# Patient Record
Sex: MALE | Race: WHITE | NOT HISPANIC OR LATINO | Employment: OTHER | URBAN - METROPOLITAN AREA
[De-identification: names, ages, dates, MRNs, and addresses within clinical notes are randomized per-mention and may not be internally consistent; named-entity substitution may affect disease eponyms.]

---

## 2017-02-16 ENCOUNTER — ALLSCRIPTS OFFICE VISIT (OUTPATIENT)
Dept: OTHER | Facility: OTHER | Age: 52
End: 2017-02-16

## 2017-02-16 DIAGNOSIS — M54.6 PAIN IN THORACIC SPINE: ICD-10-CM

## 2017-02-28 ENCOUNTER — APPOINTMENT (OUTPATIENT)
Dept: PHYSICAL THERAPY | Facility: CLINIC | Age: 52
End: 2017-02-28
Payer: OTHER MISCELLANEOUS

## 2017-02-28 DIAGNOSIS — M54.6 PAIN IN THORACIC SPINE: ICD-10-CM

## 2017-02-28 PROCEDURE — 97010 HOT OR COLD PACKS THERAPY: CPT

## 2017-02-28 PROCEDURE — 97163 PT EVAL HIGH COMPLEX 45 MIN: CPT

## 2017-03-01 ENCOUNTER — GENERIC CONVERSION - ENCOUNTER (OUTPATIENT)
Dept: OTHER | Facility: OTHER | Age: 52
End: 2017-03-01

## 2017-03-02 ENCOUNTER — APPOINTMENT (OUTPATIENT)
Dept: PHYSICAL THERAPY | Facility: CLINIC | Age: 52
End: 2017-03-02
Payer: OTHER MISCELLANEOUS

## 2017-03-02 PROCEDURE — 97110 THERAPEUTIC EXERCISES: CPT

## 2017-03-02 PROCEDURE — 97140 MANUAL THERAPY 1/> REGIONS: CPT

## 2017-03-07 ENCOUNTER — APPOINTMENT (OUTPATIENT)
Dept: PHYSICAL THERAPY | Facility: CLINIC | Age: 52
End: 2017-03-07
Payer: OTHER MISCELLANEOUS

## 2017-03-07 PROCEDURE — 97110 THERAPEUTIC EXERCISES: CPT

## 2017-03-07 PROCEDURE — 97140 MANUAL THERAPY 1/> REGIONS: CPT

## 2017-03-09 ENCOUNTER — APPOINTMENT (OUTPATIENT)
Dept: PHYSICAL THERAPY | Facility: CLINIC | Age: 52
End: 2017-03-09
Payer: OTHER MISCELLANEOUS

## 2017-03-14 ENCOUNTER — APPOINTMENT (OUTPATIENT)
Dept: PHYSICAL THERAPY | Facility: CLINIC | Age: 52
End: 2017-03-14
Payer: OTHER MISCELLANEOUS

## 2017-03-16 ENCOUNTER — APPOINTMENT (OUTPATIENT)
Dept: PHYSICAL THERAPY | Facility: CLINIC | Age: 52
End: 2017-03-16
Payer: OTHER MISCELLANEOUS

## 2017-04-07 ENCOUNTER — GENERIC CONVERSION - ENCOUNTER (OUTPATIENT)
Dept: OTHER | Facility: OTHER | Age: 52
End: 2017-04-07

## 2017-05-11 ENCOUNTER — ALLSCRIPTS OFFICE VISIT (OUTPATIENT)
Dept: OTHER | Facility: OTHER | Age: 52
End: 2017-05-11

## 2017-09-13 ENCOUNTER — GENERIC CONVERSION - ENCOUNTER (OUTPATIENT)
Dept: OTHER | Facility: OTHER | Age: 52
End: 2017-09-13

## 2017-10-03 ENCOUNTER — APPOINTMENT (EMERGENCY)
Dept: CT IMAGING | Facility: HOSPITAL | Age: 52
End: 2017-10-03
Payer: OTHER MISCELLANEOUS

## 2017-10-03 ENCOUNTER — HOSPITAL ENCOUNTER (EMERGENCY)
Facility: HOSPITAL | Age: 52
Discharge: HOME | End: 2017-10-03
Attending: EMERGENCY MEDICINE
Payer: OTHER MISCELLANEOUS

## 2017-10-03 VITALS
OXYGEN SATURATION: 95 % | HEIGHT: 68 IN | TEMPERATURE: 98.4 F | WEIGHT: 253 LBS | HEART RATE: 67 BPM | RESPIRATION RATE: 18 BRPM | BODY MASS INDEX: 38.34 KG/M2 | SYSTOLIC BLOOD PRESSURE: 127 MMHG | DIASTOLIC BLOOD PRESSURE: 85 MMHG

## 2017-10-03 DIAGNOSIS — R10.9 FLANK PAIN: Primary | ICD-10-CM

## 2017-10-03 DIAGNOSIS — R19.7 DIARRHEA: ICD-10-CM

## 2017-10-03 LAB
ALBUMIN SERPL BCP-MCNC: 3.4 G/DL (ref 3.5–5)
ALP SERPL-CCNC: 136 U/L (ref 46–116)
ALT SERPL W P-5'-P-CCNC: 55 U/L (ref 12–78)
ANION GAP SERPL CALCULATED.3IONS-SCNC: 6 MMOL/L (ref 4–13)
AST SERPL W P-5'-P-CCNC: 41 U/L (ref 5–45)
BASOPHILS # BLD AUTO: 0.03 THOUSANDS/ΜL (ref 0–0.1)
BASOPHILS NFR BLD AUTO: 1 % (ref 0–1)
BILIRUB SERPL-MCNC: 0.4 MG/DL (ref 0.2–1)
BUN SERPL-MCNC: 10 MG/DL (ref 5–25)
CALCIUM SERPL-MCNC: 8.6 MG/DL (ref 8.3–10.1)
CHLORIDE SERPL-SCNC: 104 MMOL/L (ref 100–108)
CO2 SERPL-SCNC: 29 MMOL/L (ref 21–32)
CREAT SERPL-MCNC: 0.9 MG/DL (ref 0.6–1.3)
EOSINOPHIL # BLD AUTO: 0.13 THOUSAND/ΜL (ref 0–0.61)
EOSINOPHIL NFR BLD AUTO: 2 % (ref 0–6)
ERYTHROCYTE [DISTWIDTH] IN BLOOD BY AUTOMATED COUNT: 14.8 % (ref 11.6–15.1)
GFR SERPL CREATININE-BSD FRML MDRD: 99 ML/MIN/1.73SQ M
GLUCOSE SERPL-MCNC: 138 MG/DL (ref 65–140)
HCT VFR BLD AUTO: 39.2 % (ref 36.5–49.3)
HGB BLD-MCNC: 13 G/DL (ref 12–17)
LIPASE SERPL-CCNC: 120 U/L (ref 73–393)
LYMPHOCYTES # BLD AUTO: 2.01 THOUSANDS/ΜL (ref 0.6–4.47)
LYMPHOCYTES NFR BLD AUTO: 31 % (ref 14–44)
MCH RBC QN AUTO: 27.1 PG (ref 26.8–34.3)
MCHC RBC AUTO-ENTMCNC: 33.2 G/DL (ref 31.4–37.4)
MCV RBC AUTO: 82 FL (ref 82–98)
MONOCYTES # BLD AUTO: 0.48 THOUSAND/ΜL (ref 0.17–1.22)
MONOCYTES NFR BLD AUTO: 7 % (ref 4–12)
NEUTROPHILS # BLD AUTO: 3.8 THOUSANDS/ΜL (ref 1.85–7.62)
NEUTS SEG NFR BLD AUTO: 59 % (ref 43–75)
NRBC BLD AUTO-RTO: 0 /100 WBCS
PLATELET # BLD AUTO: 243 THOUSANDS/UL (ref 149–390)
PMV BLD AUTO: 9.1 FL (ref 8.9–12.7)
POTASSIUM SERPL-SCNC: 4 MMOL/L (ref 3.5–5.3)
PROT SERPL-MCNC: 7.1 G/DL (ref 6.4–8.2)
RBC # BLD AUTO: 4.79 MILLION/UL (ref 3.88–5.62)
SODIUM SERPL-SCNC: 139 MMOL/L (ref 136–145)
WBC # BLD AUTO: 6.47 THOUSAND/UL (ref 4.31–10.16)

## 2017-10-03 PROCEDURE — 74176 CT ABD & PELVIS W/O CONTRAST: CPT

## 2017-10-03 PROCEDURE — 85025 COMPLETE CBC W/AUTO DIFF WBC: CPT | Performed by: EMERGENCY MEDICINE

## 2017-10-03 PROCEDURE — 80053 COMPREHEN METABOLIC PANEL: CPT | Performed by: EMERGENCY MEDICINE

## 2017-10-03 PROCEDURE — 36415 COLL VENOUS BLD VENIPUNCTURE: CPT | Performed by: EMERGENCY MEDICINE

## 2017-10-03 PROCEDURE — 96375 TX/PRO/DX INJ NEW DRUG ADDON: CPT

## 2017-10-03 PROCEDURE — 83690 ASSAY OF LIPASE: CPT | Performed by: EMERGENCY MEDICINE

## 2017-10-03 PROCEDURE — 96374 THER/PROPH/DIAG INJ IV PUSH: CPT

## 2017-10-03 PROCEDURE — 99285 EMERGENCY DEPT VISIT HI MDM: CPT

## 2017-10-03 PROCEDURE — 96361 HYDRATE IV INFUSION ADD-ON: CPT

## 2017-10-03 RX ORDER — SODIUM CHLORIDE 9 MG/ML
125 INJECTION, SOLUTION INTRAVENOUS CONTINUOUS
Status: DISCONTINUED | OUTPATIENT
Start: 2017-10-03 | End: 2017-10-03 | Stop reason: HOSPADM

## 2017-10-03 RX ORDER — TRAMADOL HYDROCHLORIDE 100 MG/1
100 CAPSULE ORAL 2 TIMES DAILY PRN
COMMUNITY
End: 2018-08-06

## 2017-10-03 RX ORDER — METHOCARBAMOL 750 MG/1
750 TABLET, FILM COATED ORAL 3 TIMES DAILY
COMMUNITY
End: 2018-08-06

## 2017-10-03 RX ORDER — PANTOPRAZOLE SODIUM 40 MG/1
20 TABLET, DELAYED RELEASE ORAL 2 TIMES DAILY
COMMUNITY

## 2017-10-03 RX ORDER — KETOROLAC TROMETHAMINE 30 MG/ML
30 INJECTION, SOLUTION INTRAMUSCULAR; INTRAVENOUS ONCE
Status: COMPLETED | OUTPATIENT
Start: 2017-10-03 | End: 2017-10-03

## 2017-10-03 RX ORDER — MORPHINE SULFATE 10 MG/ML
6 INJECTION, SOLUTION INTRAMUSCULAR; INTRAVENOUS ONCE
Status: COMPLETED | OUTPATIENT
Start: 2017-10-03 | End: 2017-10-03

## 2017-10-03 RX ORDER — SERTRALINE HYDROCHLORIDE 100 MG/1
150 TABLET, FILM COATED ORAL EVERY MORNING
COMMUNITY

## 2017-10-03 RX ORDER — TRAZODONE HYDROCHLORIDE 50 MG/1
75 TABLET ORAL
COMMUNITY

## 2017-10-03 RX ADMIN — KETOROLAC TROMETHAMINE 30 MG: 30 INJECTION, SOLUTION INTRAMUSCULAR at 18:54

## 2017-10-03 RX ADMIN — MORPHINE SULFATE 6 MG: 10 INJECTION, SOLUTION INTRAMUSCULAR; INTRAVENOUS at 18:53

## 2017-10-03 RX ADMIN — SODIUM CHLORIDE 125 ML/HR: 0.9 INJECTION, SOLUTION INTRAVENOUS at 19:00

## 2017-10-03 NOTE — ED PROVIDER NOTES
History  Chief Complaint   Patient presents with    Flank Pain     diarrhea 3 days ago accompanied with right sided flank pain  HPI    Prior to Admission Medications   Prescriptions Last Dose Informant Patient Reported? Taking? Multiple Vitamins-Minerals (CENTRUM SILVER 50+MEN PO)   Yes No   Sig: Centrum Silver TABS  TAKE 1 TABLET DAILY  Refills: 0      , M D ; Active   TraMADol HCl  MG CP24   Yes No   Sig: Take 100 mg by mouth 2 (two) times a day as needed   methocarbamol (ROBAXIN) 750 mg tablet   Yes No   Sig: Take 750 mg by mouth 3 (three) times a day   metoprolol tartrate (LOPRESSOR) 25 mg tablet   Yes No   Sig: Take 12 5 mg by mouth 2 (two) times a day   pantoprazole (PROTONIX) 40 mg tablet   Yes No   Sig: Take 40 mg by mouth daily at bedtime   sertraline (ZOLOFT) 100 mg tablet   Yes No   Sig: Take 50 mg by mouth daily   traZODone (DESYREL) 100 mg tablet   Yes No   Sig: Take 100 mg by mouth daily at bedtime      Facility-Administered Medications: None       Past Medical History:   Diagnosis Date    Hypertension        Past Surgical History:   Procedure Laterality Date    CERVICAL SPINE SURGERY      NECK SURGERY      SHOULDER SURGERY Right        History reviewed  No pertinent family history  I have reviewed and agree with the history as documented      Social History   Substance Use Topics    Smoking status: Never Smoker    Smokeless tobacco: Never Used    Alcohol use No        Review of Systems    Physical Exam  ED Triage Vitals [10/03/17 1817]   Temperature Pulse Respirations Blood Pressure SpO2   98 4 °F (36 9 °C) 76 18 127/85 95 %      Temp Source Heart Rate Source Patient Position - Orthostatic VS BP Location FiO2 (%)   Oral Monitor Sitting Left arm --      Pain Score       7           Physical Exam    ED Medications  Medications    EMS REPLENISHMENT MED (not administered)   sodium chloride 0 9 % infusion (125 mL/hr Intravenous New Bag 10/3/17 1900)   morphine (PF) 10 mg/mL injection 6 mg (6 mg Intravenous Given 10/3/17 1853)   ketorolac (TORADOL) 30 mg/mL injection 30 mg (30 mg Intravenous Given 10/3/17 1854)       Diagnostic Studies  Labs Reviewed   COMPREHENSIVE METABOLIC PANEL - Abnormal        Result Value Ref Range Status    Alkaline Phosphatase 136 (*) 46 - 116 U/L Final    Albumin 3 4 (*) 3 5 - 5 0 g/dL Final    Sodium 139  136 - 145 mmol/L Final    Potassium 4 0  3 5 - 5 3 mmol/L Final    Chloride 104  100 - 108 mmol/L Final    CO2 29  21 - 32 mmol/L Final    Anion Gap 6  4 - 13 mmol/L Final    BUN 10  5 - 25 mg/dL Final    Creatinine 0 90  0 60 - 1 30 mg/dL Final    Comment: Standardized to IDMS reference method    Glucose 138  65 - 140 mg/dL Final    Comment:   If the patient is fasting, the ADA then defines impaired fasting glucose as > 100 mg/dL and diabetes as > or equal to 123 mg/dL  Specimen collection should occur prior to Sulfasalazine administration due to the potential for falsely depressed results  Specimen collection should occur prior to Sulfapyridine administration due to the potential for falsely elevated results  Calcium 8 6  8 3 - 10 1 mg/dL Final    AST 41  5 - 45 U/L Final    Comment:   Specimen collection should occur prior to Sulfasalazine administration due to the potential for falsely depressed results  ALT 55  12 - 78 U/L Final    Comment:   Specimen collection should occur prior to Sulfasalazine administration due to the potential for falsely depressed results  Total Protein 7 1  6 4 - 8 2 g/dL Final    Total Bilirubin 0 40  0 20 - 1 00 mg/dL Final    eGFR 99  ml/min/1 73sq m Final    Narrative:     National Kidney Disease Education Program recommendations are as follows:  GFR calculation is accurate only with a steady state creatinine  Chronic Kidney disease less than 60 ml/min/1 73 sq  meters  Kidney failure less than 15 ml/min/1 73 sq  meters     CBC AND DIFFERENTIAL - Normal    WBC 6 47  4 31 - 10 16 Thousand/uL Final    RBC 4 79  3 88 - 5 62 Million/uL Final    Hemoglobin 13 0  12 0 - 17 0 g/dL Final    Hematocrit 39 2  36 5 - 49 3 % Final    MCV 82  82 - 98 fL Final    MCH 27 1  26 8 - 34 3 pg Final    MCHC 33 2  31 4 - 37 4 g/dL Final    RDW 14 8  11 6 - 15 1 % Final    MPV 9 1  8 9 - 12 7 fL Final    Platelets 967  016 - 390 Thousands/uL Final    nRBC 0  /100 WBCs Final    Neutrophils Relative 59  43 - 75 % Final    Lymphocytes Relative 31  14 - 44 % Final    Monocytes Relative 7  4 - 12 % Final    Eosinophils Relative 2  0 - 6 % Final    Basophils Relative 1  0 - 1 % Final    Neutrophils Absolute 3 80  1 85 - 7 62 Thousands/µL Final    Lymphocytes Absolute 2 01  0 60 - 4 47 Thousands/µL Final    Monocytes Absolute 0 48  0 17 - 1 22 Thousand/µL Final    Eosinophils Absolute 0 13  0 00 - 0 61 Thousand/µL Final    Basophils Absolute 0 03  0 00 - 0 10 Thousands/µL Final   LIPASE - Normal    Lipase 120  73 - 393 u/L Final   URINALYSIS WITH REFLEX TO MICROSCOPIC       CT renal stone study abdomen pelvis without contrast   Final Result      No obstructive uropathy or acute appendicitis in this patient with a history of right flank pain  No acute findings in the abdomen or the pelvis  Fatty infiltration of the partially imaged liver  Fat-containing bilateral inguinal hernias  ##[email or web address]##imslh                Workstation performed: [id number]             Procedures  Procedures      Phone Contacts  ED Phone Contact    ED Course  ED Course as of Oct 03 2117   Tue Oct 03, 2017   2114 Patient is stable for discharge  His lab work and CT scan are unremarkable  There is no evidence of kidney stone  Patient is comfortable at discharge  MDM  CritCare Time    Disposition  Final diagnoses:   Flank pain   Diarrhea     ED Disposition     ED Disposition Condition Comment    Discharge  Clydell Givens  discharge to home/self care      Condition at discharge: Stable        Follow-up Information     Follow up With Specialties Details Why Contact Info    Private physician  Schedule an appointment as soon as possible for a visit          Patient's Medications   Discharge Prescriptions    No medications on file     No discharge procedures on file      ED Provider  Electronically Signed by       Mike Biggs MD  10/03/17 9509

## 2017-10-03 NOTE — ED PROVIDER NOTES
History  Chief Complaint   Patient presents with    Flank Pain     diarrhea 3 days ago accompanied with right sided flank pain  Patient is a 79-year-old male  He presents to the emergency room with a 3 day history of right flank pain  It does not radiate  He denies injury  It is a sharp pain like being stabbed with a knife  It is worse with movement  He does have a history of lumbar disc disease  He has a history of spinal stenosis  He denies fever or chills  No history of IV drug use  He has no incontinence  There is no dysuria, frequency or hematuria  No difficulty urinating  No rash  No abdominal pain  He has had diarrhea for several days  There is no melena or hematochezia  There is no associated nausea and vomiting  The pain is severe  He was in a chair at home and could not get up  He needed to call an ambulance  He received fentanyl EN route  There is no associated motor or sensory complaints in the lower extremities  He has not had pain like this before  Prior to Admission Medications   Prescriptions Last Dose Informant Patient Reported? Taking? Multiple Vitamins-Minerals (CENTRUM SILVER 50+MEN PO)   Yes No   Sig: Centrum Silver TABS  TAKE 1 TABLET DAILY     Refills: 0      , M D ; Active   TraMADol HCl  MG CP24   Yes No   Sig: Take 100 mg by mouth 2 (two) times a day as needed   methocarbamol (ROBAXIN) 750 mg tablet   Yes No   Sig: Take 750 mg by mouth 3 (three) times a day   metoprolol tartrate (LOPRESSOR) 25 mg tablet   Yes No   Sig: Take 12 5 mg by mouth 2 (two) times a day   pantoprazole (PROTONIX) 40 mg tablet   Yes No   Sig: Take 40 mg by mouth daily at bedtime   sertraline (ZOLOFT) 100 mg tablet   Yes No   Sig: Take 50 mg by mouth daily   traZODone (DESYREL) 100 mg tablet   Yes No   Sig: Take 100 mg by mouth daily at bedtime      Facility-Administered Medications: None       Past Medical History:   Diagnosis Date    Hypertension        Past Surgical History:   Procedure Laterality Date    CERVICAL SPINE SURGERY      NECK SURGERY      SHOULDER SURGERY Right        History reviewed  No pertinent family history  I have reviewed and agree with the history as documented  Social History   Substance Use Topics    Smoking status: Never Smoker    Smokeless tobacco: Never Used    Alcohol use No        Review of Systems   Constitutional: Negative for chills and fever  HENT: Negative for rhinorrhea and sore throat  Eyes: Negative for pain, redness and visual disturbance  Respiratory: Negative for cough and shortness of breath  Cardiovascular: Negative for chest pain and leg swelling  Gastrointestinal: Negative for abdominal pain, diarrhea and vomiting  Endocrine: Negative for polydipsia and polyuria  Genitourinary: Positive for flank pain  Negative for dysuria, frequency and hematuria  Musculoskeletal: Positive for back pain and neck pain  Skin: Negative for rash and wound  Allergic/Immunologic: Negative for immunocompromised state  Neurological: Negative for weakness, numbness and headaches  Psychiatric/Behavioral: Negative for hallucinations and suicidal ideas  All other systems reviewed and are negative  Physical Exam  ED Triage Vitals [10/03/17 1817]   Temperature Pulse Respirations Blood Pressure SpO2   98 4 °F (36 9 °C) 76 18 127/85 95 %      Temp Source Heart Rate Source Patient Position - Orthostatic VS BP Location FiO2 (%)   Oral Monitor Sitting Left arm --      Pain Score       7           Physical Exam   Constitutional: He is oriented to person, place, and time  He appears well-developed and well-nourished  He appears distressed  HENT:   Head: Normocephalic and atraumatic  Eyes: Conjunctivae are normal  Right eye exhibits no discharge  Left eye exhibits no discharge  No scleral icterus  Neck: Normal range of motion  Neck supple  No tracheal deviation present     Cardiovascular: Normal rate, regular rhythm, normal heart sounds and intact distal pulses  Exam reveals no gallop and no friction rub  No murmur heard  Pulmonary/Chest: Effort normal and breath sounds normal  No respiratory distress  He has no wheezes  He has no rales  He exhibits no tenderness  Abdominal: Soft  Bowel sounds are normal  He exhibits no distension  There is no tenderness  There is no rebound and no guarding  Negative Peres sign  No pulsatile mass  Musculoskeletal: He exhibits tenderness  He exhibits no edema or deformity  Tenderness is localized to the right CVA area  There is pain with range of motion  There is no calf pain or unilateral leg swelling  Neurological: He is alert and oriented to person, place, and time  He has normal strength  No sensory deficit  GCS eye subscore is 4  GCS verbal subscore is 5  GCS motor subscore is 6  No saddle anesthesia  Skin: Skin is warm and dry  No rash noted  He is not diaphoretic  Psychiatric: He has a normal mood and affect  His behavior is normal    Vitals reviewed  ED Medications  Medications   morphine (PF) 10 mg/mL injection 6 mg (6 mg Intravenous Given 10/3/17 1853)   ketorolac (TORADOL) 30 mg/mL injection 30 mg (30 mg Intravenous Given 10/3/17 1854)       Diagnostic Studies  Labs Reviewed   COMPREHENSIVE METABOLIC PANEL - Abnormal        Result Value Ref Range Status    Alkaline Phosphatase 136 (*) 46 - 116 U/L Final    Albumin 3 4 (*) 3 5 - 5 0 g/dL Final    Sodium 139  136 - 145 mmol/L Final    Potassium 4 0  3 5 - 5 3 mmol/L Final    Chloride 104  100 - 108 mmol/L Final    CO2 29  21 - 32 mmol/L Final    Anion Gap 6  4 - 13 mmol/L Final    BUN 10  5 - 25 mg/dL Final    Creatinine 0 90  0 60 - 1 30 mg/dL Final    Comment: Standardized to IDMS reference method    Glucose 138  65 - 140 mg/dL Final    Comment:   If the patient is fasting, the ADA then defines impaired fasting glucose as > 100 mg/dL and diabetes as > or equal to 123 mg/dL    Specimen collection should occur prior to Sulfasalazine administration due to the potential for falsely depressed results  Specimen collection should occur prior to Sulfapyridine administration due to the potential for falsely elevated results  Calcium 8 6  8 3 - 10 1 mg/dL Final    AST 41  5 - 45 U/L Final    Comment:   Specimen collection should occur prior to Sulfasalazine administration due to the potential for falsely depressed results  ALT 55  12 - 78 U/L Final    Comment:   Specimen collection should occur prior to Sulfasalazine administration due to the potential for falsely depressed results  Total Protein 7 1  6 4 - 8 2 g/dL Final    Total Bilirubin 0 40  0 20 - 1 00 mg/dL Final    eGFR 99  ml/min/1 73sq m Final    Narrative:     National Kidney Disease Education Program recommendations are as follows:  GFR calculation is accurate only with a steady state creatinine  Chronic Kidney disease less than 60 ml/min/1 73 sq  meters  Kidney failure less than 15 ml/min/1 73 sq  meters     CBC AND DIFFERENTIAL - Normal    WBC 6 47  4 31 - 10 16 Thousand/uL Final    RBC 4 79  3 88 - 5 62 Million/uL Final    Hemoglobin 13 0  12 0 - 17 0 g/dL Final    Hematocrit 39 2  36 5 - 49 3 % Final    MCV 82  82 - 98 fL Final    MCH 27 1  26 8 - 34 3 pg Final    MCHC 33 2  31 4 - 37 4 g/dL Final    RDW 14 8  11 6 - 15 1 % Final    MPV 9 1  8 9 - 12 7 fL Final    Platelets 569  079 - 390 Thousands/uL Final    nRBC 0  /100 WBCs Final    Neutrophils Relative 59  43 - 75 % Final    Lymphocytes Relative 31  14 - 44 % Final    Monocytes Relative 7  4 - 12 % Final    Eosinophils Relative 2  0 - 6 % Final    Basophils Relative 1  0 - 1 % Final    Neutrophils Absolute 3 80  1 85 - 7 62 Thousands/µL Final    Lymphocytes Absolute 2 01  0 60 - 4 47 Thousands/µL Final    Monocytes Absolute 0 48  0 17 - 1 22 Thousand/µL Final    Eosinophils Absolute 0 13  0 00 - 0 61 Thousand/µL Final    Basophils Absolute 0 03  0 00 - 0 10 Thousands/µL Final   LIPASE - Normal    Lipase 120  73 - 393 u/L Final       CT renal stone study abdomen pelvis without contrast   Final Result      No obstructive uropathy or acute appendicitis in this patient with a history of right flank pain  No acute findings in the abdomen or the pelvis  Fatty infiltration of the partially imaged liver  Fat-containing bilateral inguinal hernias  ##[email or web address]##imslh                Workstation performed: [id number]             Procedures  Procedures      Phone Contacts  ED Phone Contact    ED Course  ED Course                                MDM  CritCare Time    Disposition  Final diagnoses:   Flank pain   Diarrhea     ED Disposition     ED Disposition Condition Comment    Discharge  Lissa Corona  discharge to home/self care  Condition at discharge: Stable        Follow-up Information     Follow up With Specialties Details Why [facility] physician  Schedule an appointment as soon as possible for a visit          Discharge Medication List as of 10/3/2017  9:17 PM      CONTINUE these medications which have NOT CHANGED    Details   methocarbamol (ROBAXIN) 750 mg tablet Take 750 mg by mouth 3 (three) times a day, Historical Med      metoprolol tartrate (LOPRESSOR) 25 mg tablet Take 12 5 mg by mouth 2 (two) times a day, Historical Med      Multiple Vitamins-Minerals (CENTRUM SILVER 50+MEN PO) Centrum Silver TABS  TAKE 1 TABLET DAILY  Refills: 0      , M D ; Active, Historical Med      pantoprazole (PROTONIX) 40 mg tablet Take 40 mg by mouth daily at bedtime, Historical Med      sertraline (ZOLOFT) 100 mg tablet Take 50 mg by mouth daily, Historical Med      TraMADol HCl  MG CP24 Take 100 mg by mouth 2 (two) times a day as needed, Historical Med      traZODone (DESYREL) 100 mg tablet Take 100 mg by mouth daily at bedtime, Historical Med           No discharge procedures on file      ED Provider  Electronically Signed by       Georges Rose MD  10/06/17 1340

## 2017-10-03 NOTE — ED NOTES
Patient transported to [address] [place] [address] [address] [place] [address], [address] [place] [address]  10/03/17 9632

## 2017-10-04 NOTE — DISCHARGE INSTRUCTIONS
Flank Pain   WHAT YOU NEED TO KNOW:   Flank pain is felt in the area below your ribcage and above your hip bones, often in the lower back  Your pain may be dull or so severe that you cannot get comfortable  The pain may stay in one area or radiate to another area  It may worsen and lighten in waves  Flank pain is often a sign of problems with your urinary tract, such as a kidney stone or infection  DISCHARGE INSTRUCTIONS:   Return to the emergency department if:   · You have a fever  · Your heart is fluttering or jumping  · You see blood in your urine  · Your pain radiates into your lower abdomen and genital area  · You have intense pain in your low back next to your spine  · You are much more tired than usual and have no desire to eat  · You have a headache and your muscles jerk  Contact your healthcare provider if:   · You have an upset stomach and are vomiting  · You have to urinate more often, and with urgency  · Your pain worsens or does not improve, and you cannot get comfortable  · You pass a stone when you urinate  · You have questions or concerns about your condition or care  Medicines: The following medicines may be ordered for you:  · Pain medicine  may help decrease or relieve your pain  Do not wait until the pain is severe before you take your medicine  · Antibiotics  may help treat a urinary tract infection caused by bacteria  · Take your medicine as directed  Contact your healthcare provider if you think your medicine is not helping or if you have side effects  Tell him of her if you are allergic to any medicine  Keep a list of the medicines, vitamins, and herbs you take  Include the amounts, and when and why you take them  Bring the list or the pill bottles to follow-up visits  Carry your medicine list with you in case of an emergency    Follow up with your healthcare provider in 1 to 2 days or as directed:  Write down your questions so you remember to ask them during your visits  © 2017 [address] [employer] Information is for End User's use only and may not be sold, redistributed or otherwise used for commercial purposes  All illustrations and images included in CareNotes® are the copyrighted property of [employer]  or Danilo Mathew  The above information is an  only  It is not intended as medical advice for individual conditions or treatments  Talk to your doctor, nurse or pharmacist before following any medical regimen to see if it is safe and effective for you  Flank Pain   AMBULATORY CARE:   Flank pain  is felt in the area below your ribcage and above your hip bones, often in the lower back  Your pain may be dull or so severe that you cannot get comfortable  The pain may stay in one area or radiate to another area  It may worsen and lighten in waves  Flank pain is often a sign of problems with your urinary tract, such as a kidney stone or infection  Seek care immediately if:   · You have a fever  · Your heart is fluttering or jumping  · You see blood in your urine  · Your pain radiates into your lower abdomen and genital area  · You have intense pain in your low back next to your spine  · You are much more tired than usual and have no desire to eat  · You have a headache and your muscles jerk  Contact your healthcare provider if:   · You have an upset stomach and are vomiting  · You have to urinate more often, and with urgency  · Your pain worsens or does not improve, and you cannot get comfortable  · You pass a stone when you urinate  · You have questions or concerns about your condition or care  Treatment for flank pain  may include medicine to decrease pain or treat a bacterial infection  Follow up with your healthcare provider as directed:  Write down your questions so you remember to ask them during your visits     © 2017 [employer] [employer] Information is for End User's use only and may not be sold, redistributed or otherwise used for commercial purposes  All illustrations and images included in CareNotes® are the copyrighted property of A [employer] M [employer]  or Danilo Mathew  The above information is an  only  It is not intended as medical advice for individual conditions or treatments  Talk to your doctor, nurse or pharmacist before following any medical regimen to see if it is safe and effective for you  Gastroenteritis   WHAT YOU NEED TO KNOW:   What is gastroenteritis? Gastroenteritis, or stomach flu, is an infection of the stomach and intestines  It is caused by bacteria, parasites, or viruses  What increases my risk for gastroenteritis? · Close contact with an infected person or animal    · Food poisoning, such as from eggs, raw vegetables, shellfish, or meat that is not fully cooked    · Drinking water that is not clean, such as when you camp or travel  What are the signs and symptoms of gastroenteritis? · Diarrhea or gas    · Nausea, vomiting, or poor appetite    · Abdominal cramps, pain, or gurgling    · Fever    · Tiredness or weakness    · Headaches or muscle aches with any of the above symptoms  How is gastroenteritis diagnosed and treated? Your healthcare provider will examine you  He will check for signs of dehydration  He will ask you how often you are vomiting or have diarrhea  You may need a blood or bowel movement sample tested for the germ causing your gastroenteritis  Gastroenteritis often clears up on its own  Medicines may be given to slow or stop your diarrhea or vomiting  You may also need medicines to treat an infection caused by bacteria or a parasite  How can I manage my gastroenteritis? · Drink liquids as directed  Ask your healthcare provider how much liquid to drink each day, and which liquids are best for you  You may also need to drink an oral rehydration solution (ORS)   An ORS has the right amounts of sugar, salt, and minerals in water to replace body fluids  · Eat bland foods  When you feel hungry, begin eating soft, bland foods  Examples are bananas, clear soup, potatoes, and applesauce  Do not have dairy products, alcohol, sugary drinks, or drinks with caffeine until you feel better  · Rest as much as possible  Slowly start to do more each day when you begin to feel better  How can I prevent gastroenteritis? Gastroenteritis can spread easily  Keep yourself, your family, and your surroundings clean to help prevent the spread of gastroenteritis:  · Wash your hands often  Use soap and water  Wash your hands after you use the bathroom, change a child's diapers, or sneeze  Wash your hands before you prepare or eat food  · Clean surfaces and do laundry often  Wash your clothes and towels separately from the rest of the laundry  Clean surfaces in your home with antibacterial  or bleach  · Clean food thoroughly and cook safely  Wash raw vegetables before you cook  Cook meat, fish, and eggs fully  Do not use the same dishes for raw meat as you do for other foods  Refrigerate any leftover food immediately  · Be aware when you camp or travel  Drink only clean water  Do not drink from rivers or lakes unless you purify or boil the water first  When you travel, drink bottled water and do not add ice  Do not eat fruit that has not been peeled  Do not eat raw fish or meat that is not fully cooked  Call 911 for any of the following:   · You have trouble breathing or a very fast pulse  When should I seek immediate care? · You see blood in your diarrhea  · You cannot stop vomiting  · You have not urinated for 12 hours  · You feel like you are going to faint  When should I contact my healthcare provider? · You have a fever  · You continue to vomit or have diarrhea, even after treatment  · You see worms in your diarrhea  · Your mouth or eyes are dry   You are not urinating as much or as often  · You have questions or concerns about your condition or care  CARE AGREEMENT:   You have the right to help plan your care  Learn about your health condition and how it may be treated  Discuss treatment options with your caregivers to decide what care you want to receive  You always have the right to refuse treatment  The above information is an  only  It is not intended as medical advice for individual conditions or treatments  Talk to your doctor, nurse or pharmacist before following any medical regimen to see if it is safe and effective for you  © 2017 [address] [facility] Information is for End User's use only and may not be sold, redistributed or otherwise used for commercial purposes  All illustrations and images included in CareNotes® are the copyrighted property of A D A M , Inc  or Danilo Mathew

## 2017-10-05 DIAGNOSIS — M54.50 LOW BACK PAIN: ICD-10-CM

## 2018-01-03 ENCOUNTER — LAB REQUISITION (OUTPATIENT)
Dept: LAB | Facility: HOSPITAL | Age: 53
End: 2018-01-03
Payer: OTHER GOVERNMENT

## 2018-01-03 DIAGNOSIS — L03.113 CELLULITIS OF RIGHT UPPER EXTREMITY: ICD-10-CM

## 2018-01-03 LAB
ALBUMIN SERPL BCP-MCNC: 4.2 G/DL (ref 3.5–5)
ALP SERPL-CCNC: 151 U/L (ref 46–116)
ALT SERPL W P-5'-P-CCNC: 75 U/L (ref 12–78)
ANION GAP SERPL CALCULATED.3IONS-SCNC: 7 MMOL/L (ref 4–13)
AST SERPL W P-5'-P-CCNC: 54 U/L (ref 5–45)
BASOPHILS # BLD AUTO: 0.03 THOUSANDS/ΜL (ref 0–0.1)
BASOPHILS NFR BLD AUTO: 1 % (ref 0–1)
BILIRUB SERPL-MCNC: 0.3 MG/DL (ref 0.2–1)
BUN SERPL-MCNC: 12 MG/DL (ref 5–25)
CALCIUM SERPL-MCNC: 8.8 MG/DL (ref 8.3–10.1)
CHLORIDE SERPL-SCNC: 102 MMOL/L (ref 100–108)
CO2 SERPL-SCNC: 31 MMOL/L (ref 21–32)
CREAT SERPL-MCNC: 1.07 MG/DL (ref 0.6–1.3)
EOSINOPHIL # BLD AUTO: 0.2 THOUSAND/ΜL (ref 0–0.61)
EOSINOPHIL NFR BLD AUTO: 5 % (ref 0–6)
ERYTHROCYTE [DISTWIDTH] IN BLOOD BY AUTOMATED COUNT: 13.3 % (ref 11.6–15.1)
ERYTHROCYTE [SEDIMENTATION RATE] IN BLOOD: 13 MM/HOUR (ref 0–10)
GFR SERPL CREATININE-BSD FRML MDRD: 81 ML/MIN/1.73SQ M
GLUCOSE SERPL-MCNC: 122 MG/DL (ref 65–140)
HCT VFR BLD AUTO: 41 % (ref 36.5–49.3)
HGB BLD-MCNC: 13.7 G/DL (ref 12–17)
LYMPHOCYTES # BLD AUTO: 1.23 THOUSANDS/ΜL (ref 0.6–4.47)
LYMPHOCYTES NFR BLD AUTO: 28 % (ref 14–44)
MCH RBC QN AUTO: 28.7 PG (ref 26.8–34.3)
MCHC RBC AUTO-ENTMCNC: 33.4 G/DL (ref 31.4–37.4)
MCV RBC AUTO: 86 FL (ref 82–98)
MONOCYTES # BLD AUTO: 0.45 THOUSAND/ΜL (ref 0.17–1.22)
MONOCYTES NFR BLD AUTO: 10 % (ref 4–12)
NEUTROPHILS # BLD AUTO: 2.47 THOUSANDS/ΜL (ref 1.85–7.62)
NEUTS SEG NFR BLD AUTO: 56 % (ref 43–75)
PLATELET # BLD AUTO: 264 THOUSANDS/UL (ref 149–390)
PMV BLD AUTO: 10.1 FL (ref 8.9–12.7)
POTASSIUM SERPL-SCNC: 4.7 MMOL/L (ref 3.5–5.3)
PROT SERPL-MCNC: 7.8 G/DL (ref 6.4–8.2)
RBC # BLD AUTO: 4.78 MILLION/UL (ref 3.88–5.62)
SODIUM SERPL-SCNC: 140 MMOL/L (ref 136–145)
WBC # BLD AUTO: 4.38 THOUSAND/UL (ref 4.31–10.16)

## 2018-01-03 PROCEDURE — 85652 RBC SED RATE AUTOMATED: CPT | Performed by: INTERNAL MEDICINE

## 2018-01-03 PROCEDURE — 86140 C-REACTIVE PROTEIN: CPT | Performed by: INTERNAL MEDICINE

## 2018-01-03 PROCEDURE — 85025 COMPLETE CBC W/AUTO DIFF WBC: CPT | Performed by: INTERNAL MEDICINE

## 2018-01-03 PROCEDURE — 80053 COMPREHEN METABOLIC PANEL: CPT | Performed by: INTERNAL MEDICINE

## 2018-01-04 LAB — CRP SERPL QL: 7.7 MG/L

## 2018-01-12 VITALS
WEIGHT: 255 LBS | HEART RATE: 62 BPM | DIASTOLIC BLOOD PRESSURE: 82 MMHG | SYSTOLIC BLOOD PRESSURE: 120 MMHG | HEIGHT: 67 IN | BODY MASS INDEX: 40.02 KG/M2

## 2018-01-12 NOTE — RESULT NOTES
Message   Recorded as Task   Date: 04/07/2016 02:11 PM, Created By: Gisselle Mcclendon   Task Name: Follow Up   Assigned To: ANTONIETA Paul   Regarding Patient: Tila Garcia, Status: Active   Comment:    Karina Rogers - 07 Apr 2016 2:11 PM     TASK CREATED  Other  ASA Hold form faxed to Dr Dieter Suarez of [address] [place] [address] on 4/6/16  ASA hold approval received dated 4/6/16 from Dr Dieter Suarez of the [place]  Anticoag form needs to be scanned  PORTILLO [facility] w/ Dr Ethan McNehemiah - 07 Apr 2016 2:48 PM     TASK REPLIED TO: Previously Assigned To [employer] with pt and scheduled for PORTILLO 4-22-16  Preprocedure instructions reviewed with pt-NPO 1hr prior,needs ,aware to call with illness/antibiotics and aware to start holding aspirin on 4-16-16 and hold x 6 days  Verbalizes understanding  Signatures   Electronically signed by :  Jethro Tolbert, ; Apr 7 2016  2:48PM EST                       (Author)

## 2018-01-14 VITALS — HEART RATE: 58 BPM | DIASTOLIC BLOOD PRESSURE: 76 MMHG | SYSTOLIC BLOOD PRESSURE: 115 MMHG

## 2018-01-15 NOTE — MISCELLANEOUS
Message   Recorded as Task   Date: 06/07/2016 04:28 PM, Created By: Shanda Kevin   Task Name: Follow Up   Assigned To: [facility],TEAM   Regarding Patient: Blue Casey, Status: Active   CommentAlysha Acuña - 07 Jun 2016 4:28 PM     TASK CREATED  Received call from patient requesting to reschedule his procedure that is scheduled for this Thursday, June 9 to next Thursday, June 16 - patient on Aspirin - please call patient at [phone] - thank you   Karina Rogers - 08 Jun 2016 9:26 AM     TASK EDITED  S/W pt who needs to R/S his PORTILLO #2 from 6/9 to 6/16 b/c of his son's HS graduation  Pt had stopped his ASA 81mg on 6/2-6/6 and restarted it on 6/7  Told pt I would need permission from his [facility] who manages his ASA if ok to restart for 3 days and then stop again for 6 days if we R/S him to 6/16/16  [facility] Dr Alexa Beckman, pt advised to s/w his Dr Pilar Jade nurse Magaly Lopez  S/W Magaly Lopez RN and explained situation will fax a new ASA hold form with note explaining which days pt already held the ASA, which days he resumed and which days he needs to hold again if we R/S inj to 6/16/16  Delia Serrano will have covering doctor today review and make a decision, she will try and get form faxed back to us today or tomorrow  [place] phone # [phone], press "0" and then ask to s/w Magaly Lopez RN  [place] fax # [phone]    Pt tent sched for 6/16/16 at 3:30, pt understands we need to await reply from the [place] doctor and if not approved would need to cx the proced we tent sched for 6/16/16  Karina Rogers - 09 Jun 2016 9:05 AM     TASK EDITED  Received updated ASA hold approval from Dr Pilar Jade office at the [facility] dated 6/8/16  **form needs to be scanned into chart  **    Left vm for pt on his home & cell to c/b     Karina Rogers - 09 Jun 2016 9:18 AM     TASK EDITED  S/W pt and informed that we can move forward with his R/S inj for 6/16/16 and that the [place]  approved the holding of the ASA for the orig 5 days and then restarting for 3 days and then stopping again for the R/S inj on 6/16/16  Pt aware of other pre-procedure instr as before  Active Problems    1  Bilateral occipital neuralgia (723 8) (M54 81)   2  Cervical post-laminectomy syndrome (722 81) (M96 1)   3  Cervical radiculopathy (723 4) (M54 12)   4  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) (M47 812)   5  Chronic lumbar pain (724 2,338 29) (M54 5,G89 29)   6  Chronic pain syndrome (338 4) (G89 4)   7  Chronic thoracic spine pain (724 1,338 29) (M54 6,G89 29)   8  Degenerative cervical spinal stenosis (723 0) (M48 02)   9  Diabetes (250 00) (E11 9)   10  Displacement of cervical intervertebral disc without myelopathy (722 0) (M50 20)   11  Failed neck syndrome (722 81) (M96 1)   12  Myofascial muscle pain (729 1) (M79 1)   13  Neck sprain and strain (847 0) (S13  9XXA,S16  1XXA)   14  Polyarthralgia (719 49) (M25 50)   15  Strain of thoracic region, sequela (905 7) (S29 019S)    Current Meds   1  Aspirin 81 MG TABS; Take 1 tablet daily; Therapy: (Recorded:15Mar2016) to Recorded   2  Centrum Silver TABS; TAKE 1 TABLET DAILY; Therapy: (Recorded:[id number]) to Recorded   3  MetFORMIN HCl - 500 MG Oral Tablet; Take 1 tablet twice daily; Therapy: (Recorded:15Mar2016) to Recorded   4  Methocarbamol 750 MG Oral Tablet (Robaxin-750); TAKE 1 TABLET 3 times daily PRN    Requested for: [id number]; Last Rx:[id number] Ordered   5  Metoprolol Tartrate 25 MG Oral Tablet; TAKE 0 5 TABLET Twice daily; Therapy: (Recorded:15Mar2016) to Recorded   6  Pantoprazole Sodium 40 MG Oral Tablet Delayed Release; TAKE 1 TABLET Bedtime; Therapy: (Recorded:[id number]) to Recorded   7  Sertraline HCl - 100 MG Oral Tablet; TAKE 1 AND 1/2 TABLETS DAILY; Therapy: (Recorded:13Odl8080) to Recorded   8  TraMADol HCl  MG Oral Tablet Extended Release 24 Hour (Ultram ER); TAKE 1   TABLET Twice daily PRN; Last Rx:15Mar2016 Ordered   9   TraZODone HCl - 50 MG Oral Tablet; TAKE 1 5 TABLET Bedtime; Therapy: (Recorded:15Mar2016) to Recorded    Allergies    1   Penicillin V Potassium TABS    Signatures   Electronically signed by : Lizz Naqvi, ; Jun 9 2016  9:19AM EST                       (Author)

## 2018-01-15 NOTE — MISCELLANEOUS
Message   Recorded as Task   Date: 06/23/2016 11:02 AM, Created By: Gilmer Grant   Task Name: Follow Up   Assigned To: [address] ,Team   Regarding Patient: Ema Lee, Status: Active   Comment:    Skyla West - 23 Jun 2016 11:02 AM     TASK CREATED  Pt is S/P PORTILLO #2 on 6/16/16 by Dr Graciela Stratton  No f/u scheduled   Skyla West - 23 Jun 2016 1:17 PM     TASK EDITED  1st attempt to reach pt  LM for return call  Skyla West - 29 Jun 2016 10:29 AM     TASK EDITED  2nd attempt to reach pt  LM for return call  Skyla West - 05 Jul 2016 8:06 AM     TASK EDITED  Please send a cannot reach you letter  letter sent      Active Problems    1  Bilateral occipital neuralgia (723 8) (M54 81)   2  Cervical post-laminectomy syndrome (722 81) (M96 1)   3  Cervical radiculopathy (723 4) (M54 12)   4  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) (M47 812)   5  Chronic lumbar pain (724 2,338 29) (M54 5,G89 29)   6  Chronic pain syndrome (338 4) (G89 4)   7  Chronic thoracic spine pain (724 1,338 29) (M54 6,G89 29)   8  Degenerative cervical spinal stenosis (723 0) (M48 02)   9  Diabetes (250 00) (E11 9)   10  Displacement of cervical intervertebral disc without myelopathy (722 0) (M50 20)   11  Failed neck syndrome (722 81) (M96 1)   12  Myofascial muscle pain (729 1) (M79 1)   13  Neck sprain and strain (847 0) (S13  9XXA,S16  1XXA)   14  Polyarthralgia (719 49) (M25 50)   15  Strain of thoracic region, sequela (905 7) (S29 019S)    Current Meds   1  Aspirin 81 MG TABS; Take 1 tablet daily; Therapy: (Recorded:15Mar2016) to Recorded   2  Centrum Silver TABS; TAKE 1 TABLET DAILY; Therapy: (Recorded:[id number]) to Recorded   3  MetFORMIN HCl - 500 MG Oral Tablet; Take 1 tablet twice daily; Therapy: (Recorded:15Mar2016) to Recorded   4  Methocarbamol 750 MG Oral Tablet (Robaxin-750); TAKE 1 TABLET 3 times daily PRN    Requested for: [id number]; Last Rx:16Jun2016 Ordered   5   Metoprolol Tartrate 25 MG Oral Tablet; TAKE 0  5 TABLET Twice daily; Therapy: (Recorded:15Mar2016) to Recorded   6  Pantoprazole Sodium 40 MG Oral Tablet Delayed Release; TAKE 1 TABLET Bedtime; Therapy: (Recorded:12Dec2015) to Recorded   7  Sertraline HCl - 100 MG Oral Tablet; TAKE 1 AND 1/2 TABLETS DAILY; Therapy: (Recorded:12Dec2015) to Recorded   8  TraMADol HCl  MG Oral Tablet Extended Release 24 Hour (Ultram ER); TAKE 1   TABLET Twice daily PRN; Last Rx:16Jun2016 Ordered   9  TraZODone HCl - 50 MG Oral Tablet; TAKE 1 5 TABLET Bedtime; Therapy: (Recorded:15Mar2016) to Recorded    Allergies    1   Penicillin V Potassium TABS    Signatures   Electronically signed by : Justin Carroll, ; Jul 5 2016 10:08AM EST                       (Author)

## 2018-01-16 NOTE — MISCELLANEOUS
Message   Recorded as Task   Date: 04/27/2016 02:01 PM, Created By: Bessie Almonte   Task Name: Follow Up   Assigned To: [facility],[employer]   Regarding Patient: Demarcus Castro, Status: In Progress   Comment:    Skyla West - 27 Apr 2016 2:01 PM     TASK CREATED  Pt is S/P PORTILLO on 4/22/16 by Dr Johnie Adams  No f/u scheduled   Ernestina Crain - 28 Apr 2016 10:41 AM     TASK EDITED  1st attempt to contact pt, no answer  LM for pt to CB  Skyla West - 15 May 2016 11:17 AM     TASK EDITED  Spoke with pt who reports 25% relief from inj  Current level of pain 5/10, about the same as prior  Pain located in neck radiating into B/L UE  Pt mentioned something about Dr Johnie Adams telling him he will most likely need surgery but he has not heard from anyone  Pt saw Dr Devin Moreno in the past, advised pt to give his office a call for an appt  Pt interested in a repeat inj to see if there would be any additional relief  Bobbi López - 32 May 2016 4:08 PM     TASK REPLIED TO: Previously Assigned To Bobbi López md aware   ok to schedule repeat injection   Skyla West - 04 May 2016 7:56 AM     TASK REASSIGNED: Previously Assigned To ANTONIETA Workman - 04 May 2016 12:35 PM     TASK REASSIGNED: Previously Assigned To [address] surgery sched,Team   Shira Enciso - 04 May 2016 1:53 PM     TASK EDITED  Left message for patient to call back   [facility] 04 May 2016 1:53 PM     TASK IN [facility] - 09 May 2016 10:07 AM     TASK EDITED  Left message for patient to call back   Faisal Galdamez - 10 May 2016 11:40 AM     TASK EDITED  While speaking with patient to schedule repeat PORTILLO - patient on Aspirin - advised patient need to obtain hold and we will call to schedule - patient aware and agreed    Faxed Aspirin hold to Dr Tae Mosley at the [facility] in [place] (fax [phone]) (ph # [phone] ext [phone])    Forwarding task to nurse   [address] [place] [address] - 10 May 2016 12:52 PM     TASK REASSIGNED: Previously Assigned To ANTONIETA Jonas - 12 May 2016 9:29 AM     TASK EDITED  Received consent to hold ASA but no consent checked off by the physician  Form faxed again to physician for consent  Karina Rogers - 23 May 2016 3:07 PM     TASK EDITED  ASA hold approval received and is signed & dated by Dr Ellyn Nelson  *Form needs to be scanned into chart  *    Left vm on pt's home & cell to c/b to schedule inj  Karina Rogers - 25 May 2016 10:02 AM     TASK EDITED  Pt called to schedule inj  Pt said he currently has a head cold so we sched PORTILLO #2 for 6/9/16 at 3:30  Instr reviewed: light lunch allowed then NPO 1 Hr prior to opro,  needed, c/b needed if sick/abx started prior to opr  ASA to be held for 6 days prior to inj, no NSAIDS for 4 days prior to inj  Pt verbalized understanding of all instr  Active Problems    1  Bilateral occipital neuralgia (723 8) (M54 81)   2  Cervical post-laminectomy syndrome (722 81) (M96 1)   3  Cervical radiculopathy (723 4) (M54 12)   4  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) (M47 812)   5  Chronic lumbar pain (724 2,338 29) (M54 5,G89 29)   6  Chronic pain syndrome (338 4) (G89 4)   7  Chronic thoracic spine pain (724 1,338 29) (M54 6,G89 29)   8  Degenerative cervical spinal stenosis (723 0) (M48 02)   9  Diabetes (250 00) (E11 9)   10  Displacement of cervical intervertebral disc without myelopathy (722 0) (M50 20)   11  Failed neck syndrome (722 81) (M96 1)   12  Myofascial muscle pain (729 1) (M79 1)   13  Neck sprain and strain (847 0) (S13  9XXA,S16  1XXA)   14  Polyarthralgia (719 49) (M25 50)   15  Strain of thoracic region, sequela (905 7) (S29 019S)    Current Meds   1  Aspirin 81 MG TABS; Take 1 tablet daily; Therapy: (Recorded:97Cts9117) to Recorded   2  Centrum Silver TABS; TAKE 1 TABLET DAILY; Therapy: (Recorded:11Wmd3361) to Recorded   3  MetFORMIN HCl - 500 MG Oral Tablet;  Take 1 tablet twice daily; Therapy: (Recorded:15Mar2016) to Recorded   4  Methocarbamol 750 MG Oral Tablet (Robaxin-750); TAKE 1 TABLET 3 times daily PRN    Requested for: [id number]; Last Rx:[id number] Ordered   5  Metoprolol Tartrate 25 MG Oral Tablet; TAKE 0 5 TABLET Twice daily; Therapy: (Recorded:15Mar2016) to Recorded   6  Pantoprazole Sodium 40 MG Oral Tablet Delayed Release; TAKE 1 TABLET Bedtime; Therapy: (Recorded:12Dec2015) to Recorded   7  Sertraline HCl - 100 MG Oral Tablet; TAKE 1 AND 1/2 TABLETS DAILY; Therapy: (Recorded:12Dec2015) to Recorded   8  TraMADol HCl  MG Oral Tablet Extended Release 24 Hour (Ultram ER); TAKE 1   TABLET Twice daily PRN; Last Rx:15Mar2016 Ordered   9  TraZODone HCl - 50 MG Oral Tablet; TAKE 1 5 TABLET Bedtime; Therapy: (Recorded:15Mar2016) to Recorded    Allergies    1   Penicillin V Potassium TABS    Signatures   Electronically signed by : Juan Daniel Kuhn, ; May 25 2016 10:02AM EST                       (Author)

## 2018-01-17 NOTE — PROGRESS NOTES
Preliminary Nursing Report                Patient Information    Initial Encounter Entry Date:   10/7/2016 10:42 AM EST (Automated Transmission Automated Transmission)       Last Modified:   {Maty Bravo}              Legal Name: [address] Number:        YOB: 1965        Age (years): 54        Gender: M        Body Mass Index (BMI): 2 kg/m2        Height: 68 in  Weight: 15 88 lbs (7 kgs)           Address:   [address] [place] [address]              Phone: [phone]   (consent to leave messages)        Email:        Ethnicity: Decline to State        Congregational:        Marital Status:        Preferred Language: English        Race: Other Race                    Patient Insurance Information        Primary Insurance Information Carrier Name: {Primary  CarrierName}           Carrier Address:   {Primary  CarrierAddress}              Carrier Phone: {Primary  CarrierPhone}          Group Number: {Primary  GroupNumber}          Policy Number: {Primary  PolicyNumber}          Insured Name: {Primary  InsuredName}          Insured : {Primary  InsuredDOB}          Relationship to Insured: {Primary  RelationshiptoInsured}           Secondary Insurance Information Carrier Name: {Secondary  CarrierName}           Carrier Address:   {Secondary  CarrierAddress}              Carrier Phone: {Secondary  CarrierPhone}          Group Number: {Secondary  GroupNumber}          Policy Number: {Secondary  PolicyNumber}          Insured Name: {Secondary  InsuredName}          Insured : {Secondary  InsuredDOB}          Relationship to Insured: {Secondary  RelationshiptoInsured}                       Health Profile   Booking #:   Apollo Rodrigues #: [id number]               DOS: 2016    Surgery : NECK SPINE FUSION       Add'l Procedures/Notes:     Surgery Risk: Intermediate          Precautions     BMI                   Allergies    Penicillin V Potassium TABS       Clinical Comments: Reaction Type: , Reaction: , Severity:              Medications    Aspirin 81 MG TABS       Centrum Silver TABS       Methocarbamol 750 MG Oral Tablet       Metoprolol Tartrate 25 MG Oral Tablet       Pantoprazole Sodium 40 MG Oral Tablet Delayed Release       Sertraline HCl - 100 MG Oral Tablet       TraMADol HCl  MG Oral Tablet Extended Release 24 Hour       TraZODone HCl - 50 MG Oral Tablet               Conditions    Bilateral occipital neuralgia       Cervical post-laminectomy syndrome       Cervical radiculopathy       Cervical spine degeneration       Cervical spondylosis with myelopathy       Chronic lumbar pain       Chronic pain syndrome       Chronic thoracic spine pain       Degenerative cervical spinal stenosis       Diabetes       Failed neck syndrome       Myofascial muscle pain       Neck sprain and strain       Other displacement of cervical disc       Polyarthralgia       Status post cervical spinal fusion       Strain of thoracic region, sequela               Family History    None             Surgical History    None             Social History    Disabled              Never a smoker       No alcohol use       Retired                               Patient Instructions       ? NPO Instructions   The day before surgery it is recommended to have a light dinner at your usual time and you are allowed a light snack early in the evening  Do not eat anything heavy or eat a big meal after 7pm  Do not eat or drink anything after midnight prior to your surgery  If you are supposed to take any of your medications, do so with a sip of water  Failure to follow these instructions can lead to an increased risk of lung complications and may result in a delay or cancellation of your procedure  If you have any questions, contact your institution for further instructions  No candy, no gum, no mints, no chewing tobacco   Triggered by: Medical Procedure Risk         ?  Aspirin (Blood Thinners) 112, 104, 105, 114, 113, 103, 110, 107, 108, 109, 111, 106  Please continue to take this medication on your normal schedule  If this is an oral medication and you take in the morning, you may do so with a sip of water  However, your surgeon may have you stop aspirin up to a week early if you are having intracranial, middle ear, posterior eye, spine surgery or prostate surgery  Triggered by: Aspirin 81 MG TABS         ? Beta Blocker 3, 2, c, 4, 6, 5  Please continue to take this medication on your normal schedule  If this is an oral medication and you take in the morning, you may do so with a sip of water  Triggered by: Metoprolol Tartrate 25 MG Oral Tablet         ? Diabetic Medication   Please decrease your morning insulin dose to one-half of normal dose  If you are taking oral diabetes medications, the morning dose should be omitted  If taking metformin (Glucophage), discontinue the medication for 24 hours prior to surgery  If you have an insulin pump, continue at a basal rate only  Triggered by: Diabetes         ? Opioids (Pain Medication) 62  Please continue the following medications, if needed, up to and including the day of surgery (with a sip of water)  Triggered by: TraMADol HCl  MG Oral Tablet Extended Release 24 Hour         ? SSRI (Antidepressants) 80  Please continue to take this medication on your normal schedule  If this is an oral medication and you take in the morning, you may do so with a sip of water  Triggered by: Sertraline HCl - 100 MG Oral Tablet, , TraZODone HCl - 50 MG Oral Tablet               Testing Considerations       ? Basic Metabolic Panel (BMP) t  If test was completed and normal within last six months, repeat test is not necessary  Triggered by: Diabetes         ? Blood Glucose on Day of Surgery t  Please check the blood sugar on the morning of surgery  Triggered by: Diabetes         ?  Complete Blood Count (CBC) t, client, client  If test was completed and normal within last six months, repeat test is not necessary  Triggered by: Age or Facility Rec         ? Electrocardiogram (ECG) t  Patient does not need new test if normal ECG is present within the last six months and no change in clinical condition  Triggered by: Diabetes         ? Hemoglobin A1c (HbA1c) client  If test was completed and normal within the last three months, repeat test is not necessary  Triggered by: Diabetes, Age or Facility Rec         ? Type and Screen client  Type and Screen - Blood: If there is anticipated or possible large blood loss with this procedure, then a Type and Screen for Blood should be ordered  Triggered by: Age or Facility Rec               Consultations       No recommendations for this classification  Miscellaneous Questions         Question: Are you able to walk up a flight of stairs, walk up a hill or do heavy housework WITHOUT having chest pain or shortness of breath? Answer: YES                   Allergies/Conditions/Medications Not Found        The following were not recognized by our system when generating the recommendations  Please consider if this would impact any preoperative protocols  ? Disabled       ?        ? Never a smoker       ? No alcohol use       ? Retired       ? Centrum Silver TABS       ? Pantoprazole Sodium 40 MG Oral Tablet Delayed Release                  Appointment Information         Date:    11/07/2016        Location:    [place]        Address:           Directions:                      Footnotes revision 14      ?? Denotes a free-text entry  Legal Disclaimer: Any and all recommendations and services provided herein are designed to assist in the preoperative care of the patient  Nothing contained herein is designed to replace, eliminate or alleviate the responsibility of the attending physician to supervise and determine the patient?s preoperative care and course of treatment   Failure to provide complete, accurate information may negatively impact the system?s ability to recommend the proper preoperative protocol  THE ATTENDING PHYSICIAN IS RESPONSIBLE TO REVIEW THE SUGGESTED PREOPERATIVE PROTOCOLS/COURSE OF TREATMENT AND PRESCRIBE THE FINAL COURSE OF PREOPERATIVE TREATMENT IN CONSULTATION WITH THE PATIENT  THE ePREOP SYSTEM AND ITS MATERIALS ARE PROVIDED ? AS IS? WITHOUT WARRANTY OF ANY KIND, EXPRESS OR IMPLIED, INCLUDING, BUT NOT LIMITED TO, WARRANTIES OF PERFORMANCE OR MERCHANTABILITY OR FITNESS FOR A PARTICULAR PURPOSE  PATIENT AND PHYSICIANS HEREBY AGREE THAT THEIR USE OF THE MATERIALS AND RESOURCES ACT AS A CONSENT TO RELEASE AND WAIVE ePREOP FROM ANY AND ALL CLAIMS OF WARRANTY, TORT OR CONTRACT LAW OF ANY KIND  Electronically signed by:Kelton ROBERTSON    Oct  7 2016  1:03PM EST

## 2018-01-22 VITALS
HEART RATE: 72 BPM | DIASTOLIC BLOOD PRESSURE: 68 MMHG | BODY MASS INDEX: 39.39 KG/M2 | SYSTOLIC BLOOD PRESSURE: 118 MMHG | HEIGHT: 67 IN | WEIGHT: 251 LBS

## 2018-08-01 RX ORDER — CYCLOBENZAPRINE HCL 10 MG
1 TABLET ORAL 3 TIMES DAILY PRN
COMMUNITY
Start: 2017-10-11 | End: 2018-12-10 | Stop reason: SDUPTHER

## 2018-08-06 ENCOUNTER — OFFICE VISIT (OUTPATIENT)
Dept: NEUROLOGY | Facility: CLINIC | Age: 53
End: 2018-08-06
Payer: OTHER MISCELLANEOUS

## 2018-08-06 VITALS
HEART RATE: 60 BPM | SYSTOLIC BLOOD PRESSURE: 108 MMHG | DIASTOLIC BLOOD PRESSURE: 72 MMHG | WEIGHT: 250 LBS | BODY MASS INDEX: 37.89 KG/M2 | HEIGHT: 68 IN

## 2018-08-06 DIAGNOSIS — G89.29 CHRONIC THORACIC SPINE PAIN: ICD-10-CM

## 2018-08-06 DIAGNOSIS — M96.1 CERVICAL POST-LAMINECTOMY SYNDROME: ICD-10-CM

## 2018-08-06 DIAGNOSIS — M77.12 BILATERAL TENNIS ELBOW: Primary | ICD-10-CM

## 2018-08-06 DIAGNOSIS — M54.6 CHRONIC THORACIC SPINE PAIN: ICD-10-CM

## 2018-08-06 DIAGNOSIS — M77.11 BILATERAL TENNIS ELBOW: Primary | ICD-10-CM

## 2018-08-06 PROBLEM — M54.12 CERVICAL RADICULOPATHY: Status: ACTIVE | Noted: 2018-08-06

## 2018-08-06 PROCEDURE — 99214 OFFICE O/P EST MOD 30 MIN: CPT | Performed by: PSYCHIATRY & NEUROLOGY

## 2018-08-06 NOTE — PROGRESS NOTES
Progress Note - Neurology   Margret Omalley  48 y o  male MRN: [id number]  Unit/Bed#:  Encounter: [id number]      Subjective:   Patient is here for a follow-up visit with a history of chronic neck and low back pain, failed neck syndrome, cervical radiculopathy, and was last seen by me in September of last year subsequent to which she underwent a right shoulder replacement, at the [facility] from which she had significant complications including sepsis, requiring revision, and is now followed up at the [facility] after 6 weeks of IV antibiotics and is to go for shoulder replacement next week  Patient continues to have neck and midback pain, and recently also has been experiencing paresthesias along the lateral aspect of the left elbow has had limited movement of the right hand, and denies any weakness  He also complains of pain in the lateral aspect of the elbows bilaterally  ROS:   Review of Systems   HENT: Negative  Eyes: Negative  Respiratory: Negative  Cardiovascular: Negative  Gastrointestinal: Negative  Endocrine: Negative  Genitourinary: Negative  Musculoskeletal: Positive for back pain and neck pain  Skin: Negative  Allergic/Immunologic: Negative  Neurological: Positive for weakness, light-headedness and headaches  Hematological: Negative  Psychiatric/Behavioral: Positive for sleep disturbance  Vitals:   Vitals:    08/06/18 0852   BP: 108/72   Pulse: 60   ,Body mass index is 38 01 kg/m²      MEDS:      Current Outpatient Prescriptions:     cyclobenzaprine (FLEXERIL) 10 mg tablet, Take 1 tablet by mouth 3 (three) times a day as needed, Disp: , Rfl:     metoprolol tartrate (LOPRESSOR) 25 mg tablet, Take 25 mg by mouth 2 (two) times a day  , Disp: , Rfl:     pantoprazole (PROTONIX) 40 mg tablet, Take 40 mg by mouth daily at bedtime, Disp: , Rfl:     sertraline (ZOLOFT) 100 mg tablet, Take 50 mg by mouth daily, Disp: , Rfl:     traZODone (DESYREL) 100 mg tablet, Take 100 mg by mouth daily at bedtime, Disp: , Rfl:   :    Physical Exam:  General appearance: alert, appears stated age and cooperative  Head: Normocephalic, without obvious abnormality, atraumatic    Neurologic:  On examination patient has evidence of significant cervical and thoracic tenderness, tenderness along the trapezius muscles, range of motion in the right shoulder is limited, strength testing could not be performed in the right upper extremity proximally but distally is strength is adequate bilaterally and has diminished sensation bilaterally to pinprick and light touch more prominently on the left than the right along the median nerve distribution  Deep tendon reflexes are hypoactive, and patient has significant tenderness more prominently on the left elbow along the lateral epicondyle in the pronator Terres muscles bilaterally, no new deficits were noted on exam   His gait is normal based  Lab Results: I have personally reviewed pertinent reports  Imaging Studies: I have personally reviewed pertinent reports  Assessment:  1  Bilateral tennis elbow  2  Post-laminectomy syndrome status post cervical disc surgery  3  Chronic cervical and thoracic strain  Plan:  The patient is to go for shoulder surgery next week, is advised to use a tennis elbow brace at the left elbow, continue present medications, and will need further evaluation once he recovers from his surgery  In the meanwhile may continue with Flexeril on a p r n  basis and home exercise program is encouraged  He will return back to see me in 3-4 months  8/6/2018,9:04 AM    Dictation voice to text software has been used in the creation of this document  Please consider this in light of any contextual or grammatical errors

## 2018-10-19 ENCOUNTER — EVALUATION (OUTPATIENT)
Dept: PHYSICAL THERAPY | Facility: CLINIC | Age: 53
End: 2018-10-19
Payer: OTHER GOVERNMENT

## 2018-10-19 DIAGNOSIS — Z96.611 S/P REVERSE TOTAL SHOULDER ARTHROPLASTY, RIGHT: Primary | ICD-10-CM

## 2018-10-19 PROCEDURE — G8985 CARRY GOAL STATUS: HCPCS | Performed by: PHYSICAL THERAPIST

## 2018-10-19 PROCEDURE — 97535 SELF CARE MNGMENT TRAINING: CPT | Performed by: PHYSICAL THERAPIST

## 2018-10-19 PROCEDURE — 97162 PT EVAL MOD COMPLEX 30 MIN: CPT | Performed by: PHYSICAL THERAPIST

## 2018-10-19 PROCEDURE — G8984 CARRY CURRENT STATUS: HCPCS | Performed by: PHYSICAL THERAPIST

## 2018-10-19 NOTE — LETTER
2018    Deny Garcia MD  [address] [facility] , [address] [place] [address]    Patient: Jose David Ashford  YOB: 1965   Date of Visit: 10/19/2018     Encounter Diagnosis     ICD-10-CM    1  S/P reverse total shoulder arthroplasty, right Z96 611        Dear Dr Mary Ann Montes Recipients:    Please review the attached Plan of Care from [facility] recent visit  Please verify that you agree therapy should continue by signing the attached document and sending it back to our office  If you have any questions or concerns, please don't hesitate to call  Sincerely,    Danny Bates, PT      Referring Provider:      I certify that I have read the below Plan of Care and certify the need for these services furnished under this plan of treatment while under my care  Deny Garcia MD  [address] [facility] , [address] [facility] [address]: [phone]          PT Evaluation     Today's date: 10/19/2018  Patient name: Jose David Ashford  : 1965  MRN: [id number]  Referring provider: No ref  provider found  Dx:   Encounter Diagnosis     ICD-10-CM    1  S/P reverse total shoulder arthroplasty, right Z96 611        Start Time: 1000  Stop Time: 1100  Total time in clinic (min): 60 minutes    Assessment  Impairments: abnormal or restricted ROM, activity intolerance, impaired physical strength, lacks appropriate home exercise program, scapular dyskinesis and poor posture     Assessment details: Pt presents s/p right reverse total shoulder revision performed 18 following two additional surgeries on right shoulder since 2017  PT notes the patient with decreased active and passive ROM of the right shoulder, TTP t/o the right shoulder into the right upper arm, decreased mm strength of the right shoulder and scapular muscles, forward head rounded shoulders posture, decreased activity tolerance, and decreased functional mobility   Pt would benefit from skilled PT to improve mm strength, ROM, flexibility, posture, scapular stability, activity tolerance, and functional mobility, as well as ensure safe RTW and PLOF  Prognosis is good with adherence to skilled PT 2-3x/wk and compliance to HEP  Based upon examination, no other referral appears necessary at this time  Understanding of Dx/Px/POC: good   Prognosis: good    Goals  Short Term Goals  1  Pt will increase right shoulder active ROM by 25% in 4 wks  2  Pt will improve right shoulder/scapular mm strength by 1/2-1 grade in 4 wks  3  Pt will be independent with HEP in 4 wks    Long Term Goals  1  Pt will demonstrate improved postural awareness and control in 8 wks  2  Pt will improve right shoulder/scapular mm strength to WNL in 8 wks  3  Pt will demonstrate WFL ROM t/o the right shoulder in 12 wks  4  Pt will report no limitations with ADLs in 12 wks  5  Pt will report no limitations with functional mobility in 12 wks      Plan  Patient would benefit from: PT eval and skilled physical therapy  Planned modality interventions: cryotherapy and thermotherapy: hydrocollator packs  Planned therapy interventions: abdominal trunk stabilization, flexibility, functional ROM exercises, graded exercise, home exercise program, joint mobilization, manual therapy, massage, neuromuscular re-education, patient education, postural training, strengthening, stretching and therapeutic exercise  Frequency: 3x week  Duration in weeks: 12  Plan of Care beginning date: 10/19/2018  Plan of Care expiration date: 11/18/2018  Treatment plan discussed with: patient  Plan details: Discussed findings of POC with pt, pt agreeable to skilled PT 2-3x/wk for 4 wks        Subjective Evaluation    History of Present Illness  Date of surgery: 8/13/2018  Mechanism of injury: surgery  Mechanism of injury: Pt presents s/p right reverse total shoulder performed 8/13/18 following failure of initial total shoulder arthroplasty performed June 5, 2017   Pt reports initial surgery was performed 17, but continued to have pain and decreased mobility t/o the shoulder  Pt went for second opinion at [facility] in November  Pt was told he had an infection in the replacement and it would have to be removed  Pt had arthroplasty removed and was without a shoulder joint for a few months  Pt had 3rd surgery for the right shoulder on 18, with reverse total shoulder being performed  Presents today with orders for evaluation and treatment s/p right reverse total shoulder arthroplasty  Pt reports he felt good after surgery with very minimal pain and swelling, however, continues to have deficits with mobility  Currently not working but is hoping to return to work following PT  Quality of life: good    Pain  No pain reported  Current pain ratin  At best pain ratin  At worst pain ratin  Location: Right shoulder   Progression: improved    Social Support    Employment status: not working  Treatments  Previous treatment: physical therapy  Current treatment: physical therapy  Patient Goals  Patient goals for therapy: increased motion, increased strength, independence with ADLs/IADLs, return to work and return to sport/leisure activities          Objective     Observations     Right Shoulder  Positive for incision  Additional Observation Details  Well healed surgical incisions present on right shoulder  No s/s of infection     Palpation     Right Tenderness of the anterior deltoid, middle deltoid, posterior deltoid and supraspinatus  Active Range of Motion   Left Shoulder   Normal active range of motion    Right Shoulder   Flexion: 105 degrees   Extension: 55 degrees   Abduction: 75 degrees   External rotation 0°:  18 degrees   Internal rotation 0°:  20 degrees     Additional Active Range of Motion Details  No pain reported with AROM of the right shoulder   PT notes the patient with decreased AROM of the right shoulder compared to left    Passive Range of Motion   Left Shoulder   Normal passive range of motion    Right Shoulder   Flexion: 110 degrees   Extension: 55 degrees   Abduction: 80 degrees   External rotation 0°:  20 degrees   Internal rotation 0°:  25 degrees     Additional Passive Range of Motion Details  PT notes the patient with decreased PROM of the right shoulder    Strength/Myotome Testing     Left Shoulder   Normal muscle strength    Right Shoulder     Planes of Motion   Flexion: 3+   Extension: 4   Abduction: 3+   Adduction: 3+   External rotation at 0°:  4-   Internal rotation at 0°:  4-       Flowsheet Rows      Most Recent Value   PT/OT G-Codes   FOTO information reviewed  N/A   Assessment Type  Evaluation   G code set  Carrying, Moving & Handling Objects   Carrying, Moving and Handling Objects Current Status ()  CK   Carrying, Moving and Handling Objects Goal Status ()  CI          Precautions s/p right reverse total shoulder 8/13/18    Specialty Daily Treatment Diary     Manual         Right shoulder stretching and ROM        Scapular mobs                                    Exercise Diary         UBE        Innorange Oy        Finger Ladder        Scapular Wall Slides        Table Slides        Shoulder Isometrics        MTP/LTP        Tband Flex, Abd, Add        Tband ER/IR        Curl, Shrug, Retraction, Row        Sidelying Scapular 4-way        Prone Flex, Ext, Abd, Row        Ball on Ricarda Counter        Doorway InPulse Medical AAROM        Towel IR Stretch                                                    Modalities        MHP/CP prn

## 2018-10-19 NOTE — PROGRESS NOTES
PT Evaluation     Today's date: 10/19/2018  Patient name: Sandip Burciaga  : 1965  MRN: [id number]  Referring provider: No ref  provider found  Dx:   Encounter Diagnosis     ICD-10-CM    1  S/P reverse total shoulder arthroplasty, right Z96 611        Start Time: 1000  Stop Time: 1100  Total time in clinic (min): 60 minutes    Assessment  Impairments: abnormal or restricted ROM, activity intolerance, impaired physical strength, lacks appropriate home exercise program, scapular dyskinesis and poor posture     Assessment details: Pt presents s/p right reverse total shoulder revision performed 18 following two additional surgeries on right shoulder since 2017  PT notes the patient with decreased active and passive ROM of the right shoulder, TTP t/o the right shoulder into the right upper arm, decreased mm strength of the right shoulder and scapular muscles, forward head rounded shoulders posture, decreased activity tolerance, and decreased functional mobility  Pt would benefit from skilled PT to improve mm strength, ROM, flexibility, posture, scapular stability, activity tolerance, and functional mobility, as well as ensure safe RTW and PLOF  Prognosis is good with adherence to skilled PT 2-3x/wk and compliance to HEP  Based upon examination, no other referral appears necessary at this time  Understanding of Dx/Px/POC: good   Prognosis: good    Goals  Short Term Goals  1  Pt will increase right shoulder active ROM by 25% in 4 wks  2  Pt will improve right shoulder/scapular mm strength by 1/2-1 grade in 4 wks  3  Pt will be independent with HEP in 4 wks    Long Term Goals  1  Pt will demonstrate improved postural awareness and control in 8 wks  2  Pt will improve right shoulder/scapular mm strength to WNL in 8 wks  3  Pt will demonstrate WFL ROM t/o the right shoulder in 12 wks  4  Pt will report no limitations with ADLs in 12 wks  5   Pt will report no limitations with functional mobility in 12 wks      Plan  Patient would benefit from: PT eval and skilled physical therapy  Planned modality interventions: cryotherapy and thermotherapy: hydrocollator packs  Planned therapy interventions: abdominal trunk stabilization, flexibility, functional ROM exercises, graded exercise, home exercise program, joint mobilization, manual therapy, massage, neuromuscular re-education, patient education, postural training, strengthening, stretching and therapeutic exercise  Frequency: 3x week  Duration in weeks: 12  Plan of Care beginning date: 10/19/2018  Plan of Care expiration date: 2018  Treatment plan discussed with: patient  Plan details: Discussed findings of POC with pt, pt agreeable to skilled PT 2-3x/wk for 4 wks        Subjective Evaluation    History of Present Illness  Date of surgery: 2018  Mechanism of injury: surgery  Mechanism of injury: Pt presents s/p right reverse total shoulder performed 18 following failure of initial total shoulder arthroplasty performed 2017  Pt reports initial surgery was performed 17, but continued to have pain and decreased mobility t/o the shoulder  Pt went for second opinion at [facility] in November  Pt was told he had an infection in the replacement and it would have to be removed  Pt had arthroplasty removed and was without a shoulder joint for a few months  Pt had 3rd surgery for the right shoulder on 18, with reverse total shoulder being performed  Presents today with orders for evaluation and treatment s/p right reverse total shoulder arthroplasty  Pt reports he felt good after surgery with very minimal pain and swelling, however, continues to have deficits with mobility  Currently not working but is hoping to return to work following PT     Quality of life: good    Pain  No pain reported  Current pain ratin  At best pain ratin  At worst pain ratin  Location: Right shoulder   Progression: improved    Social Support    Employment status: not working  Treatments  Previous treatment: physical therapy  Current treatment: physical therapy  Patient Goals  Patient goals for therapy: increased motion, increased strength, independence with ADLs/IADLs, return to work and return to sport/leisure activities          Objective     Observations     Right Shoulder  Positive for incision  Additional Observation Details  Well healed surgical incisions present on right shoulder  No s/s of infection     Palpation     Right Tenderness of the anterior deltoid, middle deltoid, posterior deltoid and supraspinatus  Active Range of Motion   Left Shoulder   Normal active range of motion    Right Shoulder   Flexion: 105 degrees   Extension: 55 degrees   Abduction: 75 degrees   External rotation 0°: 18 degrees   Internal rotation 0°: 20 degrees     Additional Active Range of Motion Details  No pain reported with AROM of the right shoulder   PT notes the patient with decreased AROM of the right shoulder compared to left    Passive Range of Motion   Left Shoulder   Normal passive range of motion    Right Shoulder   Flexion: 110 degrees   Extension: 55 degrees   Abduction: 80 degrees   External rotation 0°: 20 degrees   Internal rotation 0°: 25 degrees     Additional Passive Range of Motion Details  PT notes the patient with decreased PROM of the right shoulder    Strength/Myotome Testing     Left Shoulder   Normal muscle strength    Right Shoulder     Planes of Motion   Flexion: 3+   Extension: 4   Abduction: 3+   Adduction: 3+   External rotation at 0°: 4-   Internal rotation at 0°: 4-       Flowsheet Rows      Most Recent Value   PT/OT G-Codes   FOTO information reviewed  N/A   Assessment Type  Evaluation   G code set  Carrying, Moving & Handling Objects   Carrying, Moving and Handling Objects Current Status ()  CK   Carrying, Moving and Handling Objects Goal Status ()  CI          Precautions s/p right reverse total shoulder 8/13/18    Specialty Daily Treatment Diary     Manual         Right shoulder stretching and ROM        Scapular mobs                                    Exercise Diary         UBE        [place]        Finger Ladder        Scapular Wall Slides        Table Slides        Shoulder Isometrics        MTP/LTP        Tband Flex, Abd, Add        Tband ER/IR        Curl, Shrug, Retraction, Row        Sidelying Scapular 4-way        Prone Flex, Ext, Abd, Row        Ball on [facility]        Doorway Stretch        Cane AAROM        Towel IR Stretch                                                    Modalities        MHP/CP prn

## 2018-10-22 ENCOUNTER — OFFICE VISIT (OUTPATIENT)
Dept: PHYSICAL THERAPY | Facility: CLINIC | Age: 53
End: 2018-10-22
Payer: OTHER GOVERNMENT

## 2018-10-22 ENCOUNTER — TRANSCRIBE ORDERS (OUTPATIENT)
Dept: PHYSICAL THERAPY | Facility: CLINIC | Age: 53
End: 2018-10-22

## 2018-10-22 DIAGNOSIS — Z96.611 PRESENCE OF RIGHT ARTIFICIAL SHOULDER JOINT: ICD-10-CM

## 2018-10-22 DIAGNOSIS — Z96.611 S/P REVERSE TOTAL SHOULDER ARTHROPLASTY, RIGHT: Primary | ICD-10-CM

## 2018-10-22 DIAGNOSIS — M54.12 CERVICAL RADICULITIS: Primary | ICD-10-CM

## 2018-10-22 PROCEDURE — 97110 THERAPEUTIC EXERCISES: CPT

## 2018-10-22 PROCEDURE — 97140 MANUAL THERAPY 1/> REGIONS: CPT

## 2018-10-22 NOTE — PROGRESS NOTES
Daily Note     Today's date: 10/22/2018  Patient name: Meme Tapia  : 1965  MRN: [id number]  Referring provider: No ref  provider found  Dx:   Encounter Diagnosis     ICD-10-CM    1  S/P reverse total shoulder arthroplasty, right Z96 611                   Subjective: "Its a little sore because I accidentally slept on it"        Objective: See treatment diary below    Pain in 0-/10        Assessment: Tolerated treatment well  Patient exhibited good technique with therapeutic exercises and would benefit from continued PT  Patient did have good ROM in his shoulder during manual therapy  Plan: Continue per plan of care       Precautions s/p right reverse total shoulder 18    Specialty Daily Treatment Diary     Manual  10/22/18       Right shoulder stretching and ROM 15'       Scapular mobs                                    Exercise Diary  10/22/18       UBE 10'       [place] 3'       Finger Ladder 2x       Scapular Wall Slides 20x       Table Slides        Shoulder Isometrics        MTP/LTP Blue 30x       Tband Flex, Abd, Add        Tband ER/IR Blue 30x       Curl, Shrug, Retraction, Row        Sidelying Scapular 4-way        Prone Flex, Ext, Abd, [employer] on [employer] AAR        Towel IR Stretch                                                    Modalities 10/22/18       MHP/CP prn 5'

## 2018-10-25 ENCOUNTER — APPOINTMENT (OUTPATIENT)
Dept: PHYSICAL THERAPY | Facility: CLINIC | Age: 53
End: 2018-10-25
Payer: OTHER GOVERNMENT

## 2018-10-29 ENCOUNTER — OFFICE VISIT (OUTPATIENT)
Dept: PHYSICAL THERAPY | Facility: CLINIC | Age: 53
End: 2018-10-29
Payer: OTHER GOVERNMENT

## 2018-10-29 DIAGNOSIS — Z96.611 S/P REVERSE TOTAL SHOULDER ARTHROPLASTY, RIGHT: Primary | ICD-10-CM

## 2018-10-29 PROCEDURE — 97110 THERAPEUTIC EXERCISES: CPT

## 2018-10-29 PROCEDURE — 97140 MANUAL THERAPY 1/> REGIONS: CPT

## 2018-10-29 NOTE — PROGRESS NOTES
Daily Note     Today's date: 10/29/2018  Patient name: Aravind Anderson  : 1965  MRN: [id number]  Referring provider: Bryant Swain MD  Dx:   Encounter Diagnosis     ICD-10-CM    1  S/P reverse total shoulder arthroplasty, right Z96 611                   Subjective: "I think I overdid it this weekend"         Objective: See treatment diary below   Pain in 3/10      Assessment: Tolerated treatment well  Patient exhibited good technique with therapeutic exercises    Apparent increased pain with therex  Fair tolerance with additional therex today  Plan: Continue per plan of care       Precautions s/p right reverse total shoulder 18    Specialty Daily Treatment Diary     Manual  10/22/18 10/29/18      Right shoulder stretching and ROM 15' 15      Scapular mobs                                    Exercise Diary  10/22/18 10/29/18      UBE 10' 10      [place] 3' 4'      Finger Ladder 2x 3x      Scapular Wall Slides 20x 20x      Table Slides  10              MTP/LTP Blue 30x Blue 30      Tband Flex, Abd, Add        Tband ER/IR Blue 30x 30 bl      Curl, Shrug, Retraction, Row        Sidelying Scapular 4-way        Prone Flex, Ext, Abd, Row        Ball on Wall  20    2 way      Doorway Stretch  Attempted, unable 2 to pain      Luis A Aguiar        Towel IR Stretch                                                    Modalities 10/22/18 10/29/18      MHP/CP prn 5' Cp prone

## 2018-10-31 ENCOUNTER — OFFICE VISIT (OUTPATIENT)
Dept: PHYSICAL THERAPY | Facility: CLINIC | Age: 53
End: 2018-10-31
Payer: OTHER GOVERNMENT

## 2018-10-31 DIAGNOSIS — Z96.611 S/P REVERSE TOTAL SHOULDER ARTHROPLASTY, RIGHT: Primary | ICD-10-CM

## 2018-10-31 PROCEDURE — 97140 MANUAL THERAPY 1/> REGIONS: CPT

## 2018-10-31 PROCEDURE — 97110 THERAPEUTIC EXERCISES: CPT

## 2018-10-31 NOTE — PROGRESS NOTES
Daily Note     Today's date: 10/31/2018  Patient name: Mounika Ness  : 1965  MRN: [id number]  Referring provider: Usman Estes MD  Dx:   Encounter Diagnosis     ICD-10-CM    1  S/P reverse total shoulder arthroplasty, right Z96 611                 Subjective: "I feel pretty good"        Objective: See treatment diary below      Assessment: Tolerated treatment well  Patient exhibited good technique with therapeutic exercises    Patient reports 10-20 percent improvement in s/s since IE  Evident end range pain with MT  Plan: Continue per plan of care         recautions s/p right reverse total shoulder 18    Specialty Daily Treatment Diary     Manual  10/22/18 10/29/18 10/31/18     Right shoulder stretching and ROM 15' 15 15     Scapular mobs                                    Exercise Diary  10/22/18 10/29/18 10/31/18     UBE 10' 10 10     [place] 3' 4' 5'     Finger Ladder 2x 3x 3x     Scapular Wall Slides 20x 20x 20     Table Slides  10 20   2 way             MTP/LTP Blue 30x Blue 30 Blue  30     Tband Flex, Abd, Add        Tband ER/IR Blue 30x 30 bl 30 bl     Curl, Shrug, Retraction, Row        Sidelying Scapular 4-way        Prone Flex, Ext, Abd, Row        Ball on Wall  20    2 way 20     Doorway Stretch  Attempted, unable 2 to pain      Ekaterina Valentina        Towel IR Stretch                                                    Modalities 10/22/18 10/29/18 10/31/18     MHP/CP prn 5' Cp prone 5

## 2018-11-06 ENCOUNTER — APPOINTMENT (OUTPATIENT)
Dept: PHYSICAL THERAPY | Facility: CLINIC | Age: 53
End: 2018-11-06
Payer: OTHER GOVERNMENT

## 2018-11-07 ENCOUNTER — OFFICE VISIT (OUTPATIENT)
Dept: PHYSICAL THERAPY | Facility: CLINIC | Age: 53
End: 2018-11-07
Payer: OTHER GOVERNMENT

## 2018-11-07 DIAGNOSIS — Z96.611 S/P REVERSE TOTAL SHOULDER ARTHROPLASTY, RIGHT: Primary | ICD-10-CM

## 2018-11-07 PROCEDURE — 97110 THERAPEUTIC EXERCISES: CPT

## 2018-11-07 PROCEDURE — 97140 MANUAL THERAPY 1/> REGIONS: CPT

## 2018-11-07 NOTE — PROGRESS NOTES
Daily Note     Today's date: 2018  Patient name: Chante Jones  : 1965  MRN: [id number]  Referring provider: Lucina Durán MD  Dx:   Encounter Diagnosis     ICD-10-CM    1  S/P reverse total shoulder arthroplasty, right Z96 611                   Subjective: The patient states that he is alright today  No new complaints  Objective: See treatment diary below      Assessment: Tolerated treatment well  Patient exhibited good technique with therapeutic exercises and would benefit from continued PT  The patient did have good ROM in his shoulder  Plan: Continue per plan of care       recautions s/p right reverse total shoulder 18     Specialty Daily Treatment Diary      Manual  10/22/18 10/29/18 10/31/18 11/7/18     Right shoulder stretching and ROM 15' 15 15 15'     Scapular mobs                                                             Exercise Diary  10/22/18 10/29/18 10/31/18 11/7/18     UBE 10' 10 10 12' 75 RPM     Puleys 3' 4' 5'  5'     Finger Ladder 2x 3x 3x       Scapular Wall Slides 20x 20x 20  20x     Table Slides   10 20   2 way                     MTP/LTP Blue 30x Blue 30 Blue  30 Blue 30x     Tband Flex, Abd, Add       Green 30x     Tband ER/IR Blue 30x 30 bl 30 bl Blue 30x     Curl, Shrug, Retraction, Row        2# 30x     Sidelying Scapular 4-way             Prone Flex, Ext, Abd, Row             Ball on Wall   20    2 way 20       Doorway Stretch   Attempted, unable 2 to pain         [place]             Towel IR Stretch                                                                                         Modalities 10/22/18 10/29/18 10/31/18 11/7/18     MHP/CP prn 5' Cp prone 5 Declined

## 2018-11-08 ENCOUNTER — OFFICE VISIT (OUTPATIENT)
Dept: PHYSICAL THERAPY | Facility: CLINIC | Age: 53
End: 2018-11-08
Payer: OTHER GOVERNMENT

## 2018-11-08 DIAGNOSIS — Z96.611 S/P REVERSE TOTAL SHOULDER ARTHROPLASTY, RIGHT: Primary | ICD-10-CM

## 2018-11-08 PROCEDURE — 97140 MANUAL THERAPY 1/> REGIONS: CPT

## 2018-11-08 PROCEDURE — 97110 THERAPEUTIC EXERCISES: CPT

## 2018-11-08 NOTE — PROGRESS NOTES
Daily Note     Today's date: 2018  Patient name: Rossi Lopez  : 1965  MRN: [id number]  Referring provider: Nirmala Saucedo MD  Dx:   Encounter Diagnosis     ICD-10-CM    1  S/P reverse total shoulder arthroplasty, right Z96 611                   Subjective: The arm is definitely getting better  I couldn't really move it before starting PT      Objective: See treatment diary below      Assessment: Tolerated treatment well  Patient would benefit from continued PT      Plan: Continue per plan of care         recautions s/p right reverse total shoulder 18     Specialty Daily Treatment Diary      Manual  10/22/18 10/29/18 10/31/18 11/7/18  11-8-18   Right shoulder stretching and ROM 15' 15 15 15'  15'   Scapular mobs                                                             Exercise Diary  10/22/18 10/29/18 10/31/18 11/7/18 11-8-18   UBE 10' 10 10 12' 75 RPM  12'     Puleys 3' 4' 5'  5'  5'   Finger Ladder 2x 3x 3x    5x   Scapular Wall Slides 20x 20x 20  20x     Table Slides   10 20   2 way        Standing Shoulder Flex/Scapt         30x  ea   MTP/LTP Blue 30x Blue 30 Blue  30 Blue 30x  Blue 30x   Tband Flex, Abd, Add       Green 30x     Tband ER/IR Blue 30x 30 bl 30 bl Blue 30x  Blue 30x   Curl, Shrug, Retraction, Row        2# 30x  3#  30x  Curls  Shrugs, Bent over row   Sidelying Scapular 4-way             Prone Flex, Ext, Abd, Row             Ball on Wall   20    2 way 20       Doorway Stretch   Attempted, unable 2 to pain         Jacqui Watters IR Stretch                                                                                         Modalities 10/22/18 10/29/18 10/31/18 11/7/18  11-8-18   MHP/CP prn 5' Cp prone 5 Declined  declined

## 2018-11-13 ENCOUNTER — OFFICE VISIT (OUTPATIENT)
Dept: PHYSICAL THERAPY | Facility: CLINIC | Age: 53
End: 2018-11-13
Payer: OTHER GOVERNMENT

## 2018-11-13 DIAGNOSIS — Z96.611 S/P REVERSE TOTAL SHOULDER ARTHROPLASTY, RIGHT: Primary | ICD-10-CM

## 2018-11-13 PROCEDURE — 97140 MANUAL THERAPY 1/> REGIONS: CPT

## 2018-11-13 PROCEDURE — 97110 THERAPEUTIC EXERCISES: CPT

## 2018-11-13 NOTE — PROGRESS NOTES
Daily Note     Today's date: 2018  Patient name: Kalina Gale  : 1965  MRN: [id number]  Referring provider: Darling Kaye MD  Dx:   Encounter Diagnosis     ICD-10-CM    1  S/P reverse total shoulder arthroplasty, right Z96 611                   Subjective: Its doing ok  I need more strength  It has been sore down the outside part of the upper arm at night  Nothing during the day  Objective: See treatment diary below      Assessment: Tolerated treatment well  New TE added for strengthening  Reports using UE for lifting/carrying at home with weakness noted  Difficulty with active elevation of arm due to weakness  Fatigue noted following TE  Pt would benefit from continued PT      Plan: Continue per plan of care         Precautions s/p right reverse total shoulder 18     Specialty Daily Treatment Diary      Manual  11/13/18 10/29/18 10/31/18 11/7/18  11-8-18   Right shoulder stretching and ROM 15' 15 15 15'  15'   Scapular mobs                                                             Exercise Diary  11/13/18 10/29/18 10/31/18 11/7/18 11-8-18   UBE 12'  alt 10 10 12' 75 RPM  12'     Pulleys 5' 4' 5'  5'  5'   Finger Ladder  3x 3x    5x   Scapular Wall Slides  20x 20  20x     T-band Add Blue  20x 10 20   2 way        Standing Shoulder Flex/Abd  1#  10x ea       30x  ea   MTP/LTP Blue 30x Blue 30 Blue  30 Blue 30x  Blue 30x   Tband punches  Blue  30x        Green 30x     Tband ER/IR Blue 30x 30 bl 30 bl Blue 30x  Blue 30x   Curl, Shrug, Retraction, Row  3#  30x       2# 30x  3#  30x  Curls  Shrugs, Bent over row   Sidelying Scapular 4-way             Prone Flex, Ext, Abd, Row             Ball on Wall   20    2 way 20       Doorway Stretch   Attempted, unable 2 to pain         [place]             Towel IR Stretch                                                                                         Modalities 11/13/18 10/29/18 10/31/18 11/7/18  11-8-18   MHP/CP prn 10' MHP Cp prone 5 Declined  declined

## 2018-11-15 ENCOUNTER — OFFICE VISIT (OUTPATIENT)
Dept: PHYSICAL THERAPY | Facility: CLINIC | Age: 53
End: 2018-11-15
Payer: OTHER GOVERNMENT

## 2018-11-15 DIAGNOSIS — Z96.611 S/P REVERSE TOTAL SHOULDER ARTHROPLASTY, RIGHT: Primary | ICD-10-CM

## 2018-11-15 PROCEDURE — 97110 THERAPEUTIC EXERCISES: CPT

## 2018-11-15 PROCEDURE — 97140 MANUAL THERAPY 1/> REGIONS: CPT

## 2018-11-15 NOTE — PROGRESS NOTES
Daily Note     Today's date: 11/15/2018  Patient name: Kalina Gale  : 1965  MRN: [id number]  Referring provider: Darling Kaye MD  Dx:   Encounter Diagnosis     ICD-10-CM    1  S/P reverse total shoulder arthroplasty, right Z96 611                   Subjective: "Im feeling better, I just need to get stronger"      Objective:    Pain in 4/10       Assessment: Tolerated treatment well  Patient exhibited good technique with therapeutic exercises    Notable weakness   Would benefit from progressive strengthening  Plan: Continue per plan of care       Precautions s/p right reverse total shoulder 18     Specialty Daily Treatment Diary      Manual  11/13/18 11/15/18 10/31/18 11/7/18  11-8-18   Right shoulder stretching and ROM 15' 15 15 15'  15'   Scapular mobs                                                             Exercise Diary  11/13/18 11/15/18 10/31/18 11/7/18 11-8-18   UBE 12'  alt 12   Alt 10 12' 75 RPM  12'     Pulleys 5' 4' 5'  5'  5'   Finger Ladder  3x 3x    5x   Scapular Wall Slides  20x 20  20x     T-band Add Blue  20x 10 20   2 way        Standing Shoulder Flex/Abd  1#  10x ea  1#  10 each     30x  ea   MTP/LTP Blue 30x Blue 30 Blue  30 Blue 30x  Blue 30x   Tband punches  Blue  30x     Blue 30   Green 30x     Tband ER/IR Blue 30x 30 bl 30 bl Blue 30x  Blue 30x   Curl, Shrug, Retraction, Row  3#  30x  3#   30 each    2# 30x  3#  30x  Curls  Shrugs, Bent over row   Sidelying Scapular 4-way             Prone Flex, Ext, Abd, Row             Ball on Wall   20    2 way 20       Doorway Stretch            [place]             Towel IR Stretch                                                                                         Modalities 11/13/18 11/15/18 10/31/18 11/7/18  11-8-18   MHP/CP prn 10'  MHP 5  MHP sitting 5 Declined  declined

## 2018-11-20 ENCOUNTER — OFFICE VISIT (OUTPATIENT)
Dept: PHYSICAL THERAPY | Facility: CLINIC | Age: 53
End: 2018-11-20
Payer: OTHER GOVERNMENT

## 2018-11-20 DIAGNOSIS — Z96.611 S/P REVERSE TOTAL SHOULDER ARTHROPLASTY, RIGHT: Primary | ICD-10-CM

## 2018-11-20 PROCEDURE — G8985 CARRY GOAL STATUS: HCPCS

## 2018-11-20 PROCEDURE — 97110 THERAPEUTIC EXERCISES: CPT

## 2018-11-20 PROCEDURE — 97140 MANUAL THERAPY 1/> REGIONS: CPT

## 2018-11-20 PROCEDURE — G8986 CARRY D/C STATUS: HCPCS

## 2018-11-20 NOTE — PROGRESS NOTES
Daily Note     Today's date: 2018  Patient name: Ansley Liu  : 1965  MRN: [id number]  Referring provider: David Lovelace MD  Dx:   Encounter Diagnosis     ICD-10-CM    1  S/P reverse total shoulder arthroplasty, right Z96 611               Subjective: Slowly improving with strength and flexibility  Hoping to RTW in January  Objective: See treatment diary below    Assessment: Tolerated treatment well  Pt reports consistency with HEP  Weakness and muscle fatigue noted t/o ex  Pain and tightness with all planes  Patient would benefit from continued PT    Plan: Continue per plan of care     Precautions s/p right reverse total shoulder 18     Specialty Daily Treatment Diary      Manual  11/13/18 11/15/18 11-20-18 11/7/18  11-8-18   Right shoulder stretching and ROM 15' 15 15' 15'  15'   Scapular mobs                   Exercise Diary  11/13/18 11/15/18 11-20-18 11/7/18 11-8-18   UBE 12'  alt 12   Alt 12 alt 12' 75 RPM  12'     Pulleys 5' 4' 5'  5'  5'   Finger Ladder   3x 3x    5x   Scapular Wall Slides   20x Flex/scap/arc   x 20 ea   20x     T-band Add Blue  20x 10 20   2 way        Standing Shoulder Flex/Abd  1#  10x ea  1#  10 each  1# x 10 ea   30x  ea   MTP/LTP Blue 30x Blue 30 Blue  30 Blue 30x  Blue 30x   Tband punches  Blue  30x      Blue 30  Blue x 30 Green 30x     Tband ER/IR Blue 30x 30 bl Blue x 30 Blue 30x  Blue 30x   Curl, Shrug, Retraction, Row  3#  30x  3#   30 each  3# x 30 ea  2# 30x  3#  30x  Curls  Shrugs, Bent over row   Sidelying Scapular 4-way             Prone Flex, Ext, Abd, Row             Ball on Wall   20    2 way  x 20 CW/CCW       Doorway Stretch             Lorie Tenino             Towel IR Stretch                                                                                         Modalities 11/13/18 11/15/18 11-20-18 11/7/18  11-8-18   MHP/CP prn 10'  MHP 5  MHP sitting 5' MHP sitting Declined  declined

## 2018-12-10 ENCOUNTER — OFFICE VISIT (OUTPATIENT)
Dept: NEUROLOGY | Facility: CLINIC | Age: 53
End: 2018-12-10
Payer: OTHER MISCELLANEOUS

## 2018-12-10 VITALS
SYSTOLIC BLOOD PRESSURE: 118 MMHG | BODY MASS INDEX: 39.71 KG/M2 | DIASTOLIC BLOOD PRESSURE: 82 MMHG | HEIGHT: 67 IN | WEIGHT: 253 LBS | HEART RATE: 60 BPM

## 2018-12-10 DIAGNOSIS — M48.9 CERVICAL SPINE DISEASE: Primary | ICD-10-CM

## 2018-12-10 DIAGNOSIS — M96.1 CERVICAL POST-LAMINECTOMY SYNDROME: ICD-10-CM

## 2018-12-10 PROCEDURE — 99214 OFFICE O/P EST MOD 30 MIN: CPT | Performed by: PSYCHIATRY & NEUROLOGY

## 2018-12-10 RX ORDER — METFORMIN HYDROCHLORIDE 1000 MG/1
1000 TABLET, FILM COATED, EXTENDED RELEASE ORAL 2 TIMES DAILY WITH MEALS
COMMUNITY

## 2018-12-10 RX ORDER — INSULIN GLARGINE 100 [IU]/ML
14 INJECTION, SOLUTION SUBCUTANEOUS
COMMUNITY

## 2018-12-10 RX ORDER — CYCLOBENZAPRINE HCL 10 MG
10 TABLET ORAL
Qty: 30 TABLET | Refills: 5 | Status: SHIPPED | OUTPATIENT
Start: 2018-12-10 | End: 2020-02-28 | Stop reason: ALTCHOICE

## 2018-12-10 NOTE — PROGRESS NOTES
Progress Note - Neurology   Regine Chan  48 y o  male MRN: [id number]  Unit/Bed#:  Encounter: [id number]      Subjective:   Patient is here for a follow-up visit with significant improvement of pain, ever since he underwent right shoulder replacement  Patient complains of mild spasms in the night affecting the lower back and the neck for which she uses Flexeril on a p r n  Basis  Overall he has been doing well and does not use any pain medications  Patient also has diabetes and claims his sugars under good control  ROS:   Review of Systems   Constitutional: Negative for appetite change, fatigue and fever  HENT: Negative  Negative for hearing loss, tinnitus and voice change  Eyes: Positive for visual disturbance  Negative for photophobia and pain  Respiratory: Negative  Negative for shortness of breath  Cardiovascular: Negative  Negative for palpitations  Gastrointestinal: Negative  Negative for nausea and vomiting  Endocrine: Negative  Negative for cold intolerance and heat intolerance  Genitourinary: Positive for frequency  Negative for dysuria and urgency  Musculoskeletal: Negative  Negative for back pain and myalgias  Muscle spasms in low back   Skin: Negative  Negative for rash  Neurological: Positive for dizziness, weakness, numbness and headaches  Negative for tremors, seizures, syncope, facial asymmetry, speech difficulty and light-headedness  Tingling of both ring and pinky fingers   Hematological: Negative  Does not bruise/bleed easily  Psychiatric/Behavioral: Negative  Negative for confusion, hallucinations and sleep disturbance  Vitals:   Vitals:    12/10/18 1014   BP: 118/82   Pulse: 60   ,Body mass index is 39 63 kg/m²      MEDS:      Current Outpatient Prescriptions:     cyclobenzaprine (FLEXERIL) 10 mg tablet, Take 1 tablet by mouth 3 (three) times a day as needed, Disp: , Rfl:     metoprolol tartrate (LOPRESSOR) 25 mg tablet, Take 25 mg by mouth 2 (two) times a day  , Disp: , Rfl:     pantoprazole (PROTONIX) 40 mg tablet, Take 40 mg by mouth 2 (two) times a day  , Disp: , Rfl:     sertraline (ZOLOFT) 100 mg tablet, Take 150 mg by mouth every morning  , Disp: , Rfl:     traZODone (DESYREL) 50 mg tablet, Take 75 mg by mouth daily at bedtime  , Disp: , Rfl:   :    Physical Exam:  General appearance: alert, appears stated age and cooperative  Head: Normocephalic, without obvious abnormality, atraumatic    Neurologic:  On examination patient has minimal cervical and lumbosacral as well as thoracic tenderness with no new deficits noted on motor and sensory exam   Range of motion in the right shoulder has improved, and there is no evidence of any gait dysfunction  Lab Results: I have personally reviewed pertinent reports  Imaging Studies: I have personally reviewed pertinent reports  Assessment:  1  Status post Right shoulder replacement  2  Cervical postlaminectomy syndrome  3  Chronic thoracic and lumbosacral strain secondary to disc disease  Plan:  Patient is advised to continue home exercise program, has been doing well at this time, he is familiar with his restrictions, weight loss was also discussed at length and patient is looking to go back to some form of employment in the near future  He will return back to see me in 6 months  He may continue with Flexeril 10 mg at bedtime on a p r n  Basis  12/10/2018,10:19 AM    Dictation voice to text software has been used in the creation of this document  Please consider this in light of any contextual or grammatical errors

## 2019-01-02 NOTE — PROGRESS NOTES
PT Evaluation     Today's date: 2019  Patient name: Gela Aranda  : 1965  MRN: [id number]  Referring provider: Joycelyn Voss MD  Dx:   Encounter Diagnosis     ICD-10-CM    1  S/P reverse total shoulder arthroplasty, right Z96 611        Start Time: 1000  Stop Time: 1110  Total time in clinic (min): 70 minutes    Assessment  Assessment details: Pt presents s/p right reverse total shoulder revision performed 18 following two additional surgeries on right shoulder since 2017  Pt was progressing well with PT tx  Improving ROM and strength noted  Pt continued to fatigue with activity  He reported continued low level pain  Pt did need continued improvement in both strength and ROM  Last attended session was on 18  He did not return after that time  D/C PT services  Impairments: abnormal or restricted ROM, activity intolerance, impaired physical strength, lacks appropriate home exercise program, scapular dyskinesis and poor posture   Understanding of Dx/Px/POC: good   Prognosis: good    Goals  Short Term Goals  1  Pt will increase right shoulder active ROM by 25% in 4 wks  2  Pt will improve right shoulder/scapular mm strength by 1/2-1 grade in 4 wks  3  Pt will be independent with HEP in 4 wks    Long Term Goals  1  Pt will demonstrate improved postural awareness and control in 8 wks  2  Pt will improve right shoulder/scapular mm strength to WNL in 8 wks  3  Pt will demonstrate WFL ROM t/o the right shoulder in 12 wks  4  Pt will report no limitations with ADLs in 12 wks  5   Pt will report no limitations with functional mobility in 12 wks      Plan  Plan details: D/C PT services   Findings per initial evaluation   Patient would benefit from: PT eval and skilled physical therapy  Planned modality interventions: cryotherapy and thermotherapy: hydrocollator packs  Planned therapy interventions: abdominal trunk stabilization, flexibility, functional ROM exercises, graded exercise, home exercise program, joint mobilization, manual therapy, massage, neuromuscular re-education, patient education, postural training, strengthening, stretching and therapeutic exercise        Subjective Evaluation    History of Present Illness  Date of surgery: 2018  Mechanism of injury: surgery  Mechanism of injury: Pt presents s/p right reverse total shoulder performed 18 following failure of initial total shoulder arthroplasty performed 2017  Pt reports initial surgery was performed 17, but continued to have pain and decreased mobility t/o the shoulder  Pt went for second opinion at [facility] in November  Pt was told he had an infection in the replacement and it would have to be removed  Pt had arthroplasty removed and was without a shoulder joint for a few months  Pt had 3rd surgery for the right shoulder on 18, with reverse total shoulder being performed  Presents today with orders for evaluation and treatment s/p right reverse total shoulder arthroplasty  Pt reports he felt good after surgery with very minimal pain and swelling, however, continues to have deficits with mobility  Currently not working but is hoping to return to work following PT  Quality of life: good    Pain  No pain reported  Current pain ratin  At best pain ratin  At worst pain ratin  Location: Right shoulder   Progression: improved    Social Support    Employment status: not working  Treatments  Previous treatment: physical therapy  Current treatment: physical therapy  Patient Goals  Patient goals for therapy: increased motion, increased strength, independence with ADLs/IADLs, return to work and return to sport/leisure activities          Objective     Observations     Right Shoulder  Positive for incision  Additional Observation Details  Well healed surgical incisions present on right shoulder   No s/s of infection     Palpation     Right Tenderness of the anterior deltoid, middle deltoid, posterior deltoid and supraspinatus  Active Range of Motion   Left Shoulder   Normal active range of motion    Right Shoulder   Flexion: 105 degrees   Extension: 55 degrees   Abduction: 75 degrees   External rotation 0°: 18 degrees   Internal rotation 0°: 20 degrees     Additional Active Range of Motion Details  No pain reported with AROM of the right shoulder   PT notes the patient with decreased AROM of the right shoulder compared to left    Passive Range of Motion   Left Shoulder   Normal passive range of motion    Right Shoulder   Flexion: 110 degrees   Extension: 55 degrees   Abduction: 80 degrees   External rotation 0°: 20 degrees   Internal rotation 0°: 25 degrees     Additional Passive Range of Motion Details  PT notes the patient with decreased PROM of the right shoulder    Strength/Myotome Testing     Left Shoulder   Normal muscle strength    Right Shoulder     Planes of Motion   Flexion: 3+   Extension: 4   Abduction: 3+   Adduction: 3+   External rotation at 0°: 4-   Internal rotation at 0°: 4-       Flowsheet Rows      Most Recent Value   PT/OT G-Codes   Assessment Type  Discharge   G code set  Carrying, Moving & Handling Objects   Carrying, Moving and Handling Objects Goal Status ()  CI   Carrying, Moving and Handling Objects Discharge Status ()  CK

## 2019-07-17 ENCOUNTER — TELEPHONE (OUTPATIENT)
Dept: NEUROLOGY | Facility: CLINIC | Age: 54
End: 2019-07-17

## 2019-07-17 NOTE — TELEPHONE ENCOUNTER
Received patient request from [facility] [employer] [facility] [place]  Please fax to [phone]  Faxed to [phone] on 7/17/2019

## 2020-01-11 ENCOUNTER — OFFICE VISIT (OUTPATIENT)
Dept: URGENT CARE | Facility: CLINIC | Age: 55
End: 2020-01-11
Payer: COMMERCIAL

## 2020-01-11 VITALS
HEIGHT: 68 IN | DIASTOLIC BLOOD PRESSURE: 76 MMHG | RESPIRATION RATE: 16 BRPM | SYSTOLIC BLOOD PRESSURE: 126 MMHG | HEART RATE: 83 BPM | WEIGHT: 248 LBS | OXYGEN SATURATION: 96 % | BODY MASS INDEX: 37.59 KG/M2 | TEMPERATURE: 98.6 F

## 2020-01-11 DIAGNOSIS — J06.9 ACUTE URI: Primary | ICD-10-CM

## 2020-01-11 PROCEDURE — G0382 LEV 3 HOSP TYPE B ED VISIT: HCPCS

## 2020-01-11 RX ORDER — LISINOPRIL 10 MG/1
10 TABLET ORAL DAILY
COMMUNITY

## 2020-01-11 NOTE — PATIENT INSTRUCTIONS

## 2020-01-15 NOTE — PROGRESS NOTES
[facility]        NAME: Andriy Cain  is a 48 y o  male  : 1965    MRN: [id number]  DATE: January 15, 2020  TIME: 10:03 AM    Assessment and Plan   Acute URI [J06 9]  1  Acute URI           Patient Instructions       Follow up with PCP in 3-5 days  Proceed to  ER if symptoms worsen  Chief Complaint     Chief Complaint   Patient presents with    Cold Like Symptoms     x 4 days, c/o post nasal drainage, cough with green/gray mucus, sinus and chest congestion         History of Present Illness       URI    This is a new problem  Episode onset: 3-4 days  The problem has been unchanged  There has been no fever  Associated symptoms include congestion, coughing (non productive dry), a plugged ear sensation and rhinorrhea  Pertinent negatives include no abdominal pain, chest pain, ear pain, headaches, nausea, rash, sinus pain, sneezing, sore throat, swollen glands or vomiting  He has tried nothing for the symptoms  The treatment provided mild relief  Review of Systems   Review of Systems   Constitutional: Negative for chills, fatigue and fever  HENT: Positive for congestion and rhinorrhea  Negative for ear pain, hearing loss, postnasal drip, sinus pressure, sinus pain, sneezing and sore throat  Eyes: Negative for pain and discharge  Respiratory: Positive for cough (non productive dry)  Negative for chest tightness and shortness of breath  Cardiovascular: Negative for chest pain  Gastrointestinal: Negative for abdominal pain, constipation, nausea and vomiting  Genitourinary: Negative for difficulty urinating  Musculoskeletal: Negative for arthralgias and myalgias  Skin: Negative for rash  Neurological: Negative for dizziness and headaches  Psychiatric/Behavioral: Negative for behavioral problems           Current Medications       Current Outpatient Medications:     lisinopril (ZESTRIL) 10 mg tablet, Take 10 mg by mouth daily, Disp: , Rfl:     metFORMIN (GLUMETZA) 1000 MG (MOD) 24 hr tablet, Take 1,000 mg by mouth 2 (two) times a day with meals, Disp: , Rfl:     pantoprazole (PROTONIX) 40 mg tablet, Take 40 mg by mouth 2 (two) times a day  , Disp: , Rfl:     sertraline (ZOLOFT) 100 mg tablet, Take 150 mg by mouth every morning  , Disp: , Rfl:     traZODone (DESYREL) 50 mg tablet, Take 75 mg by mouth daily at bedtime  , Disp: , Rfl:     cyclobenzaprine (FLEXERIL) 10 mg tablet, Take 1 tablet (10 mg total) by mouth daily at bedtime (Patient not taking: Reported on 1/11/2020), Disp: 30 tablet, Rfl: 5    insulin glargine (LANTUS) 100 units/mL subcutaneous injection, Inject 22 Units under the skin daily at bedtime, Disp: , Rfl:     Current Allergies     Allergies as of 01/11/2020 - Reviewed 01/11/2020   Allergen Reaction Noted    Penicillin v      Penicillins Rash 10/03/2017            The following portions of the patient's history were reviewed and updated as appropriate: allergies, current medications, past family history, past medical history, past social history, past surgical history and problem list      Past Medical History:   Diagnosis Date    Bilateral occipital neuralgia     Cervical post-laminectomy syndrome     Cervical radiculopathy     Cervical spine disease     Cervical spondylosis with myelopathy     Chronic lumbar pain     Chronic pain syndrome     Chronic thoracic spine pain     Degenerative cervical spinal stenosis     Diabetes ([address] )     Failed neck syndrome     Hypertension     Myofascial muscle pain     Polyarthralgia        Past Surgical History:   Procedure Laterality Date    CERVICAL DISC SURGERY      CERVICAL FUSION      TOTAL SHOULDER REPLACEMENT Right     Reversal       Family History   Problem Relation Age of Onset    Stroke Mother     Lung cancer Father          Medications have been verified          Objective   /76   Pulse 83   Temp 98 6 °F (37 °C) (Oral)   Resp 16   Ht 5' 7 5" (1 715 m)   Wt 112 kg (248 lb)   SpO2 96% BMI 38 27 kg/m²        Physical Exam     Physical Exam   Constitutional: He is oriented to person, place, and time  He appears well-developed and well-nourished  HENT:   Right Ear: Tympanic membrane and external ear normal    Left Ear: Tympanic membrane and external ear normal    Nose: Rhinorrhea present  No nasal deformity  Mouth/Throat: Posterior oropharyngeal erythema present  No tonsillar exudate  Neck: Normal range of motion  No edema present  Cardiovascular: Normal rate, regular rhythm, S1 normal, S2 normal and normal heart sounds  No murmur heard  Pulmonary/Chest: Effort normal and breath sounds normal  No respiratory distress  He has no wheezes  He has no rales  He exhibits no tenderness  Lymphadenopathy:     He has no cervical adenopathy  Neurological: He is alert and oriented to person, place, and time  Skin: Skin is warm, dry and intact  No rash noted  Psychiatric: He has a normal mood and affect  His speech is normal and behavior is normal    Nursing note and vitals reviewed

## 2020-02-28 ENCOUNTER — OFFICE VISIT (OUTPATIENT)
Dept: NEUROLOGY | Facility: CLINIC | Age: 55
End: 2020-02-28
Payer: OTHER MISCELLANEOUS

## 2020-02-28 VITALS
HEIGHT: 68 IN | BODY MASS INDEX: 37.13 KG/M2 | DIASTOLIC BLOOD PRESSURE: 78 MMHG | HEART RATE: 70 BPM | SYSTOLIC BLOOD PRESSURE: 118 MMHG | WEIGHT: 245 LBS

## 2020-02-28 DIAGNOSIS — S39.012D STRAIN OF LUMBAR REGION, SUBSEQUENT ENCOUNTER: ICD-10-CM

## 2020-02-28 DIAGNOSIS — M54.6 CHRONIC THORACIC SPINE PAIN: Primary | ICD-10-CM

## 2020-02-28 DIAGNOSIS — G89.29 CHRONIC THORACIC SPINE PAIN: Primary | ICD-10-CM

## 2020-02-28 PROCEDURE — 99214 OFFICE O/P EST MOD 30 MIN: CPT | Performed by: PSYCHIATRY & NEUROLOGY

## 2020-02-28 RX ORDER — METHOCARBAMOL 750 MG/1
750 TABLET, FILM COATED ORAL EVERY 8 HOURS SCHEDULED
Qty: 90 TABLET | Refills: 1 | Status: SHIPPED | OUTPATIENT
Start: 2020-02-28 | End: 2022-07-06 | Stop reason: SDUPTHER

## 2020-02-28 NOTE — PROGRESS NOTES
Progress Note - Neurology   Bekah Lazo  48 y o  male MRN: [id number]  Unit/Bed#:  Encounter: [id number]      Subjective:   Patient is here for a follow-up visit and since his last visit was doing well has started working at the [place], until 2 weeks ago he started experiencing pain on the right lumbar region occasionally radiating down the right leg but otherwise denies any motor or sensory symptoms in the lower extremities  Patient had been off all pain medications and muscle relaxers prior to this  His blood sugars under better control he has lost about 14 lb of weight, and his blood pressure is also under good control  At baseline he has a history of cervical postlaminectomy syndrome and does experience mild neck pain constantly  Patient also has recovered from a right shoulder replacement  His back and neck pain were related to a work related injury several years ago  ROS:   Review of Systems   Constitutional: Negative  Negative for appetite change and fever  HENT: Negative  Negative for hearing loss, tinnitus, trouble swallowing and voice change  Eyes: Positive for visual disturbance  Negative for photophobia and pain  Respiratory: Negative  Negative for shortness of breath  Cardiovascular: Negative  Negative for palpitations  Gastrointestinal: Negative  Negative for nausea and vomiting  Endocrine: Negative  Negative for cold intolerance and heat intolerance  Genitourinary: Negative  Negative for dysuria, frequency and urgency  Musculoskeletal: Positive for back pain and neck pain  Negative for myalgias  Skin: Negative  Negative for rash  Neurological: Positive for numbness (right ring and middle fingers) and headaches  Negative for dizziness, tremors, seizures, syncope, facial asymmetry, speech difficulty, weakness and light-headedness  Hematological: Negative  Does not bruise/bleed easily  Psychiatric/Behavioral: Negative    Negative for confusion, hallucinations and sleep disturbance  MA review of systems was reviewed by myself  Vitals:   Vitals:    02/28/20 1405   BP: 118/78   BP Location: Left arm   Patient Position: Sitting   Cuff Size: Adult   Pulse: 70   Weight: 111 kg (245 lb)   Height: 5' 7 5" (1 715 m)   ,Body mass index is 37 81 kg/m²  MEDS:      Current Outpatient Medications:     Empagliflozin (Jardiance) 25 MG TABS, Take 25 mg by mouth every morning ---- Marylu Corona, Disp: , Rfl:     insulin glargine (LANTUS) 100 units/mL subcutaneous injection, Inject 14 Units under the skin daily at bedtime , Disp: , Rfl:     lisinopril (ZESTRIL) 10 mg tablet, Take 10 mg by mouth daily, Disp: , Rfl:     metFORMIN (GLUMETZA) 1000 MG (MOD) 24 hr tablet, Take 1,000 mg by mouth 2 (two) times a day with meals, Disp: , Rfl:     pantoprazole (PROTONIX) 40 mg tablet, Take 40 mg by mouth 2 (two) times a day  , Disp: , Rfl:     sertraline (ZOLOFT) 100 mg tablet, Take 150 mg by mouth every morning  , Disp: , Rfl:     traZODone (DESYREL) 50 mg tablet, Take 75 mg by mouth daily at bedtime  , Disp: , Rfl:   :    Physical Exam:  General appearance: alert, appears stated age and cooperative  Head: Normocephalic, without obvious abnormality, atraumatic    On examination patient is alert awake oriented, speech is fluent, cranial nerves 2-12 intact, on motor and sensory exam there is no evidence of any focal weakness noted in the lower extremities or the upper extremities, deep tendon reflexes are 1+ bilaterally, patient has diminished sensation to pinprick and light touch in the right L4 distribution, no evidence of any dysmetria was noted and his gait is normal based  Patient has evidence of right-sided lumbosacral tenderness as well as cervical paraspinal tenderness  Lab Results: I have personally reviewed pertinent reports  Imaging Studies: I have personally reviewed pertinent reports  Assessment:  1  Lumbosacral strain with recent worsening    2  Chronic cervical strain  Plan:  Patient is advised to try Robaxin 750 mg up to 3 times a day, on a p r n  basis to see if it relieves the symptoms, in the meanwhile will also try physical therapy to the lumbar region  If he sees no improvement in the next 6 weeks he is advised to call me may need a repeat MRI of the lumbar spine otherwise will return back to see me in 3 months     2/28/2020,2:10 PM    Dictation voice to text software has been used in the creation of this document  Please consider this in light of any contextual or grammatical errors

## 2020-06-04 ENCOUNTER — TELEPHONE (OUTPATIENT)
Dept: NEUROLOGY | Facility: CLINIC | Age: 55
End: 2020-06-04

## 2021-02-26 ENCOUNTER — OFFICE VISIT (OUTPATIENT)
Dept: URGENT CARE | Facility: CLINIC | Age: 56
End: 2021-02-26
Payer: COMMERCIAL

## 2021-02-26 VITALS
DIASTOLIC BLOOD PRESSURE: 80 MMHG | HEIGHT: 68 IN | HEART RATE: 71 BPM | WEIGHT: 238.8 LBS | SYSTOLIC BLOOD PRESSURE: 142 MMHG | BODY MASS INDEX: 36.19 KG/M2 | TEMPERATURE: 97.5 F | OXYGEN SATURATION: 98 % | RESPIRATION RATE: 18 BRPM

## 2021-02-26 DIAGNOSIS — J01.00 ACUTE MAXILLARY SINUSITIS, RECURRENCE NOT SPECIFIED: ICD-10-CM

## 2021-02-26 DIAGNOSIS — M26.629 TMJPDS (TEMPOROMANDIBULAR JOINT PAIN DYSFUNCTION SYNDROME): Primary | ICD-10-CM

## 2021-02-26 PROCEDURE — 99213 OFFICE O/P EST LOW 20 MIN: CPT | Performed by: PHYSICIAN ASSISTANT

## 2021-02-26 RX ORDER — AZITHROMYCIN 250 MG/1
TABLET, FILM COATED ORAL
Qty: 6 TABLET | Refills: 0 | Status: SHIPPED | OUTPATIENT
Start: 2021-02-26 | End: 2021-03-02

## 2021-02-26 RX ORDER — LOSARTAN POTASSIUM 25 MG/1
TABLET ORAL
COMMUNITY
Start: 2020-12-08

## 2021-02-26 RX ORDER — NAPROXEN 500 MG/1
500 TABLET ORAL 2 TIMES DAILY WITH MEALS
Qty: 14 TABLET | Refills: 0 | Status: SHIPPED | OUTPATIENT
Start: 2021-02-26 | End: 2021-03-05

## 2021-02-26 NOTE — PATIENT INSTRUCTIONS
Rest, ice and heat  Start azithromycin for possible sinus infection  Naproxen twice daily with food  Soft foods  Avoid wide opening of mouth, yawning  Follow up with PCP as scheduled  Go to ER with worsening symptoms, pain, unable to open mouth, eat, chew or swallow  Plain radiographs ineffective in determining causality of this condition unless concerned for fracture  No suspicion for fracture  Recommend further evaluation with CT or MRI  Temporomandibular Disorder   WHAT YOU NEED TO KNOW:   Temporomandibular disorder is a condition that causes pain in your jaw  The disorder affects the joint between your temporal bone and your mandible (jawbone)  The muscles and nerves around the joint are also affected  DISCHARGE INSTRUCTIONS:   Medicines:   · Pain medicine: You may be given a prescription medicine to decrease pain  Do not wait until the pain is severe before you take this medicine  · NSAIDs:  These medicines decrease swelling and pain  You can buy NSAIDs without a doctor's order  Ask your healthcare provider which medicine is right for you, and how much to take  Take as directed  NSAIDs can cause stomach bleeding or kidney problems if not taken correctly  · Muscle relaxers  help decrease pain and muscle spasms  · Take your medicine as directed  Contact your healthcare provider if you think your medicine is not helping or if you have side effects  Tell him of her if you are allergic to any medicine  Keep a list of the medicines, vitamins, and herbs you take  Include the amounts, and when and why you take them  Bring the list or the pill bottles to follow-up visits  Carry your medicine list with you in case of an emergency  Follow up with your healthcare provider as directed:  Write down your questions so you remember to ask them during your visits  Manage your symptoms:   · Eat soft foods: Your healthcare provider may suggest that you eat only soft foods for several days   A dietitian may work with you to find foods that are easier to bite, chew, or swallow  Examples are soup, applesauce, cottage cheese, pudding, yogurt, and soft fruits  · Use jaw supporting devices:  Splints may be used to support your jaw or keep your jaw from moving  You may need to wear a mouth guard to keep you from clenching or grinding your teeth while you are sleeping  · Use ice and heat:  Ice helps decrease swelling and pain  Ice may also help prevent tissue damage  Use an ice pack, or put crushed ice in a plastic bag  Cover it with a towel and place it on your jaw for 15 to 20 minutes every hour or as directed  After the first 24 to 48 hours, use heat to decrease pain, swelling, and muscle spasms  Apply heat on the area for 20 to 30 minutes every 2 hours for as many days as directed  Use a heating pad, moist warm compress, or a hot water bottle  · Go to physical therapy:  A physical therapist teaches you exercises to help improve movement and strength, and to decrease pain in your jaw  A speech therapist may help you with swallowing and speech exercises  Contact your healthcare provider if:   · You have a fever  · Your splint or mouth guard is loose  · You have questions or concerns about your condition or care  Return to the emergency department if:   · You have nausea, are vomiting, or cannot keep liquids down  · You have pain that does not go away even after you take your pain medicine  · You have problems breathing, talking, drinking, eating, or swallowing  · Your splint or mouth guard gets damaged or broken  © Copyright [address] Information is for End User's use only and may not be sold, redistributed or otherwise used for commercial purposes  All illustrations and images included in CareNotes® are the copyrighted property of [employer]  or [address] [place], [place] [address]osei   The above information is an  only   It is not intended as medical advice for individual conditions or treatments  Talk to your doctor, nurse or pharmacist before following any medical regimen to see if it is safe and effective for you

## 2021-02-26 NOTE — PROGRESS NOTES
[facility]        NAME: Enriqueta Bruno  is a 50 y o  male  : 1965    MRN: [id number]  DATE: 2021  TIME: 6:03 PM    Assessment and Plan   TMJPDS (temporomandibular joint pain dysfunction syndrome) [M26 629]  1  TMJPDS (temporomandibular joint pain dysfunction syndrome)  naproxen (NAPROSYN) 500 mg tablet   2  Acute maxillary sinusitis, recurrence not specified  azithromycin (ZITHROMAX) 250 mg tablet         Patient Instructions     Patient Instructions     Rest, ice and heat  Start azithromycin for possible sinus infection  Naproxen twice daily with food  Soft foods  Avoid wide opening of mouth, yawning  Follow up with PCP as scheduled  Go to ER with worsening symptoms, pain, unable to open mouth, eat, chew or swallow  Plain radiographs ineffective in determining causality of this condition unless concerned for fracture  No suspicion for fracture  Recommend further evaluation with CT or MRI  Temporomandibular Disorder   WHAT YOU NEED TO KNOW:   Temporomandibular disorder is a condition that causes pain in your jaw  The disorder affects the joint between your temporal bone and your mandible (jawbone)  The muscles and nerves around the joint are also affected  DISCHARGE INSTRUCTIONS:   Medicines:   · Pain medicine: You may be given a prescription medicine to decrease pain  Do not wait until the pain is severe before you take this medicine  · NSAIDs:  These medicines decrease swelling and pain  You can buy NSAIDs without a doctor's order  Ask your healthcare provider which medicine is right for you, and how much to take  Take as directed  NSAIDs can cause stomach bleeding or kidney problems if not taken correctly  · Muscle relaxers  help decrease pain and muscle spasms  · Take your medicine as directed  Contact your healthcare provider if you think your medicine is not helping or if you have side effects  Tell him of her if you are allergic to any medicine   Keep a list of the medicines, vitamins, and herbs you take  Include the amounts, and when and why you take them  Bring the list or the pill bottles to follow-up visits  Carry your medicine list with you in case of an emergency  Follow up with your healthcare provider as directed:  Write down your questions so you remember to ask them during your visits  Manage your symptoms:   · Eat soft foods: Your healthcare provider may suggest that you eat only soft foods for several days  A dietitian may work with you to find foods that are easier to bite, chew, or swallow  Examples are soup, applesauce, cottage cheese, pudding, yogurt, and soft fruits  · Use jaw supporting devices:  Splints may be used to support your jaw or keep your jaw from moving  You may need to wear a mouth guard to keep you from clenching or grinding your teeth while you are sleeping  · Use ice and heat:  Ice helps decrease swelling and pain  Ice may also help prevent tissue damage  Use an ice pack, or put crushed ice in a plastic bag  Cover it with a towel and place it on your jaw for 15 to 20 minutes every hour or as directed  After the first 24 to 48 hours, use heat to decrease pain, swelling, and muscle spasms  Apply heat on the area for 20 to 30 minutes every 2 hours for as many days as directed  Use a heating pad, moist warm compress, or a hot water bottle  · Go to physical therapy:  A physical therapist teaches you exercises to help improve movement and strength, and to decrease pain in your jaw  A speech therapist may help you with swallowing and speech exercises  Contact your healthcare provider if:   · You have a fever  · Your splint or mouth guard is loose  · You have questions or concerns about your condition or care  Return to the emergency department if:   · You have nausea, are vomiting, or cannot keep liquids down  · You have pain that does not go away even after you take your pain medicine      · You have problems breathing, talking, drinking, eating, or swallowing  · Your splint or mouth guard gets damaged or broken  © Copyright [address] Information is for End User's use only and may not be sold, redistributed or otherwise used for commercial purposes  All illustrations and images included in CareNotes® are the copyrighted property of A D A M , Inc  or [facility]   The above information is an  only  It is not intended as medical advice for individual conditions or treatments  Talk to your doctor, nurse or pharmacist before following any medical regimen to see if it is safe and effective for you  **Portions of the record may have been created with voice recognition software  Occasional wrong word or "sound a like" substitutions may have occurred due to the inherent limitations of voice recognition software  Read the chart carefully and recognize, using context, where substitutions have occurred  **     Chief Complaint     Chief Complaint   Patient presents with    Jaw Pain     R jaw pain/popping started Sun/Mon  History of Present Illness     72-year-old male presents to clinic with complaints of right jaw popping x5 days  States he woke up and felt clicking is dull every time he opens his mouth  He denies any pain, injury  He states over the past week he has had sinus pain and pressure on the right side, postnasal drainage and congestion  Denies any fever, difficulty breathing or swallowing, chest pain, shortness of breath  Review of Systems     Review of Systems   Constitutional: Negative for chills and fever  HENT: Positive for congestion, postnasal drip and sinus pressure  Negative for dental problem, facial swelling, sinus pain and trouble swallowing  Respiratory: Negative for shortness of breath  Cardiovascular: Negative for chest pain  Gastrointestinal: Negative for abdominal pain     Neurological: Negative for facial asymmetry, speech difficulty, numbness and headaches           Current Medications     Current Outpatient Medications:     Ascorbic Acid 500 MG CHEW, TAKE ONE TABLET BY MOUTH EVERY DAY ** VITAMIN C ** FOR NUTRITION, WOUND HEALING OR IRON ABSORPTION OVERNIGHT, PLEASE, Disp: , Rfl:     Aspirin Buf,CaCarb-MgCarb-MgO, 81 MG TABS, Take by mouth, Disp: , Rfl:     Cholecalciferol 25 MCG (1000 UT) capsule, TAKE ONE TABLET BY MOUTH EVERY DAY (FOR LOW VITAMIN D), Disp: , Rfl:     dextrose 1 g CHEW, as needed, Disp: , Rfl:     Empagliflozin (Jardiance) 25 MG TABS, Take 25 mg by mouth every morning ---- Miguelina Ashraf, Disp: , Rfl:     lisinopril (ZESTRIL) 10 mg tablet, Take 10 mg by mouth daily, Disp: , Rfl:     losartan (COZAAR) 25 mg tablet, TAKE ONE-HALF TABLET BY MOUTH EVERY DAY FOR BLOOD PRESSURE/HEART, Disp: , Rfl:     metFORMIN (GLUMETZA) 1000 MG (MOD) 24 hr tablet, Take 1,000 mg by mouth 2 (two) times a day with meals, Disp: , Rfl:     methocarbamol (ROBAXIN) 750 mg tablet, Take 1 tablet (750 mg total) by mouth every 8 (eight) hours, Disp: 90 tablet, Rfl: 1    pantoprazole (PROTONIX) 40 mg tablet, Take 40 mg by mouth 2 (two) times a day  , Disp: , Rfl:     traZODone (DESYREL) 50 mg tablet, Take 75 mg by mouth daily at bedtime  , Disp: , Rfl:     azithromycin (ZITHROMAX) 250 mg tablet, Take 2 tablets today then 1 tablet daily x 4 days, Disp: 6 tablet, Rfl: 0    benzonatate (TESSALON PERLES) 100 mg capsule, Take 100 mg by mouth Three times daily as needed, Disp: , Rfl:     insulin glargine (LANTUS) 100 units/mL subcutaneous injection, Inject 14 Units under the skin daily at bedtime , Disp: , Rfl:     naproxen (NAPROSYN) 500 mg tablet, Take 500 mg by mouth every 12 (twelve) hours as needed, Disp: , Rfl:     naproxen (NAPROSYN) 500 mg tablet, Take 1 tablet (500 mg total) by mouth 2 (two) times a day with meals for 7 days, Disp: 14 tablet, Rfl: 0    sertraline (ZOLOFT) 100 mg tablet, Take 150 mg by mouth every morning , Disp: , Rfl:     sucralfate (CARAFATE) 1 g/10 mL suspension, Take 1 g by mouth, Disp: , Rfl:     Current Allergies     Allergies as of 02/26/2021 - Reviewed 02/26/2021   Allergen Reaction Noted    Metoprolol  02/28/2020    Oxycodone-acetaminophen  01/18/2005    Penicillin v      Ranitidine  05/25/2010    Penicillins Rash 11/29/2002            The following portions of the patient's history were reviewed and updated as appropriate: allergies, current medications, past family history, past medical history, past social history, past surgical history and problem list      Past Medical History:   Diagnosis Date    Bilateral occipital neuralgia     Cervical post-laminectomy syndrome     Cervical radiculopathy     Cervical spine disease     Cervical spondylosis with myelopathy     Chronic lumbar pain     Chronic pain syndrome     Chronic thoracic spine pain     Degenerative cervical spinal stenosis     Diabetes ([facility] [address] )     Failed neck syndrome     Hypertension     Myofascial muscle pain     Polyarthralgia        Past Surgical History:   Procedure Laterality Date    CERVICAL DISC SURGERY      CERVICAL FUSION      TOTAL SHOULDER REPLACEMENT Right     Reversal       Family History   Problem Relation Age of Onset    Stroke Mother     Lung cancer Father          Medications have been verified  Objective     /80 (BP Location: Left arm, Patient Position: Sitting)   Pulse 71   Temp 97 5 °F (36 4 °C) (Temporal)   Resp 18   Ht 5' 7 5" (1 715 m)   Wt 108 kg (238 lb 12 8 oz)   SpO2 98%   BMI 36 85 kg/m²        Physical Exam     Physical Exam  Vitals signs and nursing note reviewed  Constitutional:       General: He is not in acute distress  Appearance: Normal appearance  He is not ill-appearing or diaphoretic  HENT:      Head: Normocephalic and atraumatic  Jaw: There is normal jaw occlusion  No trismus, tenderness, swelling, pain on movement or malocclusion        Comments: Clicking felt when opening and closing jaw     Right Ear: Tympanic membrane, ear canal and external ear normal       Left Ear: Tympanic membrane, ear canal and external ear normal       Mouth/Throat:      Mouth: No oral lesions  Dentition: Has dentures  No dental caries or dental abscesses  Pharynx: Oropharynx is clear  Uvula midline  No posterior oropharyngeal erythema  Tonsils: No tonsillar exudate  Cardiovascular:      Rate and Rhythm: Normal rate  Pulmonary:      Effort: Pulmonary effort is normal    Skin:     Findings: No bruising, erythema or rash  Neurological:      Mental Status: He is alert

## 2021-05-07 ENCOUNTER — OFFICE VISIT (OUTPATIENT)
Dept: URGENT CARE | Facility: CLINIC | Age: 56
End: 2021-05-07
Payer: COMMERCIAL

## 2021-05-07 ENCOUNTER — APPOINTMENT (OUTPATIENT)
Dept: RADIOLOGY | Facility: CLINIC | Age: 56
End: 2021-05-07
Payer: COMMERCIAL

## 2021-05-07 VITALS
DIASTOLIC BLOOD PRESSURE: 78 MMHG | HEIGHT: 67 IN | WEIGHT: 240 LBS | BODY MASS INDEX: 37.67 KG/M2 | TEMPERATURE: 97.6 F | RESPIRATION RATE: 18 BRPM | SYSTOLIC BLOOD PRESSURE: 137 MMHG | OXYGEN SATURATION: 98 % | HEART RATE: 59 BPM

## 2021-05-07 DIAGNOSIS — R42 DIZZINESS AND GIDDINESS: ICD-10-CM

## 2021-05-07 DIAGNOSIS — R42 DIZZINESS AND GIDDINESS: Primary | ICD-10-CM

## 2021-05-07 PROCEDURE — 99213 OFFICE O/P EST LOW 20 MIN: CPT | Performed by: PHYSICIAN ASSISTANT

## 2021-05-07 PROCEDURE — 71045 X-RAY EXAM CHEST 1 VIEW: CPT

## 2021-05-07 NOTE — PROGRESS NOTES
[facility]        NAME: Ansley Liu  is a 50 y o  male  : 1965    MRN: [id number]  DATE: May 7, 2021  TIME: 10:28 AM    Assessment and Plan   Dizziness and giddiness [R42]  1  Dizziness and giddiness  ECG 12 lead    XR chest 1 view         Patient Instructions     Patient Instructions     Hydrate with plenty of water  Change positions slowly as discussed  PCP follow up  Go to ER with worsening symptoms, chest pain, difficulty breathing, fainting, worsening dizziness  Bradycardia   WHAT YOU NEED TO KNOW:   Bradycardia is a slow heart rate, usually fewer than 60 beats per minute  A slow heart rate is normal for some people, such as athletes, and needs no treatment  Bradycardia may also be caused by health conditions that do need treatment  Your healthcare provider will tell you what heart rate is too low for you  DISCHARGE INSTRUCTIONS:   Call your local emergency number (911 in the [address] [facility]) if:   · You have new or worsening dizziness, shortness of breath, chest pain, or confusion  Call your doctor or cardiologist if:   · You feel lightheaded or faint  · Your pulse rate is lower than healthcare providers say it should be, even with treatment  · You are more tired than usual, even with treatment  · You have questions or concerns about your condition or care  Medicines: Your healthcare provider may make changes to medicine you currently take if it causes your symptoms  · Medicines  may be given to increase your heart rate and help your symptoms  You may also need medicine to treat a condition that is causing your symptoms  · Take your medicine as directed  Contact your healthcare provider if you think your medicine is not helping or if you have side effects  Tell him or her if you are allergic to any medicine  Keep a list of the medicines, vitamins, and herbs you take  Include the amounts, and when and why you take them   Bring the list or the pill bottles to follow-up visits  Carry your medicine list with you in case of an emergency  Heart monitoring at home: You may need to use a heart monitor at home to provide more information about your condition  This device is also called a Holter monitor, event monitor, or mobile telemetry  Bring your monitor with you to follow-up visits with your healthcare provider or cardiologist  Ask for more information about the Holter monitor  Manage or prevent bradycardia:   · Keep a record of your symptoms  Include when you have bradycardia, and what you were doing when it started  Also include anything that made your symptoms better or worse  Bring the record to follow-up visits with your healthcare providers  · Reach or maintain a healthy weight  Your healthcare provider can tell you what a healthy weight is for you  He or she can help you create a weight loss plan if needed  Weight loss can help strengthen your heartbeat  If you have sleep apnea, weight loss may help you breathe more regularly while you sleep  · Exercise as directed  Exercise can help strengthen your heart  It can also help you manage your weight and improve your sleep  Your healthcare provider can help you create an exercise plan  He or she may recommend 30 to 40 minutes of exercise most days of the week  · Eat a variety of healthy foods  Healthy foods include fruits, vegetables, whole-grain breads and cereals, low-fat dairy products, lean meats, fish, and cooked beans  Depending on the cause of your bradycardia, your healthcare provider may recommend a heart healthy diet  This diet is low in sodium (salt) and unhealthy fats  A dietitian or your healthcare provider can help you create a heart healthy diet  · Do not smoke  Nicotine and other chemicals in cigarettes and cigars can cause heart and lung damage  Ask your healthcare provider for information if you currently smoke and need help to quit   E-cigarettes or smokeless tobacco still contain nicotine  Talk to your healthcare provider before you use these products  For more information:   · [address]  [place] , [place]   Phone: [phone]  Web Address: [email or web address] ieCrowd/  [facility]     Follow up with your healthcare provider or cardiologist as directed:  Write down your questions so you remember to ask them during your visits  You may need to see specialists for more treatment  If you have a pacemaker, your cardiologist needs to make sure that it is working as it should  © Copyright [address] Information is for End User's use only and may not be sold, redistributed or otherwise used for commercial purposes  All illustrations and images included in CareNotes® are the copyrighted property of [employer] D A M [employer] Inc  or [employer]  The above information is an  only  It is not intended as medical advice for individual conditions or treatments  Talk to your doctor, nurse or pharmacist before following any medical regimen to see if it is safe and effective for you  **Portions of the record may have been created with voice recognition software  Occasional wrong word or "sound a like" substitutions may have occurred due to the inherent limitations of voice recognition software  Read the chart carefully and recognize, using context, where substitutions have occurred  **     Chief Complaint     Chief Complaint   Patient presents with    Nausea     1 week    Dizziness         History of Present Illness       62-year-old male presents to clinic with complaints of dizziness, lightheadedness, shortness of breath on off for the past few months  States symptoms have been ongoing since having COVID-19  He reports occasional nausea and fatigue  Denies any chest pain, difficulty breathing, fever, vomiting or diarrhea, abdominal pain  No known sick contacts  No recent travel        Review of Systems     Review of Systems Constitutional: Positive for fatigue  Negative for chills and fever  HENT: Negative  Respiratory: Positive for shortness of breath  Negative for cough, choking, chest tightness and wheezing  Cardiovascular: Negative for chest pain and palpitations  Gastrointestinal: Positive for nausea  Negative for diarrhea and vomiting  Musculoskeletal: Negative for myalgias  Skin: Negative for pallor and rash  Neurological: Positive for dizziness and light-headedness  Negative for syncope, weakness and headaches           Current Medications     Current Outpatient Medications:     Ascorbic Acid 500 MG CHEW, TAKE ONE TABLET BY MOUTH EVERY DAY ** VITAMIN C ** FOR NUTRITION, WOUND HEALING OR IRON ABSORPTION OVERNIGHT, PLEASE, Disp: , Rfl:     Aspirin Buf,CaCarb-MgCarb-MgO, 81 MG TABS, Take by mouth, Disp: , Rfl:     benzonatate (TESSALON PERLES) 100 mg capsule, Take 100 mg by mouth Three times daily as needed, Disp: , Rfl:     Cholecalciferol 25 MCG (1000 UT) capsule, TAKE ONE TABLET BY MOUTH EVERY DAY (FOR LOW VITAMIN D), Disp: , Rfl:     dextrose 1 g CHEW, as needed, Disp: , Rfl:     Empagliflozin (Jardiance) 25 MG TABS, Take 25 mg by mouth every morning ---- Marc Grace, Disp: , Rfl:     insulin glargine (LANTUS) 100 units/mL subcutaneous injection, Inject 14 Units under the skin daily at bedtime , Disp: , Rfl:     lisinopril (ZESTRIL) 10 mg tablet, Take 10 mg by mouth daily, Disp: , Rfl:     losartan (COZAAR) 25 mg tablet, TAKE ONE-HALF TABLET BY MOUTH EVERY DAY FOR BLOOD PRESSURE/HEART, Disp: , Rfl:     metFORMIN (GLUMETZA) 1000 MG (MOD) 24 hr tablet, Take 1,000 mg by mouth 2 (two) times a day with meals, Disp: , Rfl:     methocarbamol (ROBAXIN) 750 mg tablet, Take 1 tablet (750 mg total) by mouth every 8 (eight) hours, Disp: 90 tablet, Rfl: 1    naproxen (NAPROSYN) 500 mg tablet, Take 500 mg by mouth every 12 (twelve) hours as needed, Disp: , Rfl:     naproxen (NAPROSYN) 500 mg tablet, Take 1 tablet (500 mg total) by mouth 2 (two) times a day with meals for 7 days, Disp: 14 tablet, Rfl: 0    pantoprazole (PROTONIX) 40 mg tablet, Take 40 mg by mouth 2 (two) times a day  , Disp: , Rfl:     sertraline (ZOLOFT) 100 mg tablet, Take 150 mg by mouth every morning  , Disp: , Rfl:     sucralfate (CARAFATE) 1 g/10 mL suspension, Take 1 g by mouth, Disp: , Rfl:     traZODone (DESYREL) 50 mg tablet, Take 75 mg by mouth daily at bedtime  , Disp: , Rfl:     Current Allergies     Allergies as of 05/07/2021 - Reviewed 05/07/2021   Allergen Reaction Noted    Metoprolol  02/28/2020    Oxycodone-acetaminophen  01/18/2005    Penicillin v      Ranitidine  05/25/2010    Penicillins Rash 11/29/2002            The following portions of the patient's history were reviewed and updated as appropriate: allergies, current medications, past family history, past medical history, past social history, past surgical history and problem list      Past Medical History:   Diagnosis Date    Bilateral occipital neuralgia     Cervical post-laminectomy syndrome     Cervical radiculopathy     Cervical spine disease     Cervical spondylosis with myelopathy     Chronic lumbar pain     Chronic pain syndrome     Chronic thoracic spine pain     Degenerative cervical spinal stenosis     Diabetes ([address] )     Failed neck syndrome     Hypertension     Myofascial muscle pain     Polyarthralgia        Past Surgical History:   Procedure Laterality Date    CERVICAL DISC SURGERY      CERVICAL FUSION      TOTAL SHOULDER REPLACEMENT Right     Reversal       Family History   Problem Relation Age of Onset    Stroke Mother     Lung cancer Father          Medications have been verified  Objective     /78   Pulse 59   Temp 97 6 °F (36 4 °C)   Resp 18   Ht 5' 7" (1 702 m)   Wt 109 kg (240 lb)   SpO2 98%   BMI 37 59 kg/m²        Physical Exam     Physical Exam  Vitals signs and nursing note reviewed     Constitutional: General: He is not in acute distress  Appearance: Normal appearance  He is not ill-appearing or diaphoretic  HENT:      Head: Normocephalic and atraumatic  Right Ear: External ear normal       Left Ear: External ear normal    Cardiovascular:      Rate and Rhythm: Regular rhythm  Bradycardia present  Pulses: Normal pulses  Heart sounds: Normal heart sounds  Pulmonary:      Effort: Pulmonary effort is normal  No respiratory distress  Breath sounds: Normal breath sounds  No stridor  No wheezing, rhonchi or rales  Skin:     General: Skin is warm and dry  Coloration: Skin is not pale  Findings: No rash  Neurological:      Mental Status: He is alert and oriented to person, place, and time  Cranial Nerves: No cranial nerve deficit     Psychiatric:         Mood and Affect: Mood normal

## 2021-05-07 NOTE — PATIENT INSTRUCTIONS
Hydrate with plenty of water  Change positions slowly as discussed  PCP follow up  Go to ER with worsening symptoms, chest pain, difficulty breathing, fainting, worsening dizziness  Bradycardia   WHAT YOU NEED TO KNOW:   Bradycardia is a slow heart rate, usually fewer than 60 beats per minute  A slow heart rate is normal for some people, such as athletes, and needs no treatment  Bradycardia may also be caused by health conditions that do need treatment  Your healthcare provider will tell you what heart rate is too low for you  DISCHARGE INSTRUCTIONS:   Call your local emergency number (911 in the [address] [facility]) if:   · You have new or worsening dizziness, shortness of breath, chest pain, or confusion  Call your doctor or cardiologist if:   · You feel lightheaded or faint  · Your pulse rate is lower than healthcare providers say it should be, even with treatment  · You are more tired than usual, even with treatment  · You have questions or concerns about your condition or care  Medicines: Your healthcare provider may make changes to medicine you currently take if it causes your symptoms  · Medicines  may be given to increase your heart rate and help your symptoms  You may also need medicine to treat a condition that is causing your symptoms  · Take your medicine as directed  Contact your healthcare provider if you think your medicine is not helping or if you have side effects  Tell him or her if you are allergic to any medicine  Keep a list of the medicines, vitamins, and herbs you take  Include the amounts, and when and why you take them  Bring the list or the pill bottles to follow-up visits  Carry your medicine list with you in case of an emergency  Heart monitoring at home: You may need to use a heart monitor at home to provide more information about your condition  This device is also called a Holter monitor, event monitor, or mobile telemetry   Bring your monitor with you to follow-up visits with your healthcare provider or cardiologist  Ask for more information about the Holter monitor  Manage or prevent bradycardia:   · Keep a record of your symptoms  Include when you have bradycardia, and what you were doing when it started  Also include anything that made your symptoms better or worse  Bring the record to follow-up visits with your healthcare providers  · Reach or maintain a healthy weight  Your healthcare provider can tell you what a healthy weight is for you  He or she can help you create a weight loss plan if needed  Weight loss can help strengthen your heartbeat  If you have sleep apnea, weight loss may help you breathe more regularly while you sleep  · Exercise as directed  Exercise can help strengthen your heart  It can also help you manage your weight and improve your sleep  Your healthcare provider can help you create an exercise plan  He or she may recommend 30 to 40 minutes of exercise most days of the week  · Eat a variety of healthy foods  Healthy foods include fruits, vegetables, whole-grain breads and cereals, low-fat dairy products, lean meats, fish, and cooked beans  Depending on the cause of your bradycardia, your healthcare provider may recommend a heart healthy diet  This diet is low in sodium (salt) and unhealthy fats  A dietitian or your healthcare provider can help you create a heart healthy diet  · Do not smoke  Nicotine and other chemicals in cigarettes and cigars can cause heart and lung damage  Ask your healthcare provider for information if you currently smoke and need help to quit  E-cigarettes or smokeless tobacco still contain nicotine  Talk to your healthcare provider before you use these products  For more information:   · [address]  [place] , [place]   Phone: [phone]  Web Address: [email or web address]  [place]     Follow up with your healthcare provider or cardiologist as directed:  Write down your questions so you remember to ask them during your visits  You may need to see specialists for more treatment  If you have a pacemaker, your cardiologist needs to make sure that it is working as it should  © Copyright [employer] Information is for End User's use only and may not be sold, redistributed or otherwise used for commercial purposes  All illustrations and images included in CareNotes® are the copyrighted property of A D A M , Inc  or [facility]   The above information is an  only  It is not intended as medical advice for individual conditions or treatments  Talk to your doctor, nurse or pharmacist before following any medical regimen to see if it is safe and effective for you

## 2022-01-17 ENCOUNTER — TELEPHONE (OUTPATIENT)
Dept: NEUROLOGY | Facility: CLINIC | Age: 57
End: 2022-01-17

## 2022-01-17 NOTE — TELEPHONE ENCOUNTER
lmom if patient would like to move up his office visit with Mey Wallace for 1/18/2022 or later this week

## 2022-04-11 ENCOUNTER — TELEPHONE (OUTPATIENT)
Dept: NEUROLOGY | Facility: CLINIC | Age: 57
End: 2022-04-11

## 2022-04-11 NOTE — TELEPHONE ENCOUNTER
Called patient at the phone number [phone] mobile  Voicemail is full home number [phone]  and leave a message to contact office to r/s appointment for Monday April 18, 2022 with Dr Jonathan Ingram  Due to provider is out of office  Appointment for 04/18/2022 is cancel

## 2022-06-27 ENCOUNTER — TELEPHONE (OUTPATIENT)
Dept: NEUROLOGY | Facility: CLINIC | Age: 57
End: 2022-06-27

## 2022-06-27 NOTE — TELEPHONE ENCOUNTER
This is a  [place] [facility] was given as secondary ibuprofen did call to have the [address] [place] a referral as back up      Patient was out of town , could not find anything until 12/2022 will put him on waiting list

## 2022-07-05 ENCOUNTER — TELEPHONE (OUTPATIENT)
Dept: NEUROLOGY | Facility: CLINIC | Age: 57
End: 2022-07-05

## 2022-07-05 NOTE — TELEPHONE ENCOUNTER
Pt LVM on nursing line stating that he has not been seen in over 2 years but was told that he is still an active pt  Was scheduled for 12/8/22 as this was earliest they could find with Dr Juanjo Carson  Pt states he has a federal WC claim  Is now having problems that have just started up  Pt states he can't work because of it from an injury  States situation has gone downhill, injury worse  Pt hoping to get FMLA paper filled out for 12 weeks  Called pt and scheduled him for appt with Dr Juanjo Carson tomorrow 7/6/22 to discuss concerns

## 2022-07-06 ENCOUNTER — OFFICE VISIT (OUTPATIENT)
Dept: NEUROLOGY | Facility: CLINIC | Age: 57
End: 2022-07-06
Payer: OTHER MISCELLANEOUS

## 2022-07-06 VITALS
HEART RATE: 73 BPM | TEMPERATURE: 97.8 F | DIASTOLIC BLOOD PRESSURE: 80 MMHG | HEIGHT: 67 IN | BODY MASS INDEX: 37.59 KG/M2 | OXYGEN SATURATION: 97 % | SYSTOLIC BLOOD PRESSURE: 120 MMHG

## 2022-07-06 DIAGNOSIS — M54.6 CHRONIC THORACIC SPINE PAIN: ICD-10-CM

## 2022-07-06 DIAGNOSIS — M54.12 CERVICAL RADICULOPATHY: Primary | ICD-10-CM

## 2022-07-06 DIAGNOSIS — M96.1 CERVICAL POST-LAMINECTOMY SYNDROME: ICD-10-CM

## 2022-07-06 DIAGNOSIS — G89.29 CHRONIC THORACIC SPINE PAIN: ICD-10-CM

## 2022-07-06 PROCEDURE — 99215 OFFICE O/P EST HI 40 MIN: CPT | Performed by: PSYCHIATRY & NEUROLOGY

## 2022-07-06 RX ORDER — FAMOTIDINE 20 MG/1
20 TABLET, FILM COATED ORAL
COMMUNITY
Start: 2022-06-22

## 2022-07-06 RX ORDER — GABAPENTIN 100 MG/1
100 CAPSULE ORAL
Qty: 30 CAPSULE | Refills: 1 | Status: SHIPPED | OUTPATIENT
Start: 2022-07-06 | End: 2022-07-20

## 2022-07-06 RX ORDER — METHOCARBAMOL 750 MG/1
750 TABLET, FILM COATED ORAL EVERY 8 HOURS SCHEDULED
Qty: 90 TABLET | Refills: 1 | Status: SHIPPED | OUTPATIENT
Start: 2022-07-06 | End: 2022-07-20

## 2022-07-06 NOTE — PROGRESS NOTES
Ye Sheridan  is a 49 y o  male  Chief Complaint   Patient presents with    Chronic thoracic spine pain        Assessment:  1  Cervical radiculopathy    2  Chronic thoracic spine pain    3  Cervical post-laminectomy syndrome        Plan:  MRI scan of the cervical spine  MRI of the lumbar spine  EMG of bilateral upper extremity  Methocarbamol 750 mg Q 8 p r n  Neurontin 100 mg at nighttime  Referral to Pain management  Discussion:  Patient has history of cervical postlaminectomy syndrome and given his neck and mid back pain would recommend an MRI scan of the cervical and thoracic spine also would recommend an EMG of bilateral upper extremity to evaluate for his symptoms, patient to call me after the above test to discuss the results I have given him a prescription for methocarbamol 750 mg q 8 p r n  as it has helped him in the past for his muscle spasms and also Neurontin 100 mg at nighttime side effects discussed with the patient, he was advised to avoid strenuous activities, he is unable to work sitting in front of the computer for 8 hours 1 some FMLA paperwork to be filled out, also would recommend patient to be seen by pain specialist, depending on the above test will decide further management, to go to the hospital if has any worsening symptoms and call me otherwise to see me back in 3 months and follow up with his other physicians      Subjective:    HPI   Patient is here for his neck and low back pain he used to see my associate Dr Flor Lawrence in the past, he has history of cervical postlaminectomy syndrome and also has history of right shoulder replacement and midback pain, according to the patient he had been working on a computer and was able to manage but lately in the last several months he has been having worsening neck pain radiating to both arms associated with numbness and tingling sensation at least between 5-7 on 10 and also has mid back pain and is unable to sit in front of the computer and work, he also has been complaining of lot of muscle spasms, denies any bowel and bladder incontinence, no focal weakness, no other complaints      Vitals:    07/06/22 1322   BP: 120/80   BP Location: Left arm   Patient Position: Sitting   Cuff Size: Adult   Pulse: 73   Temp: 97 8 °F (36 6 °C)   TempSrc: Temporal   SpO2: 97%   Height: 5' 7" (1 702 m)       Current Medications    Current Outpatient Medications:     Ascorbic Acid 500 MG CHEW, TAKE ONE TABLET BY MOUTH EVERY DAY ** VITAMIN C ** FOR NUTRITION, WOUND HEALING OR IRON ABSORPTION OVERNIGHT, PLEASE, Disp: , Rfl:     benzonatate (TESSALON PERLES) 100 mg capsule, Take 100 mg by mouth Three times daily as needed, Disp: , Rfl:     Cholecalciferol 25 MCG (1000 UT) capsule, TAKE ONE TABLET BY MOUTH EVERY DAY (FOR LOW VITAMIN D), Disp: , Rfl:     Empagliflozin 25 MG TABS, Take 25 mg by mouth every morning ---- Summer Galindo, Disp: , Rfl:     famotidine (PEPCID) 20 mg tablet, Take 20 mg by mouth, Disp: , Rfl:     insulin glargine (LANTUS) 100 units/mL subcutaneous injection, Inject 14 Units under the skin daily at bedtime , Disp: , Rfl:     lisinopril (ZESTRIL) 10 mg tablet, Take 10 mg by mouth daily, Disp: , Rfl:     losartan (COZAAR) 25 mg tablet, TAKE ONE-HALF TABLET BY MOUTH EVERY DAY FOR BLOOD PRESSURE/HEART, Disp: , Rfl:     metFORMIN (GLUMETZA) 1000 MG (MOD) 24 hr tablet, Take 1,000 mg by mouth 2 (two) times a day with meals, Disp: , Rfl:     pantoprazole (PROTONIX) 40 mg tablet, Take 20 mg by mouth 2 (two) times a day, Disp: , Rfl:     traZODone (DESYREL) 50 mg tablet, Take 75 mg by mouth daily at bedtime  , Disp: , Rfl:     Aspirin Buf,CaCarb-MgCarb-MgO, 81 MG TABS, Take by mouth (Patient not taking: Reported on 7/6/2022), Disp: , Rfl:     dextrose 1 g CHEW, as needed (Patient not taking: Reported on 7/6/2022), Disp: , Rfl:     methocarbamol (ROBAXIN) 750 mg tablet, Take 1 tablet (750 mg total) by mouth every 8 (eight) hours (Patient not taking: Reported on 7/6/2022), Disp: 90 tablet, Rfl: 1    naproxen (NAPROSYN) 500 mg tablet, Take 500 mg by mouth every 12 (twelve) hours as needed (Patient not taking: Reported on 7/6/2022), Disp: , Rfl:     sertraline (ZOLOFT) 100 mg tablet, Take 150 mg by mouth every morning   (Patient not taking: Reported on 7/6/2022), Disp: , Rfl:     sucralfate (CARAFATE) 1 g/10 mL suspension, Take 1 g by mouth (Patient not taking: Reported on 7/6/2022), Disp: , Rfl:       Allergies  Metoprolol, Oxycodone-acetaminophen, Penicillin v, Ranitidine, and Penicillins    Past Medical History  Past Medical History:   Diagnosis Date    Bilateral occipital neuralgia     Cervical post-laminectomy syndrome     Cervical radiculopathy     Cervical spine disease     Cervical spondylosis with myelopathy     Chronic lumbar pain     Chronic pain syndrome     Chronic thoracic spine pain     Degenerative cervical spinal stenosis     Diabetes ([address] )     Failed neck syndrome     Hypertension     Myofascial muscle pain     Polyarthralgia          Past Surgical History:  Past Surgical History:   Procedure Laterality Date    CERVICAL DISC SURGERY      CERVICAL FUSION      TOTAL SHOULDER REPLACEMENT Right     Reversal         Family History:  Family History   Problem Relation Age of Onset    Stroke Mother     Lung cancer Father        Social History:   reports that he has never smoked  He has never used smokeless tobacco  He reports that he does not drink alcohol and does not use drugs  I have reviewed the past medical history, surgical history, social and family history, current medications, allergies vitals, review of systems, and updated this information as appropriate today  Objective:    Physical Exam    Neurological Exam    GENERAL:  Cooperative in no acute distress  Well-developed and well-nourished    HEAD and NECK   Head is atraumatic normocephalic with no lesions or masses   Neck is supple with full range of motion    CARDIOVASCULAR  Carotid Arteries-no carotid bruits  NEUROLOGIC:  Mental Status-the patient is awake alert and oriented without aphasia or apraxia  Cranial Nerves: Visual fields are full to confrontation  Extraocular movements are full without nystagmus  Pupils are 2-1/2 mm and reactive  Face is symmetrical to light touch  Movements of facial expression move symmetrically  Hearing is normal to finger rub bilaterally  Soft palate lifts symmetrically  Shoulder shrug is symmetrical  Tongue is midline without atrophy  Motor: No drift is noted on arm extension  Strength is full in the upper and lower extremities with normal bulk and tone  With poor effort  Sensory:  Decreased light touch pinprick temperature sensation in the right forearm compared to the left Cortical function is intact  Coordination: Finger to nose testing is performed accurately  Gait reveals a normal base with symmetrical arm swing  Reflexes:  1+ and symmetrical  Paraspinal muscle tenderness and spasm in the cervical and midthoracic area          ROS:  Review of Systems   Constitutional: Positive for appetite change  Negative for fever  HENT: Negative  Negative for hearing loss, tinnitus, trouble swallowing and voice change  Eyes: Positive for photophobia  Negative for pain  Respiratory: Negative  Negative for shortness of breath  Cardiovascular: Negative  Negative for palpitations  Gastrointestinal: Negative  Negative for nausea and vomiting  Endocrine: Negative  Negative for cold intolerance  Genitourinary: Positive for frequency and urgency  Negative for dysuria  Musculoskeletal: Negative  Negative for myalgias and neck pain  Skin: Negative  Negative for rash  Neurological: Positive for weakness, numbness and headaches  Negative for dizziness, tremors, seizures, syncope, facial asymmetry, speech difficulty and light-headedness  Patient states numbness and weakness in arms  Hematological: Negative  Does not bruise/bleed easily  Psychiatric/Behavioral: Positive for sleep disturbance  Negative for confusion and hallucinations

## 2022-07-15 NOTE — PROGRESS NOTES
Assessment:  1  Cervical paraspinal muscle spasm    2  Cervical radiculopathy    3  Cervical post-laminectomy syndrome    4  Myofascial pain syndrome    5  Chronic pain syndrome        Plan:  Orders Placed This Encounter   Procedures    Durable Medical Equipment     1 TENS UNIT       New Medications Ordered This Visit   Medications    cyclobenzaprine (FLEXERIL) 10 mg tablet     Sig: Take 1 tablet (10 mg total) by mouth 3 (three) times a day as needed for muscle spasms     Dispense:  60 tablet     Refill:  0    gabapentin (NEURONTIN) 300 mg capsule     Sig: Take 1 capsule (300 mg total) by mouth 3 (three) times a day Start by taking 1 tablet at bedtime for 3 days  Then increase to 1 tablet in the evening and 1 tablet at bedtime for 3 days  Then increase to 1 tablet in the morning, 1 tablet in the evening, and 1 tablet at bedtime thereafter  Dispense:  90 capsule     Refill:  0       My impressions and treatment recommendations were discussed in detail with the patient, who verbalized understanding and had no further questions  This is a 29-year-old male who presents to our office with chief complaint of neck tightness, burning thoracic pain and pain down the bilateral upper extremities described as numbness, tingling  No inciting event  Issue began about 6 months ago  He has history of ACDF at C5-6 and C6-7 with Dr Kathleen Victor in 2011 and was doing well up until 6 months ago  Previously seen in 2016 by Dr Vilma Venegas in deemed an inadequate SCS candidate  He is neurologically intact on exam today, however patient insists that he is having a significant issue as this is a departure from his baseline  He has MRI of the cervical and thoracic spine ordered by Neurology which she will be getting this week  He will alert us of the findings of the study  This will help to determine next step which may include epidural steroid injection if he has notable compressive pathology    He was started on gabapentin by Neurology at 100 mg q h s  I believe the medication could be escalated considerably  We will start with 300 mg t i d  on a dose titration schedule  Will also order Flexeril given his description of spasm starting at 10 mg t i d  p luca n  Shiv Perez [address] [facility] Prescription Drug Monitoring Program report was reviewed and was appropriate     Complete risks and benefits including bleeding, infection, tissue reaction, nerve injury and allergic reaction were discussed  The approach was demonstrated using models and literature was provided  Verbal and written consent was obtained  Discharge instructions were provided  I personally saw and examined the patient and I agree with the above discussed plan of care  History of Present Illness:    Ansley Liu  is a 56 y o  male who presents to [facility] for initial evaluation of the above stated pain complaints  The patient has a past medical and chronic pain history as outlined in the assessment section  He was referred by Victoria Blankenship MD  [address]   Patient was seen by spine and pain in 2016  Has history of ACDF C5-6 and C6-7 with Dr Corrine Oliveira in 2011  Surgery initially help with radicular symptoms but had recurrence of symptoms the following year  Dr John Zaman did discuss SCS trial with him but felt that he was not a good candidate given that there was not enough room for the lead    Today's reporting chronic neck and bilateral arm pain this is chronic for last 15 years  Patient attributes symptoms to an injury in 2005  Over the past month, intensity the pain has been moderate to severe  Current pain is 8/10  Constant  Worse in the morning evening  Burning, numbness, sharp, pins and needles, throbbing associated with muscle spasm  Reports weakness in the upper extremities  Reports dropping objects        Reports he doing ok until the last 6 months when he started to have increased pain in the neck and pain in the arms  Reports fine motor issues in the finger tips  Has pressure pain in the neck and burning pain in the thoracic area  He reports he was told he needed further surgery in the early 2010's after his initial surgery which was dened by the department of labor  Pain is increased with standing, bending, sitting, walking, exercising, sneezing  Decreased with relaxation, lying down  Has MRI and EMG scheduled later this week  Had MRI from 2016 showing moderate to severe left foraminal narrowing at C4-5 and broad-based disc    Past medical history includes anxiety, diabetes, GERD  Next   Reports moderate relief with nerve injection, PT, heat/ice therapy in the past   No relief with 10s unit, chiropractic manipulation  In the past has been on opioids including tramadol with relief  In the past also tried lidocaine patch  Currently using acetaminophen, naproxen  Also was recently started on gabapentin by Neurology  Review of Systems:    Review of Systems   Constitutional: Positive for unexpected weight change  Negative for fever  HENT: Positive for hearing loss  Negative for trouble swallowing  Eyes: Positive for visual disturbance  Respiratory: Negative for shortness of breath and wheezing  Cardiovascular: Negative for chest pain and palpitations  Gastrointestinal: Negative for constipation, diarrhea, nausea and vomiting  Endocrine: Positive for polyuria  Negative for cold intolerance, heat intolerance and polydipsia  Genitourinary: Negative for difficulty urinating and frequency  Musculoskeletal: Positive for arthralgias and myalgias  Negative for gait problem and joint swelling  Skin: Negative for rash  Neurological: Positive for dizziness and headaches  Negative for seizures, syncope and weakness  Hematological: Does not bruise/bleed easily  Psychiatric/Behavioral: Positive for decreased concentration  Negative for dysphoric mood          Anxiety All other systems reviewed and are negative  Patient Active Problem List   Diagnosis    Cervical radiculopathy    Cervical post-laminectomy syndrome    Chronic thoracic spine pain    Cervical spine disease       Past Medical History:   Diagnosis Date    Bilateral occipital neuralgia     Cervical post-laminectomy syndrome     Cervical radiculopathy     Cervical spine disease     Cervical spondylosis with myelopathy     Chronic lumbar pain     Chronic pain syndrome     Chronic thoracic spine pain     Degenerative cervical spinal stenosis     Diabetes ([address] )     Failed neck syndrome     Hypertension     Myofascial muscle pain     Polyarthralgia        Past Surgical History:   Procedure Laterality Date    CERVICAL DISC SURGERY      CERVICAL FUSION      TOTAL SHOULDER REPLACEMENT Right     Reversal       Family History   Problem Relation Age of Onset    Stroke Mother     Lung cancer Father        Social History     Occupational History    Not on file   Tobacco Use    Smoking status: Never Smoker    Smokeless tobacco: Never Used   Vaping Use    Vaping Use: Never used   Substance and Sexual Activity    Alcohol use: Yes     Alcohol/week: 1 0 standard drink     Types: 1 Cans of beer per week     Comment: every weekend    Drug use: No    Sexual activity: Not on file         Current Outpatient Medications:     Cholecalciferol 25 MCG (1000 UT) capsule, TAKE ONE TABLET BY MOUTH EVERY DAY (FOR LOW VITAMIN D), Disp: , Rfl:     cyclobenzaprine (FLEXERIL) 10 mg tablet, Take 1 tablet (10 mg total) by mouth 3 (three) times a day as needed for muscle spasms, Disp: 60 tablet, Rfl: 0    famotidine (PEPCID) 20 mg tablet, Take 20 mg by mouth, Disp: , Rfl:     gabapentin (NEURONTIN) 300 mg capsule, Take 1 capsule (300 mg total) by mouth 3 (three) times a day Start by taking 1 tablet at bedtime for 3 days  Then increase to 1 tablet in the evening and 1 tablet at bedtime for 3 days    Then increase to 1 tablet in the morning, 1 tablet in the evening, and 1 tablet at bedtime thereafter , Disp: 90 capsule, Rfl: 0    lisinopril (ZESTRIL) 10 mg tablet, Take 10 mg by mouth daily, Disp: , Rfl:     metFORMIN (GLUMETZA) 1000 MG (MOD) 24 hr tablet, Take 1,000 mg by mouth 2 (two) times a day with meals, Disp: , Rfl:     naproxen (NAPROSYN) 500 mg tablet, Take 500 mg by mouth every 12 (twelve) hours as needed PRN, Disp: , Rfl:     pantoprazole (PROTONIX) 40 mg tablet, Take 20 mg by mouth 2 (two) times a day, Disp: , Rfl:     traZODone (DESYREL) 50 mg tablet, Take 75 mg by mouth daily at bedtime  , Disp: , Rfl:     Ascorbic Acid 500 MG CHEW, TAKE ONE TABLET BY MOUTH EVERY DAY ** VITAMIN C ** FOR NUTRITION, WOUND HEALING OR IRON ABSORPTION OVERNIGHT, PLEASE (Patient not taking: Reported on 7/20/2022), Disp: , Rfl:     Aspirin Buf,CaCarb-MgCarb-MgO, 81 MG TABS, Take by mouth (Patient not taking: No sig reported), Disp: , Rfl:     benzonatate (TESSALON PERLES) 100 mg capsule, Take 100 mg by mouth Three times daily as needed (Patient not taking: Reported on 7/20/2022), Disp: , Rfl:     dextrose 1 g CHEW, as needed (Patient not taking: No sig reported), Disp: , Rfl:     Empagliflozin 25 MG TABS, Take 25 mg by mouth every morning ---- Thien Mcarthur, Disp: , Rfl:     insulin glargine (LANTUS) 100 units/mL subcutaneous injection, Inject 14 Units under the skin daily at bedtime  (Patient not taking: Reported on 7/20/2022), Disp: , Rfl:     losartan (COZAAR) 25 mg tablet, TAKE ONE-HALF TABLET BY MOUTH EVERY DAY FOR BLOOD PRESSURE/HEART (Patient not taking: Reported on 7/20/2022), Disp: , Rfl:     sertraline (ZOLOFT) 100 mg tablet, Take 150 mg by mouth every morning   (Patient not taking: No sig reported), Disp: , Rfl:     sucralfate (CARAFATE) 1 g/10 mL suspension, Take 1 g by mouth (Patient not taking: No sig reported), Disp: , Rfl:     Allergies   Allergen Reactions    Metoprolol      Too low of a heart rate    Oxycodone-Acetaminophen      rash    Penicillin V     Ranitidine      Other reaction(s): Xerostomia    Penicillins Rash     rash       Physical Exam:    /78 (BP Location: Left arm, Patient Position: Sitting, Cuff Size: Standard)   Pulse 74   Ht 5' 7" (1 702 m)   Wt 111 kg (244 lb 3 2 oz)   BMI 38 25 kg/m²     Constitutional: normal, well developed, well nourished, alert, in no distress and non-toxic and no overt pain behavior    Eyes: anicteric  HEENT: grossly intact  Neck: supple, symmetric, trachea midline and no masses   Pulmonary:even and unlabored  Cardiovascular:No edema or pitting edema present  Skin:Normal without rashes or lesions and well hydrated  Psychiatric:Mood and affect appropriate  Neurologic:Cranial Nerves II-XII grossly intact  Musculoskeletal:normal     Cervical Spine Exam    Appearance:  Normal lordosis  Palpation/Tenderness:  left cervical paraspinal tenderness  right cervical paraspinal tenderness  left trapezium tenderness  right trapezium tenderness  Sensory:  no sensory deficits noted  Range of Motion:  Flexion:  Minimally limited  with pain  Extension:  Severely limited  with pain  Lateral Flexion - Left:  Severely limited  with pain  Lateral Flexion - Right:  Severely limited  with pain  Rotation - Left:  Severely limited  with pain  Rotation - Right:  Severely limited  with pain  Motor Strength:  Left Arm Flexion  5/5  Left Arm Extension  5/5  Right Arm Flexion  4/5  Right Arm Extension  5/5  Left Wrist Flexion  5/5  Left Wrist Extension  5/5  Left Finger Abduction  5/5  Right Finger Abduction  5/5  Left Pincer Grasp  5/5  Right Pincer Grasp  5/5  Left    5/5  Right   5/5   Pain limited right arm flexion  Reflexes:  Left Patellar:  1+   Right Patellar:  1+   Left Achilles:  1+   Right Achilles:  1+       Imaging  No orders to display       Orders Placed This Encounter   Procedures   Jaylene Mare Durable Medical Equipment

## 2022-07-18 ENCOUNTER — TELEPHONE (OUTPATIENT)
Dept: NEUROLOGY | Facility: CLINIC | Age: 57
End: 2022-07-18

## 2022-07-18 NOTE — TELEPHONE ENCOUNTER
LVM making patient aware that his FMLA paper work has been completed  Asked that patient call back to let us know if he would like to come to the office to pick it up and if he needs the original copy  Will scan into chart

## 2022-07-20 ENCOUNTER — CONSULT (OUTPATIENT)
Dept: PAIN MEDICINE | Facility: CLINIC | Age: 57
End: 2022-07-20
Payer: OTHER MISCELLANEOUS

## 2022-07-20 VITALS
SYSTOLIC BLOOD PRESSURE: 134 MMHG | HEIGHT: 67 IN | WEIGHT: 244.2 LBS | BODY MASS INDEX: 38.33 KG/M2 | DIASTOLIC BLOOD PRESSURE: 78 MMHG | HEART RATE: 74 BPM

## 2022-07-20 DIAGNOSIS — M54.12 CERVICAL RADICULOPATHY: ICD-10-CM

## 2022-07-20 DIAGNOSIS — G89.4 CHRONIC PAIN SYNDROME: ICD-10-CM

## 2022-07-20 DIAGNOSIS — M62.838 CERVICAL PARASPINAL MUSCLE SPASM: Primary | ICD-10-CM

## 2022-07-20 DIAGNOSIS — M79.18 MYOFASCIAL PAIN SYNDROME: ICD-10-CM

## 2022-07-20 DIAGNOSIS — M96.1 CERVICAL POST-LAMINECTOMY SYNDROME: ICD-10-CM

## 2022-07-20 PROCEDURE — 99204 OFFICE O/P NEW MOD 45 MIN: CPT | Performed by: STUDENT IN AN ORGANIZED HEALTH CARE EDUCATION/TRAINING PROGRAM

## 2022-07-20 RX ORDER — GABAPENTIN 300 MG/1
300 CAPSULE ORAL 3 TIMES DAILY
Qty: 90 CAPSULE | Refills: 0 | Status: SHIPPED | OUTPATIENT
Start: 2022-07-20 | End: 2022-09-29 | Stop reason: SDUPTHER

## 2022-07-20 RX ORDER — CYCLOBENZAPRINE HCL 10 MG
10 TABLET ORAL 3 TIMES DAILY PRN
Qty: 60 TABLET | Refills: 0 | Status: SHIPPED | OUTPATIENT
Start: 2022-07-20

## 2022-07-20 NOTE — PATIENT INSTRUCTIONS
How to Use a TENS Unit   WHAT YOU NEED TO KNOW:   What do I need to know about a TENS unit? A transcutaneous electrical nerve stimulation (TENS) unit is a treatment for pain  A TENS unit is a small, portable, battery-powered device  The TENS unit uses mild, safe electrical signals to help control pain  Electrodes (sticky patches) are placed on your skin  The TENS unit sends painless electrical signals through the electrodes to the nerves under your skin  Electrode placement depends on the type and location of your pain  Your healthcare provider will show you where to place the electrodes and what settings are best for you  How do I use a TENS unit? Test the battery pack of the TENS unit to make sure it is fully charged  The TENS unit has 2 control knobs  One control knob makes the electrical signals strong or weak  The other control knob makes the electrical signals fast or slow  Turn the control knobs to the off position before you start  Use rubbing alcohol to clean the skin where the electrodes will be placed  Let your skin dry  Put a thin coat of gel on the bottom of each electrode  This gel helps the electrical signal get to the nerves under your skin  Put the electrodes on your skin and use medical tape or a sticky patch to cover the electrode  This keeps the electrode firmly stuck to the skin  Ask for help if you cannot reach the area where the electrodes should go  Hook the pin connectors on the end of the electrode wires to the electrodes  Then plug the electrode wires into the TENS unit  Turn the control knobs slowly to the correct setting  You should feel a tingling feeling  Hook the TENS unit to your belt or place it in a pocket  What should I do after the TENS treatment? Turn the control knobs to the off position  Unplug the electrode wires from the TENS unit  The electrodes may be left on your skin if you have another TENS treatment soon  If not, remove the electrodes  Wash the skin where the electrodes were placed  Clean the electrodes with soap and water to remove the gel  Do not use alcohol because this can damage the rubber on the electrode  Get new electrodes if the electrodes become damaged or will not stay stuck to the skin  Remove the battery from the TENS and replace it with a charged battery  Charge the battery so that it will be ready for another treatment  What else do I need to know about a TENS unit? Tell your healthcare provider if your muscles start to twitch during treatment  The TENS signals may be too strong or too fast  Also tell him if you cannot feel any tingling at all  The signal may be too weak or too slow  The electrodes should be removed at least once a day if the TENS treatment is used around the clock  Check your skin under the electrodes for redness or tenderness  Clean your skin when the electrodes are off and use lotion  Move the electrodes slightly for each treatment  This will help prevent the skin from becoming red or sore  Put new gel on the bottom of the electrodes each time you place them on your skin  Do not sleep or get near water with the electrodes on your skin and the TENS unit turned on  CARE AGREEMENT:   You have the right to help plan your care  Learn about your health condition and how it may be treated  Discuss treatment options with your healthcare providers to decide what care you want to receive  You always have the right to refuse treatment  The above information is an  only  It is not intended as medical advice for individual conditions or treatments  Talk to your doctor, nurse or pharmacist before following any medical regimen to see if it is safe and effective for you  © Copyright [employer] 2022 Information is for End User's use only and may not be sold, redistributed or otherwise used for commercial purposes   All illustrations and images included in CareNotes® are the copyrighted property of A D A M , Inc  or [address] [place] [address]

## 2022-07-22 ENCOUNTER — PROCEDURE VISIT (OUTPATIENT)
Dept: NEUROLOGY | Facility: CLINIC | Age: 57
End: 2022-07-22
Payer: OTHER MISCELLANEOUS

## 2022-07-22 DIAGNOSIS — M54.12 CERVICAL RADICULOPATHY: ICD-10-CM

## 2022-07-22 DIAGNOSIS — G56.23 ULNAR NEUROPATHY OF BOTH UPPER EXTREMITIES: Primary | ICD-10-CM

## 2022-07-22 PROCEDURE — 95886 MUSC TEST DONE W/N TEST COMP: CPT | Performed by: PSYCHIATRY & NEUROLOGY

## 2022-07-22 PROCEDURE — 95910 NRV CNDJ TEST 7-8 STUDIES: CPT | Performed by: PSYCHIATRY & NEUROLOGY

## 2022-07-22 NOTE — PROGRESS NOTES
EMG 2 Limb Upper Extremity     Date/Time 7/22/2022 11:18 AM     Performed by  Bryan Quinonez MD     Authorized by Riky Whitfield MD              EMG BILATERAL UPPER EXTREMITIES    Patient reports worsening neck pain radiating to his arms right greater than left as well as numbness and tingling of the medial 2 fingers bilaterally    Motor and sensory conduction studies were performed on the median and ulnar nerves and radial sensory nerves bilaterally  The right median distal motor latency is prolonged at 4 4 milliseconds in the left ulnar distal motor latency is prolonged at 3 4 milliseconds while the other distal motor latencies were normal  The median motor action potential amplitudes are normal with distal stimulation however with proximal stimulation there is a significant reduction in amplitudes  The ulnar motor potentials were mildly reduced with distal stimulation and were significantly reduced with proximal stimulation    Motor conduction velocities of the right median nerve is mildly slow at 48 m/sec while the left is normal   The ulnar conduction velocity on the right below the elbow was slow at 40 m/sec with more significant slowing at 23 m/sec across the elbow  Conduction of the left ulnar nerve below the elbow is normal with significant reduction across the elbow at 18 meters/second  The left median F-wave was normal while the other F-waves were prolonged  The ulnar distal sensory latencies were prolonged bilaterally at 3 8 and 4 3 milliseconds of the right left respectively while the median and radial latencies were normal   The right ulnar and bilateral radial potential amplitudes were normal while the other amplitudes were reduced  Concentric needle EMG was performed in various distal and proximal muscles of the upper extremities bilaterally including APB, FDI, EDC, FDS index, brachioradialis, biceps, triceps in the low cervical paraspinal region   There 2+ positive waves 1+ fibrillation potentials noted in the left FDI region  No other spontaneous activities were seen  Rapid firing large amplitude poly phasic potentials with reduced interference patterns were noted the triceps and EDC regions bilaterally  The other compound motor unit potentials were of normal configuration and interference patterns were full or full for effort    IMPRESSION: This is an abnormal EMG of the bilateral upper extremities due to evidence of a localized neuropathic process involving the ulnar nerve at the elbow bilaterally consistent with bilateral cubital tunnel syndrome as well as concomitant entrapment of the ulnar at the wrist bilaterally left greater than right  A more diffuse peripheral neuropathy is suspected  In addition chronic denervation changes in the C 6 7 distribution bilaterally were also noted  SHERRON Bower  [facility] [address]  [phone]    Electromyography & Nerve Conduction Studies Report          Full Name: Adriana Marquez Gender: Male  MRN: [id number] YOB: 1965      Visit Date: 7/22/2022 11:19 AM  Age: 56 Years  Examining Physician: Angeles Kimball MD   Referring Physician: DR Savi Tejada  Medical History: TEMP 32 8      Sensory Nerve Conduction Study       Nerve / Sites Rec  Site Onset Lat Peak Lat  Amp Segments Distance Peak Diff Velocity     ms ms µV  cm ms m/s   R Median - Dig II (Antidromic)  Wrist Index 3 0 3 9 15 9 Wrist - Index 14  47      Ref  ?3 4  ? 20 0 Ref  ?50   L Median - Dig II (Antidromic)  Wrist Index 3 1 3 8 14 7 Wrist - Index 14  45      Ref  ?3 4  ? 20 0 Ref  ?50   R Ulnar - Dig V (Antidromic)  Wrist Dig V 3 8 5 9 18 8 Wrist - Dig V 12  32      Ref  ?2 9  ? 17 0 Ref  ?50   L Ulnar - Dig V (Antidromic)  Wrist Dig V 4 3 5 4 7 8 Wrist - Dig V 12  28      Ref  ?2 9  ? 17 0 Ref     ?50   R Median, Ulnar - Palmar      Median Palm Wrist 1 7 2 3 58 8 Median Palm - Wrist 8  47      Ref  ?2 2 ? 50 0 Ref  ?50      Ulnar Palm Wrist 2 3 3 2 24 1 Ulnar Palm - Wrist 8  35      Ref  ?2 2 ? 12 0 Ref  ?50        Median Palm - Ulnar Palm  -0 9         Ref  ?0 4    L Median, Ulnar - Palmar      Median Palm Wrist 1 7 2 2 37 3 Median Palm - Wrist 8  48      Ref  ?2 2 ? 50 0 Ref  ?50      Ulnar Palm Wrist 1 4 1 9 1 9 Ulnar Palm - Wrist 8  57      Ref  ?2 2 ? 12 0 Ref  ?50        Median Palm - Ulnar Palm  0 3         Ref  ?0 4    R Radial - Superficial (Antidromic)      Forearm Wrist 1 5 2 1 16 2 Forearm - Wrist 10  66      Ref  ?2 9 ? 15 0 Ref  ?50   L Radial - Superficial (Antidromic)      Forearm Wrist 1 7 2 3 18 8 Forearm - Wrist 10  58      Ref  ?2 9 ? 15 0 Ref  ?50       Motor Nerve Conduction Study       Nerve / Sites Muscle Latency Ref  Amplitude Ref  Segments Distance Lat Diff Velocity Ref  ms ms mV mV  cm ms m/s m/s   R Median - APB      Wrist APB 4 4 ?4 4 7 0 ?4 0 Wrist - APB 7         Elbow APB 9 3  1 3  Elbow - Wrist 23 4 83 48 ?49   L Median - APB      Wrist APB 3 5 ?4 4 8 9 ?4 0 Wrist - APB 7         Elbow APB 8 0  1 8  Elbow - Wrist 23 4 54 51 ?49   R Ulnar - ADM      Wrist ADM 3 3 ?3 3 5 2 ?6 0 Wrist - ADM 7         B  Elbow ADM 9 1  2 9  B  Elbow - Wrist 23 5 81 40 ?49      A  Elbow ADM 13 3  3 6  A  Elbow - B  Elbow 10 4 27 23 ?49   L Ulnar - ADM      Wrist ADM 3 4 ?3 3 5 8 ?6 0 Wrist - ADM 7         B  Elbow ADM 7 5  2 6  B  Elbow - Wrist 22 4 04 54 ?49      A  Elbow ADM 13 0  2 6  A  Elbow - B  Elbow 10 5 50 18 ?52       F Waves       Nerve F Latency Ref  ms ms   R Median - APB 34 5 ?31 0   R Ulnar - ADM 34 4 ?32 0   L Median - APB 26 5 ?31 0   L Ulnar - ADM 37 0 ?32 0       EMG Summary Table Please see report for accurate description of EMG findings rather than the table below    Spontaneous MUAP Recruitment   Muscle Nerve Roots IA Fib PSW Fasc H F  Dur  Amp PPP Config Pattern   L   First dorsal interosseous Ulnar C8-T1 NL None None None None NL NL None NL NL   R  First dorsal interosseous Ulnar C8-T1 NL None None None None NL NL None NL NL   R  Abductor pollicis brevis Median J0-G8 NL None None None None NL NL None NL NL   L  Abductor pollicis brevis Median U4-V3 NL None None None None NL NL None NL NL   R  Extensor digitorum communis Radial C7-C8 NL None None None None NL NL None NL NL   L  Extensor digitorum communis Radial C7-C8 NL None None None None NL NL None NL NL   R  Brachioradialis Radial C5-C6 NL None None None None NL NL None NL NL   L  Brachioradialis Radial C5-C6 NL None None None None NL NL None NL NL   R  Flexor digitorum superficialis Median C7-T1 NL None None None None NL NL None NL NL   L  Flexor digitorum superficialis Median C7-T1 NL None None None None NL NL None NL NL   R  Biceps brachii Musculocut  C5-C6 NL None None None None NL NL None NL NL   L  Biceps brachii Musculocut  C5-C6 NL None None None None NL NL None NL NL   R  Triceps brachii Radial C6-C8 NL None None None None NL NL None NL NL   L  Triceps brachii Radial C6-C8 NL None None None None NL NL None NL NL   R  Cervical paraspinals Spinal C4-C8 NL None None None None NL NL None NL NL   L   Cervical paraspinals Spinal C4-C8 NL None None None None NL NL None NL NL

## 2022-07-23 ENCOUNTER — HOSPITAL ENCOUNTER (OUTPATIENT)
Dept: MRI IMAGING | Facility: HOSPITAL | Age: 57
Discharge: HOME | End: 2022-07-23
Attending: PSYCHIATRY & NEUROLOGY
Payer: OTHER MISCELLANEOUS

## 2022-07-23 DIAGNOSIS — G89.29 CHRONIC THORACIC SPINE PAIN: ICD-10-CM

## 2022-07-23 DIAGNOSIS — M54.12 CERVICAL RADICULOPATHY: ICD-10-CM

## 2022-07-23 DIAGNOSIS — M54.6 CHRONIC THORACIC SPINE PAIN: ICD-10-CM

## 2022-07-23 PROCEDURE — 72146 MRI CHEST SPINE W/O DYE: CPT

## 2022-07-23 PROCEDURE — G1004 CDSM NDSC: HCPCS

## 2022-07-23 PROCEDURE — 72141 MRI NECK SPINE W/O DYE: CPT

## 2022-07-25 ENCOUNTER — TELEPHONE (OUTPATIENT)
Dept: PAIN MEDICINE | Facility: CLINIC | Age: 57
End: 2022-07-25

## 2022-07-25 NOTE — TELEPHONE ENCOUNTER
Pt called in to let the MRI has been completed   Please be advised thank you    Pt can be reached @ [phone] once reviewed

## 2022-07-25 NOTE — TELEPHONE ENCOUNTER
MRIs ordered by neurology  Will review results whe available for any recs from [facility]

## 2022-07-26 ENCOUNTER — TELEPHONE (OUTPATIENT)
Dept: NEUROLOGY | Facility: CLINIC | Age: 57
End: 2022-07-26

## 2022-07-26 DIAGNOSIS — M48.9 CERVICAL SPINE DISEASE: Primary | ICD-10-CM

## 2022-07-27 NOTE — TELEPHONE ENCOUNTER
MRI shows some advancement of his severe spinal stenosis at C3-4    He should probably see Neurosurgery 1st   I believe Neurology placed the referral

## 2022-07-28 ENCOUNTER — TELEPHONE (OUTPATIENT)
Dept: NEUROLOGY | Facility: CLINIC | Age: 57
End: 2022-07-28

## 2022-07-28 NOTE — TELEPHONE ENCOUNTER
Pt requesting updated report regarding his condition  Please see previous  Form is located in media tab  Dr Marina Spangler - Are you agreeable to complete forms?

## 2022-08-01 NOTE — TELEPHONE ENCOUNTER
S/w pt and advised of same  Pt verbalized understanding  Per pt his records are being reviewed and he will receive a call back from Neurosurgery

## 2022-08-03 ENCOUNTER — PATIENT MESSAGE (OUTPATIENT)
Dept: NEUROLOGY | Facility: CLINIC | Age: 57
End: 2022-08-03

## 2022-08-03 NOTE — TELEPHONE ENCOUNTER
Called pt and lvm that that Dr Lomax Certain out of the office and forms will not be ready until he returns

## 2022-08-10 ENCOUNTER — PATIENT MESSAGE (OUTPATIENT)
Dept: NEUROLOGY | Facility: CLINIC | Age: 57
End: 2022-08-10

## 2022-08-15 ENCOUNTER — TELEPHONE (OUTPATIENT)
Dept: NEUROSURGERY | Facility: CLINIC | Age: 57
End: 2022-08-15

## 2022-08-15 NOTE — PATIENT COMMUNICATION
Patient called wants to know if Dr Marina Spangler filled out his forms for Workers Comp and a letter explaining patient present condition  It is due on 8/18/22  Patient stated if Dr Marina Spangler cannot fill it out to refer him to another doctor who can  Please advise and contact patient when filled out at [phone]

## 2022-08-15 NOTE — TELEPHONE ENCOUNTER
8/15/22 2:38pm - Patient called and lm stating he has an appointment on 9/9/22 with Dr Servando Ramos  He states that the consult stated it would be billed to the [place] [facility] but this is incorrect  It's supposed to be billed under [address] Dept  Of Labor; needs pre-approval and the examiner has not seen it  Patient wants to make sure this is fixed and make sure it's under [address] Dept  Of Labor OWCP W/C  Gave msg to [facility] to review/verify

## 2022-08-24 NOTE — PATIENT COMMUNICATION
Form has been completed by Dr Patel Fink  Patient informed Beba Rodriguez, he would be coming to pick-up forms  1 page ($15) form charge has been dropped  Collect patient when patient comes to pick-up forms  Completed Form has been scanned into this encounter

## 2022-09-09 ENCOUNTER — HOSPITAL ENCOUNTER (OUTPATIENT)
Dept: RADIOLOGY | Facility: HOSPITAL | Age: 57
Discharge: HOME | End: 2022-09-09
Attending: NEUROLOGICAL SURGERY
Payer: OTHER MISCELLANEOUS

## 2022-09-09 ENCOUNTER — OFFICE VISIT (OUTPATIENT)
Dept: NEUROSURGERY | Facility: CLINIC | Age: 57
End: 2022-09-09
Payer: OTHER MISCELLANEOUS

## 2022-09-09 VITALS
SYSTOLIC BLOOD PRESSURE: 130 MMHG | TEMPERATURE: 98.2 F | HEIGHT: 67 IN | RESPIRATION RATE: 16 BRPM | DIASTOLIC BLOOD PRESSURE: 80 MMHG | WEIGHT: 237 LBS | BODY MASS INDEX: 37.2 KG/M2 | HEART RATE: 66 BPM

## 2022-09-09 DIAGNOSIS — Z98.1 STATUS POST CERVICAL SPINAL FUSION: ICD-10-CM

## 2022-09-09 DIAGNOSIS — M48.9 CERVICAL SPINE DISEASE: ICD-10-CM

## 2022-09-09 DIAGNOSIS — Z98.1 ARTHRODESIS STATUS: ICD-10-CM

## 2022-09-09 DIAGNOSIS — G89.4 CHRONIC PAIN SYNDROME: ICD-10-CM

## 2022-09-09 DIAGNOSIS — M54.12 CERVICAL RADICULOPATHY: ICD-10-CM

## 2022-09-09 DIAGNOSIS — M47.12 OTHER SPONDYLOSIS WITH MYELOPATHY, CERVICAL REGION: Primary | ICD-10-CM

## 2022-09-09 DIAGNOSIS — G56.23 CUBITAL TUNNEL SYNDROME OF BOTH UPPER EXTREMITIES: ICD-10-CM

## 2022-09-09 PROCEDURE — 72052 X-RAY EXAM NECK SPINE 6/>VWS: CPT

## 2022-09-09 PROCEDURE — 99204 OFFICE O/P NEW MOD 45 MIN: CPT | Performed by: NEUROLOGICAL SURGERY

## 2022-09-09 RX ORDER — METHOCARBAMOL 500 MG/1
500 TABLET, FILM COATED ORAL AS NEEDED
COMMUNITY

## 2022-09-09 NOTE — Clinical Note
F/U appointment after xray 6 X should be scheduled as snpx w/ Gurwinder and I not solo, is scheduled with me rosalva Torres previously did surgery  There may be a surgical plan after flexion and extension completed

## 2022-09-09 NOTE — PROGRESS NOTES
Assessment/Plan:    Cubital tunnel syndrome of both upper extremities  · As addressed in HPI   · EMG 7/22/22 abnormal EMG of the bilateral upper extremities due to evidence of a localized neuropathic process involving the ulnar nerve at the elbow bilaterally consistent with bilateral cubital tunnel syndrome as well as concomitant entrapment of the ulnar at the wrist bilaterally left greater than right  A more diffuse peripheral neuropathy is suspected  Other spondylosis with myelopathy, cervical region  · As addressed in HPI  · mJOA 13 diminishes finger movement and dexterity  Is painful, able to walk up and down stairs w/ hand rail, no loss in hand sensation , no urinary sphincter dysfunction  · Reports  He sometimes has loss of bowel control in the setting of HO IBS ,   · He describes in the past year legs do not want to work, concerning is losing use of legs mainly the left, legs do not have energy, intermittently legs feel like this,Few seconds of episodes legs feel like they are dead  Imagining  · MRI cervical Progressive spondylotic narrowing at C3-C4 with progressive mild compression of the spinal cord  No cord signal abnormality  Further clinical assessment advised  2   Spondylotic changes elsewhere anterior fusion C5-C7 stable      Plan  · Dr Rivera President met with patient solo, cervical spine 6 V ordered and RTO as snpx w/ Nicole President and I     My visit   · Reviewed MRI imagining and EMG  · Explained if symptoms worsen loss of upper or lower extremity motor function, difficultly ambulating , Bowel or bladder dysfunction contact neurosurgery or go to closest emergency room  · Continue care with pain management Dr Ambrosio Bond  · Consider revisiting cervical spinal cord simulator recommended by Dr Joanna Carballo and he referred to neurosurgery Dr [facility] in 2016 for Cervical SCS and ONS          Cervical radiculopathy  · As addressed in HPI  · EMG BUE 7/22/22 chronic denervation changes in the C 6 7 distribution bilaterally were also noted  Arthrodesis status  · As addressed in HPI  · MRI cervical 7/23/22Anterior fusion hardware C5-C7 again noted  Artifact from spinal hardware limits evaluation  C5-C6:  Surgically capacious    Chronic pain syndrome  · As addressed in hPI  · As addressed in cervical spondylosis with myelopathy        Diagnoses and all orders for this visit:    Cervical spine disease  -     Ambulatory referral to Neurosurgery    Other orders  -     methocarbamol (ROBAXIN) 500 mg tablet; Take 500 mg by mouth if needed for muscle spasms        Subjective:     Patient ID: Marylin Hernandez  is a 56 y o  male     HPI   He reports a lonstanding history of chronic cervicalgia, cervical radiculopathy, weakness in arms and hands associated with numbness and tingling  He reports current symptoms as posterior cervical pain (at cervico-occipital  Junction) pain distribution at base of skull into base of neck , with distribution into  Left and right shoulders, down into arms, lateral left arm , down into fingers 3, 4,5 associated numbness and tingling and decreased sensation in finger tips  Right sided pain from neck into right shoulder, into medial posterior right arm down into fingers 4 and 5  The pain is associated with numbness and tingling in hands only not arms  Is Right hand dominate  At work started having difficultly, could not feel keys while typing at work, was having difficultly moving fingers to strike keys, started with diminished strength in both arms that seems to be progressively worsening  Symptom onset about 6 months ago with no identifiable incting factor, started noticing trouble on key board , progressively worsened with onset of numbness and tingling in hand/fingers and progressed to pain in hand  Then started to get pain in neck that was not present when he started the job 2 years ago  Pain in neck started first then progressed to shoulders , arms and hands     He reports pain is located at the base of skull, is constant aching shooting down into base of neck then shooting  over to left shoulder and arms associated with intermittent like electric shock depending on how he turns head or moves neck to left or right  Reports limitation in cervical side to side rotation and flexion/extension  Reports this limitation in cervical movement started since surgery and worsening over the past year, much worse over past 6 months  Reports arms fall asleep at night wakens causing an uncomfortable sensation that  awakens him from sleep  In the morning he awakens with a lot of neck pain  He reports a previous work related injury while working for [employer]  in 2005  He underwent surgery 7/26/2011 with Dr Kanwal Macias  C5- C6  And C6-C7 disectomy and fusion,  This surgery he reports helped arm weakness and radiculopathy he had at that time  He reports this baseline pain he had started after a fall on ice at work in 2005  After second surgery in 2011 ---100% efficacy --could use right arm associated with in numbneess and tingling , could use arm again and neck pain diminished by about 80 %  He was under care of Dr Estella Moscoso, neurologist, for neck and back pain  Dr Estella Moscoso retired recently then his care was transferred to Dr Mercedez Goodwin, neurology  Reports 3 right shoulder surgeries s/p right reverse total shoulder revision performed 8/13/18 following two additional surgeries on right shoulder since June 2017, since has decreased ROM in right shoulder  Characterization of neck/arm/hand symptoms constant, intermittent electric shock sensations associated with head and neck movement  Aggravating factors are described as cervical movement flexion and extension , rotation side to side , elevation of arms in front or above head, cannot lift right arm up to head level since shoulder surgeries  Putting pressure on handle bars while bike riding, had to stop riding  Carrying objects that weigh more 3-4 lbs cause pulling on neck then suffers with neck pain for hours after wards, pulling or pushing movement of arms  When bending at waist to  object off floor has neck pain  Heating pad places at cervical paraspinal area causes worse pain  Releiving --NOTHING --      Multimodal treatments   PT- non since onset 6 months ago ---  PM - Dr Zuleyma Sloan 7/20/22 started gabapentin ----300 tid (take only 300 at HS ) take edge off pain  Cyclobenzaprine 10 prn TID ---not taking ordered for back spasm  Ordered   Other --heating pad across shoulders and neck makes symptoms worse , aggravated the the pain  TENS unit for back has not used for neck symptoms  Previously treated with Dr Ayala Long and was referred to Dr Cedeño(2/11/2016 Consult) for a cervical spinal cord stimulator HO cervical post laminectomy syndrome  Cervical spine degenerative disease  Bilateral occipital neuralgia  Jose Martin Po He never underwent the cervical spinal cord stimulator trial and consideration for occipital nerve stimulator leads        Neurology ---ordered methocarbamol and neurontin 100 at HS but increased by PM     Medicinal; as above   naprosyn 500 mg q 12 hours does not use instead use OTC dosing, take a daily dose in the AM  40 -50 % relief take the edge off Methocarbamol take prn ----for back   [facility]raz[facility]  Dr Shekhar Benavides and I have spent 50 minutes with Patient and family today in which greater than 50% of this time was spent in counseling/coordination of care regarding Diagnostic results, Prognosis, Risks and benefits of tx options, Instructions for management, Patient and family education, Importance of tx compliance, Risk factor reductions and Impressions  REVIEW OF SYSTEMS  Review of Systems   Constitutional: Negative  HENT: Negative  Eyes: Negative  Respiratory: Negative  Cardiovascular: Negative  Gastrointestinal: Negative  Endocrine: Negative  Genitourinary: Positive for frequency and urgency  Musculoskeletal: Positive for back pain, gait problem (unsteady balance), myalgias, neck pain (radiates to B/L arms) and neck stiffness  1 fall down steps about 8 weeks ago    H/O ACDF   Skin: Negative  Allergic/Immunologic: Negative  Neurological: Positive for dizziness, weakness (arms), light-headedness, numbness (arms and hands/fingers) and headaches  Left leg went dead twice in the past month   Hematological: Negative  Psychiatric/Behavioral: Positive for confusion, decreased concentration and sleep disturbance  Negative for dysphoric mood  The patient is nervous/anxious  Meds/Allergies     Current Outpatient Medications   Medication Sig Dispense Refill    ASCORBIC ACID PO       Cholecalciferol 25 MCG (1000 UT) capsule TAKE ONE TABLET BY MOUTH EVERY DAY (FOR LOW VITAMIN D)      Empagliflozin 25 MG TABS Take 12 5 mg by mouth every morning ---- Saida García      famotidine (PEPCID) 20 mg tablet Take 20 mg by mouth      gabapentin (NEURONTIN) 300 mg capsule Take 1 capsule (300 mg total) by mouth 3 (three) times a day Start by taking 1 tablet at bedtime for 3 days  Then increase to 1 tablet in the evening and 1 tablet at bedtime for 3 days  Then increase to 1 tablet in the morning, 1 tablet in the evening, and 1 tablet at bedtime thereafter  (Patient taking differently: Take 300 mg by mouth daily at bedtime Start by taking 1 tablet at bedtime for 3 days  Then increase to 1 tablet in the evening and 1 tablet at bedtime for 3 days    Then increase to 1 tablet in the morning, 1 tablet in the evening, and 1 tablet at bedtime thereafter ) 90 capsule 0    lisinopril (ZESTRIL) 10 mg tablet Take 10 mg by mouth daily      metFORMIN (GLUMETZA) 1000 MG (MOD) 24 hr tablet Take 1,000 mg by mouth 2 (two) times a day with meals      methocarbamol (ROBAXIN) 500 mg tablet Take 500 mg by mouth if needed for muscle spasms      pantoprazole (PROTONIX) 20 mg tablet Take 20 mg by mouth daily      traZODone (DESYREL) 50 mg tablet Take 75 mg by mouth daily at bedtime        Aspirin Buf,CaCarb-MgCarb-MgO, 81 MG TABS Take by mouth (Patient not taking: No sig reported)      benzonatate (TESSALON PERLES) 100 mg capsule Take 100 mg by mouth Three times daily as needed (Patient not taking: No sig reported)      cyclobenzaprine (FLEXERIL) 10 mg tablet Take 1 tablet (10 mg total) by mouth 3 (three) times a day as needed for muscle spasms (Patient not taking: Reported on 9/9/2022) 60 tablet 0    dextrose 1 g CHEW as needed (Patient not taking: No sig reported)      insulin glargine (LANTUS) 100 units/mL subcutaneous injection Inject 14 Units under the skin daily at bedtime  (Patient not taking: No sig reported)      losartan (COZAAR) 25 mg tablet TAKE ONE-HALF TABLET BY MOUTH EVERY DAY FOR BLOOD PRESSURE/HEART (Patient not taking: No sig reported)      naproxen (NAPROSYN) 500 mg tablet Take 500 mg by mouth every 12 (twelve) hours as needed PRN (Patient not taking: Reported on 9/9/2022)      sertraline (ZOLOFT) 100 mg tablet Take 150 mg by mouth every morning   (Patient not taking: No sig reported)      sucralfate (CARAFATE) 1 g/10 mL suspension Take 1 g by mouth (Patient not taking: No sig reported)       No current facility-administered medications for this visit         Allergies   Allergen Reactions    Metoprolol      Too low of a heart rate    Oxycodone-Acetaminophen      rash    Penicillin V     Ranitidine      Other reaction(s): Xerostomia    Penicillins Rash     rash       PAST HISTORY    Past Medical History:   Diagnosis Date    Bilateral occipital neuralgia     Cervical post-laminectomy syndrome     Cervical radiculopathy     Cervical spine disease     Cervical spondylosis with myelopathy     Chronic lumbar pain     Chronic pain syndrome     Chronic thoracic spine pain     Cubital tunnel syndrome of both upper extremities 9/10/2022    Degenerative cervical spinal stenosis     Diabetes ([address] )     Failed neck syndrome     Hypertension     Myofascial muscle pain     Polyarthralgia        Past Surgical History:   Procedure Laterality Date    CERVICAL DISC SURGERY      CERVICAL FUSION  2011    ACDF with Dr Alberta Garcia Right     Reversal       Social History     Tobacco Use    Smoking status: Never Smoker    Smokeless tobacco: Never Used   Vaping Use    Vaping Use: Never used   Substance Use Topics    Alcohol use: Yes     Alcohol/week: 1 0 standard drink     Types: 1 Cans of beer per week     Comment: occasional, weekly    Drug use: No       Family History   Problem Relation Age of Onset    Stroke Mother     Lung cancer Father        The following portions of the patient's history were reviewed and updated as appropriate: allergies, current medications, past family history, past medical history, past social history, past surgical history and problem list       EXAM    Vitals:Blood pressure 130/80, pulse 66, temperature 98 2 °F (36 8 °C), temperature source Tympanic, resp  rate 16, height 5' 7" (1 702 m), weight 108 kg (237 lb)  ,Body mass index is 37 12 kg/m²  Physical Exam  Vitals and nursing note reviewed  Constitutional:       General: He is not in acute distress  Appearance: Normal appearance  He is obese  He is not ill-appearing, toxic-appearing or diaphoretic  HENT:      Head: Normocephalic  Eyes:      General: No scleral icterus  Right eye: No discharge  Left eye: No discharge  Neck:      Comments: Limitation cervical ROM  Pulmonary:      Effort: Pulmonary effort is normal  No respiratory distress  Musculoskeletal:      Cervical back: Rigidity present  Right lower leg: No edema  Left lower leg: No edema  Skin:     General: Skin is warm and dry  Neurological:      Mental Status: He is alert  Sensory: No sensory deficit        Motor: Weakness present  Coordination: Coordination abnormal  [facility] abnormal (poor coordination)  Gait: Gait abnormal and tandem walk abnormal (almost balance , reaching for wall)  Deep Tendon Reflexes: Reflexes abnormal       Reflex Scores:       Tricep reflexes are 2+ on the right side and 2+ on the left side  Bicep reflexes are 2+ on the right side and 2+ on the left side  Brachioradialis reflexes are 2+ on the right side and 2+ on the left side  Patellar reflexes are 2+ on the right side and 2+ on the left side  Achilles reflexes are 1+ on the right side and 1+ on the left side  Psychiatric:         Mood and Affect: Mood normal          Behavior: Behavior normal          Neurologic Exam     Motor Exam   Right arm tone: decreased  Left arm tone: decreased    Strength   Right strength: Painful cervical ROM side to side rotation  Right deltoid: 4/5  Left deltoid: 4/5  Right biceps: 4/5  Left biceps: 4/5  Right triceps: 4/5  Left triceps: 4/5  Right wrist flexion: 4/5  Left wrist flexion: 4/5  Right wrist extension: 4/5  Left wrist extension: 4/5  Right interossei: 4/5  Left interossei: 4/5  Right iliopsoas: 5/5  Left iliopsoas: 5/5  Right quadriceps: 5/5  Left quadriceps: 5/5  Right hamstrin/5  Left hamstrin/5  Right anterior tibial: 5/5  Left anterior tibial: 5/5  Right gastroc: 5/5  Left gastroc: 5/5Lacks fluid rapid finger movement both hands , slow deliberate movement  Must look and hands while performing, appears to tire and drops arms       Stifness during cervical movement , limitation in completion cervical rotation R> L , status post cervical fusion     Sensory Exam   Right arm light touch: normal  Left arm light touch: normal    Gait, Coordination, and Reflexes     Coordination   Heel to shin coordination: abnormal (poor coordination)  Tandem walking coordination: abnormal (almost balance , reaching for wall)    Reflexes   Right brachioradialis: 2+  Left brachioradialis: 2+  Right biceps: 2+  Left biceps: 2+  Right triceps: 2+  Left triceps: 2+  Right patellar: 2+  Left patellar: 2+  Right achilles: 1+  Left achilles: 1+  Right Khan: absent  Left Khan: absent  Right ankle clonus: absent  Left pendular knee jerk: absent      Imaging Studies    [facility] of the bilateral upper extremities due to evidence of a localized neuropathic process involving the ulnar nerve at the elbow bilaterally consistent with bilateral cubital tunnel syndrome as well as concomitant entrapment of the ulnar at the wrist bilaterally left greater than right  A more diffuse peripheral neuropathy is suspected  In addition chronic denervation changes in the C 6 7 distribution bilaterally were also noted        7/23/22 MRI CERVICAL SPINE WITHOUT CONTRAST   FINDINGS:     ALIGNMENT:  Normal alignment of the cervical spine  No compression fracture  No subluxation  No scoliosis      MARROW SIGNAL:  Anterior fusion hardware C5-C7 again noted  Artifact from spinal hardware limits evaluation  Scattered degenerative endplate changes  No focally suspicious marrow lesions  No bone marrow edema or compression abnormality      CERVICAL AND VISUALIZED THORACIC CORD:  Progressive flattening of the ventral aspect of the spinal cord at the C3-C4 level secondary to an adjacent disc herniation, slightly increased from the prior study  No cord signal abnormality      PREVERTEBRAL AND PARASPINAL SOFT TISSUES:  Normal      VISUALIZED POSTERIOR FOSSA:  Mucus retention cyst in the floor the right maxillary sinus      CERVICAL DISC SPACES:   C2-C3:  Small central disc herniation, protrusion type  Mild central canal narrowing  Neural foramina patent bilaterally  This level is similar to the prior study      C3-C4:  There is a central/right paracentral disc herniation, protrusion type with associated uncovertebral hypertrophy    Severe central canal narrowing is slightly increased from the prior study  Severe right neural foraminal narrowing remains   unchanged  Mild to moderate left neural foraminal narrowing again noted      C4-C5:  There is a disc osteophyte complex with a superimposed left neural foraminal disc protrusion  Severe left neural foraminal narrowing  Moderate central canal narrowing  Moderate right neural foraminal narrowing  This level is similar to the   prior study      C5-C6:  Surgically capacious     C6-C7:  Mild residual disc osteophyte complex with superimposed right paracentral disc protrusion  Mild central canal right neural foraminal narrowing  Left neural foramen patent  This level is similar to the prior study      C7-T1:  Residual left neural foraminal disc herniation, protrusion type  Mild left neural foraminal narrowing  Central canal right neural foramen patent  This level is similar to the prior study      UPPER THORACIC DISC SPACES:  Normal      IMPRESSION:     1  Progressive spondylotic narrowing at C3-C4 with progressive mild compression of the spinal cord  No cord signal abnormality  Further clinical assessment advised  2   Spondylotic changes elsewhere anterior fusion C5-C7 stable      I have personally reviewed pertinent reports     and I have personally reviewed pertinent films in PACS

## 2022-09-10 PROBLEM — G56.23 CUBITAL TUNNEL SYNDROME OF BOTH UPPER EXTREMITIES: Status: ACTIVE | Noted: 2022-09-10

## 2022-09-10 PROBLEM — M47.12 CERVICAL SPONDYLOSIS WITH MYELOPATHY: Status: ACTIVE | Noted: 2018-12-10

## 2022-09-10 NOTE — ASSESSMENT & PLAN NOTE
· As addressed in HPI   · EMG 7/22/22 abnormal EMG of the bilateral upper extremities due to evidence of a localized neuropathic process involving the ulnar nerve at the elbow bilaterally consistent with bilateral cubital tunnel syndrome as well as concomitant entrapment of the ulnar at the wrist bilaterally left greater than right  A more diffuse peripheral neuropathy is suspected

## 2022-09-10 NOTE — ASSESSMENT & PLAN NOTE
· As addressed in HPI  · MRI cervical 7/23/22Anterior fusion hardware C5-C7 again noted  Artifact from spinal hardware limits evaluation  C5-C6:  Surgically capacious

## 2022-09-10 NOTE — ASSESSMENT & PLAN NOTE
· As addressed in HPI  · mJOA 13 diminishes finger movement and dexterity  Is painful, able to walk up and down stairs w/ hand rail, no loss in hand sensation , no urinary sphincter dysfunction  · Reports  He sometimes has loss of bowel control in the setting of HO IBS ,   · He describes in the past year legs do not want to work, concerning is losing use of legs mainly the left, legs do not have energy, intermittently legs feel like this,Few seconds of episodes legs feel like they are dead  Imagining  · MRI cervical Progressive spondylotic narrowing at C3-C4 with progressive mild compression of the spinal cord  No cord signal abnormality  Further clinical assessment advised  2   Spondylotic changes elsewhere anterior fusion C5-C7 stable      Plan  · Dr Lourdes Olivares met with patient solo, cervical spine 6 V ordered and RTO as snpx w/ Gurwinder and I     My visit   · Reviewed MRI imagining and EMG  · Explained if symptoms worsen loss of upper or lower extremity motor function, difficultly ambulating , Bowel or bladder dysfunction contact neurosurgery or go to closest emergency room  · Continue care with pain management Dr Korey Warren  · Consider revisiting cervical spinal cord simulator recommended by Dr Xenia Soriano and he referred to neurosurgery Dr Lexi Rodriguez in 2016 for Cervical SCS and ONS

## 2022-09-10 NOTE — ASSESSMENT & PLAN NOTE
· As addressed in HPI  · EMG BUE 7/22/22 chronic denervation changes in the C 6 7 distribution bilaterally were also noted

## 2022-09-12 ENCOUNTER — TELEPHONE (OUTPATIENT)
Dept: NEUROSURGERY | Facility: CLINIC | Age: 57
End: 2022-09-12

## 2022-09-12 NOTE — TELEPHONE ENCOUNTER
Pt seen 9/9/ per [facility] [place] instructions to f u with ap after xrys - pt was schedueld that day -     Now received inbox she sent on 9/10/2022   NAVEED Min; Adrian Potter MA  F/U appointment after xray 6 X should be scheduled as snpx w/ Gurwinder and I not solo, is scheduled with me solo  Corrine Alcocer previously did surgery  There may be a surgical plan after flexion and extension completed          snpx was made now I called pt to advised of new apt mailbox full will try tomorrow -ba

## 2022-09-13 ENCOUNTER — TELEPHONE (OUTPATIENT)
Dept: NEUROLOGY | Facility: CLINIC | Age: 57
End: 2022-09-13

## 2022-09-13 NOTE — TELEPHONE ENCOUNTER
----- Message from Michoacano Bran RN sent at 9/13/2022  8:48 AM EDT -----  Regarding: FW: Can ewa and Dr Cresencio Denise assist   Patient was recently seen in the neurosurgery office on 9/9/22 by our [facility] and Dr Elan Walters independently  Per their recommendations:    My visit   · Reviewed MRI imagining and EMG  · Explained if symptoms worsen loss of upper or lower extremity motor function, difficultly ambulating , Bowel or bladder dysfunction contact neurosurgery or go to closest emergency room  · Continue care with pain management Dr Birt Duane  · Consider revisiting cervical spinal cord simulator recommended by Dr Angie Dumont and he referred to neurosurgery Dr Warren Velasco in 2016 for Cervical SCS and ONS    Patient feels upset that he is not getting the care that he needs  Would you be able to reach out to the patient again to offer an appointment with one of the providers with the pain management team?    Also cc'ing neuro on this since he mentioned concerns there as well  Unfortunately, our office only puts patients out of work from the date of surgery through their 6 week post-op visit appointment  I will recommend patient reaches out to his PCP for this but he had mentioned something about his other neurologist taking care of this before he retired- not sure of the accuracy of that statement? 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  Dr Meg Olmstead nurse is messaging me to message Dr Elan Walters in regards to this  I am guessing they cant read notes or alerts sent to them from Dr Elan Walters     I am up in arms here about how aloof my provider is at [facility] Maryam Lawrence was my assigned doctor for my federal injury for 15 years till he left  He would have handled this  I live in pain everyday with many issues stemming from my neck and back  For the life of me I cant understand why I need to tell one doctor what another says when they are in the same network  I hope you can help me here  Dr Nicole President said he wanted me to trying injections and physical therapy and he would be alerting my pain specialist   Still have not heard anything in regards to this  Totally loosing confidence in [facility]     Thanks Maria A Landers

## 2022-09-13 NOTE — TELEPHONE ENCOUNTER
9/13/2022 maria esther echols pt on home number earlier this am - advised change off apt from [facility] to [place] location he was grateful - she states he sent navya a message after hours yesterday - advised navya here but seeing pt and I will give her message to call you or email you back -BA

## 2022-09-13 NOTE — TELEPHONE ENCOUNTER
I called Mr Nallely Neil to speak with him about how I can help from  the [facility] office , I have talked with him many times in the past and helped with [place] paper work   At this time I was not able to leave a message because his mailbox was full and not taking messages   I will try  to call again later today    IF he calls back  You can forward to me I will be happy to help him in anyway I can

## 2022-09-14 ENCOUNTER — TELEPHONE (OUTPATIENT)
Dept: NEUROLOGY | Facility: CLINIC | Age: 57
End: 2022-09-14

## 2022-09-14 NOTE — TELEPHONE ENCOUNTER
Patient returned my call and I told him that I would  Speak to Dr Danya Apodaca' and his MA  He is asking for a letter stating that he  Is not able to work, on 10/3 he has an appointment with Neuro- Surgery

## 2022-09-16 ENCOUNTER — TELEPHONE (OUTPATIENT)
Dept: NEUROLOGY | Facility: CLINIC | Age: 57
End: 2022-09-16

## 2022-09-16 NOTE — TELEPHONE ENCOUNTER
Patient called in again to see about getting a letter  At this time I told him that Dr Tarik Lewis would not be able to write a letter saying that he is disable and needs to stay out of work  Explained to him that DR Tarik Lewis sent him to see the Neurosurgery; which patient stated that he is going to see 10/3 , He then went on to say that he has never gotten a call back from [facility] I did reach out tho them to see if they can get him an apt  AT this time there is nothing that Dr Tarik Lewis office can do for him  as for writing a letter for him to not work

## 2022-09-21 ENCOUNTER — TELEPHONE (OUTPATIENT)
Dept: RADIOLOGY | Facility: CLINIC | Age: 57
End: 2022-09-21

## 2022-09-21 DIAGNOSIS — M47.812 CERVICAL SPONDYLOSIS: Primary | ICD-10-CM

## 2022-09-21 NOTE — TELEPHONE ENCOUNTER
Please see below response from pt message  Seems it was routed incorrectly  Please advise of injection to be scheduled  Kylah Sanford Jr to Carla Lion CRNP    5:35 PM  Good afternoon Dr Johnny Rey,      Thanks for getting back I know your very busy  Whenever you want to set up the injections I will be ready  I am currently taking 300mg of Gabapentin at bed and 300 at dinner  I was taking it in the morning and it was affecting my memory        Thanks,      Sathish Gloria MD to Fabiano Cifuentes  12:26 PM  Hi Mr Carolina Bobby,     I'm sorry you feel that you are not getting care up to your expectations  I just want to let you know that Dr Jonah Lopez did reach out to me to consider possibly doing injections in some of the facet joints in your upper neck to help with some of neck pain you are having while they are completing their work-up  This is an injection with a numbing agent and a steroid solution into the small joints in your neck which is different from an epidural injection which is more for pain radiating in the arms  I also wanted to see how much gabapentin you are now taking and if you are having side effects   We discussed increasing the medication at our first office visit   Lizet Kwong,  Dr Johnny Rey

## 2022-09-22 DIAGNOSIS — M47.812 CERVICAL SPONDYLOSIS: Primary | ICD-10-CM

## 2022-09-23 NOTE — TELEPHONE ENCOUNTER
Faxed req for Left C3-4 and C4-5 facet joint injection to [phone] ([facility])  Will schedule when approved

## 2022-09-29 DIAGNOSIS — M96.1 CERVICAL POST-LAMINECTOMY SYNDROME: ICD-10-CM

## 2022-09-29 DIAGNOSIS — M54.12 CERVICAL RADICULOPATHY: ICD-10-CM

## 2022-09-29 DIAGNOSIS — M62.838 CERVICAL PARASPINAL MUSCLE SPASM: ICD-10-CM

## 2022-09-29 DIAGNOSIS — G89.4 CHRONIC PAIN SYNDROME: ICD-10-CM

## 2022-09-29 RX ORDER — GABAPENTIN 300 MG/1
300 CAPSULE ORAL 3 TIMES DAILY
Qty: 90 CAPSULE | Refills: 0 | Status: SHIPPED | OUTPATIENT
Start: 2022-09-29

## 2022-10-03 ENCOUNTER — OFFICE VISIT (OUTPATIENT)
Dept: NEUROSURGERY | Facility: CLINIC | Age: 57
End: 2022-10-03
Payer: OTHER MISCELLANEOUS

## 2022-10-03 VITALS
TEMPERATURE: 98 F | RESPIRATION RATE: 18 BRPM | HEIGHT: 67 IN | WEIGHT: 247 LBS | SYSTOLIC BLOOD PRESSURE: 126 MMHG | DIASTOLIC BLOOD PRESSURE: 66 MMHG | HEART RATE: 60 BPM | BODY MASS INDEX: 38.77 KG/M2

## 2022-10-03 DIAGNOSIS — M47.12 OTHER SPONDYLOSIS WITH MYELOPATHY, CERVICAL REGION: Primary | ICD-10-CM

## 2022-10-03 DIAGNOSIS — Z98.1 STATUS POST CERVICAL SPINAL FUSION: ICD-10-CM

## 2022-10-03 DIAGNOSIS — M54.12 CERVICAL RADICULOPATHY: ICD-10-CM

## 2022-10-03 DIAGNOSIS — M48.9 CERVICAL SPINE DISEASE: ICD-10-CM

## 2022-10-03 PROCEDURE — 99214 OFFICE O/P EST MOD 30 MIN: CPT | Performed by: NEUROLOGICAL SURGERY

## 2022-10-03 RX ORDER — ACETAMINOPHEN 325 MG/1
975 TABLET ORAL ONCE
OUTPATIENT
Start: 2022-10-03 | End: 2022-10-03

## 2022-10-03 RX ORDER — GABAPENTIN 300 MG/1
300 CAPSULE ORAL ONCE
OUTPATIENT
Start: 2022-10-03 | End: 2022-10-03

## 2022-10-03 RX ORDER — VANCOMYCIN HYDROCHLORIDE 1 G/200ML
1000 INJECTION, SOLUTION INTRAVENOUS ONCE
OUTPATIENT
Start: 2022-10-03 | End: 2022-10-03

## 2022-10-03 RX ORDER — CHLORHEXIDINE GLUCONATE 0.12 MG/ML
15 RINSE ORAL ONCE
OUTPATIENT
Start: 2022-10-03 | End: 2022-10-03

## 2022-10-03 NOTE — PROGRESS NOTES
Neurosurgery Office Note  Gladys Dobbins  56 y o  male MRN: [id number]      Assessment/Plan     Other spondylosis with myelopathy, cervical region  Patient presents for surgical discussion after review of xray  · H/o C5-7 ACDF Baljinder Hahn, 2011) with resolution of pre operative symptoms  · Now with difficulty with gait imbalance, difficulty with fine motor skills over the past year  Constant pain BUE  · mJOA 13     Imagining  · MRI cervical spine w/o, 7/23/22: Progressive spondylotic narrowing at C3-C4 with progressive mild compression of the spinal cord  No cord signal abnormality  · XR cervical spine, 9/9/22: No evidence of dynamic instability seen with flexion or extension  Plan  · Imaging reviewed in detail with the patient today, showing adjacent level disease 2 levels above and 1 level below his prior fusion likely contributing to his symptoms  · At this time surgery is recommended to prevent and worsening of his symptoms  · After discussion of the risks and benefits of both anterior and posterior fusion, we recommend C3-T1 posterior cervical discectomy and fixation fusion  The patient is in agreement with this  · Consent signed in the office today after all questions were answered  · Referral to [facility] (Dr Melissa Kam) for assistance with VA disability paperwork and return to work questions  · Our surgery scheduler will contact the patient within a few days via phone call to schedule surgery, discuss insurance authorization and pre operative instructions             Diagnoses and all orders for this visit:    Other spondylosis with myelopathy, cervical region  -     Ambulatory Referral to Physiatry; Future    Status post cervical spinal fusion  -     Ambulatory Referral to Physiatry; Future    Cervical radiculopathy  -     Ambulatory Referral to Physiatry; Future    Cervical spine disease  -     Ambulatory Referral to Physiatry;  Future          I spent 20 minutes with the patient today in which >50% of the time was spent counseling/coordination of care regarding diagnosis, imaging review, symptoms and treatment plan  CHIEF COMPLAINT    Chief Complaint   Patient presents with    Follow-up     Other spondylosis with myelopathy, cervical region       HISTORY    This is a 56-year-old male with a past medical history significant for C5-7 ACDF in 2011 by Dr Nato Kingsley, diabetes, hypertension who is here today to review cervical x-rays and discuss surgical options  He sustained a work injury in 2005 while working for [employer]  Ultimately he underwent C5-7 ACDF in 2011 for neck pain, right arm weakness and numbness  He did very well postoperatively with near complete reduction in symptoms  He was doing very well until approximately 6 months ago  He is working at a desk and started noticing worsening neck pain and difficulty typing  He describes a constant pain in his bilateral upper extremities in no specific distribution with associated numbness and tingling  He cannot feel his hands very well  He has difficulty with range of motion in his neck  When he does move his neck, he feels electric shock sensations in his arms  He is also having significant difficulty with ambulation and balance  He describes more of a leg fatigue  He denies bowel bladder incontinence at this time  He denies any falls  He has not tried PT but does follow with pain management  He is on gabapentin, Robaxin, and over-the-counter medications with limited pain reduction  He does have a history of 3 right shoulder surgeries and has limited range of motion of his right shoulder  See Discussion    REVIEW OF SYSTEMS    Review of Systems   Constitutional: Negative  HENT: Negative  Eyes: Negative  Respiratory: Negative  Cardiovascular: Negative  Gastrointestinal: Negative  Endocrine: Negative  Genitourinary: Positive for frequency and urgency     Musculoskeletal: Positive for back pain, gait problem (unsteady balance), myalgias, neck pain (radiates to B/L arms) and neck stiffness  1 fall down steps about 8 weeks ago    H/O ACDF    ov 9/9/22 Other spondylosis with myelopathy, cervical region  constant aching shooting down into base of neck then shooting  over to left shoulder and arms associated with intermittent like electric shock depending on how he turns head or moves neck to left or right  xray done 9/9/22   Skin: Negative  Allergic/Immunologic: Negative  Neurological: Positive for dizziness, weakness (arms), light-headedness, numbness (arms and hands/fingers) and headaches  Left leg went dead twice in the past month   Hematological: Negative  Psychiatric/Behavioral: Positive for confusion, decreased concentration and sleep disturbance  Negative for dysphoric mood  The patient is nervous/anxious  All other systems reviewed and are negative  ROS was personally reviewed and changes made as needed     Meds/Allergies     Current Outpatient Medications   Medication Sig Dispense Refill    ASCORBIC ACID PO       Aspirin Buf,CaCarb-MgCarb-MgO, 81 MG TABS Take by mouth      Cholecalciferol 25 MCG (1000 UT) capsule TAKE ONE TABLET BY MOUTH EVERY DAY (FOR LOW VITAMIN D)      cyclobenzaprine (FLEXERIL) 10 mg tablet Take 1 tablet (10 mg total) by mouth 3 (three) times a day as needed for muscle spasms 60 tablet 0    dextrose 1 g CHEW as needed      Empagliflozin 25 MG TABS Take 12 5 mg by mouth every morning ---- Supriya Diaz      famotidine (PEPCID) 20 mg tablet Take 20 mg by mouth      gabapentin (NEURONTIN) 300 mg capsule Take 1 capsule (300 mg total) by mouth 3 (three) times a day Start by taking 1 tablet at bedtime for 3 days  Then increase to 1 tablet in the evening and 1 tablet at bedtime for 3 days  Then increase to 1 tablet in the morning, 1 tablet in the evening, and 1 tablet at bedtime thereafter   90 capsule 0    lisinopril (ZESTRIL) 10 mg tablet Take 10 mg by mouth daily      losartan (COZAAR) 25 mg tablet TAKE ONE-HALF TABLET BY MOUTH EVERY DAY FOR BLOOD PRESSURE/HEART      metFORMIN (GLUMETZA) 1000 MG (MOD) 24 hr tablet Take 1,000 mg by mouth 2 (two) times a day with meals      methocarbamol (ROBAXIN) 500 mg tablet Take 500 mg by mouth if needed for muscle spasms      naproxen (NAPROSYN) 500 mg tablet Take 500 mg by mouth every 12 (twelve) hours as needed PRN      pantoprazole (PROTONIX) 20 mg tablet Take 20 mg by mouth daily      traZODone (DESYREL) 50 mg tablet Take 75 mg by mouth daily at bedtime        benzonatate (TESSALON PERLES) 100 mg capsule Take 100 mg by mouth Three times daily as needed (Patient not taking: No sig reported)      insulin glargine (LANTUS) 100 units/mL subcutaneous injection Inject 14 Units under the skin daily at bedtime  (Patient not taking: Reported on 10/3/2022)      sertraline (ZOLOFT) 100 mg tablet Take 150 mg by mouth every morning   (Patient not taking: No sig reported)      sucralfate (CARAFATE) 1 g/10 mL suspension Take 1 g by mouth (Patient not taking: No sig reported)       No current facility-administered medications for this visit         Allergies   Allergen Reactions    Metoprolol      Too low of a heart rate    Oxycodone-Acetaminophen      rash    Penicillin V     Ranitidine      Other reaction(s): Xerostomia    Penicillins Rash     rash       PAST HISTORY    Past Medical History:   Diagnosis Date    Bilateral occipital neuralgia     Cervical post-laminectomy syndrome     Cervical radiculopathy     Cervical spine disease     Cervical spondylosis with myelopathy     Chronic lumbar pain     Chronic pain syndrome     Chronic thoracic spine pain     Cubital tunnel syndrome of both upper extremities 9/10/2022    Degenerative cervical spinal stenosis     Diabetes ([facility] [address] )     Failed neck syndrome     Hypertension     Myofascial muscle pain     Polyarthralgia        Past Surgical History: Procedure Laterality Date    CERVICAL One Arch Cem SURGERY      CERVICAL FUSION  2011    ACDF with Dr Francisco Ross Right     Reversal       Social History     Tobacco Use    Smoking status: Never Smoker    Smokeless tobacco: Never Used   Vaping Use    Vaping Use: Never used   Substance Use Topics    Alcohol use: Yes     Alcohol/week: 1 0 standard drink     Types: 1 Cans of beer per week     Comment: occasional, weekly    Drug use: No       Family History   Problem Relation Age of Onset    Stroke Mother     Lung cancer Father          Above history personally reviewed  EXAM    Vitals:Blood pressure 126/66, pulse 60, temperature 98 °F (36 7 °C), temperature source Temporal, resp  rate 18, height 5' 7" (1 702 m), weight 112 kg (247 lb)  ,Body mass index is 38 69 kg/m²  Physical Exam  Vitals and nursing note reviewed  Constitutional:       Appearance: Normal appearance  He is well-developed and normal weight  HENT:      Head: Normocephalic and atraumatic  Eyes:      Extraocular Movements: Extraocular movements intact  Pupils: Pupils are equal, round, and reactive to light  Cardiovascular:      Rate and Rhythm: Normal rate  Pulmonary:      Effort: Pulmonary effort is normal  No respiratory distress  Abdominal:      Palpations: Abdomen is soft  Musculoskeletal:         General: Normal range of motion  Cervical back: Normal range of motion  Skin:     General: Skin is warm and dry  Neurological:      Mental Status: He is alert and oriented to person, place, and time  Gait: Gait is intact  Psychiatric:         Mood and Affect: Mood normal          Speech: Speech normal          Behavior: Behavior normal          Thought Content: Thought content normal          Judgment: Judgment normal          Neurologic Exam     Mental Status   Oriented to person, place, and time  Follows 2 step commands     Attention: normal  Concentration: normal    Speech: speech is normal   Level of consciousness: alert  Knowledge: good  Able to repeat  Normal comprehension  Cranial Nerves   Cranial nerves II through XII intact  CN III, IV, VI   Pupils are equal, round, and reactive to light  Motor Exam   Muscle bulk: normal  Overall muscle tone: normal    Strength   Strength 5/5 except as noted  4-/5 bilateral IO     Sensory Exam   Decreased to LT LUE in all distrubutions  Pian with cervical ROM  Limited cervical ROM     Gait, Coordination, and Reflexes     Gait  Gait: normal    Tremor   Resting tremor: absent    Reflexes   Right Khan: absent  Left Khan: absent  Right ankle clonus: absent  Left ankle clonus: absent           MEDICAL DECISION MAKING    Imaging Studies:     XR spine cervical complete 6+ vw flex/ext/obl    Result Date: 9/16/2022  Narrative: CERVICAL SPINE INDICATION:   M48 9: Spondylopathy, unspecified M54 12: Radiculopathy, cervical region Z98 1: Arthrodesis status  COMPARISON:  8/3/2016  VIEWS:  XR SPINE CERVICAL COMPLETE 6+ VW FLEX /EXT /OBL FINDINGS: Intact C5-C7 anterior fusion hardware  No fracture  Normal alignment without subluxation  The intervertebral disc spaces are preserved  No evidence of dynamic instability seen with flexion or extension  The prevertebral soft tissues are within normal limits  The lung apices are clear  Impression: Intact C5-C7 anterior fusion hardware  Workstation performed: [id number]       I have personally reviewed pertinent reports     and I have personally reviewed pertinent films in PACS

## 2022-10-03 NOTE — LETTER
October 3, 2022     Patient: Cori Avilez  YOB: 1965  Date of Visit: 10/3/2022      To Whom it May Concern:    Tristantrish Ramirez is under my professional care  Mary Auguste was seen in my office on 10/3/2022  Mary Auguste will be assessed by physiatry in the near future to determine return to work status  Until that appointment, the patient should remain out of work  If you have any questions or concerns, please don't hesitate to call           Sincerely,          Kaya Douglas MD        CC: No Recipients

## 2022-10-03 NOTE — TELEPHONE ENCOUNTER
S/w Yehuda @ [facility], there was missing info on request for facet joint injection  Corrected request and faxed

## 2022-10-03 NOTE — ASSESSMENT & PLAN NOTE
Patient presents for surgical discussion after review of xray  · H/o C5-7 ACDF (Gurwinder, 2011) with resolution of pre operative symptoms  · Now with difficulty with gait imbalance, difficulty with fine motor skills over the past year  Constant pain BUE  · mJOA 13     Imagining  · MRI cervical spine w/o, 7/23/22: Progressive spondylotic narrowing at C3-C4 with progressive mild compression of the spinal cord  No cord signal abnormality  · XR cervical spine, 9/9/22: No evidence of dynamic instability seen with flexion or extension  Plan  · Imaging reviewed in detail with the patient today, showing adjacent level disease 2 levels above and 1 level below his prior fusion likely contributing to his symptoms  · At this time surgery is recommended to prevent and worsening of his symptoms  · After discussion of the risks and benefits of both anterior and posterior fusion, we recommend C3-T1 posterior cervical discectomy and fixation fusion   The patient is in agreement with this  · Consent signed in the office today after all questions were answered  · Referral to [facility] (Dr Samantha Bermudez) for assistance with VA disability paperwork and return to work questions  · Our surgery scheduler will contact the patient within a few days via phone call to schedule surgery, discuss insurance authorization and pre operative instructions

## 2022-10-19 NOTE — TELEPHONE ENCOUNTER
B/l C3-4 and C4-5 facet joint injections are approved, pt is scheduled for Procedure: C3-T1 posterior decompression with instrumented fixation fusion and possible extension to additional levels (impulse monitoring) (Bilateral Spine Cervical) with Dr Katie Solis on Date/Time: 12/05/22 9648  Ok to proceed with facet injections?

## 2022-10-20 NOTE — TELEPHONE ENCOUNTER
S/w pt, he would like to hold off with facet injections since he will be having surgery  Pt would like to make you aware that he is up to 3 times a day of the Gabapentin and it is helping  When pain is really bad he will take tylenol and tries to avoid NSAIDs as much as possible

## 2022-11-07 ENCOUNTER — OFFICE VISIT (OUTPATIENT)
Dept: URGENT CARE | Facility: CLINIC | Age: 57
End: 2022-11-07

## 2022-11-07 VITALS
SYSTOLIC BLOOD PRESSURE: 131 MMHG | RESPIRATION RATE: 18 BRPM | TEMPERATURE: 98.6 F | DIASTOLIC BLOOD PRESSURE: 83 MMHG | HEART RATE: 76 BPM | OXYGEN SATURATION: 96 %

## 2022-11-07 DIAGNOSIS — H66.001 NON-RECURRENT ACUTE SUPPURATIVE OTITIS MEDIA OF RIGHT EAR WITHOUT SPONTANEOUS RUPTURE OF TYMPANIC MEMBRANE: ICD-10-CM

## 2022-11-07 DIAGNOSIS — R68.89 FLU-LIKE SYMPTOMS: Primary | ICD-10-CM

## 2022-11-07 RX ORDER — AZITHROMYCIN 250 MG/1
TABLET, FILM COATED ORAL
Qty: 6 TABLET | Refills: 0 | Status: SHIPPED | OUTPATIENT
Start: 2022-11-07 | End: 2022-11-11

## 2022-11-07 NOTE — PATIENT INSTRUCTIONS
Ear Infection   WHAT YOU NEED TO KNOW:   An ear infection is also called otitis media  Blocked or swollen eustachian tubes can cause an infection  Eustachian tubes connect the middle ear to the back of the nose and throat  They drain fluid from the middle ear  You may have a buildup of fluid in your ear  Germs build up in the fluid and infection develops  DISCHARGE INSTRUCTIONS:   Return to the emergency department if:   You have clear fluid coming from your ear  You have a stiff neck, headache, and a fever  Call your doctor if:   You see blood or pus draining from your ear  Your ear pain gets worse or does not go away, even after treatment  The outside of your ear is red or swollen  You are vomiting or have diarrhea  You have questions or concerns about your condition or care  Medicines: You may  need any of the following:  Acetaminophen  decreases pain and fever  It is available without a doctor's order  Ask how much to take and how often to take it  Follow directions  Read the labels of all other medicines you are using to see if they also contain acetaminophen, or ask your doctor or pharmacist  Acetaminophen can cause liver damage if not taken correctly  Do not use more than 4 grams (4,000 milligrams) total of acetaminophen in one day  NSAIDs , such as ibuprofen, help decrease swelling, pain, and fever  This medicine is available with or without a doctor's order  NSAIDs can cause stomach bleeding or kidney problems in certain people  If you take blood thinner medicine, always ask your healthcare provider if NSAIDs are safe for you  Always read the medicine label and follow directions  Ear drops  may contain medicine to decrease pain and inflammation  Antibiotics  help treat a bacterial infection  Take your medicine as directed  Contact your healthcare provider if you think your medicine is not helping or if you have side effects   Tell him or her if you are allergic to any medicine  Keep a list of the medicines, vitamins, and herbs you take  Include the amounts, and when and why you take them  Bring the list or the pill bottles to follow-up visits  Carry your medicine list with you in case of an emergency  Self-care:   Apply heat  on your ear for 15 to 20 minutes, 3 to 4 times a day or as directed  You can apply heat with an electric heating pad, hot water bottle, or warm compress  Always put a cloth between your skin and the heat pack to prevent burns  Heat helps decrease pain  Apply ice  on your ear for 15 to 20 minutes, 3 to 4 times a day for 2 days or as directed  Use an ice pack, or put crushed ice in a plastic bag  Cover it with a towel before you apply it to your ear  Ice decreases swelling and pain  Prevent an ear infection:   Wash your hands often  to help prevent the spread of germs  Ask everyone in your house to wash their hands with soap and water  Ask them to wash after they use the bathroom or change a diaper  Remind them to wash before they prepare or eat food  Stay away from people who are ill  Some germs spread easily and quickly through contact  Follow up with your doctor as directed:  Write down your questions so you remember to ask them during your visits  © Copyright [employer] 2022 Information is for End User's use only and may not be sold, redistributed or otherwise used for commercial purposes  All illustrations and images included in CareNotes® are the copyrighted property of [employer]  or [employer] Re[employer]  The above information is an  only  It is not intended as medical advice for individual conditions or treatments  Talk to your doctor, nurse or pharmacist before following any medical regimen to see if it is safe and effective for you

## 2022-11-07 NOTE — PROGRESS NOTES
[facility]        NAME: Avi Barahona  is a 56 y o  male  : 1965    MRN: [id number]  DATE: 2022  TIME: 1:00 PM    Assessment and Plan   Flu-like symptoms [R68 89]  1  Flu-like symptoms  Covid/Flu-Office Collect   2  Non-recurrent acute suppurative otitis media of right ear without spontaneous rupture of tympanic membrane  azithromycin (ZITHROMAX) 250 mg tablet         Patient Instructions       Follow up with PCP in 3-5 days  Proceed to  ER if symptoms worsen  Chief Complaint     Chief Complaint   Patient presents with   • Sinusitis     Patient states it been going on for 3-4 days, and he feels its going to his chest He did not test for covid but has had covid in the past plus all the shots and boasters  • Sore Throat         History of Present Illness       55 yo male with sinus pressure for 3-4 days, and he feels its going to his chest He did not test for covid but has had covid in the past plus all the shots and boasters  He is going for his second neck surgery at [place] [address] on Dec  5th  Patient has not had his influenza vaccine as of yet  Review of Systems   Review of Systems   Constitutional: Positive for activity change, appetite change, chills, fatigue and fever  HENT: Positive for congestion, postnasal drip, rhinorrhea and sore throat  Eyes: Negative for visual disturbance  Respiratory: Positive for cough  Negative for chest tightness, shortness of breath and wheezing  Cardiovascular: Negative for chest pain and palpitations  Gastrointestinal: Negative for abdominal pain, diarrhea, nausea and vomiting  Musculoskeletal: Negative for back pain, myalgias and neck stiffness  Skin: Negative for color change and rash  Neurological: Negative for light-headedness and headaches           Current Medications       Current Outpatient Medications:   •  ASCORBIC ACID PO, , Disp: , Rfl:   •  Aspirin Buf,CaCarb-MgCarb-MgO, 81 MG TABS, Take by mouth, Disp: , Rfl:   •  azithromycin (ZITHROMAX) 250 mg tablet, Take 2 tablets today then 1 tablet daily x 4 days, Disp: 6 tablet, Rfl: 0  •  Cholecalciferol 25 MCG (1000 UT) capsule, TAKE ONE TABLET BY MOUTH EVERY DAY (FOR LOW VITAMIN D), Disp: , Rfl:   •  cyclobenzaprine (FLEXERIL) 10 mg tablet, Take 1 tablet (10 mg total) by mouth 3 (three) times a day as needed for muscle spasms, Disp: 60 tablet, Rfl: 0  •  dextrose 1 g CHEW, as needed, Disp: , Rfl:   •  Empagliflozin 25 MG TABS, Take 12 5 mg by mouth every morning ---- Gisela Pearl, Disp: , Rfl:   •  famotidine (PEPCID) 20 mg tablet, Take 20 mg by mouth, Disp: , Rfl:   •  gabapentin (NEURONTIN) 300 mg capsule, Take 1 capsule (300 mg total) by mouth 3 (three) times a day Start by taking 1 tablet at bedtime for 3 days  Then increase to 1 tablet in the evening and 1 tablet at bedtime for 3 days    Then increase to 1 tablet in the morning, 1 tablet in the evening, and 1 tablet at bedtime thereafter , Disp: 90 capsule, Rfl: 0  •  insulin glargine (LANTUS) 100 units/mL subcutaneous injection, Inject 14 Units under the skin daily at bedtime, Disp: , Rfl:   •  lisinopril (ZESTRIL) 10 mg tablet, Take 10 mg by mouth daily, Disp: , Rfl:   •  losartan (COZAAR) 25 mg tablet, TAKE ONE-HALF TABLET BY MOUTH EVERY DAY FOR BLOOD PRESSURE/HEART, Disp: , Rfl:   •  metFORMIN (GLUMETZA) 1000 MG (MOD) 24 hr tablet, Take 1,000 mg by mouth 2 (two) times a day with meals, Disp: , Rfl:   •  methocarbamol (ROBAXIN) 500 mg tablet, Take 500 mg by mouth if needed for muscle spasms, Disp: , Rfl:   •  naproxen (NAPROSYN) 500 mg tablet, Take 500 mg by mouth every 12 (twelve) hours as needed PRN, Disp: , Rfl:   •  pantoprazole (PROTONIX) 20 mg tablet, Take 20 mg by mouth daily, Disp: , Rfl:   •  traZODone (DESYREL) 50 mg tablet, Take 75 mg by mouth daily at bedtime  , Disp: , Rfl:   •  benzonatate (TESSALON PERLES) 100 mg capsule, Take 100 mg by mouth Three times daily as needed (Patient not taking: No sig reported), Disp: , Rfl:   •  sertraline (ZOLOFT) 100 mg tablet, Take 150 mg by mouth every morning   (Patient not taking: No sig reported), Disp: , Rfl:   •  sucralfate (CARAFATE) 1 g/10 mL suspension, Take 1 g by mouth (Patient not taking: No sig reported), Disp: , Rfl:     Current Allergies     Allergies as of 11/07/2022 - Reviewed 11/07/2022   Allergen Reaction Noted   • Metoprolol  02/28/2020   • Oxycodone-acetaminophen  01/18/2005   • Penicillin v     • Ranitidine  05/25/2010   • Penicillins Rash 11/29/2002            The following portions of the patient's history were reviewed and updated as appropriate: allergies, current medications, past family history, past medical history, past social history, past surgical history and problem list      Past Medical History:   Diagnosis Date   • Bilateral occipital neuralgia    • Cervical post-laminectomy syndrome    • Cervical radiculopathy    • Cervical spine disease    • Cervical spondylosis with myelopathy    • Chronic lumbar pain    • Chronic pain syndrome    • Chronic thoracic spine pain    • Cubital tunnel syndrome of both upper extremities 9/10/2022   • Degenerative cervical spinal stenosis    • Diabetes (HCC)    • Failed neck syndrome    • Hypertension    • Myofascial muscle pain    • Polyarthralgia        Past Surgical History:   Procedure Laterality Date   • CERVICAL DISC SURGERY     • CERVICAL FUSION  2011    ACDF with Dr Jonathan Hardy   • TOTAL SHOULDER REPLACEMENT Right     Reversal       Family History   Problem Relation Age of Onset   • Stroke Mother    • Lung cancer Father          Medications have been verified  Objective   /83   Pulse 76   Temp 98 6 °F (37 °C)   Resp 18   SpO2 96%        Physical Exam     Physical Exam  Vitals and nursing note reviewed  Constitutional:       General: He is not in acute distress  Appearance: He is well-developed and normal weight  He is not ill-appearing  HENT:      Head: Normocephalic and atraumatic  Right Ear: Swelling present  A middle ear effusion is present  Tympanic membrane is erythematous  Left Ear: Ear canal normal  A middle ear effusion is present  Mouth/Throat:      Mouth: Mucous membranes are moist       Pharynx: Pharyngeal swelling and posterior oropharyngeal erythema present  No oropharyngeal exudate  Tonsils: No tonsillar exudate or tonsillar abscesses  0 on the right  0 on the left  Eyes:      Extraocular Movements:      Right eye: Normal extraocular motion  Left eye: Normal extraocular motion  Conjunctiva/sclera: Conjunctivae normal       Pupils: Pupils are equal, round, and reactive to light  Neck:      Thyroid: No thyromegaly  Cardiovascular:      Rate and Rhythm: Normal rate and regular rhythm  Heart sounds: Normal heart sounds  Pulmonary:      Effort: Pulmonary effort is normal  No respiratory distress  Breath sounds: Normal breath sounds  No wheezing, rhonchi or rales  Abdominal:      General: Bowel sounds are normal       Palpations: Abdomen is soft  Tenderness: There is no abdominal tenderness  There is no guarding or rebound  Musculoskeletal:      Cervical back: Normal range of motion and neck supple  Lymphadenopathy:      Cervical: Cervical adenopathy present  Skin:     General: Skin is warm and dry  Capillary Refill: Capillary refill takes less than 2 seconds  Findings: No erythema or rash  Neurological:      General: No focal deficit present  Mental Status: He is alert and oriented to person, place, and time     Psychiatric:         Mood and Affect: Mood normal          Behavior: Behavior normal

## 2022-11-08 LAB
FLUAV RNA RESP QL NAA+PROBE: NEGATIVE
FLUBV RNA RESP QL NAA+PROBE: NEGATIVE
SARS-COV-2 RNA RESP QL NAA+PROBE: NEGATIVE

## 2022-12-07 ENCOUNTER — TELEPHONE (OUTPATIENT)
Dept: NEUROLOGY | Facility: CLINIC | Age: 57
End: 2022-12-07

## 2022-12-07 NOTE — TELEPHONE ENCOUNTER
Spoke with patient informed him Dr Juanjo Carson is out of office sick and rescheduled his Dec  8th appointment as well as placed patient on wait-list

## 2022-12-14 DIAGNOSIS — M96.1 CERVICAL POST-LAMINECTOMY SYNDROME: ICD-10-CM

## 2022-12-14 DIAGNOSIS — G89.4 CHRONIC PAIN SYNDROME: ICD-10-CM

## 2022-12-14 DIAGNOSIS — M54.12 CERVICAL RADICULOPATHY: ICD-10-CM

## 2022-12-14 DIAGNOSIS — M62.838 CERVICAL PARASPINAL MUSCLE SPASM: ICD-10-CM

## 2022-12-14 NOTE — TELEPHONE ENCOUNTER
Please advise on refill request  Will have pt schedule a f/u and remind him [facility] does not take refill requests from pharmacies

## 2022-12-15 RX ORDER — GABAPENTIN 300 MG/1
300 CAPSULE ORAL 3 TIMES DAILY
Qty: 90 CAPSULE | Refills: 0 | Status: SHIPPED | OUTPATIENT
Start: 2022-12-15 | End: 2022-12-23 | Stop reason: SDUPTHER

## 2022-12-19 NOTE — PROGRESS NOTES
Pain Medicine Follow-Up Note    Assessment:  1  Chronic pain syndrome    2  Cervical radiculopathy    3  Cervical post-laminectomy syndrome    4  Cervical paraspinal muscle spasm        Plan:  Orders Placed This Encounter   Procedures   • FL spine and pain procedure     Standing Status:   Future     Standing Expiration Date:   12/23/2026     Order Specific Question:   Reason for Exam:     Answer:   C7-T1     Order Specific Question:   Anticoagulant hold needed? Answer:   No       New Medications Ordered This Visit   Medications   • gabapentin (NEURONTIN) 300 mg capsule     Sig: Take 2 capsules (600 mg total) by mouth 3 (three) times a day     Dispense:  180 capsule     Refill:  0   • cyclobenzaprine (FLEXERIL) 5 mg tablet     Sig: Take 1 tablet (5 mg total) by mouth 3 (three) times a day as needed for muscle spasms     Dispense:  90 tablet     Refill:  1       My impressions and treatment recommendations were discussed in detail with the patient who verbalized understanding and had no further questions  Patient presents the office following up on his chronic pain secondary to cervical radiculopathy  Patient reports that he is scheduled to undergo a C3-T1 posterior cervical discectomy and fixation fusion with Dr Quang Hutson in February 2023  However patient states due to it being a Workmen's Comp  issue he also has to have a second opinion from another surgeon which he is being evaluated on January 11, 2023  Patient has been currently using gabapentin 300 mg 3 times daily which he states has eased his pain symptoms  Patient is in agreement to titrate this medication up to 600 mg 3 times daily  The patient currently has symptoms that run along a C6-C7 dermatomal pattern and due to the patient having in a cervical fusion C4-C7, I find it appropriate for him to undergo a cervical epidural steroid injection at C7-T1    Dr Quang Hutson stated that the patient may have a epidural steroid injection as long as it is at least 2 weeks prior to his surgery  Patient is in agreement with this plan  Complete risks and benefits including bleeding, infection, tissue reaction, nerve injury and allergic reaction were discussed  The approach was demonstrated using models and literature was provided  Verbal and written consent was obtained  Follow-up is planned in 4 weeks after injection time or sooner as warranted  Discharge instructions were provided  I personally saw and examined the patient and I agree with the above discussed plan of care  History of Present Illness:    Ibeth Burroughs  is a 56 y o  male who presents to [facility] for interval re-evaluation of the above stated pain complaints  The patient has a past medical and chronic pain history as outlined in the assessment section  He was last seen on 7/20/2022  At today's visit patient states that his pain symptoms are the same with a pain score of 5 out of 10 on the verbal numeric pain scale  Patient states that his pain is worse in the morning, evening, and at night  The patient's pain is constant in nature  The quality of the patient's pain is described as burning, pressure-like, numbness, and pins-and-needles  Patient indicates that his pain is located in his posterior neck and his bilateral arms  Patient states the pain relief he is now obtaining from his current pain relievers is not enough to make a difference in his life due to it only reducing his pain symptoms by 60%  Other than as stated above, the patient denies any interval changes in medications, medical condition, mental condition, symptoms, or allergies since the last office visit  Review of Systems:    Review of Systems   Respiratory: Negative for shortness of breath  Cardiovascular: Negative for chest pain  Gastrointestinal: Negative for constipation, diarrhea, nausea and vomiting  Musculoskeletal: Positive for arthralgias and myalgias   Negative for gait problem and joint swelling  Pain in Arms   Skin: Negative for rash  Neurological: Negative for dizziness, seizures and weakness  Psychiatric/Behavioral:        Memory loss   All other systems reviewed and are negative  Past Medical History:   Diagnosis Date   • Bilateral occipital neuralgia    • Cervical post-laminectomy syndrome    • Cervical radiculopathy    • Cervical spine disease    • Cervical spondylosis with myelopathy    • Chronic lumbar pain    • Chronic pain syndrome    • Chronic thoracic spine pain    • Cubital tunnel syndrome of both upper extremities 9/10/2022   • Degenerative cervical spinal stenosis    • Diabetes (HCC)    • Failed neck syndrome    • Hypertension    • Myofascial muscle pain    • Polyarthralgia        Past Surgical History:   Procedure Laterality Date   • CERVICAL DISC SURGERY     • CERVICAL FUSION  2011    ACDF with Dr Kamar Pettit   • TOTAL SHOULDER REPLACEMENT Right     Reversal       Family History   Problem Relation Age of Onset   • Stroke Mother    • Lung cancer Father        Social History     Occupational History   • Not on file   Tobacco Use   • Smoking status: Never   • Smokeless tobacco: Never   Vaping Use   • Vaping Use: Never used   Substance and Sexual Activity   • Alcohol use:  Yes     Alcohol/week: 1 0 standard drink     Types: 1 Cans of beer per week     Comment: occasional, weekly   • Drug use: No   • Sexual activity: Not on file         Current Outpatient Medications:   •  ASCORBIC ACID PO, , Disp: , Rfl:   •  Aspirin Buf,CaCarb-MgCarb-MgO, 81 MG TABS, Take by mouth, Disp: , Rfl:   •  Cholecalciferol 25 MCG (1000 UT) capsule, TAKE ONE TABLET BY MOUTH EVERY DAY (FOR LOW VITAMIN D), Disp: , Rfl:   •  cyclobenzaprine (FLEXERIL) 5 mg tablet, Take 1 tablet (5 mg total) by mouth 3 (three) times a day as needed for muscle spasms, Disp: 90 tablet, Rfl: 1  •  dextrose 1 g CHEW, as needed, Disp: , Rfl:   •  Empagliflozin 25 MG TABS, Take 12 5 mg by mouth every morning ---- [place], Disp: , Rfl:   •  famotidine (PEPCID) 20 mg tablet, Take 20 mg by mouth, Disp: , Rfl:   •  gabapentin (NEURONTIN) 300 mg capsule, Take 2 capsules (600 mg total) by mouth 3 (three) times a day, Disp: 180 capsule, Rfl: 0  •  insulin glargine (LANTUS) 100 units/mL subcutaneous injection, Inject 14 Units under the skin daily at bedtime, Disp: , Rfl:   •  lisinopril (ZESTRIL) 10 mg tablet, Take 10 mg by mouth daily, Disp: , Rfl:   •  losartan (COZAAR) 25 mg tablet, TAKE ONE-HALF TABLET BY MOUTH EVERY DAY FOR BLOOD PRESSURE/HEART, Disp: , Rfl:   •  metFORMIN (GLUMETZA) 1000 MG (MOD) 24 hr tablet, Take 1,000 mg by mouth 2 (two) times a day with meals, Disp: , Rfl:   •  methocarbamol (ROBAXIN) 500 mg tablet, Take 500 mg by mouth if needed for muscle spasms, Disp: , Rfl:   •  naproxen (NAPROSYN) 500 mg tablet, Take 500 mg by mouth every 12 (twelve) hours as needed PRN, Disp: , Rfl:   •  pantoprazole (PROTONIX) 20 mg tablet, Take 20 mg by mouth daily, Disp: , Rfl:   •  sertraline (ZOLOFT) 100 mg tablet, Take 150 mg by mouth every morning, Disp: , Rfl:   •  sucralfate (CARAFATE) 1 g/10 mL suspension, Take 1 g by mouth, Disp: , Rfl:   •  traZODone (DESYREL) 50 mg tablet, Take 75 mg by mouth daily at bedtime  , Disp: , Rfl:   •  benzonatate (TESSALON PERLES) 100 mg capsule, Take 100 mg by mouth Three times daily as needed (Patient not taking: Reported on 7/20/2022), Disp: , Rfl:     Allergies   Allergen Reactions   • Metoprolol      Too low of a heart rate   • Oxycodone-Acetaminophen      rash   • Penicillin V    • Ranitidine      Other reaction(s): Xerostomia   • Penicillins Rash     rash       Physical Exam:    /84 (BP Location: Left arm, Patient Position: Sitting, Cuff Size: Standard)   Pulse 84   Ht 5' 7" (1 702 m)   Wt 110 kg (243 lb 9 6 oz)   BMI 38 15 kg/m²     Constitutional:normal, well developed, well nourished, alert, in no distress and non-toxic and no overt pain behavior   and obese  Eyes:anicteric  HEENT:grossly intact  Neck:supple, symmetric, trachea midline and no masses   Pulmonary:even and unlabored  Cardiovascular:No edema or pitting edema present  Skin:Normal without rashes or lesions and well hydrated  Psychiatric:Mood and affect appropriate  Neurologic:Cranial Nerves II-XII grossly intact  Musculoskeletal:antalgic     Cervical Spine Exam    Appearance:  Normal lordosis  Palpation/Tenderness:  left cervical paraspinal tenderness  right cervical paraspinal tenderness  Sensory:  dimished light touch sensation, location: Bilateral arms and digits 3, 4, and 5 bilaterally  Range of Motion:  Flexion:   Moderately limited  with pain  Extension:  Moderately limited  with pain  Lateral Flexion - Left:  Moderately limited  with pain  Lateral Flexion - Right:  Moderately limited  with pain  Rotation - Left:  Moderately limited  with pain  Rotation - Right:  Moderately limited  with pain  Motor Strength:  Left Arm Flexion  4/5  Left Arm Extension  4/5  Right Arm Flexion  4/5  Right Arm Extension  4/5  Left Wrist Flexion  4/5  Left Wrist Extension  4/5  Left Finger Abduction  5/5  Right Finger Abduction  5/5  Left Pincer Grasp  5/5  Right Pincer Grasp  5/5        Imaging  FL spine and pain procedure    (Results Pending)         Orders Placed This Encounter   Procedures   • FL spine and pain procedure

## 2022-12-23 ENCOUNTER — TELEPHONE (OUTPATIENT)
Dept: RADIOLOGY | Facility: CLINIC | Age: 57
End: 2022-12-23

## 2022-12-23 ENCOUNTER — OFFICE VISIT (OUTPATIENT)
Dept: PAIN MEDICINE | Facility: CLINIC | Age: 57
End: 2022-12-23

## 2022-12-23 VITALS
HEIGHT: 67 IN | WEIGHT: 243.6 LBS | BODY MASS INDEX: 38.24 KG/M2 | HEART RATE: 84 BPM | DIASTOLIC BLOOD PRESSURE: 84 MMHG | SYSTOLIC BLOOD PRESSURE: 148 MMHG

## 2022-12-23 DIAGNOSIS — G89.4 CHRONIC PAIN SYNDROME: Primary | ICD-10-CM

## 2022-12-23 DIAGNOSIS — M54.12 CERVICAL RADICULOPATHY: ICD-10-CM

## 2022-12-23 DIAGNOSIS — M96.1 CERVICAL POST-LAMINECTOMY SYNDROME: ICD-10-CM

## 2022-12-23 DIAGNOSIS — M62.838 CERVICAL PARASPINAL MUSCLE SPASM: ICD-10-CM

## 2022-12-23 RX ORDER — GABAPENTIN 300 MG/1
600 CAPSULE ORAL 3 TIMES DAILY
Qty: 180 CAPSULE | Refills: 0 | Status: SHIPPED | OUTPATIENT
Start: 2022-12-23

## 2022-12-23 RX ORDER — CYCLOBENZAPRINE HCL 5 MG
5 TABLET ORAL 3 TIMES DAILY PRN
Qty: 90 TABLET | Refills: 1 | Status: SHIPPED | OUTPATIENT
Start: 2022-12-23

## 2022-12-23 NOTE — PATIENT INSTRUCTIONS
Gabapentin (By mouth)   Gabapentin (graciela-a-PEN-tin)  Treats seizures and pain caused by shingles  Brand Name(s): FusePaq Fanatrex, Neurontin   There may be other brand names for this medicine  When This Medicine Should Not Be Used: This medicine is not right for everyone  Do not use it if you had an allergic reaction to gabapentin  How to Use This Medicine:   Capsule, Liquid, Tablet  Take your medicine as directed  Your dose may need to be changed several times to find what works best for you  If you have epilepsy, do not allow more than 12 hours to pass between doses  Capsule: Swallow the capsule whole with plenty of water  Do not open, crush, or chew it  Gralise® tablet: Swallow the tablet whole   Do not crush, break, or chew it  Neurontin® tablet: If you break a tablet into 2 pieces, use the second half as your next dose  Do not use the half-tablet if the whole tablet has been cut or broken after 28 days  Oral liquid: Measure the oral liquid medicine with a marked measuring spoon, oral syringe, or medicine cup  This medicine should come with a Medication Guide  Ask your pharmacist for a copy if you do not have one  Missed dose: Take a dose as soon as you remember  If it is almost time for your next dose, wait until then and take a regular dose  Do not take extra medicine to make up for a missed dose  Store the medicine in a closed container at room temperature, away from heat, moisture, and direct light  Store the Neurontin® oral liquid in the refrigerator  Do not freeze  Drugs and Foods to Avoid:   Ask your doctor or pharmacist before using any other medicine, including over-the-counter medicines, vitamins, and herbal products  Some medicines can affect how gabapentin works  Tell your doctor if you also using hydrocodone or morphine  If you take an antacid, wait at least 2 hours before you take gabapentin  Do not drink alcohol while you are using this medicine    Tell your doctor if you use anything else that makes you sleepy  Some examples are allergy medicine, narcotic pain medicine, and alcohol  Tell your doctor if you are also using lorazepam, oxycodone, or zolpidem  Warnings While Using This Medicine:   Tell your doctor if you are pregnant or breastfeeding, or if you have kidney problems (including patients receiving dialysis) or lung problems  Tell your doctor if you have a history of depression or mental health problems  This medicine may cause the following problems:  Drug reaction with eosinophilia and systemic symptoms (DRESS) or multiorgan hypersensitivity, which may damage the liver, kidney, blood, heart, or muscles  Changes in mood or behavior, including suicidal thoughts or behavior  Respiratory depression (serious breathing problem that can be life-threatening), when used with narcotic pain medicines  Do not stop using this medicine suddenly  Your doctor will need to slowly decrease your dose before you stop it completely  This medicine may make you dizzy or drowsy  Do not drive or do anything else that could be dangerous until you know how this medicine affects you  Tell any doctor or dentist who treats you that you are using this medicine  This medicine may affect certain medical test results  Your doctor will check your progress and the effects of this medicine at regular visits  Keep all appointments  Keep all medicine out of the reach of children  Never share your medicine with anyone  Possible Side Effects While Using This Medicine:   Call your doctor right away if you notice any of these side effects:   Allergic reaction: Itching or hives, swelling in your face or hands, swelling or tingling in your mouth or throat, chest tightness, trouble breathing  Behavior problems, aggression, restlessness, trouble concentrating, moodiness (especially in children)  Blistering, peeling, red skin rash  Blue lips, fingernails, or skin, chest pain, fast heartbeat, trouble breathing  Change in how much or how often you urinate, bloody or cloudy urine  Dark urine or pale stools, nausea, vomiting, loss of appetite, stomach pain, yellow skin or eyes  Fever, chills, cough, sore throat, body aches  Problems with coordination, shakiness, unsteadiness, unusual eye movement  Rapid weight gain, swelling in your hands, ankles, or feet  Rash, swollen or tender glands in the neck, armpit, or groin  Unusual moods or behaviors, thoughts of hurting yourself, feeling depressed  If you notice these less serious side effects, talk with your doctor:   Dizziness, drowsiness, sleepiness, tiredness  If you notice other side effects that you think are caused by this medicine, tell your doctor  Call your doctor for medical advice about side effects  You may report side effects to FDA at 7-323-FDA-8019    © Copyright [employer] [facility] 1928–1983 2022 Information is for End User's use only and may not be sold, redistributed or otherwise used for commercial purposes  The above information is an  only  It is not intended as medical advice for individual conditions or treatments  Talk to your doctor, nurse or pharmacist before following any medical regimen to see if it is safe and effective for you

## 2022-12-23 NOTE — TELEPHONE ENCOUNTER
----- Message from Jason Fields, Elana Jye Reyes sent at 12/23/2022  4:05 PM EST -----  Regarding: FW: PORTILLO prior to surgery  Please notify patient that Dr Santana Keith has stated that he can have a cervical epidural steroid injection up to 2 weeks before his surgery so he is allowed to have his cervical injection  I placed order in his chart Fadia Garcialove the  should reach out soon  Thank you       ----- Message -----  From: Pasha Taveras MD  Sent: 12/23/2022   3:54 PM EST  To: NAVEED Hare  Subject: RE: Hollie Mitchell prior to surgery                        He can have injection up to two weeks before surgery  ----- Message -----  From: NAVEED Hare  Sent: 12/23/2022   1:46 PM EST  To: Pasha Taveras MD  Subject: PORTILLO prior to surgery                            Patient was inquiring about having a cervical epidural steroid injection prior to his surgery  He stated he is likely to have surgery sometime in February with you  How long prior to surgery would a cervical epidural steroid injection be appropriate to administer and not interfere with surgery? Thank you      Jason LUCIO

## 2022-12-23 NOTE — TELEPHONE ENCOUNTER
Per [facility], surgery is scheduled 1/23  Pt would need  to have heriberto by [facility]'s 1/5 procedure day    [facility] has no availability until 1/17  Will check with [facility] if and where pt should be double booked

## 2022-12-28 NOTE — TELEPHONE ENCOUNTER
MG ordered PORTILLO for this patient when I previously ordered facet injection for him for his predominant neck pain  Dr Blaise Jarquin had also recommended this  Given how high in his neck the stenosis is, I honestly do not think PORTILLO will result in significant pain relief   Would consider facet injection, or he has the additional option to forgo any procedure at this time since surgery is imminent

## 2023-01-12 ENCOUNTER — TELEPHONE (OUTPATIENT)
Dept: NEUROSURGERY | Facility: CLINIC | Age: 58
End: 2023-01-12

## 2023-01-12 NOTE — TELEPHONE ENCOUNTER
1/12/23 PT CALLED AND ASKED THAT I GIVE MSG TO DR GOMEZ  PT IS SCHEDULED FOR CSPINE SX WITH PATRICIA 2/20/23  PT STATES HE SAW 2ND OPINION FOR DEPT OF LABOR TO APPROVE HIS SX OFFERED BY DR GOMEZ  PT SAW DR [address] AND BRAIN SURGERY ON 1/11/23  PT WANTS DR GOMEZ TO KNOW THAT THE NEUROSURGEON CONCURRED WITH DR GOMEZ ON SURGICAL PROCEDURE AND WE SHOULD BE RECEIVING UPDATE FROM DEPT OF LABOR SOON

## 2023-01-23 ENCOUNTER — APPOINTMENT (OUTPATIENT)
Dept: LAB | Facility: HOSPITAL | Age: 58
End: 2023-01-23

## 2023-01-23 DIAGNOSIS — M47.12 OTHER SPONDYLOSIS WITH MYELOPATHY, CERVICAL REGION: ICD-10-CM

## 2023-01-23 DIAGNOSIS — Z01.818 PRE-OP TESTING: ICD-10-CM

## 2023-01-23 DIAGNOSIS — Z98.1 STATUS POST CERVICAL SPINAL FUSION: ICD-10-CM

## 2023-01-23 DIAGNOSIS — M48.9 CERVICAL SPINE DISEASE: ICD-10-CM

## 2023-01-23 DIAGNOSIS — M54.12 CERVICAL RADICULOPATHY: ICD-10-CM

## 2023-01-23 LAB
ALBUMIN SERPL BCP-MCNC: 4.1 G/DL (ref 3.5–5)
ALP SERPL-CCNC: 104 U/L (ref 46–116)
ALT SERPL W P-5'-P-CCNC: 67 U/L (ref 12–78)
ANION GAP SERPL CALCULATED.3IONS-SCNC: 8 MMOL/L (ref 4–13)
APTT PPP: 27 SECONDS (ref 23–37)
AST SERPL W P-5'-P-CCNC: 42 U/L (ref 5–45)
BACTERIA UR QL AUTO: NORMAL /HPF
BASOPHILS # BLD AUTO: 0.03 THOUSANDS/ÂΜL (ref 0–0.1)
BASOPHILS NFR BLD AUTO: 1 % (ref 0–1)
BILIRUB SERPL-MCNC: 0.61 MG/DL (ref 0.2–1)
BILIRUB UR QL STRIP: NEGATIVE
BUN SERPL-MCNC: 15 MG/DL (ref 5–25)
CALCIUM SERPL-MCNC: 8.9 MG/DL (ref 8.3–10.1)
CHLORIDE SERPL-SCNC: 104 MMOL/L (ref 96–108)
CLARITY UR: CLEAR
CO2 SERPL-SCNC: 30 MMOL/L (ref 21–32)
COLOR UR: ABNORMAL
CREAT SERPL-MCNC: 1.06 MG/DL (ref 0.6–1.3)
EOSINOPHIL # BLD AUTO: 0.1 THOUSAND/ÂΜL (ref 0–0.61)
EOSINOPHIL NFR BLD AUTO: 2 % (ref 0–6)
ERYTHROCYTE [DISTWIDTH] IN BLOOD BY AUTOMATED COUNT: 13.1 % (ref 11.6–15.1)
EST. AVERAGE GLUCOSE BLD GHB EST-MCNC: 143 MG/DL
GFR SERPL CREATININE-BSD FRML MDRD: 78 ML/MIN/1.73SQ M
GLUCOSE P FAST SERPL-MCNC: 155 MG/DL (ref 65–99)
GLUCOSE UR STRIP-MCNC: ABNORMAL MG/DL
HBA1C MFR BLD: 6.6 %
HCT VFR BLD AUTO: 48.3 % (ref 36.5–49.3)
HGB BLD-MCNC: 16.6 G/DL (ref 12–17)
HGB UR QL STRIP.AUTO: NEGATIVE
IMM GRANULOCYTES # BLD AUTO: 0.02 THOUSAND/UL (ref 0–0.2)
IMM GRANULOCYTES NFR BLD AUTO: 0 % (ref 0–2)
INR PPP: 0.91 (ref 0.84–1.19)
KETONES UR STRIP-MCNC: NEGATIVE MG/DL
LEUKOCYTE ESTERASE UR QL STRIP: ABNORMAL
LYMPHOCYTES # BLD AUTO: 1.94 THOUSANDS/ÂΜL (ref 0.6–4.47)
LYMPHOCYTES NFR BLD AUTO: 36 % (ref 14–44)
MCH RBC QN AUTO: 30.9 PG (ref 26.8–34.3)
MCHC RBC AUTO-ENTMCNC: 34.4 G/DL (ref 31.4–37.4)
MCV RBC AUTO: 90 FL (ref 82–98)
MONOCYTES # BLD AUTO: 0.44 THOUSAND/ÂΜL (ref 0.17–1.22)
MONOCYTES NFR BLD AUTO: 8 % (ref 4–12)
NEUTROPHILS # BLD AUTO: 2.94 THOUSANDS/ÂΜL (ref 1.85–7.62)
NEUTS SEG NFR BLD AUTO: 53 % (ref 43–75)
NITRITE UR QL STRIP: NEGATIVE
NON-SQ EPI CELLS URNS QL MICRO: NORMAL /HPF
NRBC BLD AUTO-RTO: 0 /100 WBCS
PH UR STRIP.AUTO: 5 [PH]
PLATELET # BLD AUTO: 229 THOUSANDS/UL (ref 149–390)
PMV BLD AUTO: 9.7 FL (ref 8.9–12.7)
POTASSIUM SERPL-SCNC: 4.8 MMOL/L (ref 3.5–5.3)
PROT SERPL-MCNC: 7.7 G/DL (ref 6.4–8.4)
PROT UR STRIP-MCNC: NEGATIVE MG/DL
PROTHROMBIN TIME: 12.1 SECONDS (ref 11.6–14.5)
RBC # BLD AUTO: 5.38 MILLION/UL (ref 3.88–5.62)
RBC #/AREA URNS AUTO: NORMAL /HPF
SODIUM SERPL-SCNC: 142 MMOL/L (ref 135–147)
SP GR UR STRIP.AUTO: 1.03 (ref 1–1.03)
UROBILINOGEN UR STRIP-ACNC: <2 MG/DL
WBC # BLD AUTO: 5.47 THOUSAND/UL (ref 4.31–10.16)
WBC #/AREA URNS AUTO: NORMAL /HPF

## 2023-01-24 ENCOUNTER — LAB REQUISITION (OUTPATIENT)
Dept: LAB | Facility: HOSPITAL | Age: 58
End: 2023-01-24

## 2023-01-24 DIAGNOSIS — M54.12 RADICULOPATHY, CERVICAL REGION: ICD-10-CM

## 2023-01-24 DIAGNOSIS — M48.9 SPONDYLOPATHY, UNSPECIFIED: ICD-10-CM

## 2023-01-24 DIAGNOSIS — Z98.1 ARTHRODESIS STATUS: ICD-10-CM

## 2023-01-24 DIAGNOSIS — M47.12 OTHER SPONDYLOSIS WITH MYELOPATHY, CERVICAL REGION: ICD-10-CM

## 2023-01-24 LAB
ABO GROUP BLD: NORMAL
BLD GP AB SCN SERPL QL: NEGATIVE
MRSA NOSE QL CULT: NORMAL
RH BLD: POSITIVE
SPECIMEN EXPIRATION DATE: NORMAL

## 2023-02-06 ENCOUNTER — DOCUMENTATION (OUTPATIENT)
Dept: PAIN MEDICINE | Facility: CLINIC | Age: 58
End: 2023-02-06

## 2023-02-06 NOTE — PROGRESS NOTES
[facility]ed [facility] to request a new referral for pt  PCP is Dr Parisa Modi  Spoke to [place] and someone else and gave our fax number [phone]  Person stated they will message doctor to have new referral put in and faxed

## 2023-02-13 NOTE — PROGRESS NOTES
Pain Medicine Follow-Up Note    Assessment:  1  Chronic pain syndrome    2  Lumbar radiculopathy    3  Cervical post-laminectomy syndrome    4  Cervical paraspinal muscle spasm    5  Cervical radiculopathy        Plan:      New Medications Ordered This Visit   Medications   • gabapentin (NEURONTIN) 300 mg capsule     Sig: Take 2 capsules (600 mg total) by mouth 2 (two) times a day     Dispense:  360 capsule     Refill:  0       My impressions and treatment recommendations were discussed in detail with the patient who verbalized understanding and had no further questions  Patient follows up in the the office in regards to his worsening cervical radiculopathy  Patient was scheduled to have a cervical fusion with Dr Kami Russo however patient states that his Worker's Comp  postpone the surgery again  Patient also had a second consult with the Worker's Comp  physician located in [place] who stated that due to the delay of surgery the patient is likely to have chronic numbness in his bilateral hands  Patient is very frustrated at today's visit due to the fact that most medications have not been effective for this patient  Patient does not feel the gabapentin is helpful however he continues to use it  At last visit I did order a cervical epidural steroid injection however when the patient received a phone call to schedule he thought the phone call was to explain that he could not have the injection  In fact Dr Kami Russo informed me that he could safely have the injection up to 2 weeks prior to his surgery however his surgery is currently scheduled now on March 6 therefore there is not a 2-week window so his opportunity for this injection is no longer available  Patient also reports that opioid medications are not helpful to him either  Patient states that if they further delay his surgery he will contact our office ASAP in hopes of obtaining a cervical epidural steroid injection       Follow-up is planned in 3 months time or sooner as warranted  Discharge instructions were provided  I personally saw and examined the patient and I agree with the above discussed plan of care  History of Present Illness:    Gladys Dobbins  is a 56 y o  male who presents to [facility] for interval re-evaluation of the above stated pain complaints  The patient has a past medical and chronic pain history as outlined in the assessment section  He was last seen on 12/23/2022  At today's visit patient states that his pain symptoms have worsened and currently has a pain score of 8 out of 10 on the verbal numeric pain scale  The patient's pain is worse in the morning, evening, and at night  The patient's pain is constant in nature  The quality of the patient's pain is described as burning, sharp, pressure-like, numbness, and pins-and-needles  The patient indicates that his pain is located in his posterior neck and his bilateral arms, and bilateral hands  Patient states the amount of pain relief he is now obtaining from his current pain relievers is not enough to make a difference in his life due to it reducing 0% of his pain  Other than as stated above, the patient denies any interval changes in medications, medical condition, mental condition, symptoms, or allergies since the last office visit  Review of Systems:    Review of Systems   Respiratory: Negative for shortness of breath  Cardiovascular: Negative for chest pain  Gastrointestinal: Negative for constipation, diarrhea, nausea and vomiting  Musculoskeletal: Positive for myalgias  Negative for arthralgias, gait problem and joint swelling  Decreased ROM   Pain in arms/hands   Skin: Negative for rash  Neurological: Negative for dizziness, seizures and weakness  All other systems reviewed and are negative          Past Medical History:   Diagnosis Date   • Anxiety    • Arthritis    • Bilateral occipital neuralgia    • Cervical post-laminectomy syndrome    • Cervical radiculopathy    • Cervical spine disease    • Cervical spondylosis with myelopathy    • Chronic lumbar pain    • Chronic pain syndrome    • Chronic thoracic spine pain    • Cubital tunnel syndrome of both upper extremities 09/10/2022   • Degenerative cervical spinal stenosis    • Dental crowns present    • Depression    • Diabetes ([address] )     type 2   • Exercise involving walking     daily 5-10 minutes   • Failed neck syndrome    • Fatty liver    • Full dentures     upper   • GERD (gastroesophageal reflux disease)    • History of COVID-19 2021    per pt admitted to the [facility]--   • Hyperlipidemia    • Hypertension    • Irritable bowel syndrome    • Motion sickness    • Muscle weakness     "arms and legs"   • Myofascial muscle pain    • Neck stiffness     plate implanted-limited ROM   • Polyarthralgia    • Tinnitus    • Wears glasses        Past Surgical History:   Procedure Laterality Date   • CERVICAL DISC SURGERY     • CERVICAL FUSION  2011    ACDF with Dr Nohemi Miranda   • COLONOSCOPY     • TOTAL SHOULDER REPLACEMENT Right     Reversal       Family History   Problem Relation Age of Onset   • Stroke Mother    • Lung cancer Father        Social History     Occupational History   • Not on file   Tobacco Use   • Smoking status: Never   • Smokeless tobacco: Never   Vaping Use   • Vaping Use: Never used   Substance and Sexual Activity   • Alcohol use:  Yes     Alcohol/week: 1 0 standard drink     Types: 1 Cans of beer per week     Comment: occasional, weekly   • Drug use: No   • Sexual activity: Not on file     Comment: defer         Current Outpatient Medications:   •  ASCORBIC ACID PO, , Disp: , Rfl:   •  Cholecalciferol 25 MCG (1000 UT) capsule, TAKE ONE TABLET BY MOUTH EVERY DAY (FOR LOW VITAMIN D), Disp: , Rfl:   •  Cyanocobalamin (B-12 PO), Take by mouth, Disp: , Rfl:   •  dextrose 1 g CHEW, daily as needed for low blood sugar, Disp: , Rfl:   •  Empagliflozin 25 MG TABS, Take 25 mg by mouth every morning ---- Brien Proctor, Disp: , Rfl:   •  famotidine (PEPCID) 20 mg tablet, Take 20 mg by mouth daily at bedtime, Disp: , Rfl:   •  gabapentin (NEURONTIN) 300 mg capsule, Take 2 capsules (600 mg total) by mouth 2 (two) times a day, Disp: 360 capsule, Rfl: 0  •  Ibuprofen 200 MG CAPS, Take by mouth as needed, Disp: , Rfl:   •  lisinopril (ZESTRIL) 10 mg tablet, Take 10 mg by mouth daily, Disp: , Rfl:   •  losartan (COZAAR) 25 mg tablet, TAKE ONE-HALF TABLET BY MOUTH EVERY DAY FOR BLOOD PRESSURE/HEART, Disp: , Rfl:   •  metFORMIN (GLUMETZA) 1000 MG (MOD) 24 hr tablet, Take 1,000 mg by mouth 2 (two) times a day with meals, Disp: , Rfl:   •  methocarbamol (ROBAXIN) 500 mg tablet, Take 500 mg by mouth if needed for muscle spasms, Disp: , Rfl:   •  naproxen (NAPROSYN) 500 mg tablet, Take 500 mg by mouth every 12 (twelve) hours as needed PRN, Disp: , Rfl:   •  pantoprazole (PROTONIX) 20 mg tablet, Take 20 mg by mouth daily, Disp: , Rfl:   •  traZODone (DESYREL) 50 mg tablet, Take 75 mg by mouth daily at bedtime  , Disp: , Rfl:   •  benzonatate (TESSALON PERLES) 100 mg capsule, Take 100 mg by mouth Three times daily as needed (Patient not taking: Reported on 7/20/2022), Disp: , Rfl:   •  cyclobenzaprine (FLEXERIL) 5 mg tablet, Take 1 tablet (5 mg total) by mouth 3 (three) times a day as needed for muscle spasms (Patient not taking: Reported on 2/17/2023), Disp: 90 tablet, Rfl: 1    Allergies   Allergen Reactions   • Metoprolol Other (See Comments)     Too low of a heart rate   • Penicillin V Shortness Of Breath   • Penicillins Shortness Of Breath and Rash     rash       Physical Exam:    /83 (BP Location: Left arm, Patient Position: Sitting, Cuff Size: Standard)   Pulse 80   Ht 5' 8" (1 727 m)   Wt 112 kg (246 lb 3 2 oz)   BMI 37 43 kg/m²     Constitutional:normal, well developed, well nourished, alert, in no distress and non-toxic and no overt pain behavior   and obese  Eyes:anicteric  HEENT:grossly intact  Neck:supple, symmetric, trachea midline and no masses   Pulmonary:even and unlabored  Cardiovascular:No edema or pitting edema present  Skin:Normal without rashes or lesions and well hydrated  Psychiatric:Mood and affect appropriate  Neurologic:Cranial Nerves II-XII grossly intact  Musculoskeletal:antalgic    This document was created using speech voice recognition software  Grammatical errors, random word insertions, pronoun errors, and incomplete sentences are an occasional consequence of this system due to software limitations, ambient noise, and hardware issues  Any formal questions or concerns about content, text, or information contained within the body of this dictation should be directly addressed to the provider for clarification

## 2023-02-17 ENCOUNTER — OFFICE VISIT (OUTPATIENT)
Dept: PAIN MEDICINE | Facility: CLINIC | Age: 58
End: 2023-02-17

## 2023-02-17 VITALS
BODY MASS INDEX: 37.31 KG/M2 | WEIGHT: 246.2 LBS | SYSTOLIC BLOOD PRESSURE: 142 MMHG | DIASTOLIC BLOOD PRESSURE: 83 MMHG | HEIGHT: 68 IN | HEART RATE: 80 BPM

## 2023-02-17 DIAGNOSIS — M96.1 CERVICAL POST-LAMINECTOMY SYNDROME: ICD-10-CM

## 2023-02-17 DIAGNOSIS — M62.838 CERVICAL PARASPINAL MUSCLE SPASM: ICD-10-CM

## 2023-02-17 DIAGNOSIS — M54.16 LUMBAR RADICULOPATHY: ICD-10-CM

## 2023-02-17 DIAGNOSIS — G89.4 CHRONIC PAIN SYNDROME: Primary | ICD-10-CM

## 2023-02-17 DIAGNOSIS — M54.12 CERVICAL RADICULOPATHY: ICD-10-CM

## 2023-02-17 RX ORDER — GABAPENTIN 300 MG/1
600 CAPSULE ORAL 2 TIMES DAILY
Qty: 360 CAPSULE | Refills: 0 | Status: SHIPPED | OUTPATIENT
Start: 2023-02-17

## 2023-02-17 NOTE — PATIENT INSTRUCTIONS
Gabapentin (By mouth)   Gabapentin (graciela-a-PEN-tin)  Treats seizures and pain caused by shingles  Brand Name(s): FusePaq Fanatrex, Neurontin   There may be other brand names for this medicine  When This Medicine Should Not Be Used: This medicine is not right for everyone  Do not use it if you had an allergic reaction to gabapentin  How to Use This Medicine:   Capsule, Liquid, Tablet  Take your medicine as directed  Your dose may need to be changed several times to find what works best for you  If you have epilepsy, do not allow more than 12 hours to pass between doses  Capsule: Swallow the capsule whole with plenty of water  Do not open, crush, or chew it  Gralise® tablet: Swallow the tablet whole   Do not crush, break, or chew it  Neurontin® tablet: If you break a tablet into 2 pieces, use the second half as your next dose  Do not use the half-tablet if the whole tablet has been cut or broken after 28 days  Oral liquid: Measure the oral liquid medicine with a marked measuring spoon, oral syringe, or medicine cup  This medicine should come with a Medication Guide  Ask your pharmacist for a copy if you do not have one  Missed dose: Take a dose as soon as you remember  If it is almost time for your next dose, wait until then and take a regular dose  Do not take extra medicine to make up for a missed dose  Store the medicine in a closed container at room temperature, away from heat, moisture, and direct light  Store the Neurontin® oral liquid in the refrigerator  Do not freeze  Drugs and Foods to Avoid:   Ask your doctor or pharmacist before using any other medicine, including over-the-counter medicines, vitamins, and herbal products  Some medicines can affect how gabapentin works  Tell your doctor if you also using hydrocodone or morphine  If you take an antacid, wait at least 2 hours before you take gabapentin  Do not drink alcohol while you are using this medicine    Tell your doctor if you use anything else that makes you sleepy  Some examples are allergy medicine, narcotic pain medicine, and alcohol  Tell your doctor if you are also using lorazepam, oxycodone, or zolpidem  Warnings While Using This Medicine:   Tell your doctor if you are pregnant or breastfeeding, or if you have kidney problems (including patients receiving dialysis) or lung problems  Tell your doctor if you have a history of depression or mental health problems  This medicine may cause the following problems:  Drug reaction with eosinophilia and systemic symptoms (DRESS) or multiorgan hypersensitivity, which may damage the liver, kidney, blood, heart, or muscles  Changes in mood or behavior, including suicidal thoughts or behavior  Respiratory depression (serious breathing problem that can be life-threatening), when used with narcotic pain medicines  Do not stop using this medicine suddenly  Your doctor will need to slowly decrease your dose before you stop it completely  This medicine may make you dizzy or drowsy  Do not drive or do anything else that could be dangerous until you know how this medicine affects you  Tell any doctor or dentist who treats you that you are using this medicine  This medicine may affect certain medical test results  Your doctor will check your progress and the effects of this medicine at regular visits  Keep all appointments  Keep all medicine out of the reach of children  Never share your medicine with anyone  Possible Side Effects While Using This Medicine:   Call your doctor right away if you notice any of these side effects:   Allergic reaction: Itching or hives, swelling in your face or hands, swelling or tingling in your mouth or throat, chest tightness, trouble breathing  Behavior problems, aggression, restlessness, trouble concentrating, moodiness (especially in children)  Blistering, peeling, red skin rash  Blue lips, fingernails, or skin, chest pain, fast heartbeat, trouble breathing  Change in how much or how often you urinate, bloody or cloudy urine  Dark urine or pale stools, nausea, vomiting, loss of appetite, stomach pain, yellow skin or eyes  Fever, chills, cough, sore throat, body aches  Problems with coordination, shakiness, unsteadiness, unusual eye movement  Rapid weight gain, swelling in your hands, ankles, or feet  Rash, swollen or tender glands in the neck, armpit, or groin  Unusual moods or behaviors, thoughts of hurting yourself, feeling depressed  If you notice these less serious side effects, talk with your doctor:   Dizziness, drowsiness, sleepiness, tiredness  If you notice other side effects that you think are caused by this medicine, tell your doctor  Call your doctor for medical advice about side effects  You may report side effects to FDA at 3-908-FDA-0916    © Copyright [employer] 2022 Information is for End User's use only and may not be sold, redistributed or otherwise used for commercial purposes  The above information is an  only  It is not intended as medical advice for individual conditions or treatments  Talk to your doctor, nurse or pharmacist before following any medical regimen to see if it is safe and effective for you  Epidural Steroid Injection, Ambulatory Care   GENERAL INFORMATION:   What do I need to know about an epidural steroid injection? An epidural steroid injection (CLARA) is a procedure to inject steroid medicine into the epidural space  The epidural space is between your spinal cord and vertebrae  Steroids reduce inflammation and fluid buildup in your spine that may be causing pain  You may be given pain medicine along with the steroids  How do I prepare for an CLARA? Your healthcare provider will talk to you about how to prepare for your procedure  He will tell you what medicines to take or not take on the day of your procedure  You may need to stop taking blood thinners or other medicines several days before your procedure   You may need to adjust any diabetes medicine you take on the day of your procedure  Steroid medicine can increase your blood sugar level  What will happen during an CLARA? You will be given medicine to numb the procedure area  You will be awake for the procedure, but you will not feel pain  You may also be given medicine to help you relax during the procedure  Contrast liquid will be used to help your healthcare provider see the area better  Tell the healthcare provider if you have ever had an allergic reaction to contrast liquid  Your healthcare provider may place the needle into your neck area, middle of your back, or tailbone area  He may inject the medicine next to the nerves that are causing your pain  He may instead inject the medicine into a larger area of the epidural space  This helps the medicine spread to more nerves  Your healthcare provider will use a fluoroscope to help guide the needle to the right place  A fluoroscope is a type of x-ray  After the procedure, a bandage will be placed over the injection site to prevent infection  What are the risks of an CLARA? You may have temporary or permanent nerve damage or paralysis  You may have bleeding or develop a serious infection, such as meningitis (swelling of the brain coverings)  An abscess may also develop  You may need surgery to fix the abscess  You may have a seizure, anxiety, or trouble sleeping  If you are a man, you may have temporary erectile dysfunction (not able to have an erection)  CARE AGREEMENT:   You have the right to help plan your care  Learn about your health condition and how it may be treated  Discuss treatment options with your caregivers to decide what care you want to receive  You always have the right to refuse treatment  The above information is an  only  It is not intended as medical advice for individual conditions or treatments   Talk to your doctor, nurse or pharmacist before following any medical regimen to see if it is safe and effective for you  © 2014 [address] is for End User's use only and may not be sold, redistributed or otherwise used for commercial purposes  All illustrations and images included in CareNotes® are the copyrighted property of A D A M [employer]  or Danilo Mathew

## 2023-04-27 ENCOUNTER — HOSPITAL ENCOUNTER (EMERGENCY)
Facility: HOSPITAL | Age: 58
Discharge: HOME | End: 2023-04-27
Attending: EMERGENCY MEDICINE

## 2023-04-27 VITALS
DIASTOLIC BLOOD PRESSURE: 80 MMHG | TEMPERATURE: 97.4 F | OXYGEN SATURATION: 97 % | HEART RATE: 88 BPM | SYSTOLIC BLOOD PRESSURE: 159 MMHG | RESPIRATION RATE: 16 BRPM

## 2023-04-27 DIAGNOSIS — M54.50 ACUTE EXACERBATION OF CHRONIC LOW BACK PAIN: Primary | ICD-10-CM

## 2023-04-27 DIAGNOSIS — G89.29 ACUTE EXACERBATION OF CHRONIC LOW BACK PAIN: Primary | ICD-10-CM

## 2023-04-27 RX ORDER — KETOROLAC TROMETHAMINE 30 MG/ML
30 INJECTION, SOLUTION INTRAMUSCULAR; INTRAVENOUS ONCE
Status: COMPLETED | OUTPATIENT
Start: 2023-04-27 | End: 2023-04-27

## 2023-04-27 RX ADMIN — KETOROLAC TROMETHAMINE 30 MG: 30 INJECTION, SOLUTION INTRAMUSCULAR at 17:12

## 2023-04-27 NOTE — DISCHARGE INSTRUCTIONS
Take ibuprofen (Motrin, Advil) or acetaminophen (Tylenol) as needed for pain, as per the instructions  Use ice or heat as it helps  Use topical medications, such as Danilo-Vail or icy Hot, to the area, to help with the pain  Do stretching exercises to keep the muscles of your back loose  Take the muscle relaxer you were prescribed by your neurologist as needed for continued severe pain  Do your normal daily activities, but avoid strenuous activities or heavy lifting until you begin to feel better  Follow up your primary care doctor and the [address] [place] [address] Avenue for further evaluation and management of your back pain  Return if you develop weakness or numbness of your leg, loss of control of your bowels or bladder, or for any other concerns

## 2023-04-27 NOTE — ED PROVIDER NOTES
History  Chief Complaint   Patient presents with   • Back Pain     Pt reports L sided sciatica, flare started 3 days ago     HPI    Prior to Admission Medications   Prescriptions Last Dose Informant Patient Reported? Taking?    ASCORBIC ACID PO   Yes No   Patient not taking: Reported on 4/20/2023   Cholecalciferol 25 MCG (1000 UT) capsule   Yes No   Sig: TAKE ONE TABLET BY MOUTH EVERY DAY (FOR LOW VITAMIN D)   Cyanocobalamin (B-12 PO)   Yes No   Sig: Take by mouth   Empagliflozin 25 MG TABS   Yes No   Sig: Take 25 mg by mouth every morning ---- Len Rory   Ibuprofen 200 MG CAPS   Yes No   Sig: Take by mouth as needed   TRAZODONE HCL PO   Yes No   Sig: Take 75 mg by mouth daily at bedtime     benzonatate (TESSALON PERLES) 100 mg capsule   Yes No   Sig: Take 100 mg by mouth Three times daily as needed   Patient not taking: Reported on 7/20/2022   cyclobenzaprine (FLEXERIL) 5 mg tablet   No No   Sig: Take 1 tablet (5 mg total) by mouth 3 (three) times a day as needed for muscle spasms   dextrose 1 g CHEW   Yes No   Sig: daily as needed for low blood sugar   famotidine (PEPCID) 20 mg tablet   Yes No   Sig: Take 20 mg by mouth daily at bedtime   Patient not taking: Reported on 4/20/2023   gabapentin (NEURONTIN) 300 mg capsule   No No   Sig: Take 2 capsules (600 mg total) by mouth 2 (two) times a day   lisinopril (ZESTRIL) 10 mg tablet   Yes No   Sig: Take 10 mg by mouth daily   losartan (COZAAR) 25 mg tablet   Yes No   Sig: TAKE ONE-HALF TABLET BY MOUTH EVERY DAY FOR BLOOD PRESSURE/HEART   metFORMIN (GLUMETZA) 1000 MG (MOD) 24 hr tablet   Yes No   Sig: Take 1,000 mg by mouth 2 (two) times a day with meals   methocarbamol (ROBAXIN) 500 mg tablet   Yes No   Sig: Take 500 mg by mouth if needed for muscle spasms   Patient not taking: Reported on 4/20/2023   naproxen (NAPROSYN) 500 mg tablet   Yes No   Sig: Take 500 mg by mouth every 12 (twelve) hours as needed PRN   pantoprazole (PROTONIX) 20 mg tablet   Yes No   Sig: "Take 20 mg by mouth daily      Facility-Administered Medications: None       Past Medical History:   Diagnosis Date   • Anxiety    • Arthritis    • Bilateral occipital neuralgia    • Cervical post-laminectomy syndrome    • Cervical radiculopathy    • Cervical spine disease    • Cervical spondylosis with myelopathy    • Chronic lumbar pain    • Chronic pain syndrome    • Chronic thoracic spine pain    • Cubital tunnel syndrome of both upper extremities 09/10/2022   • Degenerative cervical spinal stenosis    • Dental crowns present    • Depression    • Diabetes ([address] )     type 2   • Exercise involving walking     daily 5-10 minutes   • Failed neck syndrome    • Fatty liver    • Full dentures     upper   • GERD (gastroesophageal reflux disease)    • History of COVID-19 2021    per pt admitted to the [facility]--   • Hyperlipidemia    • Hypertension    • Irritable bowel syndrome    • Motion sickness    • Muscle weakness     \"arms and legs\"   • Myofascial muscle pain    • Neck stiffness     plate implanted-limited ROM   • Polyarthralgia    • Tinnitus    • Wears glasses        Past Surgical History:   Procedure Laterality Date   • CERVICAL DISC SURGERY     • CERVICAL FUSION  2011    ACDF with Dr Salud Gage   • COLONOSCOPY     • TOTAL SHOULDER REPLACEMENT Right     Reversal       Family History   Problem Relation Age of Onset   • Stroke Mother    • Lung cancer Father      I have reviewed and agree with the history as documented  E-Cigarette/Vaping   • E-Cigarette Use Never User      E-Cigarette/Vaping Substances   • Nicotine No    • THC No    • CBD No    • Flavoring No    • Other No    • Unknown No      Social History     Tobacco Use   • Smoking status: Never   • Smokeless tobacco: Never   Vaping Use   • Vaping Use: Never used   Substance Use Topics   • Alcohol use:  Yes     Alcohol/week: 1 0 standard drink     Types: 1 Cans of beer per week     Comment: occasional, weekly   • Drug use: No       Review of Systems    Physical " Exam  Physical Exam  Vitals and nursing note reviewed  Constitutional:       General: He is not in acute distress  Appearance: He is well-developed  He is obese  HENT:      Head: Normocephalic and atraumatic  Eyes:      Conjunctiva/sclera: Conjunctivae normal       Pupils: Pupils are equal, round, and reactive to light  Neck:      Trachea: No tracheal deviation  Cardiovascular:      Rate and Rhythm: Normal rate and regular rhythm  Pulses:           Radial pulses are 2+ on the right side  Pulmonary:      Effort: Pulmonary effort is normal  No respiratory distress  Musculoskeletal:      Cervical back: Normal range of motion  Thoracic back: No tenderness or bony tenderness  Lumbar back: Tenderness (left low paraspinal, into buttocks; worst upper buttocks) present  No bony tenderness  Back:       Left hip: No tenderness  Normal range of motion  Skin:     General: Skin is warm and dry  Neurological:      Mental Status: He is alert and oriented to person, place, and time  GCS: GCS eye subscore is 4  GCS verbal subscore is 5  GCS motor subscore is 6  Gait: Gait normal       Comments: Normal strength and sensation bilateral legs  No saddle anesthesia      Psychiatric:         Behavior: Behavior normal          Vital Signs  ED Triage Vitals [04/27/23 1533]   Temperature Pulse Respirations Blood Pressure SpO2   (!) 97 4 °F (36 3 °C) 88 16 159/80 97 %      Temp src Heart Rate Source Patient Position - Orthostatic VS BP Location FiO2 (%)   -- Monitor -- -- --      Pain Score       --           Vitals:    04/27/23 1533   BP: 159/80   Pulse: 88         Visual Acuity      ED Medications  Medications   ketorolac (TORADOL) injection 30 mg (30 mg Intramuscular Given 4/27/23 1712)       Diagnostic Studies  Results Reviewed     None                 No orders to display              Procedures  Procedures         ED Course                               SBIRT 22yo+    Flowsheet Row "Most Recent Value   Initial Alcohol Screen: US AUDIT-C     1  How often do you have a drink containing alcohol? 0 Filed at: 04/27/2023 1534   2  How many drinks containing alcohol do you have on a typical day you are drinking? 0 Filed at: 04/27/2023 1534   3a  Male UNDER 65: How often do you have five or more drinks on one occasion? 0 Filed at: 04/27/2023 1534   3b  FEMALE Any Age, or MALE 65+: How often do you have 4 or more drinks on one occassion? 0 Filed at: 04/27/2023 1534   Audit-C Score 0 Filed at: 04/27/2023 1534   ELICEO: How many times in the past year have you    Used an illegal drug or used a prescription medication for non-medical reasons? Never Filed at: 04/27/2023 1534                    Medical Decision Making  This is a 19-year-old male who presents here today with a complaint of \"sciatica  \"  He has a history of this happening every several years  He denies known trigger for current symptoms  He says it starts in his lower back and by his butt, radiating down his leg  No associated weakness or numbness  He has no bowel or bladder incontinence  He did try two over-the-counter naproxen today without improvement of pain  He has chronic pain in his neck which is at baseline  He denies any recent injuries that would have triggered his symptoms  He says he has similar pain every several years and has gotten a shot of an \"anti-inflammatory medication\" that he does not remember the name of, which causes significant improvement of his symptoms  Since it did not improve at home today with medications, he came here today hoping for the same  Review of systems, otherwise negative unless stated as above    He is well-appearing, in no acute distress  He has no midline tenderness, but does have paraspinal tenderness to the low lumbar back  There is tenderness out through the gluteal region  He is neurovascularly intact distally  Exam is otherwise unremarkable    This is likely recurrence of prior " sciatica, with no known trigger  He has no midline tenderness or injuries to suggest fracture or bony injury  He has no current symptoms to suggest spinal compressive process such as cauda equina syndrome, epidural abscess or hematoma  We will give him a shot of IM Toradol as he says this is worked for him before  I discussed with him additional management of pain at home, follow-up with his doctors, and indications for return, and he expresses understanding with this plan  Acute exacerbation of chronic low back pain: acute illness or injury  Risk  Prescription drug management  Disposition  Final diagnoses:   Acute exacerbation of chronic low back pain     Time reflects when diagnosis was documented in both MDM as applicable and the Disposition within this note     Time User Action Codes Description Comment    4/27/2023  4:52 PM Torrie Joseph Add [M54 50,  G89 29] Acute exacerbation of chronic low back pain       ED Disposition     ED Disposition   Discharge    Condition   Good    Date/Time   Thu Apr 27, 2023 1652    Comment   Suzie Agustin  discharge to home/self care           Follow-up Information     Follow up With Specialties Details Why Contact Info Additional Information    Zarina Cohen MD Internal Medicine Schedule an appointment as soon as possible for a visit  to follow up on your symptoms [address] [facility] [phone] [id number] [facility] Pain Medicine Schedule an appointment as soon as possible for a visit  As needed, to follow up on your low back [address] [facility] [id number] [address], [address] [phone]          Patient's Medications   Discharge Prescriptions    No medications on file           PDMP Review       Value Time User    PDMP Reviewed  Yes 7/20/2022  1:22 PM Devon Atwood MD ED Provider  Electronically Signed by           Holli Agustin MD  04/27/23 1716

## 2023-04-28 ENCOUNTER — TELEPHONE (OUTPATIENT)
Dept: PHYSICAL THERAPY | Facility: OTHER | Age: 58
End: 2023-04-28

## 2023-04-28 NOTE — TELEPHONE ENCOUNTER
Call placed to the patient per [facility] referral     Patient states that he has a very extensive cervical and back history from a work injury in 2005  States he is followed by Dr Myra Olvera and is waiting for surgery approval   States he does not wish to start our program at this time but will contact us if needed in the future  Referral Closed

## 2023-05-16 NOTE — PROGRESS NOTES
Pain Medicine Follow-Up Note    Assessment:  1  Chronic pain syndrome    2  Cervical post-laminectomy syndrome    3  Cervical paraspinal muscle spasm    4  Cervical radiculopathy        Plan:      New Medications Ordered This Visit   Medications   • gabapentin (Neurontin) 600 MG tablet     Sig: Take 1 tablet (600 mg total) by mouth 3 (three) times a day     Dispense:  90 tablet     Refill:  2       My impressions and treatment recommendations were discussed in detail with the patient who verbalized understanding and had no further questions  The patient continues to report an overall reduction of his pain level and improvement with his functioning without significant side effects using gabapentin 300 mg 3 times daily  At this time I recommend the patient slowly titrate up to gabapentin 600 mg 3 times daily  Patient was instructed how to titrate the medication up as well as to take the medication consistently and not to stop the medication suddenly  Patient verbalized understanding  Follow-up is planned in 3 months time or sooner as warranted  Discharge instructions were provided  I personally saw and examined the patient and I agree with the above discussed plan of care  History of Present Illness:    Yaa Roy  is a 56 y o  male who presents to [facility] for interval re-evaluation of the above stated pain complaints  The patient has a past medical and chronic pain history as outlined in the assessment section  He was last seen on 2/17/2023  At today's visit patient states that their pain symptoms are worse with a pain score of 8/10 on the verbal numeric pain scale  The patient's pain is worse in the morning, evening, and at night  The patient's pain is constant in nature  And the quality of the patient's pain is described as burning, sharp, throbbing, pressure-like, and pins-and-needles    The patient's pain is located in the the entire length of his spine and "bilateral arms  Patient reports the amount of pain relief he is now obtaining from his current pain relievers is not enough to make a difference in his life due to it only reducing his pain symptoms by 50%  Patient denies any side effects using gabapentin  Other than as stated above, the patient denies any interval changes in medications, medical condition, mental condition, symptoms, or allergies since the last office visit  Review of Systems:    Review of Systems   Respiratory: Negative for shortness of breath  Cardiovascular: Negative for chest pain  Gastrointestinal: Negative for constipation, diarrhea, nausea and vomiting  Musculoskeletal: Positive for gait problem and myalgias  Negative for arthralgias and joint swelling  DROM  Pain in bilateral arms   Skin: Negative for rash  Neurological: Negative for dizziness, seizures and weakness  All other systems reviewed and are negative          Past Medical History:   Diagnosis Date   • Anxiety    • Arthritis    • Bilateral occipital neuralgia    • Cervical post-laminectomy syndrome    • Cervical radiculopathy    • Cervical spine disease    • Cervical spondylosis with myelopathy    • Chronic lumbar pain    • Chronic pain syndrome    • Chronic thoracic spine pain    • Cubital tunnel syndrome of both upper extremities 09/10/2022   • Degenerative cervical spinal stenosis    • Dental crowns present    • Depression    • Diabetes ([address] )     type 2   • Exercise involving walking     daily 5-10 minutes   • Failed neck syndrome    • Fatty liver    • Full dentures     upper   • GERD (gastroesophageal reflux disease)    • History of COVID-19 2021    per pt admitted to the [facility]--   • Hyperlipidemia    • Hypertension    • Irritable bowel syndrome    • Motion sickness    • Muscle weakness     \"arms and legs\"   • Myofascial muscle pain    • Neck stiffness     plate implanted-limited ROM   • Polyarthralgia    • Tinnitus    • Wears glasses  " Past Surgical History:   Procedure Laterality Date   • CERVICAL DISC SURGERY     • CERVICAL FUSION  2011    ACDF with Dr Currie Covert   • COLONOSCOPY     • TOTAL SHOULDER REPLACEMENT Right     Reversal       Family History   Problem Relation Age of Onset   • Stroke Mother    • Lung cancer Father        Social History     Occupational History   • Not on file   Tobacco Use   • Smoking status: Never   • Smokeless tobacco: Never   Vaping Use   • Vaping Use: Never used   Substance and Sexual Activity   • Alcohol use:  Yes     Alcohol/week: 1 0 standard drink     Types: 1 Cans of beer per week     Comment: occasional, weekly   • Drug use: No   • Sexual activity: Not on file     Comment: defer         Current Outpatient Medications:   •  Cholecalciferol 25 MCG (1000 UT) capsule, TAKE ONE TABLET BY MOUTH EVERY DAY (FOR LOW VITAMIN D), Disp: , Rfl:   •  Cyanocobalamin (B-12 PO), Take by mouth, Disp: , Rfl:   •  dextrose 1 g CHEW, daily as needed for low blood sugar, Disp: , Rfl:   •  Empagliflozin 25 MG TABS, Take 25 mg by mouth every morning ---- Radha Llamashers, Disp: , Rfl:   •  gabapentin (Neurontin) 600 MG tablet, Take 1 tablet (600 mg total) by mouth 3 (three) times a day, Disp: 90 tablet, Rfl: 2  •  Ibuprofen 200 MG CAPS, Take by mouth as needed, Disp: , Rfl:   •  lisinopril (ZESTRIL) 10 mg tablet, Take 10 mg by mouth daily, Disp: , Rfl:   •  losartan (COZAAR) 25 mg tablet, TAKE ONE-HALF TABLET BY MOUTH EVERY DAY FOR BLOOD PRESSURE/HEART, Disp: , Rfl:   •  metFORMIN (GLUMETZA) 1000 MG (MOD) 24 hr tablet, Take 1,000 mg by mouth 2 (two) times a day with meals, Disp: , Rfl:   •  naproxen (NAPROSYN) 500 mg tablet, Take 500 mg by mouth every 12 (twelve) hours as needed PRN, Disp: , Rfl:   •  pantoprazole (PROTONIX) 20 mg tablet, Take 20 mg by mouth daily, Disp: , Rfl:   •  TRAZODONE HCL PO, Take 75 mg by mouth daily at bedtime  , Disp: , Rfl:   •  ASCORBIC ACID PO, , Disp: , Rfl:   •  benzonatate (TESSALON PERLES) 100 mg capsule, Take 100 mg by mouth Three times daily as needed (Patient not taking: Reported on 7/20/2022), Disp: , Rfl:   •  cyclobenzaprine (FLEXERIL) 5 mg tablet, Take 1 tablet (5 mg total) by mouth 3 (three) times a day as needed for muscle spasms (Patient not taking: Reported on 5/18/2023), Disp: 90 tablet, Rfl: 1  •  famotidine (PEPCID) 20 mg tablet, Take 20 mg by mouth daily at bedtime (Patient not taking: Reported on 4/20/2023), Disp: , Rfl:   •  methocarbamol (ROBAXIN) 500 mg tablet, Take 500 mg by mouth if needed for muscle spasms (Patient not taking: Reported on 4/20/2023), Disp: , Rfl:     Allergies   Allergen Reactions   • Metoprolol Other (See Comments)     Too low of a heart rate   • Penicillin V Shortness Of Breath   • Penicillins Shortness Of Breath and Rash     rash       Physical Exam:    /89 (BP Location: Right arm, Patient Position: Sitting, Cuff Size: Standard)   Pulse 59   Wt 110 kg (242 lb)   BMI 36 80 kg/m²     Constitutional:normal, well developed, well nourished, alert, in no distress and non-toxic and no overt pain behavior  and obese  Eyes:anicteric  HEENT:grossly intact  Neck:supple, symmetric, trachea midline and no masses   Pulmonary:even and unlabored  Cardiovascular:No edema or pitting edema present  Skin:Normal without rashes or lesions and well hydrated  Psychiatric:Mood and affect appropriate  Neurologic:Cranial Nerves II-XII grossly intact  Musculoskeletal:antalgic    This document was created using speech voice recognition software  Grammatical errors, random word insertions, pronoun errors, and incomplete sentences are an occasional consequence of this system due to software limitations, ambient noise, and hardware issues  Any formal questions or concerns about content, text, or information contained within the body of this dictation should be directly addressed to the provider for clarification

## 2023-05-18 ENCOUNTER — OFFICE VISIT (OUTPATIENT)
Dept: PAIN MEDICINE | Facility: CLINIC | Age: 58
End: 2023-05-18

## 2023-05-18 VITALS
DIASTOLIC BLOOD PRESSURE: 89 MMHG | BODY MASS INDEX: 36.8 KG/M2 | SYSTOLIC BLOOD PRESSURE: 144 MMHG | WEIGHT: 242 LBS | HEART RATE: 59 BPM

## 2023-05-18 DIAGNOSIS — M96.1 CERVICAL POST-LAMINECTOMY SYNDROME: ICD-10-CM

## 2023-05-18 DIAGNOSIS — M54.12 CERVICAL RADICULOPATHY: ICD-10-CM

## 2023-05-18 DIAGNOSIS — M62.838 CERVICAL PARASPINAL MUSCLE SPASM: ICD-10-CM

## 2023-05-18 DIAGNOSIS — G89.4 CHRONIC PAIN SYNDROME: Primary | ICD-10-CM

## 2023-05-18 RX ORDER — GABAPENTIN 600 MG/1
600 TABLET ORAL 3 TIMES DAILY
Qty: 90 TABLET | Refills: 2 | Status: SHIPPED | OUTPATIENT
Start: 2023-05-18

## 2023-05-18 NOTE — PATIENT INSTRUCTIONS
Gabapentin (By mouth)   Gabapentin (graciela-a-PEN-tin)  Treats seizures and pain caused by shingles  Brand Name(s): FusePaq Fanatrex, Neurontin   There may be other brand names for this medicine  When This Medicine Should Not Be Used: This medicine is not right for everyone  Do not use it if you had an allergic reaction to gabapentin  How to Use This Medicine:   Capsule, Liquid, Tablet  Take your medicine as directed  Your dose may need to be changed several times to find what works best for you  If you have epilepsy, do not allow more than 12 hours to pass between doses  Capsule: Swallow the capsule whole with plenty of water  Do not open, crush, or chew it  Gralise® tablet: Swallow the tablet whole   Do not crush, break, or chew it  Neurontin® tablet: If you break a tablet into 2 pieces, use the second half as your next dose  Do not use the half-tablet if the whole tablet has been cut or broken after 28 days  Oral liquid: Measure the oral liquid medicine with a marked measuring spoon, oral syringe, or medicine cup  This medicine should come with a Medication Guide  Ask your pharmacist for a copy if you do not have one  Missed dose: Take a dose as soon as you remember  If it is almost time for your next dose, wait until then and take a regular dose  Do not take extra medicine to make up for a missed dose  Store the medicine in a closed container at room temperature, away from heat, moisture, and direct light  Store the Neurontin® oral liquid in the refrigerator  Do not freeze  Drugs and Foods to Avoid:   Ask your doctor or pharmacist before using any other medicine, including over-the-counter medicines, vitamins, and herbal products  Some medicines can affect how gabapentin works  Tell your doctor if you also using hydrocodone or morphine  If you take an antacid, wait at least 2 hours before you take gabapentin  Do not drink alcohol while you are using this medicine    Tell your doctor if you use anything else that makes you sleepy  Some examples are allergy medicine, narcotic pain medicine, and alcohol  Tell your doctor if you are also using lorazepam, oxycodone, or zolpidem  Warnings While Using This Medicine:   Tell your doctor if you are pregnant or breastfeeding, or if you have kidney problems (including patients receiving dialysis) or lung problems  Tell your doctor if you have a history of depression or mental health problems  This medicine may cause the following problems:  Drug reaction with eosinophilia and systemic symptoms (DRESS) or multiorgan hypersensitivity, which may damage the liver, kidney, blood, heart, or muscles  Changes in mood or behavior, including suicidal thoughts or behavior  Respiratory depression (serious breathing problem that can be life-threatening), when used with narcotic pain medicines  Do not stop using this medicine suddenly  Your doctor will need to slowly decrease your dose before you stop it completely  This medicine may make you dizzy or drowsy  Do not drive or do anything else that could be dangerous until you know how this medicine affects you  Tell any doctor or dentist who treats you that you are using this medicine  This medicine may affect certain medical test results  Your doctor will check your progress and the effects of this medicine at regular visits  Keep all appointments  Keep all medicine out of the reach of children  Never share your medicine with anyone  Possible Side Effects While Using This Medicine:   Call your doctor right away if you notice any of these side effects:   Allergic reaction: Itching or hives, swelling in your face or hands, swelling or tingling in your mouth or throat, chest tightness, trouble breathing  Behavior problems, aggression, restlessness, trouble concentrating, moodiness (especially in children)  Blistering, peeling, red skin rash  Blue lips, fingernails, or skin, chest pain, fast heartbeat, trouble breathing  Change in how much or how often you urinate, bloody or cloudy urine  Dark urine or pale stools, nausea, vomiting, loss of appetite, stomach pain, yellow skin or eyes  Fever, chills, cough, sore throat, body aches  Problems with coordination, shakiness, unsteadiness, unusual eye movement  Rapid weight gain, swelling in your hands, ankles, or feet  Rash, swollen or tender glands in the neck, armpit, or groin  Unusual moods or behaviors, thoughts of hurting yourself, feeling depressed  If you notice these less serious side effects, talk with your doctor:   Dizziness, drowsiness, sleepiness, tiredness  If you notice other side effects that you think are caused by this medicine, tell your doctor  Call your doctor for medical advice about side effects  You may report side effects to FDA at 2-180-FDA-2454    © Copyright [facility] 2022 Information is for End User's use only and may not be sold, redistributed or otherwise used for commercial purposes  The above information is an  only  It is not intended as medical advice for individual conditions or treatments  Talk to your doctor, nurse or pharmacist before following any medical regimen to see if it is safe and effective for you

## 2023-06-07 ENCOUNTER — APPOINTMENT (OUTPATIENT)
Dept: PREADMISSION TESTING | Facility: HOSPITAL | Age: 58
DRG: 215 | End: 2023-06-07
Payer: OTHER MISCELLANEOUS

## 2023-06-08 ENCOUNTER — HOSPITAL ENCOUNTER (OUTPATIENT)
Dept: MRI IMAGING | Facility: HOSPITAL | Age: 58
End: 2023-06-08
Attending: NEUROLOGICAL SURGERY
Payer: OTHER MISCELLANEOUS

## 2023-06-08 DIAGNOSIS — M54.12 CERVICAL RADICULOPATHY: ICD-10-CM

## 2023-06-08 DIAGNOSIS — M47.12 OTHER SPONDYLOSIS WITH MYELOPATHY, CERVICAL REGION: ICD-10-CM

## 2023-06-08 DIAGNOSIS — Z98.1 STATUS POST CERVICAL SPINAL FUSION: ICD-10-CM

## 2023-06-08 DIAGNOSIS — M48.9 CERVICAL SPINE DISEASE: ICD-10-CM

## 2023-06-08 PROCEDURE — 72141 MRI NECK SPINE W/O DYE: CPT

## 2023-06-08 PROCEDURE — G1004 CDSM NDSC: HCPCS

## 2023-06-09 ENCOUNTER — APPOINTMENT (OUTPATIENT)
Dept: RADIOLOGY | Facility: CLINIC | Age: 58
End: 2023-06-09
Payer: OTHER MISCELLANEOUS

## 2023-06-09 ENCOUNTER — APPOINTMENT (OUTPATIENT)
Dept: LAB | Facility: CLINIC | Age: 58
End: 2023-06-09
Payer: OTHER MISCELLANEOUS

## 2023-06-09 DIAGNOSIS — Z98.1 STATUS POST CERVICAL SPINAL FUSION: ICD-10-CM

## 2023-06-09 DIAGNOSIS — M47.12 OTHER SPONDYLOSIS WITH MYELOPATHY, CERVICAL REGION: ICD-10-CM

## 2023-06-09 DIAGNOSIS — M54.12 CERVICAL RADICULOPATHY: ICD-10-CM

## 2023-06-09 DIAGNOSIS — M48.9 CERVICAL SPINE DISEASE: ICD-10-CM

## 2023-06-09 LAB
ALBUMIN SERPL BCP-MCNC: 4.1 G/DL (ref 3.5–5)
ALP SERPL-CCNC: 96 U/L (ref 46–116)
ALT SERPL W P-5'-P-CCNC: 53 U/L (ref 12–78)
ANION GAP SERPL CALCULATED.3IONS-SCNC: 5 MMOL/L (ref 4–13)
AST SERPL W P-5'-P-CCNC: 31 U/L (ref 5–45)
BACTERIA UR QL AUTO: NORMAL /HPF
BASOPHILS # BLD AUTO: 0.04 THOUSANDS/ÂΜL (ref 0–0.1)
BASOPHILS NFR BLD AUTO: 1 % (ref 0–1)
BILIRUB SERPL-MCNC: 0.77 MG/DL (ref 0.2–1)
BILIRUB UR QL STRIP: NEGATIVE
BUN SERPL-MCNC: 12 MG/DL (ref 5–25)
CALCIUM SERPL-MCNC: 9.3 MG/DL (ref 8.3–10.1)
CHLORIDE SERPL-SCNC: 106 MMOL/L (ref 96–108)
CLARITY UR: CLEAR
CO2 SERPL-SCNC: 29 MMOL/L (ref 21–32)
COLOR UR: ABNORMAL
CREAT SERPL-MCNC: 1.13 MG/DL (ref 0.6–1.3)
EOSINOPHIL # BLD AUTO: 0.11 THOUSAND/ÂΜL (ref 0–0.61)
EOSINOPHIL NFR BLD AUTO: 2 % (ref 0–6)
ERYTHROCYTE [DISTWIDTH] IN BLOOD BY AUTOMATED COUNT: 13.2 % (ref 11.6–15.1)
EST. AVERAGE GLUCOSE BLD GHB EST-MCNC: 137 MG/DL
GFR SERPL CREATININE-BSD FRML MDRD: 72 ML/MIN/1.73SQ M
GLUCOSE P FAST SERPL-MCNC: 115 MG/DL (ref 65–99)
GLUCOSE UR STRIP-MCNC: ABNORMAL MG/DL
HBA1C MFR BLD: 6.4 %
HCT VFR BLD AUTO: 47.9 % (ref 36.5–49.3)
HGB BLD-MCNC: 15.9 G/DL (ref 12–17)
HGB UR QL STRIP.AUTO: NEGATIVE
IMM GRANULOCYTES # BLD AUTO: 0.01 THOUSAND/UL (ref 0–0.2)
IMM GRANULOCYTES NFR BLD AUTO: 0 % (ref 0–2)
KETONES UR STRIP-MCNC: NEGATIVE MG/DL
LEUKOCYTE ESTERASE UR QL STRIP: ABNORMAL
LYMPHOCYTES # BLD AUTO: 1.78 THOUSANDS/ÂΜL (ref 0.6–4.47)
LYMPHOCYTES NFR BLD AUTO: 34 % (ref 14–44)
MCH RBC QN AUTO: 30.8 PG (ref 26.8–34.3)
MCHC RBC AUTO-ENTMCNC: 33.2 G/DL (ref 31.4–37.4)
MCV RBC AUTO: 93 FL (ref 82–98)
MONOCYTES # BLD AUTO: 0.47 THOUSAND/ÂΜL (ref 0.17–1.22)
MONOCYTES NFR BLD AUTO: 9 % (ref 4–12)
NEUTROPHILS # BLD AUTO: 2.77 THOUSANDS/ÂΜL (ref 1.85–7.62)
NEUTS SEG NFR BLD AUTO: 54 % (ref 43–75)
NITRITE UR QL STRIP: NEGATIVE
NON-SQ EPI CELLS URNS QL MICRO: NORMAL /HPF
NRBC BLD AUTO-RTO: 0 /100 WBCS
PH UR STRIP.AUTO: 5.5 [PH]
PLATELET # BLD AUTO: 244 THOUSANDS/UL (ref 149–390)
PMV BLD AUTO: 10.5 FL (ref 8.9–12.7)
POTASSIUM SERPL-SCNC: 4.6 MMOL/L (ref 3.5–5.3)
PROT SERPL-MCNC: 7.5 G/DL (ref 6.4–8.4)
PROT UR STRIP-MCNC: NEGATIVE MG/DL
RBC # BLD AUTO: 5.17 MILLION/UL (ref 3.88–5.62)
RBC #/AREA URNS AUTO: NORMAL /HPF
SODIUM SERPL-SCNC: 140 MMOL/L (ref 135–147)
SP GR UR STRIP.AUTO: 1.03 (ref 1–1.03)
UROBILINOGEN UR STRIP-ACNC: <2 MG/DL
WBC # BLD AUTO: 5.18 THOUSAND/UL (ref 4.31–10.16)
WBC #/AREA URNS AUTO: NORMAL /HPF

## 2023-06-09 PROCEDURE — 86850 RBC ANTIBODY SCREEN: CPT | Performed by: NEUROLOGICAL SURGERY

## 2023-06-09 PROCEDURE — 86900 BLOOD TYPING SEROLOGIC ABO: CPT | Performed by: NEUROLOGICAL SURGERY

## 2023-06-09 PROCEDURE — 87081 CULTURE SCREEN ONLY: CPT

## 2023-06-09 PROCEDURE — 85610 PROTHROMBIN TIME: CPT

## 2023-06-09 PROCEDURE — 83036 HEMOGLOBIN GLYCOSYLATED A1C: CPT

## 2023-06-09 PROCEDURE — 85025 COMPLETE CBC W/AUTO DIFF WBC: CPT

## 2023-06-09 PROCEDURE — 85730 THROMBOPLASTIN TIME PARTIAL: CPT

## 2023-06-09 PROCEDURE — 36415 COLL VENOUS BLD VENIPUNCTURE: CPT

## 2023-06-09 PROCEDURE — 80053 COMPREHEN METABOLIC PANEL: CPT

## 2023-06-09 PROCEDURE — 86901 BLOOD TYPING SEROLOGIC RH(D): CPT | Performed by: NEUROLOGICAL SURGERY

## 2023-06-09 PROCEDURE — 72052 X-RAY EXAM NECK SPINE 6/>VWS: CPT

## 2023-06-10 ENCOUNTER — LAB REQUISITION (OUTPATIENT)
Dept: LAB | Facility: HOSPITAL | Age: 58
End: 2023-06-10
Payer: COMMERCIAL

## 2023-06-10 DIAGNOSIS — M54.12 RADICULOPATHY, CERVICAL REGION: ICD-10-CM

## 2023-06-10 DIAGNOSIS — Z98.1 ARTHRODESIS STATUS: ICD-10-CM

## 2023-06-10 DIAGNOSIS — M48.9 SPONDYLOPATHY, UNSPECIFIED: ICD-10-CM

## 2023-06-10 DIAGNOSIS — M47.12 OTHER SPONDYLOSIS WITH MYELOPATHY, CERVICAL REGION: ICD-10-CM

## 2023-06-10 LAB
ABO GROUP BLD: NORMAL
APTT PPP: 30 SECONDS (ref 23–37)
BLD GP AB SCN SERPL QL: NEGATIVE
INR PPP: 0.85 (ref 0.84–1.19)
MRSA NOSE QL CULT: NORMAL
PROTHROMBIN TIME: 11.8 SECONDS (ref 11.6–14.5)
RH BLD: POSITIVE
SPECIMEN EXPIRATION DATE: NORMAL

## 2023-06-19 ENCOUNTER — ANESTHESIA (OUTPATIENT)
Dept: PERIOP | Facility: HOSPITAL | Age: 58
DRG: 215 | End: 2023-06-19
Payer: OTHER MISCELLANEOUS

## 2023-06-19 ENCOUNTER — ANESTHESIA EVENT (OUTPATIENT)
Dept: PERIOP | Facility: HOSPITAL | Age: 58
DRG: 215 | End: 2023-06-19
Payer: OTHER MISCELLANEOUS

## 2023-06-19 ENCOUNTER — HOSPITAL ENCOUNTER (INPATIENT)
Facility: HOSPITAL | Age: 58
LOS: 3 days | Discharge: HOME | DRG: 215 | End: 2023-06-22
Attending: NEUROLOGICAL SURGERY | Admitting: NEUROLOGICAL SURGERY
Payer: OTHER MISCELLANEOUS

## 2023-06-19 ENCOUNTER — APPOINTMENT (OUTPATIENT)
Dept: RADIOLOGY | Facility: HOSPITAL | Age: 58
DRG: 215 | End: 2023-06-19
Payer: OTHER MISCELLANEOUS

## 2023-06-19 DIAGNOSIS — M62.838 CERVICAL PARASPINAL MUSCLE SPASM: ICD-10-CM

## 2023-06-19 DIAGNOSIS — M54.12 CERVICAL RADICULOPATHY: ICD-10-CM

## 2023-06-19 DIAGNOSIS — Z98.1 S/P CERVICAL SPINAL FUSION: Primary | ICD-10-CM

## 2023-06-19 DIAGNOSIS — M96.1 CERVICAL POST-LAMINECTOMY SYNDROME: ICD-10-CM

## 2023-06-19 LAB
ANION GAP SERPL CALCULATED.3IONS-SCNC: 8 MMOL/L (ref 4–13)
BUN SERPL-MCNC: 13 MG/DL (ref 5–25)
CALCIUM SERPL-MCNC: 9.1 MG/DL (ref 8.4–10.2)
CHLORIDE SERPL-SCNC: 101 MMOL/L (ref 96–108)
CO2 SERPL-SCNC: 25 MMOL/L (ref 21–32)
CREAT SERPL-MCNC: 0.97 MG/DL (ref 0.6–1.3)
ERYTHROCYTE [DISTWIDTH] IN BLOOD BY AUTOMATED COUNT: 12.9 % (ref 11.6–15.1)
GFR SERPL CREATININE-BSD FRML MDRD: 87 ML/MIN/1.73SQ M
GLUCOSE SERPL-MCNC: 156 MG/DL (ref 65–140)
GLUCOSE SERPL-MCNC: 216 MG/DL (ref 65–140)
GLUCOSE SERPL-MCNC: 217 MG/DL (ref 65–140)
GLUCOSE SERPL-MCNC: 223 MG/DL (ref 65–140)
GLUCOSE SERPL-MCNC: 251 MG/DL (ref 65–140)
GLUCOSE SERPL-MCNC: 271 MG/DL (ref 65–140)
HCT VFR BLD AUTO: 44.8 % (ref 36.5–49.3)
HGB BLD-MCNC: 15.3 G/DL (ref 12–17)
MAGNESIUM SERPL-MCNC: 1.7 MG/DL (ref 1.9–2.7)
MCH RBC QN AUTO: 31.2 PG (ref 26.8–34.3)
MCHC RBC AUTO-ENTMCNC: 34.2 G/DL (ref 31.4–37.4)
MCV RBC AUTO: 91 FL (ref 82–98)
PHOSPHATE SERPL-MCNC: 2.3 MG/DL (ref 2.7–4.5)
PLATELET # BLD AUTO: 238 THOUSANDS/UL (ref 149–390)
PMV BLD AUTO: 9.8 FL (ref 8.9–12.7)
POTASSIUM SERPL-SCNC: 4.9 MMOL/L (ref 3.5–5.3)
RBC # BLD AUTO: 4.91 MILLION/UL (ref 3.88–5.62)
SODIUM SERPL-SCNC: 134 MMOL/L (ref 135–147)
WBC # BLD AUTO: 12.71 THOUSAND/UL (ref 4.31–10.16)

## 2023-06-19 PROCEDURE — 22614 ARTHRD PST TQ 1NTRSPC EA ADD: CPT | Performed by: NEUROLOGICAL SURGERY

## 2023-06-19 PROCEDURE — 93005 ELECTROCARDIOGRAM TRACING: CPT

## 2023-06-19 PROCEDURE — C1713 ANCHOR/SCREW BN/BN,TIS/BN: HCPCS | Performed by: NEUROLOGICAL SURGERY

## 2023-06-19 PROCEDURE — 20936 SP BONE AGRFT LOCAL ADD-ON: CPT | Performed by: NEUROLOGICAL SURGERY

## 2023-06-19 PROCEDURE — 4A11X4G MONITORING OF PERIPHERAL NERVOUS ELECTRICAL ACTIVITY, INTRAOPERATIVE, EXTERNAL APPROACH: ICD-10-PCS | Performed by: NEUROLOGICAL SURGERY

## 2023-06-19 PROCEDURE — 99024 POSTOP FOLLOW-UP VISIT: CPT | Performed by: NEUROLOGICAL SURGERY

## 2023-06-19 PROCEDURE — 01N10ZZ RELEASE CERVICAL NERVE, OPEN APPROACH: ICD-10-PCS | Performed by: NEUROLOGICAL SURGERY

## 2023-06-19 PROCEDURE — 63048 LAM FACETEC &FORAMOT EA ADDL: CPT | Performed by: NEUROLOGICAL SURGERY

## 2023-06-19 PROCEDURE — 20930 SP BONE ALGRFT MORSEL ADD-ON: CPT | Performed by: NEUROLOGICAL SURGERY

## 2023-06-19 PROCEDURE — 22600 ARTHRD PST TQ 1NTRSPC CRV: CPT | Performed by: NEUROLOGICAL SURGERY

## 2023-06-19 PROCEDURE — 85027 COMPLETE CBC AUTOMATED: CPT | Performed by: PHYSICIAN ASSISTANT

## 2023-06-19 PROCEDURE — 84100 ASSAY OF PHOSPHORUS: CPT | Performed by: PHYSICIAN ASSISTANT

## 2023-06-19 PROCEDURE — 72040 X-RAY EXAM NECK SPINE 2-3 VW: CPT

## 2023-06-19 PROCEDURE — 82948 REAGENT STRIP/BLOOD GLUCOSE: CPT

## 2023-06-19 PROCEDURE — 0RG4071 FUSION OF CERVICOTHORACIC VERTEBRAL JOINT WITH AUTOLOGOUS TISSUE SUBSTITUTE, POSTERIOR APPROACH, POSTERIOR COLUMN, OPEN APPROACH: ICD-10-PCS | Performed by: NEUROLOGICAL SURGERY

## 2023-06-19 PROCEDURE — 00NW0ZZ RELEASE CERVICAL SPINAL CORD, OPEN APPROACH: ICD-10-PCS | Performed by: NEUROLOGICAL SURGERY

## 2023-06-19 PROCEDURE — 80048 BASIC METABOLIC PNL TOTAL CA: CPT | Performed by: PHYSICIAN ASSISTANT

## 2023-06-19 PROCEDURE — 22842 INSERT SPINE FIXATION DEVICE: CPT | Performed by: NEUROLOGICAL SURGERY

## 2023-06-19 PROCEDURE — 63045 LAM FACETEC & FORAMOT CRV: CPT | Performed by: NEUROLOGICAL SURGERY

## 2023-06-19 PROCEDURE — 83735 ASSAY OF MAGNESIUM: CPT | Performed by: PHYSICIAN ASSISTANT

## 2023-06-19 PROCEDURE — 2W30XYZ IMMOBILIZATION OF HEAD USING OTHER DEVICE: ICD-10-PCS | Performed by: NEUROLOGICAL SURGERY

## 2023-06-19 PROCEDURE — 0RG2071 FUSION OF 2 OR MORE CERVICAL VERTEBRAL JOINTS WITH AUTOLOGOUS TISSUE SUBSTITUTE, POSTERIOR APPROACH, POSTERIOR COLUMN, OPEN APPROACH: ICD-10-PCS | Performed by: NEUROLOGICAL SURGERY

## 2023-06-19 DEVICE — BIOLOGICS 7600706 MSTRGRFT PUTY 6 CC KIT
Type: IMPLANTABLE DEVICE | Site: SPINE CERVICAL | Status: FUNCTIONAL
Brand: MASTERGRAFT®PUTTY

## 2023-06-19 DEVICE — MULTI AXIAL SCREW 3604524 4.5 X 24MM
Type: IMPLANTABLE DEVICE | Site: SPINE CERVICAL | Status: FUNCTIONAL
Brand: INFINITY™ OCCIPITOCERVICAL UPPER THORACIC SYSTEM

## 2023-06-19 DEVICE — ROD 7753785 PRE-CUT 3.5MM X 85MM
Type: IMPLANTABLE DEVICE | Site: SPINE CERVICAL | Status: FUNCTIONAL
Brand: VERTEX® RECONSTRUCTION SYSTEM

## 2023-06-19 RX ORDER — SIMETHICONE 80 MG
80 TABLET,CHEWABLE ORAL 4 TIMES DAILY PRN
Status: DISCONTINUED | OUTPATIENT
Start: 2023-06-19 | End: 2023-06-22 | Stop reason: HOSPADM

## 2023-06-19 RX ORDER — METHOCARBAMOL 750 MG/1
750 TABLET, FILM COATED ORAL EVERY 6 HOURS SCHEDULED
Status: DISCONTINUED | OUTPATIENT
Start: 2023-06-19 | End: 2023-06-22 | Stop reason: HOSPADM

## 2023-06-19 RX ORDER — LISINOPRIL 10 MG/1
10 TABLET ORAL DAILY
Status: DISCONTINUED | OUTPATIENT
Start: 2023-06-20 | End: 2023-06-22 | Stop reason: HOSPADM

## 2023-06-19 RX ORDER — LOSARTAN POTASSIUM 25 MG/1
12.5 TABLET ORAL DAILY
Status: DISCONTINUED | OUTPATIENT
Start: 2023-06-20 | End: 2023-06-22 | Stop reason: HOSPADM

## 2023-06-19 RX ORDER — ACETAMINOPHEN 325 MG/1
975 TABLET ORAL EVERY 8 HOURS SCHEDULED
Status: DISCONTINUED | OUTPATIENT
Start: 2023-06-19 | End: 2023-06-22 | Stop reason: HOSPADM

## 2023-06-19 RX ORDER — DEXMEDETOMIDINE HYDROCHLORIDE 100 UG/ML
INJECTION, SOLUTION INTRAVENOUS AS NEEDED
Status: DISCONTINUED | OUTPATIENT
Start: 2023-06-19 | End: 2023-06-19

## 2023-06-19 RX ORDER — OXYCODONE HYDROCHLORIDE 5 MG/1
5 TABLET ORAL EVERY 4 HOURS PRN
Status: DISCONTINUED | OUTPATIENT
Start: 2023-06-19 | End: 2023-06-22 | Stop reason: HOSPADM

## 2023-06-19 RX ORDER — DOCUSATE SODIUM 100 MG/1
100 CAPSULE, LIQUID FILLED ORAL 2 TIMES DAILY
Status: DISCONTINUED | OUTPATIENT
Start: 2023-06-19 | End: 2023-06-22 | Stop reason: HOSPADM

## 2023-06-19 RX ORDER — SODIUM CHLORIDE, SODIUM LACTATE, POTASSIUM CHLORIDE, CALCIUM CHLORIDE 600; 310; 30; 20 MG/100ML; MG/100ML; MG/100ML; MG/100ML
125 INJECTION, SOLUTION INTRAVENOUS CONTINUOUS
Status: DISCONTINUED | OUTPATIENT
Start: 2023-06-19 | End: 2023-06-22 | Stop reason: HOSPADM

## 2023-06-19 RX ORDER — FENTANYL CITRATE/PF 50 MCG/ML
25 SYRINGE (ML) INJECTION
Status: COMPLETED | OUTPATIENT
Start: 2023-06-19 | End: 2023-06-19

## 2023-06-19 RX ORDER — PROMETHAZINE HYDROCHLORIDE 25 MG/ML
12.5 INJECTION, SOLUTION INTRAMUSCULAR; INTRAVENOUS ONCE AS NEEDED
Status: DISCONTINUED | OUTPATIENT
Start: 2023-06-19 | End: 2023-06-19 | Stop reason: HOSPADM

## 2023-06-19 RX ORDER — INSULIN LISPRO 100 [IU]/ML
1-6 INJECTION, SOLUTION INTRAVENOUS; SUBCUTANEOUS
Status: DISCONTINUED | OUTPATIENT
Start: 2023-06-19 | End: 2023-06-19

## 2023-06-19 RX ORDER — HYDROMORPHONE HYDROCHLORIDE 1 MG/ML
INJECTION, SOLUTION INTRAMUSCULAR; INTRAVENOUS; SUBCUTANEOUS AS NEEDED
Status: DISCONTINUED | OUTPATIENT
Start: 2023-06-19 | End: 2023-06-19

## 2023-06-19 RX ORDER — PANTOPRAZOLE SODIUM 20 MG/1
20 TABLET, DELAYED RELEASE ORAL
Status: DISCONTINUED | OUTPATIENT
Start: 2023-06-20 | End: 2023-06-22 | Stop reason: HOSPADM

## 2023-06-19 RX ORDER — HYDROMORPHONE HYDROCHLORIDE 2 MG/ML
INJECTION, SOLUTION INTRAMUSCULAR; INTRAVENOUS; SUBCUTANEOUS
Status: COMPLETED
Start: 2023-06-19 | End: 2023-06-19

## 2023-06-19 RX ORDER — LIDOCAINE HYDROCHLORIDE AND EPINEPHRINE 10; 10 MG/ML; UG/ML
INJECTION, SOLUTION INFILTRATION; PERINEURAL AS NEEDED
Status: DISCONTINUED | OUTPATIENT
Start: 2023-06-19 | End: 2023-06-19 | Stop reason: HOSPADM

## 2023-06-19 RX ORDER — PROPOFOL 10 MG/ML
INJECTION, EMULSION INTRAVENOUS CONTINUOUS PRN
Status: DISCONTINUED | OUTPATIENT
Start: 2023-06-19 | End: 2023-06-19

## 2023-06-19 RX ORDER — MEPERIDINE HYDROCHLORIDE 25 MG/ML
12.5 INJECTION INTRAMUSCULAR; INTRAVENOUS; SUBCUTANEOUS
Status: DISCONTINUED | OUTPATIENT
Start: 2023-06-19 | End: 2023-06-19 | Stop reason: HOSPADM

## 2023-06-19 RX ORDER — ONDANSETRON 2 MG/ML
4 INJECTION INTRAMUSCULAR; INTRAVENOUS EVERY 6 HOURS PRN
Status: DISCONTINUED | OUTPATIENT
Start: 2023-06-19 | End: 2023-06-22 | Stop reason: HOSPADM

## 2023-06-19 RX ORDER — SENNOSIDES 8.6 MG
1 TABLET ORAL DAILY
Status: DISCONTINUED | OUTPATIENT
Start: 2023-06-20 | End: 2023-06-22 | Stop reason: HOSPADM

## 2023-06-19 RX ORDER — PHENYLEPHRINE HYDROCHLORIDE 10 MG/ML
INJECTION INTRAVENOUS AS NEEDED
Status: DISCONTINUED | OUTPATIENT
Start: 2023-06-19 | End: 2023-06-19

## 2023-06-19 RX ORDER — PROPOFOL 10 MG/ML
INJECTION, EMULSION INTRAVENOUS AS NEEDED
Status: DISCONTINUED | OUTPATIENT
Start: 2023-06-19 | End: 2023-06-19

## 2023-06-19 RX ORDER — MIDAZOLAM HYDROCHLORIDE 2 MG/2ML
INJECTION, SOLUTION INTRAMUSCULAR; INTRAVENOUS AS NEEDED
Status: DISCONTINUED | OUTPATIENT
Start: 2023-06-19 | End: 2023-06-19

## 2023-06-19 RX ORDER — MAGNESIUM SULFATE HEPTAHYDRATE 40 MG/ML
2 INJECTION, SOLUTION INTRAVENOUS ONCE
Status: COMPLETED | OUTPATIENT
Start: 2023-06-19 | End: 2023-06-20

## 2023-06-19 RX ORDER — GABAPENTIN 100 MG/1
100 CAPSULE ORAL 3 TIMES DAILY
Status: DISCONTINUED | OUTPATIENT
Start: 2023-06-19 | End: 2023-06-22 | Stop reason: HOSPADM

## 2023-06-19 RX ORDER — ALBUTEROL SULFATE 2.5 MG/3ML
2.5 SOLUTION RESPIRATORY (INHALATION) ONCE AS NEEDED
Status: DISCONTINUED | OUTPATIENT
Start: 2023-06-19 | End: 2023-06-19 | Stop reason: HOSPADM

## 2023-06-19 RX ORDER — KETAMINE HCL IN NACL, ISO-OSM 100MG/10ML
SYRINGE (ML) INJECTION AS NEEDED
Status: DISCONTINUED | OUTPATIENT
Start: 2023-06-19 | End: 2023-06-19

## 2023-06-19 RX ORDER — OXYCODONE HYDROCHLORIDE 10 MG/1
10 TABLET ORAL EVERY 4 HOURS PRN
Status: DISCONTINUED | OUTPATIENT
Start: 2023-06-19 | End: 2023-06-22 | Stop reason: HOSPADM

## 2023-06-19 RX ORDER — SODIUM CHLORIDE 9 MG/ML
INJECTION, SOLUTION INTRAVENOUS CONTINUOUS PRN
Status: DISCONTINUED | OUTPATIENT
Start: 2023-06-19 | End: 2023-06-19

## 2023-06-19 RX ORDER — ONDANSETRON 2 MG/ML
INJECTION INTRAMUSCULAR; INTRAVENOUS AS NEEDED
Status: DISCONTINUED | OUTPATIENT
Start: 2023-06-19 | End: 2023-06-19

## 2023-06-19 RX ORDER — DEXAMETHASONE SODIUM PHOSPHATE 10 MG/ML
INJECTION, SOLUTION INTRAMUSCULAR; INTRAVENOUS AS NEEDED
Status: DISCONTINUED | OUTPATIENT
Start: 2023-06-19 | End: 2023-06-19

## 2023-06-19 RX ORDER — BUPIVACAINE HYDROCHLORIDE AND EPINEPHRINE 5; 5 MG/ML; UG/ML
INJECTION, SOLUTION EPIDURAL; INTRACAUDAL; PERINEURAL AS NEEDED
Status: DISCONTINUED | OUTPATIENT
Start: 2023-06-19 | End: 2023-06-19 | Stop reason: HOSPADM

## 2023-06-19 RX ORDER — ONDANSETRON 2 MG/ML
4 INJECTION INTRAMUSCULAR; INTRAVENOUS ONCE AS NEEDED
Status: DISCONTINUED | OUTPATIENT
Start: 2023-06-19 | End: 2023-06-19 | Stop reason: HOSPADM

## 2023-06-19 RX ORDER — EPHEDRINE SULFATE 50 MG/ML
INJECTION INTRAVENOUS AS NEEDED
Status: DISCONTINUED | OUTPATIENT
Start: 2023-06-19 | End: 2023-06-19

## 2023-06-19 RX ORDER — FENTANYL CITRATE 50 UG/ML
INJECTION, SOLUTION INTRAMUSCULAR; INTRAVENOUS AS NEEDED
Status: DISCONTINUED | OUTPATIENT
Start: 2023-06-19 | End: 2023-06-19

## 2023-06-19 RX ORDER — HYDROMORPHONE HCL/PF 1 MG/ML
0.5 SYRINGE (ML) INJECTION
Status: DISCONTINUED | OUTPATIENT
Start: 2023-06-19 | End: 2023-06-19 | Stop reason: HOSPADM

## 2023-06-19 RX ORDER — INSULIN LISPRO 100 [IU]/ML
1-6 INJECTION, SOLUTION INTRAVENOUS; SUBCUTANEOUS
Status: DISCONTINUED | OUTPATIENT
Start: 2023-06-20 | End: 2023-06-22 | Stop reason: HOSPADM

## 2023-06-19 RX ORDER — LIDOCAINE HYDROCHLORIDE 10 MG/ML
INJECTION, SOLUTION EPIDURAL; INFILTRATION; INTRACAUDAL; PERINEURAL AS NEEDED
Status: DISCONTINUED | OUTPATIENT
Start: 2023-06-19 | End: 2023-06-19

## 2023-06-19 RX ORDER — HYDROMORPHONE HCL/PF 1 MG/ML
1 SYRINGE (ML) INJECTION EVERY 4 HOURS PRN
Status: DISCONTINUED | OUTPATIENT
Start: 2023-06-19 | End: 2023-06-21

## 2023-06-19 RX ORDER — SUCCINYLCHOLINE/SOD CL,ISO/PF 100 MG/5ML
SYRINGE (ML) INTRAVENOUS AS NEEDED
Status: DISCONTINUED | OUTPATIENT
Start: 2023-06-19 | End: 2023-06-19

## 2023-06-19 RX ORDER — SODIUM CHLORIDE, SODIUM LACTATE, POTASSIUM CHLORIDE, CALCIUM CHLORIDE 600; 310; 30; 20 MG/100ML; MG/100ML; MG/100ML; MG/100ML
INJECTION, SOLUTION INTRAVENOUS CONTINUOUS PRN
Status: DISCONTINUED | OUTPATIENT
Start: 2023-06-19 | End: 2023-06-19

## 2023-06-19 RX ORDER — ROCURONIUM BROMIDE 10 MG/ML
INJECTION, SOLUTION INTRAVENOUS AS NEEDED
Status: DISCONTINUED | OUTPATIENT
Start: 2023-06-19 | End: 2023-06-19

## 2023-06-19 RX ADMIN — EPHEDRINE SULFATE 10 MG: 50 INJECTION INTRAVENOUS at 08:15

## 2023-06-19 RX ADMIN — GABAPENTIN 100 MG: 100 CAPSULE ORAL at 23:15

## 2023-06-19 RX ADMIN — ACETAMINOPHEN 975 MG: 325 TABLET, FILM COATED ORAL at 18:38

## 2023-06-19 RX ADMIN — MAGNESIUM SULFATE HEPTAHYDRATE 2 G: 40 INJECTION, SOLUTION INTRAVENOUS at 23:16

## 2023-06-19 RX ADMIN — DOCUSATE SODIUM 100 MG: 100 CAPSULE, LIQUID FILLED ORAL at 18:38

## 2023-06-19 RX ADMIN — VANCOMYCIN HYDROCHLORIDE 2000 MG: 1 INJECTION, POWDER, LYOPHILIZED, FOR SOLUTION INTRAVENOUS at 07:42

## 2023-06-19 RX ADMIN — EPHEDRINE SULFATE 10 MG: 50 INJECTION INTRAVENOUS at 08:13

## 2023-06-19 RX ADMIN — ROCURONIUM BROMIDE 5 MG: 10 INJECTION, SOLUTION INTRAVENOUS at 07:40

## 2023-06-19 RX ADMIN — PHENYLEPHRINE HYDROCHLORIDE 200 MCG: 10 INJECTION, SOLUTION INTRAVENOUS at 08:21

## 2023-06-19 RX ADMIN — PROPOFOL 200 MG: 10 INJECTION, EMULSION INTRAVENOUS at 07:40

## 2023-06-19 RX ADMIN — INSULIN LISPRO 2 UNITS: 100 INJECTION, SOLUTION INTRAVENOUS; SUBCUTANEOUS at 18:39

## 2023-06-19 RX ADMIN — DEXAMETHASONE SODIUM PHOSPHATE 10 MG: 10 INJECTION, SOLUTION INTRAMUSCULAR; INTRAVENOUS at 08:25

## 2023-06-19 RX ADMIN — SODIUM CHLORIDE, SODIUM LACTATE, POTASSIUM CHLORIDE, AND CALCIUM CHLORIDE 125 ML/HR: .6; .31; .03; .02 INJECTION, SOLUTION INTRAVENOUS at 18:56

## 2023-06-19 RX ADMIN — METHOCARBAMOL 750 MG: 750 TABLET ORAL at 13:51

## 2023-06-19 RX ADMIN — METHOCARBAMOL 750 MG: 750 TABLET ORAL at 23:15

## 2023-06-19 RX ADMIN — FENTANYL CITRATE 100 MCG: 50 INJECTION INTRAMUSCULAR; INTRAVENOUS at 08:34

## 2023-06-19 RX ADMIN — OXYCODONE HYDROCHLORIDE 10 MG: 5 TABLET ORAL at 21:19

## 2023-06-19 RX ADMIN — ONDANSETRON 4 MG: 2 INJECTION INTRAMUSCULAR; INTRAVENOUS at 10:36

## 2023-06-19 RX ADMIN — MIDAZOLAM 2 MG: 1 INJECTION INTRAMUSCULAR; INTRAVENOUS at 07:25

## 2023-06-19 RX ADMIN — DEXMEDETOMIDINE HYDROCHLORIDE 12 MCG: 100 INJECTION, SOLUTION INTRAVENOUS at 09:32

## 2023-06-19 RX ADMIN — SODIUM CHLORIDE: 0.9 INJECTION, SOLUTION INTRAVENOUS at 07:59

## 2023-06-19 RX ADMIN — LIDOCAINE HYDROCHLORIDE 50 MG: 10 INJECTION, SOLUTION EPIDURAL; INFILTRATION; INTRACAUDAL; PERINEURAL at 07:40

## 2023-06-19 RX ADMIN — HYDROMORPHONE HYDROCHLORIDE 1 MG: 1 INJECTION, SOLUTION INTRAMUSCULAR; INTRAVENOUS; SUBCUTANEOUS at 23:11

## 2023-06-19 RX ADMIN — FENTANYL CITRATE 25 MCG: 50 INJECTION INTRAMUSCULAR; INTRAVENOUS at 11:46

## 2023-06-19 RX ADMIN — SUGAMMADEX 200 MG: 100 INJECTION, SOLUTION INTRAVENOUS at 09:07

## 2023-06-19 RX ADMIN — FENTANYL CITRATE 25 MCG: 50 INJECTION INTRAMUSCULAR; INTRAVENOUS at 11:30

## 2023-06-19 RX ADMIN — ROCURONIUM BROMIDE 35 MG: 10 INJECTION, SOLUTION INTRAVENOUS at 08:32

## 2023-06-19 RX ADMIN — HYDROMORPHONE HYDROCHLORIDE 1 MG: 1 INJECTION, SOLUTION INTRAMUSCULAR; INTRAVENOUS; SUBCUTANEOUS at 10:35

## 2023-06-19 RX ADMIN — HYDROMORPHONE HYDROCHLORIDE 1 MG: 2 INJECTION, SOLUTION INTRAMUSCULAR; INTRAVENOUS; SUBCUTANEOUS at 12:44

## 2023-06-19 RX ADMIN — Medication 50 MG: at 07:40

## 2023-06-19 RX ADMIN — POTASSIUM & SODIUM PHOSPHATES POWDER PACK 280-160-250 MG 1 PACKET: 280-160-250 PACK at 23:19

## 2023-06-19 RX ADMIN — GABAPENTIN 100 MG: 100 CAPSULE ORAL at 15:26

## 2023-06-19 RX ADMIN — HYDROMORPHONE HYDROCHLORIDE 1 MG: 1 INJECTION, SOLUTION INTRAMUSCULAR; INTRAVENOUS; SUBCUTANEOUS at 18:38

## 2023-06-19 RX ADMIN — SODIUM CHLORIDE, SODIUM LACTATE, POTASSIUM CHLORIDE, AND CALCIUM CHLORIDE: .6; .31; .03; .02 INJECTION, SOLUTION INTRAVENOUS at 10:39

## 2023-06-19 RX ADMIN — REMIFENTANIL HYDROCHLORIDE 0.12 MCG/KG/MIN: 1 INJECTION, POWDER, LYOPHILIZED, FOR SOLUTION INTRAVENOUS at 08:38

## 2023-06-19 RX ADMIN — OXYCODONE HYDROCHLORIDE 10 MG: 5 TABLET ORAL at 15:26

## 2023-06-19 RX ADMIN — REMIFENTANIL HYDROCHLORIDE 0.12 MCG/KG/MIN: 1 INJECTION, POWDER, LYOPHILIZED, FOR SOLUTION INTRAVENOUS at 07:58

## 2023-06-19 RX ADMIN — METHOCARBAMOL 750 MG: 750 TABLET ORAL at 18:38

## 2023-06-19 RX ADMIN — Medication 200 MG: at 07:40

## 2023-06-19 RX ADMIN — ACETAMINOPHEN 975 MG: 325 TABLET, FILM COATED ORAL at 23:15

## 2023-06-19 RX ADMIN — SODIUM CHLORIDE, SODIUM LACTATE, POTASSIUM CHLORIDE, AND CALCIUM CHLORIDE: .6; .31; .03; .02 INJECTION, SOLUTION INTRAVENOUS at 07:03

## 2023-06-19 RX ADMIN — FENTANYL CITRATE 25 MCG: 50 INJECTION INTRAMUSCULAR; INTRAVENOUS at 11:37

## 2023-06-19 RX ADMIN — DEXMEDETOMIDINE HYDROCHLORIDE 12 MCG: 100 INJECTION, SOLUTION INTRAVENOUS at 08:46

## 2023-06-19 RX ADMIN — PROPOFOL 100 MCG/KG/MIN: 10 INJECTION, EMULSION INTRAVENOUS at 07:58

## 2023-06-19 RX ADMIN — INSULIN HUMAN 5 UNITS: 100 INJECTION, SOLUTION PARENTERAL at 12:07

## 2023-06-19 NOTE — PROGRESS NOTES
Patient personally seen and examined with family at bedside  Postop day 0 posterior cervical decompression with fusion  Resting comfortably in hospital bed  Reports improved sensation and decreased pain in both hands  Neurologic examination is grossly unremarkable without focal deficit  Results of surgery reviewed with the patient  All his questions were answered to his satisfaction  PT and OT for disposition planning  Upright plain films later today or tomorrow  Encourage patient to ambulate and mobilize  We will likely start DVT prophylaxis tomorrow, SCDs in the interim  Collar to remain in place when up and about  Monitor drain output

## 2023-06-19 NOTE — ANESTHESIA POSTPROCEDURE EVALUATION
Post-Op Assessment Note    CV Status:  Stable  Pain Score: 0    Pain management: adequate  Multimodal analgesia used between 6 hours prior to anesthesia start to PACU discharge    Mental Status:  Sleepy   Hydration Status:  Stable and euvolemic   PONV Controlled:  None   Airway Patency:  Patent   Two or more mitigation strategies used for obstructive sleep apnea   Post Op Vitals Reviewed: Yes      Staff: CRNA         No notable events documented      BP  161/96   Temp   97 0   Pulse  110   Resp   18   SpO2   98

## 2023-06-19 NOTE — OP NOTE
OPERATIVE REPORT  PATIENT NAME: Brendan Campos  :  1965  MRN: [id number]  Pt Location: AN OR ROOM 05    SURGERY DATE: 2023    Surgeon(s) and Role:     * Schuyler Crain MD - Primary     * Christiano Haley PA-C - Assisting    No qualified resident was available  JOSEPHINE was present for the procedure, and provided essential assistance with proper exposure, retraction, hemostasis, instrumentation and decompression, and wound closure, which was necessary secondary to the complex nature of this case  Preop Diagnosis:  Status post cervical spinal fusion [Z98 1]  Other spondylosis with myelopathy, cervical region [M47 12]  Cervical radiculopathy [M54 12]  Cervical spine disease [M48 9]    Post-Op Diagnosis Codes:     * Status post cervical spinal fusion [Z98 1]     * Other spondylosis with myelopathy, cervical region [M47 12]     * Cervical radiculopathy [M54 12]     * Cervical spine disease [M48 9]    Procedure(s):  1  C3-T1 posterior instrumented fixation fusion with Medtronic Infinity, locally harvested autograft and master graft  2   C3-C7 posterior decompression  Specimen(s):  * No specimens in log *    Estimated Blood Loss:   150 mL    Drains:  Urethral Catheter Latex 16 Fr  (Active)   Number of days: 0       Anesthesia Type:   General    Operative Indications:  Status post cervical spinal fusion [Z98 1]  Other spondylosis with myelopathy, cervical region [M47 12]  Cervical radiculopathy [M54 12]  Cervical spine disease [M48 9]    Operative Findings:  Stable intraoperative electrophysiological monitoring  Central stenosis at C3-4 and C4-5 secondary to degenerative changes  Complications:   None    Procedure and Technique:  Clinical Note:    The goals and alternatives to the above procedure were discussed with patient and significant other  Surgery is intended to decompress neural structures and hopefully improve radicular pain in prevent progression myelopathy   Weakness, numbness and neck pain are less likely to improve  The risks of surgery were described in detail  1  Risk of general anesthetic, with possible cardiac and respiratory complication  There is risk of infection and bleeding  2  Risk of neurological injury with new pain, weakness or numbness or difficulties with bowel and bladder function  The risk of CSF leak was described  The risk C5 palsy was described  3  Possible need for revision surgery in the future  Restriction of cervical range of motion  Once all questions were answered to their satisfaction, they asked to proceed with surgery  Operating Room Note    The patient was brought to the operating room and placed under general anesthetic  Once all appropriate lines were in position, the patients head was placed in Forman pins  The patient was carefully turned in the prone position  Care was taken to insure that all pressure points were padded and the neck was in a  tuck position  AP and lateral fluoroscopy were used to check cervical alignment and to plan an appropriate trajectory to the appropriate surgical levels  Based on lateral fluoroscopy, an incision was planned midline of the posterior cervical spine  The skin was then prepped and draped in usual sterile fashion  A full surgical timeout was undertaken identifying the site, side and levels of surgery  The patient received preoperative antibiotics  Baseline collection physiological monitoring was obtained  10 mg of Decadron were administered and I asked that MAPs> 80 mmHg  The planned incision was infiltrated 1% lidocaine with 100,000 of epinephrine  10 blade was used to incise the skin  Monopolar cautery was used to dissect down and through the muscle fascia  The spinous processes, lamina and lateral masses were exposed using monopolar cautery and Aquamantis  Lateral fluoroscopy was used to confirm exposure of the appropriate levels, and these were also confirmed with neuroradiology      Starting at C3 a ball bur was used to drill  holes 1 mm medial and inferior to the midpoint of the lateral mass under lateral fluoroscopy  A drill with guard was then used to drill bilateral holes In a superior lateral trajectory from C3 to C7  The holes were then probed to ensure there was a floor and 4 walls and then a tap was used to prepare the holes  Starting at C3 lateral mass screws were placed under lateral fluoroscopy to complete instrumentation from C3-C6  The C7 screws were temporarily left out to allow for an easier trajectory for the T1 screws  At T1, a  hole was drilled just lateral to the pars at the base of the transverse process  A pedicle finder was advanced under AP fluoroscopy  The hole was then probed to ensure there before and 4 walls  A tap was used to prepare the hole  Bilateral 4 5 x 24 mm pedicle screws were placed at T1  The C7 screws were then placed bilaterally  AP and lateral fluoroscopy demonstrated good alignment a posterior lateral instrumentation  Electrophysiological monitoring main stable  A matchstick violet was used to drill troughs along the lamina and medial lateral mass from the inferior half of C3 to the superior half of C7  Leksell rongeur was used to remove intraspinous ligament  Posterior longitudinal ligament was undermined with 2 mm Kerrison punch bilaterally  Spinous processes and lamina were then removed with Leksell rongeur  Bone wax and bipolar cautery were used for hemostasis along the edges of the decompression  Electrophysiological monitoring remained stable  Medial facetectomies perform bilateral foraminotomies were undertaken at C3-4 and C4-5  Afterwards, I nerve hook could be passed through the foramina without difficulty  85 mm were measured and bent  These were placed bilaterally and set screws were applied  There was adequate brought above and below the construct under direct visualization   Final AP and lateral fluoroscopy demonstrated good alignment of the instrumentation and of the cervical spine  The set screws were finally tightened  The surgical site was irrigated copiously with antibiotic irrigation  The lateral masses and facet joints were decorticated with a matchstick drill  Locally harvested autograft and bone substitute were prepared in a bone mill and placed along the decorticated surface to promote bony fusion  An epidural BRISA drain was tunnelled to right of the incision  Vicryl suture was used to approximate the muscle,  fascia and subcutaneous tissue  Staples were used to approximate the skin edges  0 5% Marcaine with 1/100,000 of epinephrine was used to infiltrate the soft tissue  Miplex was applied  The count was correct at the end of the case and there were no complications  Electrophysiological monitoring was stable  The patient was carefully extubated and carefully transferred onto the operating room gurney  The Forman head spencer was removed and the holes were inspected for bleeding or CSF leak  A cervical collar was applied  The patient was transferred to the recovery room where they were noted to be hemodynamically stable and neurologically unchanged  The results of surgery were discussed with patient and significant other  I was present for the entire procedure      Patient Disposition:  PACU     SIGNATURE: Genna Luis MD  DATE: June 19, 2023  TIME: 11:12 AM

## 2023-06-19 NOTE — INTERVAL H&P NOTE
H&P reviewed  After examining the patient I find no changes in the patients condition since the H&P had been written  Patient personally seen and examined  Neurological examination unchanged compared to last office/progress note, with the following exceptions:    /81   Pulse 61   Temp 97 5 °F (36 4 °C) (Temporal)   Resp 20   SpO2 97%      None  Regular cardiac rate and rhythm  No respiratory distress  Abdomen nontender  Normocephalic  Grossly full power in upper and lower extremities aside from 4/5 power on left  and finger span  Reports diminished light touch sensation in both upper extremities with the left being affected more so than the right  Dae negative bilaterally  Mild dysdiadochokinesia  Has stopped antiplatelet/anticoagulation medication as instructed  CODE STATUS personally reviewed with the patient and his partner at the bedside  He is a full code  Post operative instructions and medications have been reviewed with the patient and partner   Assessment and Plan:    All questions have been answered to the patient and partner  satisfaction  Plan to proceed with C3-T1 posterior decompression with instrumented fixation fusion and possible extension to additional levels  They are in agreement with proceeding

## 2023-06-19 NOTE — ANESTHESIA PREPROCEDURE EVALUATION
Procedure:  C3-T1 posterior decompression with instrumented fixation fusion and possible extension to additional levels (impulse monitoring) (Bilateral: Spine Cervical)    Relevant Problems   CARDIO   (+) Chronic thoracic spine pain      MUSCULOSKELETAL   (+) Chronic thoracic spine pain   (+) Other spondylosis with myelopathy, cervical region      NEURO/PSYCH   (+) Chronic pain syndrome      Nervous and Auditory   (+) Cervical radiculopathy      Other   (+) Cervical post-laminectomy syndrome        Physical Exam    Airway    Mallampati score: I  TM Distance: >3 FB  Neck ROM: full     Dental       Cardiovascular  Cardiovascular exam normal    Pulmonary  Pulmonary exam normal     Other Findings        Anesthesia Plan  ASA Score- 3     Anesthesia Type- general with ASA Monitors  Additional Monitors:   Airway Plan: ETT  Plan Factors-Exercise tolerance (METS): >4 METS  Chart reviewed  EKG reviewed  Imaging results reviewed  Existing labs reviewed  Patient summary reviewed  Patient is not a current smoker  Patient did not smoke on day of surgery  Obstructive sleep apnea risk education given perioperatively  Induction- intravenous  Postoperative Plan- Plan for postoperative opioid use  Planned trial extubation    Informed Consent- Anesthetic plan and risks discussed with patient  I personally reviewed this patient with the CRNA  Discussed and agreed on the Anesthesia Plan with the CRNA  Ceci Laughlin

## 2023-06-20 LAB
ANION GAP SERPL CALCULATED.3IONS-SCNC: 6 MMOL/L (ref 4–13)
ATRIAL RATE: 108 BPM
BUN SERPL-MCNC: 13 MG/DL (ref 5–25)
CALCIUM SERPL-MCNC: 8.5 MG/DL (ref 8.4–10.2)
CHLORIDE SERPL-SCNC: 101 MMOL/L (ref 96–108)
CO2 SERPL-SCNC: 27 MMOL/L (ref 21–32)
CREAT SERPL-MCNC: 0.86 MG/DL (ref 0.6–1.3)
ERYTHROCYTE [DISTWIDTH] IN BLOOD BY AUTOMATED COUNT: 13.2 % (ref 11.6–15.1)
GFR SERPL CREATININE-BSD FRML MDRD: 97 ML/MIN/1.73SQ M
GLUCOSE SERPL-MCNC: 159 MG/DL (ref 65–140)
GLUCOSE SERPL-MCNC: 173 MG/DL (ref 65–140)
GLUCOSE SERPL-MCNC: 199 MG/DL (ref 65–140)
GLUCOSE SERPL-MCNC: 215 MG/DL (ref 65–140)
GLUCOSE SERPL-MCNC: 248 MG/DL (ref 65–140)
HCT VFR BLD AUTO: 41.5 % (ref 36.5–49.3)
HGB BLD-MCNC: 14.2 G/DL (ref 12–17)
MAGNESIUM SERPL-MCNC: 2.1 MG/DL (ref 1.9–2.7)
MCH RBC QN AUTO: 31.5 PG (ref 26.8–34.3)
MCHC RBC AUTO-ENTMCNC: 34.2 G/DL (ref 31.4–37.4)
MCV RBC AUTO: 92 FL (ref 82–98)
P AXIS: 44 DEGREES
PHOSPHATE SERPL-MCNC: 3.8 MG/DL (ref 2.7–4.5)
PLATELET # BLD AUTO: 235 THOUSANDS/UL (ref 149–390)
PMV BLD AUTO: 9.8 FL (ref 8.9–12.7)
POTASSIUM SERPL-SCNC: 4.5 MMOL/L (ref 3.5–5.3)
PR INTERVAL: 168 MS
QRS AXIS: -6 DEGREES
QRSD INTERVAL: 70 MS
QT INTERVAL: 316 MS
QTC INTERVAL: 423 MS
RBC # BLD AUTO: 4.51 MILLION/UL (ref 3.88–5.62)
SODIUM SERPL-SCNC: 134 MMOL/L (ref 135–147)
T WAVE AXIS: 16 DEGREES
VENTRICULAR RATE: 108 BPM
WBC # BLD AUTO: 15.31 THOUSAND/UL (ref 4.31–10.16)

## 2023-06-20 PROCEDURE — 83735 ASSAY OF MAGNESIUM: CPT | Performed by: PHYSICIAN ASSISTANT

## 2023-06-20 PROCEDURE — 97535 SELF CARE MNGMENT TRAINING: CPT

## 2023-06-20 PROCEDURE — 99024 POSTOP FOLLOW-UP VISIT: CPT | Performed by: PHYSICIAN ASSISTANT

## 2023-06-20 PROCEDURE — 97163 PT EVAL HIGH COMPLEX 45 MIN: CPT

## 2023-06-20 PROCEDURE — 97167 OT EVAL HIGH COMPLEX 60 MIN: CPT

## 2023-06-20 PROCEDURE — 85027 COMPLETE CBC AUTOMATED: CPT | Performed by: PHYSICIAN ASSISTANT

## 2023-06-20 PROCEDURE — 84100 ASSAY OF PHOSPHORUS: CPT | Performed by: PHYSICIAN ASSISTANT

## 2023-06-20 PROCEDURE — 93010 ELECTROCARDIOGRAM REPORT: CPT | Performed by: INTERNAL MEDICINE

## 2023-06-20 PROCEDURE — 80048 BASIC METABOLIC PNL TOTAL CA: CPT | Performed by: PHYSICIAN ASSISTANT

## 2023-06-20 PROCEDURE — 82948 REAGENT STRIP/BLOOD GLUCOSE: CPT

## 2023-06-20 RX ORDER — HEPARIN SODIUM 5000 [USP'U]/ML
5000 INJECTION, SOLUTION INTRAVENOUS; SUBCUTANEOUS EVERY 8 HOURS SCHEDULED
Status: DISCONTINUED | OUTPATIENT
Start: 2023-06-20 | End: 2023-06-22 | Stop reason: HOSPADM

## 2023-06-20 RX ORDER — TRAZODONE HYDROCHLORIDE 50 MG/1
75 TABLET ORAL
Status: DISCONTINUED | OUTPATIENT
Start: 2023-06-20 | End: 2023-06-22 | Stop reason: HOSPADM

## 2023-06-20 RX ADMIN — INSULIN LISPRO 1 UNITS: 100 INJECTION, SOLUTION INTRAVENOUS; SUBCUTANEOUS at 08:20

## 2023-06-20 RX ADMIN — OXYCODONE HYDROCHLORIDE 10 MG: 5 TABLET ORAL at 15:42

## 2023-06-20 RX ADMIN — HYDROMORPHONE HYDROCHLORIDE 1 MG: 1 INJECTION, SOLUTION INTRAMUSCULAR; INTRAVENOUS; SUBCUTANEOUS at 22:55

## 2023-06-20 RX ADMIN — LISINOPRIL 10 MG: 10 TABLET ORAL at 08:17

## 2023-06-20 RX ADMIN — METHOCARBAMOL 750 MG: 750 TABLET ORAL at 23:00

## 2023-06-20 RX ADMIN — DOCUSATE SODIUM 100 MG: 100 CAPSULE, LIQUID FILLED ORAL at 17:34

## 2023-06-20 RX ADMIN — GABAPENTIN 100 MG: 100 CAPSULE ORAL at 20:31

## 2023-06-20 RX ADMIN — HYDROMORPHONE HYDROCHLORIDE 1 MG: 1 INJECTION, SOLUTION INTRAMUSCULAR; INTRAVENOUS; SUBCUTANEOUS at 18:45

## 2023-06-20 RX ADMIN — HEPARIN SODIUM 5000 UNITS: 5000 INJECTION INTRAVENOUS; SUBCUTANEOUS at 14:39

## 2023-06-20 RX ADMIN — INSULIN LISPRO 2 UNITS: 100 INJECTION, SOLUTION INTRAVENOUS; SUBCUTANEOUS at 17:34

## 2023-06-20 RX ADMIN — SODIUM CHLORIDE, SODIUM LACTATE, POTASSIUM CHLORIDE, AND CALCIUM CHLORIDE 125 ML/HR: .6; .31; .03; .02 INJECTION, SOLUTION INTRAVENOUS at 03:49

## 2023-06-20 RX ADMIN — SENNOSIDES 8.6 MG: 8.6 TABLET, FILM COATED ORAL at 08:17

## 2023-06-20 RX ADMIN — ACETAMINOPHEN 975 MG: 325 TABLET, FILM COATED ORAL at 21:00

## 2023-06-20 RX ADMIN — METHOCARBAMOL 750 MG: 750 TABLET ORAL at 05:04

## 2023-06-20 RX ADMIN — PANTOPRAZOLE SODIUM 20 MG: 20 TABLET, DELAYED RELEASE ORAL at 05:04

## 2023-06-20 RX ADMIN — INSULIN LISPRO 2 UNITS: 100 INJECTION, SOLUTION INTRAVENOUS; SUBCUTANEOUS at 14:02

## 2023-06-20 RX ADMIN — ACETAMINOPHEN 975 MG: 325 TABLET, FILM COATED ORAL at 14:39

## 2023-06-20 RX ADMIN — METHOCARBAMOL 750 MG: 750 TABLET ORAL at 11:54

## 2023-06-20 RX ADMIN — TRAZODONE HYDROCHLORIDE 75 MG: 50 TABLET ORAL at 21:00

## 2023-06-20 RX ADMIN — OXYCODONE HYDROCHLORIDE 10 MG: 5 TABLET ORAL at 08:44

## 2023-06-20 RX ADMIN — ACETAMINOPHEN 975 MG: 325 TABLET, FILM COATED ORAL at 05:04

## 2023-06-20 RX ADMIN — LOSARTAN POTASSIUM 12.5 MG: 25 TABLET, FILM COATED ORAL at 08:17

## 2023-06-20 RX ADMIN — HEPARIN SODIUM 5000 UNITS: 5000 INJECTION INTRAVENOUS; SUBCUTANEOUS at 21:00

## 2023-06-20 RX ADMIN — OXYCODONE HYDROCHLORIDE 10 MG: 5 TABLET ORAL at 02:36

## 2023-06-20 RX ADMIN — GABAPENTIN 100 MG: 100 CAPSULE ORAL at 15:42

## 2023-06-20 RX ADMIN — HYDROMORPHONE HYDROCHLORIDE 1 MG: 1 INJECTION, SOLUTION INTRAMUSCULAR; INTRAVENOUS; SUBCUTANEOUS at 11:54

## 2023-06-20 RX ADMIN — DOCUSATE SODIUM 100 MG: 100 CAPSULE, LIQUID FILLED ORAL at 08:16

## 2023-06-20 RX ADMIN — INSULIN LISPRO 1 UNITS: 100 INJECTION, SOLUTION INTRAVENOUS; SUBCUTANEOUS at 08:45

## 2023-06-20 RX ADMIN — OXYCODONE HYDROCHLORIDE 10 MG: 5 TABLET ORAL at 20:31

## 2023-06-20 RX ADMIN — GABAPENTIN 100 MG: 100 CAPSULE ORAL at 08:16

## 2023-06-20 RX ADMIN — HYDROMORPHONE HYDROCHLORIDE 1 MG: 1 INJECTION, SOLUTION INTRAMUSCULAR; INTRAVENOUS; SUBCUTANEOUS at 03:52

## 2023-06-20 RX ADMIN — METHOCARBAMOL 750 MG: 750 TABLET ORAL at 17:34

## 2023-06-20 NOTE — PLAN OF CARE
Problem: OCCUPATIONAL THERAPY ADULT  Goal: Performs self-care activities at highest level of function for planned discharge setting  See evaluation for individualized goals  Description: Treatment Interventions: ADL retraining, Functional transfer training, UE strengthening/ROM, Endurance training, Patient/family training, Equipment evaluation/education, Neuromuscular reeducation  Equipment Recommended: Shower/Tub chair with back ($), Other (comment) (rollator walker vs rolling walker)       See flowsheet documentation for full assessment, interventions and recommendations  6/20/2023 1557 by Aminah Duncan OT  Outcome: Progressing  Note: Limitation: Decreased ADL status, Decreased UE strength, Decreased UE ROM, Decreased endurance, Decreased self-care trans, Decreased high-level ADLs (+ pain, spinal precautions)  Prognosis: Good  Assessment: Patient is a 56 y o  male seen for OT evaluation + treatment at [facility] following admission on 6/19/2023  s/p C3 - T1 posterior laminectomy and decompression/fusion  Please see above for comprehensive list of comorbidities and significant PMHx impacting functional performance  At baseline, pt is (I) with ADLs, no AD  Upon initial evaluation, pt appears to be performing below baseline functional status  Occupational performance is affected by the following deficits:  decreased muscular strength , acute change in mobility status , decreased trunk control , decreased activity tolerance  and (+) pain , further limited by spinal precautiosn  Supporting personal factors include: accessible home environment, support system available and attitude towards recovery Patient would benefit from OT services within the acute care setting to maximize level of functional independence in the following areas self-care transfers, functional mobility and ADLs    From OT standpoint, recommendation at time of D/C would be return to previous environment with outpatient rehabilitation       OT Discharge Recommendation: Home with outpatient rehabilitation     Sharri Sher

## 2023-06-20 NOTE — UTILIZATION REVIEW
Initial Clinical Review    Elective Inpatient surgical procedure  Age/Sex: 56 y o  male with hx cervical radiculopathy  Surgery Date: 6/19/2023 11:12 AM  Procedure:   1  C3-T1 posterior instrumented fixation fusion with Medtronic Infinity, locally harvested autograft and master graft  2   C3-C7 posterior decompression  Anesthesia: general  Operative Findings: Stable intraoperative electrophysiological monitoring  Central stenosis at C3-4 and C4-5 secondary to degenerative changes    POD #0- neuro sx-Reports improved sensation and decreased pain in both hands  Neurologic examination is grossly unremarkable without focal deficit  Upright plain films later today or tomorrow  Encourage patient to ambulate and mobilize  We will likely start DVT prophylaxis tomorrow, SCDs in the interim  Collar to remain in place when up and about  Monitor drain output  POD#1 Progress Note: 6/20   Neurosx- Patient had rough night last night due to pain & difficulty sleeping, he was tachycardic  ECG unremarkable  He requests Trazodone which he used to take at night for sleep  Order placed for 75 mg oral at bed time-noticed improvement with his radicular pain and paresthesia in his hands and fingers, c/o severe incisional pain-noticed dilaudid helped him much better than other pain meds  Sensation to LT decreased in the Ulnar distribution of bilateral hand and fingers  finger to nose test slight dysmetria bilaterally,  including iOS 4+/5 bilaterally, otherwise pain inhibition shoulder abduction weakness, elbow flex/ext 5/5 bilat  Drsg- C/D/I  Drain in place, draining light red body fluid, OP overnight 210 ml  [place] collar on  Postop upright cervical spine x-rays 6/19/2023 demonstrates postop changes and stable hardware's, with good construction, all the screws in the pedicles  WBC 15 31, Hgb 14 2   Continue with pain control, OOB ambulation  PT/OT evals  Wear [employer] cervical collar all the time   Start SQ Heparin Ellis Calvillo Admission Orders: Date/Time/Statement  Admission Orders (From admission, onward)     Ordered        06/19/23 0717  Inpatient Admission  Once                      Orders Placed This Encounter   Procedures   • Inpatient Admission     Standing Status:   Standing     Number of Occurrences:   1     Order Specific Question:   Level of Care     Answer:   Med Surg [16]     Order Specific Question:   Estimated length of stay     Answer:   More than 2 Midnights     Order Specific Question:   Certification     Answer:   I certify that inpatient services are medically necessary for this patient for a duration of greater than two midnights  See H&P and MD Progress Notes for additional information about the patient's course of treatment  Vital Signs: /92   Pulse 95   Temp 98 7 °F (37 1 °C)   Resp 18   Wt 111 kg (244 lb 3 2 oz)   SpO2 94%   BMI 37 13 kg/m²     Pertinent Labs/Diagnostic Test Results:   XR spine cervical 2 or 3 vw injury  6/19/23-  (Results Pending)   Per neuro sx, await official read-  postop changes and stable hardware's, with good construction, all the screws in the pedicles              Results from last 7 days   Lab Units 06/20/23 0457 06/19/23 2054   WBC Thousand/uL 15 31* 12 71*   HEMOGLOBIN g/dL 14 2 15 3   HEMATOCRIT % 41 5 44 8   PLATELETS Thousands/uL 235 238         Results from last 7 days   Lab Units 06/20/23 0457 06/19/23 2054   SODIUM mmol/L 134* 134*   POTASSIUM mmol/L 4 5 4 9   CHLORIDE mmol/L 101 101   CO2 mmol/L 27 25   ANION GAP mmol/L 6 8   BUN mg/dL 13 13   CREATININE mg/dL 0 86 0 97   EGFR ml/min/1 73sq m 97 87   CALCIUM mg/dL 8 5 9 1   MAGNESIUM mg/dL 2 1 1 7*   PHOSPHORUS mg/dL 3 8 2 3*         Results from last 7 days   Lab Units 06/20/23  0730 06/19/23  2135 06/19/23  1704 06/19/23  1238 06/19/23  1144 06/19/23  0704   POC GLUCOSE mg/dl 159* 251* 217* 223* 216* 156*     Results from last 7 days   Lab Units 06/20/23 0457 06/19/23 2054   GLUCOSE RANDOM mg/dL 173* 271*                 Diet: level 2 carb diet  Mobility: OOB TID ,C collar   DVT Prophylaxis: ambulation , SCD, heparin         Scheduled Medications:  acetaminophen, 975 mg, Oral, Q8H YVETTE  docusate sodium, 100 mg, Oral, BID  gabapentin, 100 mg, Oral, TID  heparin (porcine), 5,000 Units, Subcutaneous, Q8H YVETTE  insulin lispro, 1-6 Units, Subcutaneous, 4 times day  lisinopril, 10 mg, Oral, Daily  losartan, 12 5 mg, Oral, Daily  methocarbamol, 750 mg, Oral, Q6H YVETTE  pantoprazole, 20 mg, Oral, Early Morning  senna, 1 tablet, Oral, Daily  traZODone, 75 mg, Oral, HS    insulin regular (HumuLIN R,NovoLIN R) injection 5 Units  Dose: 5 Units  Freq: Once as needed Route: SC  PRN Reason: high blood sugar  Start: 06/19/23 1204 End: 06/19/23 1207  potassium-sodium phosphates (PHOS-NAK) packet 1 packet  Dose: 1 packet  Freq: Once Route: PO  Start: 06/19/23 2200 End: 06/19/23 2319  magnesium sulfate 2 g/50 mL IVPB (premix) 2 g  Dose: 2 g  Freq: Once Route: IV  Last Dose: 2 g (06/19/23 2316)  Start: 06/19/23 2200 End: 06/20/23 0116        Continuous IV Infusions:  lactated ringers, 125 mL/hr, Intravenous, Continuous      PRN Meds:  HYDROmorphone, 1 mg, Intravenous, Q4H PRN x2 6/19, x1 6/20  ondansetron, 4 mg, Intravenous, Q6H PRN  oxyCODONE, 10 mg, Oral, Q4H PRN  oxyCODONE, 5 mg, Oral, Q4H PRN  simethicone, 80 mg, Oral, 4x Daily PRN  fentaNYL (SUBLIMAZE) injection 25 mcg  Dose: 25 mcg  Freq: Every 5 minutes PRN Route: IV  PRN Reason: Pain - PACU  PRN Comment: Breakthrough - first line  Start: 06/19/23 1107 End: 06/19/23 1146 x3 6/19  HYDROmorphone (DILAUDID) 2 mg/mL injection **ADS Override Pull**  Start: 06/19/23 1241 End: 06/19/23 1244 x1 6/19         Network Utilization Review Department  ATTENTION: Please call with any questions or concerns to [phone] and carefully listen to the prompts so that you are directed to the right person   All voicemails are confidential   Kevin Perrin all requests for admission clinical reviews, approved or denied determinations and any other requests to dedicated fax number below belonging to the campus where the patient is receiving treatment   List of dedicated fax numbers for the Facilities:  [address] DENIALS (Administrative/Medical Necessity) [phone]   [address] (Maternity/NICU/Pediatrics) [phone]   [address] [phone]   Geetha Puentes  [phone]   [address] [address] [address] [facility] 28 [phone]   1551 [facility] 134 815 [facility] [phone]

## 2023-06-20 NOTE — PROGRESS NOTES
Progress Note - Neurosurgery   Yasmeen Vásquez  56 y o  male MRN: [id number]  Unit/Bed#: S -01 Encounter: [id number]              Assessment:  1  S/P # 1 C3-T1 PCDF  2  Hx of cervical radiculopathy      Plan:   · Exam:  Patient obese, A&OX3, Damon, finger to nose test slight dysmetria bilaterally,  including iOS 4+/5 bilaterally, otherwise pain inhibition shoulder abduction weakness, elbow flex/ext 5/5 bilat, sensation to LT decreased in the Ulnar distribution of bilateral hand and fingers  DTR 2+  No clonus bilaterally  Negative Khan's tests bilat  Dressing clean and dry  Drain in place, draining light red body fluid, OP overnight 210 ml  [place] collar on    · Imaging is reviewed personally and findings as follows:  · Postop upright cervical spine x-rays 6/19/2023 demonstrates postop changes and stable hardware's, with good construction, all the screws in the pedicles  · Labs: WBC=15 31; Hgb=14 2%; blood glucose 173  · Pain control:   1  Tylenol 975 mg oral 3 times daily scheduled pain  2  Gabapentin 100 mg oral 3 times daily neuropathic pain  3  Dilaudid 1 mg IV every 4 hours as needed breakthrough pain  4  Methocarbamol every 6 hours scheduled-for muscle spasm  5  Oxycodone 10 mg oral every 4 hours as needed severe pain  6  Oxycodone 5 mg oral every 4 hours as needed moderate pain  · DVT ppx: SCDs bilateral legs,   · Activity: As tolerated  · PT/OT evaluation & treatment  · Brace: Wear [place] cervical collar all the time  · Medical Mx:  1  Hypertension-on lisinopril and losartan  2  Diabetes mellitus-on insulin- sliding scale  3  Nausea and vomiting on Zofran  4  Constipation on Colace  · Neurocheck: Routine  · BRISA [id number]/20H  · NSx following the patient  Patient had rough night last night due to pain & difficulty sleeping, he was tachycardic  ECG unremarkable  He requests Trazodone which he used to take at night for sleep   Order placed for 75 mg oral at bed time- will monitor for "any potential side effect with other narcotics and muscle relaxers  He noticed improvement with his radicular pain and paresthesia in his hands and fingers, c/o severe incisional pain-noticed dilaudid helped him much better than other pain meds  He was OOB to bath room, gait instability stable  XR looks good construction and stable hardware and post op changes  Okay to start heparin SQ,  Continue monitoring with Pain control, PT/OT  Call with questions or concerns  Subjective/Objective   Chief Complaint: \" Post op day 1 Cervical Decompression & fusion\"/progress note    Subjective: Patient reports severe incisional pain, difficulty sleeping last night  He noticed improvement with his paresthesia and radicular pain in his hand and pinky and ring fingers bilaterally  Reports numbness persists in these areas and also reports wobbly gait  He ate his dinner voided urine slight burning sensation, passed gases but denies any bowel movement  He is wearing [place] collar and denies any drainage or discharge from the incision site  No fever, chills, rigors, cough or chest pain  Objective: Alert and oriented x3, communicates well and in no pain distress    I/O       06/18 0701  06/19 0700 06/19 0701  06/20 0700    P  O   200    I V   1500    NG/GT  0    Total Intake  1700    Urine  2300    Drains  210    Blood  150    Total Output  2660    Net  -960                Invasive Devices     Peripheral Intravenous Line  Duration           Peripheral IV 06/19/23 Left;Ventral (anterior) Hand <1 day          Drain  Duration           Closed/Suction Drain Posterior Neck Bulb <1 day                Physical Exam:  Vitals: Blood pressure 126/80, pulse 101, temperature 98 4 °F (36 9 °C), resp  rate 18, SpO2 92 %  ,There is no height or weight on file to calculate BMI        General appearance: alert, appears stated age, cooperative and no distress  Head: Normocephalic, without obvious abnormality, atraumatic  Eyes: EOMI, PERRL, 2 " "mm conjugate bilaterally  Neck: Dressing clean and dry, BRISA drain in place draining slightly light red bloody fluid, collar on  Back: no kyphosis present, no tenderness to percussion or palpation  Lungs: non labored breathing  Heart: regular heart rate  Neurologic:   Mental status: Alert, oriented x3, thought content appropriate  Cranial nerves: grossly intact (Cranial nerves II-XII)  Sensory: See exam above  Motor: moving all extremities, see exam above  Reflexes: 2+ and symmetric  Coordination: finger to nose normal bilaterally, no drift bilaterally      Lab Results:  Results from last 7 days   Lab Units 06/20/23 0457 06/19/23 2054   WBC Thousand/uL 15 31* 12 71*   HEMOGLOBIN g/dL 14 2 15 3   HEMATOCRIT % 41 5 44 8   PLATELETS Thousands/uL 235 238     Results from last 7 days   Lab Units 06/20/23 0457 06/19/23 2054   POTASSIUM mmol/L 4 5 4 9   CHLORIDE mmol/L 101 101   CO2 mmol/L 27 25   BUN mg/dL 13 13   CREATININE mg/dL 0 86 0 97   CALCIUM mg/dL 8 5 9 1     Results from last 7 days   Lab Units 06/20/23 0457 06/19/23 2054   MAGNESIUM mg/dL 2 1 1 7*     Results from last 7 days   Lab Units 06/20/23 0457 06/19/23 2054   PHOSPHORUS mg/dL 3 8 2 3*         No results found for: \"TROPONINT\"  ABG:No results found for: \"PHART\", \"QTX0FWS\", \"PO2ART\", \"GPN3GPV\", \"N0MAEMXA\", \"BEART\", \"SOURCE\"    Imaging Studies: I have personally reviewed pertinent reports  and I have personally reviewed pertinent films in PACS    EKG, Pathology, and Other Studies: I have personally reviewed pertinent reports        VTE Pharmacologic Prophylaxis: Sequential compression device (Venodyne)     VTE Mechanical Prophylaxis: sequential compression device      "

## 2023-06-20 NOTE — PLAN OF CARE
Problem: PAIN - ADULT  Goal: Verbalizes/displays adequate comfort level or baseline comfort level  Description: Interventions:  - Encourage patient to monitor pain and request assistance  - Assess pain using appropriate pain scale  - Administer analgesics based on type and severity of pain and evaluate response  - Implement non-pharmacological measures as appropriate and evaluate response  - Consider cultural and social influences on pain and pain management  - Notify physician/advanced practitioner if interventions unsuccessful or patient reports new pain  Outcome: Progressing     Problem: INFECTION - ADULT  Goal: Absence or prevention of progression during hospitalization  Description: INTERVENTIONS:  - Assess and monitor for signs and symptoms of infection  - Monitor lab/diagnostic results  - Monitor all insertion sites, i e  indwelling lines, tubes, and drains  - Monitor endotracheal if appropriate and nasal secretions for changes in amount and color  - [place] appropriate cooling/warming therapies per order  - Administer medications as ordered  - Instruct and encourage patient and family to use good hand hygiene technique  - Identify and instruct in appropriate isolation precautions for identified infection/condition  Outcome: Progressing  Goal: Absence of fever/infection during neutropenic period  Description: INTERVENTIONS:  - Monitor WBC    Outcome: Progressing     Problem: SAFETY ADULT  Goal: Patient will remain free of falls  Description: INTERVENTIONS:  - Educate patient/family on patient safety including physical limitations  - Instruct patient to call for assistance with activity   - Consult OT/PT to assist with strengthening/mobility   - Keep Call bell within reach  - Keep bed low and locked with side rails adjusted as appropriate  - Keep care items and personal belongings within reach  - Initiate and maintain comfort rounds  - Make Fall Risk Sign visible to staff  - Offer Toileting every 2 Hours, in advance of need  - Initiate/Maintain alarm  - Obtain necessary fall risk management equipment  - Apply yellow socks and bracelet for high fall risk patients  - Consider moving patient to room near nurses station  Outcome: Progressing  Goal: Maintain or return to baseline ADL function  Description: INTERVENTIONS:  -  Assess patient's ability to carry out ADLs; assess patient's baseline for ADL function and identify physical deficits which impact ability to perform ADLs (bathing, care of mouth/teeth, toileting, grooming, dressing, etc )  - Assess/evaluate cause of self-care deficits   - Assess range of motion  - Assess patient's mobility; develop plan if impaired  - Assess patient's need for assistive devices and provide as appropriate  - Encourage maximum independence but intervene and supervise when necessary  - Involve family in performance of ADLs  - Assess for home care needs following discharge   - Consider OT consult to assist with ADL evaluation and planning for discharge  - Provide patient education as appropriate  Outcome: Progressing  Goal: Maintains/Returns to pre admission functional level  Description: INTERVENTIONS:  - Perform BMAT or MOVE assessment daily    - Set and communicate daily mobility goal to care team and patient/family/caregiver  - Collaborate with rehabilitation services on mobility goals if consulted  - Perform Range of Motion 3 times a day  - Reposition patient every 2 hours    - Dangle patient 3 times a day  - Stand patient 3 times a day  - Ambulate patient 3 times a day  - Out of bed to chair 3 times a day   - Out of bed for meals 3 times a day  - Out of bed for toileting  - Record patient progress and toleration of activity level   Outcome: Progressing     Problem: DISCHARGE PLANNING  Goal: Discharge to home or other facility with appropriate resources  Description: INTERVENTIONS:  - Identify barriers to discharge w/patient and caregiver  - Arrange for needed discharge resources and transportation as appropriate  - Identify discharge learning needs (meds, wound care, etc )  - Arrange for interpretive services to assist at discharge as needed  - Refer to Case Management Department for coordinating discharge planning if the patient needs post-hospital services based on physician/advanced practitioner order or complex needs related to functional status, cognitive ability, or social support system  Outcome: Progressing     Problem: Knowledge Deficit  Goal: Patient/family/caregiver demonstrates understanding of disease process, treatment plan, medications, and discharge instructions  Description: Complete learning assessment and assess knowledge base    Interventions:  - Provide teaching at level of understanding  - Provide teaching via preferred learning methods  Outcome: Progressing

## 2023-06-20 NOTE — PHYSICAL THERAPY NOTE
"   PHYSICAL THERAPY EVALUATION  DATE: 06/20/23  TIME: 0521-6204    NAME:  Patricia Rand  AGE:   56 y o  Mrn:   [id number]  Length Of Stay: 1    ADMIT DX:  Status post cervical spinal fusion [Z98 1]  Other spondylosis with myelopathy, cervical region [M47 12]  Cervical radiculopathy [M54 12]  Cervical spine disease [M48 9]    Past Medical History:   Diagnosis Date    Anxiety     Arthritis     Bilateral occipital neuralgia     Cervical post-laminectomy syndrome     Cervical radiculopathy     Cervical spine disease     Cervical spondylosis with myelopathy     Chronic lumbar pain     Chronic pain syndrome     Chronic thoracic spine pain     Cubital tunnel syndrome of both upper extremities 09/10/2022    Degenerative cervical spinal stenosis     Dental crowns present     Depression     Diabetes ([address] )     type 2    Exercise involving walking     daily 5-10 minutes    Failed neck syndrome     Fatty liver     Full dentures     upper    GERD (gastroesophageal reflux disease)     History of COVID-19 2021    per pt admitted to the [address] [place] [address]--    Hyperlipidemia     Hypertension     Irritable bowel syndrome     Motion sickness     Muscle weakness     \"arms and legs\"    Myofascial muscle pain     Neck stiffness     plate implanted-limited ROM    Polyarthralgia     Tinnitus     Wears glasses      Past Surgical History:   Procedure Laterality Date    CERVICAL DISC SURGERY      CERVICAL FUSION  2011    ACDF with Dr Williams BRADEN PST/PSTLAT TQ 1NTRSPC CRV BELW C2 SEGMENT Bilateral 6/19/2023    Procedure: C3-T1 posterior decompression with instrumented fixation fusion (impulse monitoring);   Surgeon: Mike Bettencourt MD;  Location: [facility];  Service: Neurosurgery    TOTAL SHOULDER REPLACEMENT Right     Reversal       Performed at least 2 patient identifiers during session: Name, ID bracelet, and Epic photo     06/20/23 1439   PT Last Visit   PT Visit Date 06/20/23   Note Type   Note type Evaluation   Pain " Assessment   Pain Assessment Tool 0-10   Pain Score 7   Pain Location/Orientation Location: Neck   Pain Radiating Towards upper back, between shoulder blades   Pain Onset/Description Onset: Ongoing; Descriptor: Pressure   Effect of Pain on Daily Activities limits comfort   Patient's Stated Pain Goal No pain   Hospital Pain Intervention(s) Repositioned; Ambulation/increased activity; Emotional support;Medication (See MAR)   Multiple Pain Sites No   Restrictions/Precautions   Weight Bearing Precautions Per Order No   Braces or Orthoses C/S Collar  ([place] collar to be worn at all times)   Other Precautions Multiple lines; Fall Risk;Pain;Spinal precautions   Home Living   Type of [address] [place] [address] Multi-level;Performs ADLs on one level; Able to live on main level with bedroom/bathroom;Stairs to enter without rails  (baseline has 2nd floor bedroom and master bathroom after 16 steps; however reports able to maintain FFSU temporarily after 1STE)   Bathroom Shower/Tub Walk-in shower   Bathroom Toilet Standard   Bathroom Equipment Grab bars in shower   [address] Other (Comment)  (no AD)   Prior Function   Level of [place] Independent with ADLs; Independent with functional mobility; Needs assistance with IADLS   Lives With Spouse   Receives Help From Family   IADLs Independent with driving; Independent with medication management; Family/Friend/Other provides meals  ( manages all lifting tasks, laundry, home managment, laundry )   Falls in the last 6 months 1 to 4  (pt reports 1 fall within last 6 months)   Vocational Full time employment  (Healthcare support, [address]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[address])   Comments Pt reports that PTA he was independent with all self care and functional mobility of household and community distances with no AD  Has required assistance for household management tasks and IADLs due to ongoing medical difficulty     General   Additional Pertinent History Pt admitted s/p "C3-T1 posterior instrumented fixation fusion & C3-C7 posterior decompression by Dr Rebecca Baca on 6/19/23, [address] collar to be worn at all times  Family/Caregiver Present Yes  (spouse present t/o session)   Cognition   Overall Cognitive Status WFL   Arousal/Participation Alert   Orientation Level Oriented X4   Memory Within functional limits   Following Commands Follows all commands and directions without difficulty   Subjective   Subjective \"I'm not going to be able to sleep flat  I need to have a recliner ordered  \"   RUE Assessment   RUE Assessment WFL   RUE Strength   RUE Overall Strength Within Functional Limits - able to perform ADL tasks with strength   LUE Assessment   LUE Assessment WFL   LUE Strength   LUE Overall Strength Within Functional Limits - able to perform ADL tasks with strength   RLE Assessment   RLE Assessment WFL   LLE Assessment   LLE Assessment WFL   Coordination   Movements are Fluid and Coordinated 1   Sensation X   Light Touch   RLE Light Touch Grossly intact   LLE Light Touch Impaired   LLE Light Touch Comments limited sensation as compared to R LE, pt reports as \"more dull\"   Proprioception   RLE Proprioception Grossly intact   LLE Proprioception Grossly Intact   Bed Mobility   Additional Comments Bed mobility NT on this date as pt presents and was left seated OOB in recliner chair at end of session  Transfers   Sit to Stand 6  Modified independent   Additional items Armrests   Stand to Sit 6  Modified independent   Additional items Armrests   Stand pivot 6  Modified independent   Additional items Increased time required  (RW)   Ambulation/Elevation   Gait pattern WNL; Decreased foot clearance; Short stride   Gait Assistance 5  Supervision   Additional items Assist x 1   Assistive Device Rolling walker  (baseline uses no AD)   Distance 100ft x1 with elevations assessment skilled nursing   Stair Management Assistance 5  Supervision   Additional items Verbal cues; Increased time required   Stair " Management Technique One rail R;Foreward   Number of Stairs 1  (1 standard height step up/down with RHR)   Ambulation/Elevation Additional Comments Therapist educated pt on management of RW up/down single step if needed upon return to home  Pt displays and verbalizes good understanding  Balance   Static Sitting Good   Dynamic Sitting Fair +   Static Standing Good  (w/ RW support)   Dynamic Standing Fair +  (w/ RW support)   Ambulatory Fair +  (w/ RW support)   Endurance Deficit   Endurance Deficit Yes   Activity Tolerance   Activity Tolerance Patient limited by fatigue;Patient limited by pain   Medical Staff Made Aware Spoke with CM, OT   Nurse Made Aware Spoke with LARON Burr pre/post session   Assessment   Prognosis Good   Problem List Decreased strength;Decreased endurance; Impaired balance;Decreased mobility; Decreased skin integrity;Orthopedic restrictions;Pain; Impaired sensation   Assessment Pt seen for PT evaluation for mobility assessment & discharge needs  Activity orders: OOB  Pt admitted s/p C3-T1 posterior instrumented fixation fusion & C3-C7 posterior decompression by Dr Angie Salmon on 6/19/23, [facility] collar to be worn at all times  Comorbidities affecting pt's fnxl performance include: R rTSA, cervical radiculopathy, chronic pain syndrome  During PT IE, pt completes transfers independently, ambulates 100ft with RW and S, and negotiates a single standard height step with S  The AM-PAC & Barthel Index outcome tools were used to assist in determining pt safety w/ mobility/self care & appropriate d/c recommendations, see above for scores  Pt is at risk of falls d/t use of ambulatory aid, varying levels of pain, acuity of medical illness, ongoing medical treatment of primary dx and polypharmacy  Pt will benefit from continued PT services in order to address impairments, decrease risk of falls, maximize independence w/ fnxl mobility, & ensure safety w/ mobility for transition to next level of care   Based on pt presentation & impairments, pt would most appropriately benefit from outpatient PT services  Barriers to Discharge None   Goals   Patient Goals to get full function back, improve mobility   STG Expiration Date 06/30/23   Short Term Goal #1 Patient PT goals established in order to return to PLOF  Pt will: complete all bed mobility in flat bed with S in order to promote increased OOB functional mobility to improve overall activity tolerance; ambulate >300ft with LRAD at [facility] in order to increase safety with household and short community functional mobility; negotiate 10-12 steps with HR assist and S in order to facilitate safe access to all areas of his harlan; demonstrate understanding and independence with LE strengthening HEP; improve dynamic standing balance to >/= good grade in order to promote safety and increased independence with mobility; tolerate >3hrs OOB in upright position, in order to improve muscular endurance and respiratory status; improve AM-PAC score to >/= 24/24 in order to increase independence with mobility and decrease burden of care; improve Barthel Index score to >/= 80/100 in order to increase independence and decrease risk of falls  PT Treatment Day 0   Plan   Treatment/Interventions Elevations;Gait training;Bed mobility;Spoke to nursing;Spoke to case management;Equipment eval/education   PT Frequency 1-2x/wk   Recommendation   PT Discharge Recommendation Home with outpatient rehabilitation   Equipment Recommended [address] [facility] Recommended Rollator   Change/add to SCP Events?  No   AM-PAC Basic Mobility Inpatient   Turning in Flat Bed Without Bedrails 3   Lying on Back to Sitting on Edge of Flat Bed Without Bedrails 3   Moving Bed to Chair 4   Standing Up From Chair Using Arms 4   Walk in Room 3   Climb 3-5 Stairs With Railing 3   Basic Mobility Inpatient Raw Score 20   Basic Mobility Standardized Score 43 99   Highest Level Of Mobility   -[facility] Goal 6: Walk 10 steps or more   JH-HLM Achieved 7: Walk 25 feet or more   Modified [place] Scale   Modified [place] Scale 3   Barthel Index   Feeding 10   Bathing 0   Grooming Score 0   Dressing Score 5   Bladder Score 10   Bowels Score 10   Toilet Use Score 5   Transfers (Bed/Chair) Score 15   Mobility (Level Surface) Score 10   Stairs Score 5   Barthel Index Score 70   End of Consult   Patient Position at End of Consult Bedside chair; All needs within reach  (spouse and RN in room)   End of Consult Comments Based on patient's Terressa Breaker Highest Level of Mobility scores today, patient currently has a goal of JH-HLM Levels: 7: WALK 25 FEET OR MORE, to be completed with RN staffing each shift, in order to improve overall activity tolerance and mobility, combat hospital related deconditioning, and maximize outcomes for d/c from the acute care setting  The patient's AM-PAC Basic Mobility Inpatient Short Form Raw Score is 20  A Raw score of greater than 16 suggests the patient may benefit from discharge to home  Please also refer to the recommendation of the Physical Therapist for safe discharge planning        Randall Landers PT, DPT   Available via Table8  I # [id number]  [place] License - [id number]  9/43/6149

## 2023-06-20 NOTE — OCCUPATIONAL THERAPY NOTE
"    Occupational Therapy Evaluation + Treatment      Patient Name: Kevin Fuller  Today's Date: 6/20/2023  Problem List  Active Problems: There are no active Hospital Problems  Past Medical History  Past Medical History:   Diagnosis Date   • Anxiety    • Arthritis    • Bilateral occipital neuralgia    • Cervical post-laminectomy syndrome    • Cervical radiculopathy    • Cervical spine disease    • Cervical spondylosis with myelopathy    • Chronic lumbar pain    • Chronic pain syndrome    • Chronic thoracic spine pain    • Cubital tunnel syndrome of both upper extremities 09/10/2022   • Degenerative cervical spinal stenosis    • Dental crowns present    • Depression    • Diabetes ([address] )     type 2   • Exercise involving walking     daily 5-10 minutes   • Failed neck syndrome    • Fatty liver    • Full dentures     upper   • GERD (gastroesophageal reflux disease)    • History of COVID-19 2021    per pt admitted to the [facility]--   • Hyperlipidemia    • Hypertension    • Irritable bowel syndrome    • Motion sickness    • Muscle weakness     \"arms and legs\"   • Myofascial muscle pain    • Neck stiffness     plate implanted-limited ROM   • Polyarthralgia    • Tinnitus    • Wears glasses      Past Surgical History  Past Surgical History:   Procedure Laterality Date   • CERVICAL DISC SURGERY     • CERVICAL FUSION  2011    ACDF with Dr Epifanio Scott   • COLONOSCOPY     • CO ARTHRD PST/PSTLAT TQ 1NTRSPC CRV BELW C2 SEGMENT Bilateral 6/19/2023    Procedure: C3-T1 posterior decompression with instrumented fixation fusion (impulse monitoring); Surgeon: Rosalie Cifuentes MD;  Location: [facility];  Service: Neurosurgery   • TOTAL SHOULDER REPLACEMENT Right     Reversal           06/20/23 1028   OT Last Visit   OT Visit Date 06/20/23   Note Type   Note type Evaluation   Pain Assessment   Pain Assessment Tool 0-10   Pain Score 4   Pain Location/Orientation Location: Neck   Pain Onset/Description Onset: Ongoing; Descriptor: Sore " "  Effect of Pain on Daily Activities limits comfort   Patient's Stated Pain Goal No pain   Hospital Pain Intervention(s) Cold applied;Repositioned; Emotional support;Medication (See MAR); Ambulation/increased activity   Multiple Pain Sites No   Restrictions/Precautions   Weight Bearing Precautions Per Order No   Braces or Orthoses C/S Collar  ([place] collar to be worn at all times)   Other Precautions Multiple lines; Fall Risk;Pain;Spinal precautions   Home Living   Type of Home House   Home Layout Multi-level; Access  (has multi level home But plans to stay in portion of home with FF and 1 YANET)   Bathroom Shower/Tub Walk-in shower   Bathroom Toilet Standard   Bathroom Equipment Grab bars in shower  (would like shower chair)   [address]   (no AD)   Prior Function   Level of [place] Independent with ADLs; Independent with functional mobility; Needs assistance with IADLS   Lives With Spouse   Receives Help From Family   IADLs Independent with driving; Independent with medication management; Family/Friend/Other provides meals  ( manages all lifting tasks, laundry, home [employer], laundry  currently not driving, would like to return to)   Falls in the last 6 months 0  (hx of falls d/t LLE giving out, however none within 6 mo)   Vocational On disability   Comments Limited activity tolerance d/t ongoing LE and BUE weakness  and instability per pt   Lifestyle   Autonomy At baseline, pt is (I) with ADLs, mod (I) c SPC  Lives c spouse in bilevel home  Reciprocal Relationships supportive    Service to Others on disability , works for [employer] , used to work for [employer] enjoys walking outside, spending time with spouse   General   Additional Pertinent History pt s/p posterior spinal decompression and fusion of C3- T1 POD #1 by Dr Ruben Barrett \"I just bought a mustang " ", I have to be able to drive it\"   ADL   Eating Assistance 6  Modified independent   Grooming Assistance 6  Modified Independent   Grooming Deficit   (was\hed hands at sink)   UB Bathing Assistance 5  Supervision/Setup   LB Bathing Assistance 5  Supervision/Setup   UB Dressing Assistance 5  Supervision/Setup   LB Dressing Assistance 5  Supervision/Setup   LB Dressing Deficit   (thread BLE through underwear, pulled over hips with supervision  no LOB)   Toileting Assistance  5  Supervision/Setup   Toileting Deficit   (supervision pericare )   Functional Assistance 5  Supervision/Setup   Functional Deficit Increased time to complete   Additional Comments Anticipate A for donning/doffing collar only  Bed Mobility   Supine to Sit 6  Modified independent   Additional items HOB elevated; Increased time required   Sit to Supine   (seated OOB in recliner at end of session)   Transfers   Sit to Stand 5  Supervision   Additional items Increased time required;Verbal cues   Stand to Sit 5  Supervision   Additional items Increased time required;Verbal cues   Toilet transfer 5  Supervision   Additional items Increased time required;Standard toilet  (use of GB)   Additional Comments Rw used during transfers, demonstrates safe body mechancis   Functional Mobility   Functional Mobility 5  Supervision   Additional Comments functional mobility short community distance within hallways, pt preferring to use Rw for comfort   no significant change in pain, no LOB   Additional items Rolling walker   Balance   Static Sitting Good   Dynamic Sitting Fair +   Static Standing Fair +   Dynamic Standing Fair +  (c R W)   Activity Tolerance   Activity Tolerance Patient limited by fatigue;Patient limited by pain   Medical Staff Made Aware CM, PT   Nurse Made Aware RN [place] pre/post session   RUE Assessment   RUE Assessment WFL   RUE Strength   RUE Overall Strength Within Functional Limits - able to perform ADL tasks with strength   LUE Assessment " LUE Assessment WFL   LUE Strength   LUE Overall Strength Within Functional Limits - able to perform ADL tasks with strength   Hand Function   Gross Motor Coordination Functional   Fine Motor Coordination Functional   Sensation   Light Touch Partial deficits in the RUE;Partial deficits in the LUE;Partial deficits in the LLE;Partial deficits in the RLE   Additional Comments Pt reports chronic ulnar nerve distribution impairements, LLE weakness  reports no change yet since sx   Cognition   Overall Cognitive Status [facility]   Arousal/Participation Alert; Cooperative   Attention Within functional limits   Orientation Level Oriented X4   Memory Within functional limits   Following Commands Follows all commands and directions without difficulty   Comments Pt agreebale to OT session, pleasant and cooperative  Demonstrates good safety awareness   Assessment   Limitation Decreased ADL status; Decreased UE strength;Decreased UE ROM; Decreased endurance;Decreased self-care trans;Decreased high-level ADLs  (+ pain, spinal precautions)   Prognosis Good   Assessment Patient is a 56 y o  male seen for OT evaluation + [facility] at [facility] following admission on 6/19/2023  s/p C3 - T1 posterior laminectomy and decompression/fusion  Please see above for comprehensive list of comorbidities and significant PMHx impacting functional performance  At baseline, pt is (I) with ADLs, no AD  Upon initial evaluation, pt appears to be performing below baseline functional status  Occupational performance is affected by the following deficits:  decreased muscular strength , acute change in mobility status , decreased trunk control , decreased activity tolerance  and (+) pain , further limited by spinal precautiosn   Supporting personal factors include: accessible home environment, support system available and attitude towards recovery Patient would benefit from OT services within the acute care setting to maximize level of functional independence in the following areas self-care transfers, functional mobility and ADLs  From OT standpoint, recommendation at time of D/C would be return to previous environment with outpatient rehabilitation  Goals   Patient Goals for sensation and strength to improve, to get back to riding his bike and driving his mustang   Plan   Treatment Interventions ADL retraining;Functional transfer training;UE strengthening/ROM; Endurance training;Patient/family training;Equipment evaluation/education; Neuromuscular reeducation   Goal Expiration Date 06/30/23   OT Treatment Day 0   OT Frequency 1-2x/wk   Recommendation   OT Discharge Recommendation Home with outpatient rehabilitation   Equipment Recommended Shower/Tub chair with back ($);Other (comment)  (rollator walker vs rolling walker)   Additional Comments  The patient's raw score on the AM-PAC Daily Activity Inpatient Short Form is 19  A raw score of greater than or equal to 19 suggests the patient may benefit from discharge to home  Please refer to the recommendation of the Occupational Therapist for safe discharge planning  AM-PAC Daily Activity Inpatient   Lower Body Dressing 3   Bathing 3   Toileting 3   Upper Body Dressing 3   Grooming 3   Eating 4   Daily Activity Raw Score 19   Daily Activity Standardized Score (Calc for Raw Score >=11) 40 22   Additional Treatment Session   Start Time 1110   End Time 1117   Treatment Assessment Pt participated in additional tx session following IE with intervention focus on increasing functional independence during ADL tasks  Pt provided c shower collar and hand out for donning/doffing and proper wear  Reviewed steps with patient and provided visual demonstration, Additional review of spinal precautions and steps for ensuring proper fit/care of [place] and shower collar  All questions answered, pt verbalized understanding   Pt with good application of c spine precautions during session   Additional Treatment Day 1 End of Consult   Education Provided Yes   Patient Position at End of Consult Bedside chair;Bed/Chair alarm activated; All needs within reach   Nurse Communication Nurse aware of consult   Goals established on initial evaluation in order to achieve pt's goal of maximizing functional independence, riding his bike, and driving his mustang  Pt will complete donning/doffing of shower collar and [place] collar with supervision  for increased ADL independence within 10 days  Pt will complete LB ADLs Mod independent   for increased ADL independence within 10 days  Pt will complete toileting Mod independent   with use of DME for increased ADL independence within 10 days  Pt will demonstrate proper body mechanics to complete self-care transfers and functional mobility with Mod independent  and use of LRAD for increased safety and functional independence within 10 days  Pt will demonstrate proper body mechanics and fall prevention strategies during 100% of tx sessions for increased safety awareness during ADL/IADLs    Pt will verbalize and demonstrate understanding of B UE HEP program increase strength and endurance during self care transfers within 10 days  Pt will verbalize and demonstrate understanding of spinal precautions in 100% of tx sessions for increased safety and functional mobility

## 2023-06-20 NOTE — PLAN OF CARE
Problem: PAIN - ADULT  Goal: Verbalizes/displays adequate comfort level or baseline comfort level  Description: Interventions:  - Encourage patient to monitor pain and request assistance  - Assess pain using appropriate pain scale  - Administer analgesics based on type and severity of pain and evaluate response  - Implement non-pharmacological measures as appropriate and evaluate response  - Consider cultural and social influences on pain and pain management  - Notify physician/advanced practitioner if interventions unsuccessful or patient reports new pain  Outcome: Progressing     Problem: INFECTION - ADULT  Goal: Absence or prevention of progression during hospitalization  Description: INTERVENTIONS:  - Assess and monitor for signs and symptoms of infection  - Monitor lab/diagnostic results  - Monitor all insertion sites, i e  indwelling lines, tubes, and drains  - Monitor endotracheal if appropriate and nasal secretions for changes in amount and color  - [place] appropriate cooling/warming therapies per order  - Administer medications as ordered  - Instruct and encourage patient and family to use good hand hygiene technique  - Identify and instruct in appropriate isolation precautions for identified infection/condition  Outcome: Progressing  Goal: Absence of fever/infection during neutropenic period  Description: INTERVENTIONS:  - Monitor WBC    Outcome: Progressing     Problem: SAFETY ADULT  Goal: Patient will remain free of falls  Description: INTERVENTIONS:  - Educate patient/family on patient safety including physical limitations  - Instruct patient to call for assistance with activity   - Consult OT/PT to assist with strengthening/mobility   - Keep Call bell within reach  - Keep bed low and locked with side rails adjusted as appropriate  - Keep care items and personal belongings within reach  - Initiate and maintain comfort rounds  - Make Fall Risk Sign visible to staff  - Offer Toileting every  Hours, in advance of need  - Initiate/Maintain alarm  - Obtain necessary fall risk management equipment:   - Apply yellow socks and bracelet for high fall risk patients  - Consider moving patient to room near nurses station  Outcome: Progressing  Goal: Maintain or return to baseline ADL function  Description: INTERVENTIONS:  -  Assess patient's ability to carry out ADLs; assess patient's baseline for ADL function and identify physical deficits which impact ability to perform ADLs (bathing, care of mouth/teeth, toileting, grooming, dressing, etc )  - Assess/evaluate cause of self-care deficits   - Assess range of motion  - Assess patient's mobility; develop plan if impaired  - Assess patient's need for assistive devices and provide as appropriate  - Encourage maximum independence but intervene and supervise when necessary  - Involve family in performance of ADLs  - Assess for home care needs following discharge   - Consider OT consult to assist with ADL evaluation and planning for discharge  - Provide patient education as appropriate  Outcome: Progressing  Goal: Maintains/Returns to pre admission functional level  Description: INTERVENTIONS:  - Perform BMAT or MOVE assessment daily    - Set and communicate daily mobility goal to care team and patient/family/caregiver  - Collaborate with rehabilitation services on mobility goals if consulted  - Perform Range of Motion  times a day  - Reposition patient every  hours    - Dangle patient  times a day  - Stand patient  times a day  - Ambulate patient  times a day  - Out of bed to chair  times a day   - Out of bed for meals times a day  - Out of bed for toileting  - Record patient progress and toleration of activity level   Outcome: Progressing     Problem: DISCHARGE PLANNING  Goal: Discharge to home or other facility with appropriate resources  Description: INTERVENTIONS:  - Identify barriers to discharge w/patient and caregiver  - Arrange for needed discharge resources and transportation as appropriate  - Identify discharge learning needs (meds, wound care, etc )  - Arrange for interpretive services to assist at discharge as needed  - Refer to Case Management Department for coordinating discharge planning if the patient needs post-hospital services based on physician/advanced practitioner order or complex needs related to functional status, cognitive ability, or social support system  Outcome: Progressing     Problem: Knowledge Deficit  Goal: Patient/family/caregiver demonstrates understanding of disease process, treatment plan, medications, and discharge instructions  Description: Complete learning assessment and assess knowledge base    Interventions:  - Provide teaching at level of understanding  - Provide teaching via preferred learning methods  Outcome: Progressing

## 2023-06-20 NOTE — TREATMENT PLAN
Notified by nurse that pt is tachycardic to the 120s-130s  Reports the pt is asx at this time  He denies any sx of chest pain or SOB  His BP is stable at 142/99 on the recheck at this time  Pt reports some neck pain at this time, rating as a 6/10 but tolerable  Per nurse, he does report some anxiety at being in the hospital  Pt remains in IVF @ 125cc/hr post-op  Plan:   - will obtain some basic labs including a CBC, BMP, Mg, and phos   - will obtain EKG   - asked nurse to treat the pt's pain with medications ordered   - will defer IVF bolus at this time as pt remains on IVF @ 125cc/hr       UPDATE:   - EKG reviewed and revealed sinus tachycardia, however, HR has slowed to 108  - BP remains stable   - labs reviewed and as follows:    - hgb stable    - mild, expected post-op leukocytosis    - mild hyponatremia, low Mg and phos     Plan:   - will replete electrolytes at this time   - continue IVF overnight @ 125cc/hr   - continue pain control with multimodal pain regimen and prn meds scheduled   - continue to monitor vitals frequently overnight   - will follow-up repeat labs in the am      Advised nurse to reach out with any further questions or concerns

## 2023-06-21 LAB
BASOPHILS # BLD AUTO: 0.03 THOUSANDS/ÂΜL (ref 0–0.1)
BASOPHILS NFR BLD AUTO: 0 % (ref 0–1)
EOSINOPHIL # BLD AUTO: 0.13 THOUSAND/ÂΜL (ref 0–0.61)
EOSINOPHIL NFR BLD AUTO: 1 % (ref 0–6)
ERYTHROCYTE [DISTWIDTH] IN BLOOD BY AUTOMATED COUNT: 13.2 % (ref 11.6–15.1)
GLUCOSE SERPL-MCNC: 126 MG/DL (ref 65–140)
GLUCOSE SERPL-MCNC: 141 MG/DL (ref 65–140)
GLUCOSE SERPL-MCNC: 141 MG/DL (ref 65–140)
GLUCOSE SERPL-MCNC: 143 MG/DL (ref 65–140)
HCT VFR BLD AUTO: 42.1 % (ref 36.5–49.3)
HGB BLD-MCNC: 14.3 G/DL (ref 12–17)
IMM GRANULOCYTES # BLD AUTO: 0.02 THOUSAND/UL (ref 0–0.2)
IMM GRANULOCYTES NFR BLD AUTO: 0 % (ref 0–2)
LYMPHOCYTES # BLD AUTO: 2.25 THOUSANDS/ÂΜL (ref 0.6–4.47)
LYMPHOCYTES NFR BLD AUTO: 24 % (ref 14–44)
MCH RBC QN AUTO: 31.2 PG (ref 26.8–34.3)
MCHC RBC AUTO-ENTMCNC: 34 G/DL (ref 31.4–37.4)
MCV RBC AUTO: 92 FL (ref 82–98)
MONOCYTES # BLD AUTO: 0.95 THOUSAND/ÂΜL (ref 0.17–1.22)
MONOCYTES NFR BLD AUTO: 10 % (ref 4–12)
NEUTROPHILS # BLD AUTO: 6.18 THOUSANDS/ÂΜL (ref 1.85–7.62)
NEUTS SEG NFR BLD AUTO: 65 % (ref 43–75)
NRBC BLD AUTO-RTO: 0 /100 WBCS
PLATELET # BLD AUTO: 196 THOUSANDS/UL (ref 149–390)
PMV BLD AUTO: 9.5 FL (ref 8.9–12.7)
RBC # BLD AUTO: 4.59 MILLION/UL (ref 3.88–5.62)
WBC # BLD AUTO: 9.56 THOUSAND/UL (ref 4.31–10.16)

## 2023-06-21 PROCEDURE — 85025 COMPLETE CBC W/AUTO DIFF WBC: CPT | Performed by: PHYSICIAN ASSISTANT

## 2023-06-21 PROCEDURE — 82948 REAGENT STRIP/BLOOD GLUCOSE: CPT

## 2023-06-21 PROCEDURE — 99024 POSTOP FOLLOW-UP VISIT: CPT | Performed by: PHYSICIAN ASSISTANT

## 2023-06-21 RX ORDER — POLYETHYLENE GLYCOL 3350 17 G/17G
17 POWDER, FOR SOLUTION ORAL DAILY PRN
Status: DISCONTINUED | OUTPATIENT
Start: 2023-06-21 | End: 2023-06-22 | Stop reason: HOSPADM

## 2023-06-21 RX ORDER — LIDOCAINE 50 MG/G
1 PATCH TOPICAL DAILY PRN
Status: DISCONTINUED | OUTPATIENT
Start: 2023-06-21 | End: 2023-06-22 | Stop reason: HOSPADM

## 2023-06-21 RX ORDER — HYDROMORPHONE HCL/PF 1 MG/ML
1 SYRINGE (ML) INJECTION EVERY 4 HOURS PRN
Status: DISCONTINUED | OUTPATIENT
Start: 2023-06-21 | End: 2023-06-22 | Stop reason: HOSPADM

## 2023-06-21 RX ADMIN — DOCUSATE SODIUM 100 MG: 100 CAPSULE, LIQUID FILLED ORAL at 08:21

## 2023-06-21 RX ADMIN — SODIUM CHLORIDE, SODIUM LACTATE, POTASSIUM CHLORIDE, AND CALCIUM CHLORIDE 125 ML/HR: .6; .31; .03; .02 INJECTION, SOLUTION INTRAVENOUS at 04:40

## 2023-06-21 RX ADMIN — ACETAMINOPHEN 975 MG: 325 TABLET, FILM COATED ORAL at 21:22

## 2023-06-21 RX ADMIN — METHOCARBAMOL 750 MG: 750 TABLET ORAL at 17:10

## 2023-06-21 RX ADMIN — DOCUSATE SODIUM 100 MG: 100 CAPSULE, LIQUID FILLED ORAL at 17:10

## 2023-06-21 RX ADMIN — METHOCARBAMOL 750 MG: 750 TABLET ORAL at 05:03

## 2023-06-21 RX ADMIN — SODIUM CHLORIDE, SODIUM LACTATE, POTASSIUM CHLORIDE, AND CALCIUM CHLORIDE 125 ML/HR: .6; .31; .03; .02 INJECTION, SOLUTION INTRAVENOUS at 19:19

## 2023-06-21 RX ADMIN — HEPARIN SODIUM 5000 UNITS: 5000 INJECTION INTRAVENOUS; SUBCUTANEOUS at 13:11

## 2023-06-21 RX ADMIN — GABAPENTIN 100 MG: 100 CAPSULE ORAL at 15:09

## 2023-06-21 RX ADMIN — GABAPENTIN 100 MG: 100 CAPSULE ORAL at 20:28

## 2023-06-21 RX ADMIN — SENNOSIDES 8.6 MG: 8.6 TABLET, FILM COATED ORAL at 08:21

## 2023-06-21 RX ADMIN — ACETAMINOPHEN 975 MG: 325 TABLET, FILM COATED ORAL at 13:11

## 2023-06-21 RX ADMIN — TRAZODONE HYDROCHLORIDE 75 MG: 50 TABLET ORAL at 22:42

## 2023-06-21 RX ADMIN — PANTOPRAZOLE SODIUM 20 MG: 20 TABLET, DELAYED RELEASE ORAL at 05:03

## 2023-06-21 RX ADMIN — GABAPENTIN 100 MG: 100 CAPSULE ORAL at 08:19

## 2023-06-21 RX ADMIN — METHOCARBAMOL 750 MG: 750 TABLET ORAL at 11:26

## 2023-06-21 RX ADMIN — LIDOCAINE 5% 1 PATCH: 700 PATCH TOPICAL at 08:18

## 2023-06-21 RX ADMIN — HYDROMORPHONE HYDROCHLORIDE 1 MG: 1 INJECTION, SOLUTION INTRAMUSCULAR; INTRAVENOUS; SUBCUTANEOUS at 20:28

## 2023-06-21 RX ADMIN — HEPARIN SODIUM 5000 UNITS: 5000 INJECTION INTRAVENOUS; SUBCUTANEOUS at 05:02

## 2023-06-21 RX ADMIN — OXYCODONE HYDROCHLORIDE 10 MG: 5 TABLET ORAL at 19:18

## 2023-06-21 RX ADMIN — OXYCODONE HYDROCHLORIDE 10 MG: 5 TABLET ORAL at 09:00

## 2023-06-21 RX ADMIN — HYDROMORPHONE HYDROCHLORIDE 1 MG: 1 INJECTION, SOLUTION INTRAMUSCULAR; INTRAVENOUS; SUBCUTANEOUS at 11:45

## 2023-06-21 RX ADMIN — HEPARIN SODIUM 5000 UNITS: 5000 INJECTION INTRAVENOUS; SUBCUTANEOUS at 21:22

## 2023-06-21 RX ADMIN — LOSARTAN POTASSIUM 12.5 MG: 25 TABLET, FILM COATED ORAL at 08:19

## 2023-06-21 RX ADMIN — LISINOPRIL 10 MG: 10 TABLET ORAL at 08:21

## 2023-06-21 RX ADMIN — OXYCODONE HYDROCHLORIDE 10 MG: 5 TABLET ORAL at 04:38

## 2023-06-21 RX ADMIN — ACETAMINOPHEN 975 MG: 325 TABLET, FILM COATED ORAL at 05:03

## 2023-06-21 RX ADMIN — SODIUM CHLORIDE, SODIUM LACTATE, POTASSIUM CHLORIDE, AND CALCIUM CHLORIDE 125 ML/HR: .6; .31; .03; .02 INJECTION, SOLUTION INTRAVENOUS at 11:49

## 2023-06-21 RX ADMIN — OXYCODONE HYDROCHLORIDE 10 MG: 5 TABLET ORAL at 15:09

## 2023-06-21 NOTE — PLAN OF CARE
Problem: PAIN - ADULT  Goal: Verbalizes/displays adequate comfort level or baseline comfort level  Description: Interventions:  - Encourage patient to monitor pain and request assistance  - Assess pain using appropriate pain scale  - Administer analgesics based on type and severity of pain and evaluate response  - Implement non-pharmacological measures as appropriate and evaluate response  - Consider cultural and social influences on pain and pain management  - Notify physician/advanced practitioner if interventions unsuccessful or patient reports new pain  Outcome: Progressing     Problem: INFECTION - ADULT  Goal: Absence or prevention of progression during hospitalization  Description: INTERVENTIONS:  - Assess and monitor for signs and symptoms of infection  - Monitor lab/diagnostic results  - Monitor all insertion sites, i e  indwelling lines, tubes, and drains  - Monitor endotracheal if appropriate and nasal secretions for changes in amount and color  - [place] appropriate cooling/warming therapies per order  - Administer medications as ordered  - Instruct and encourage patient and family to use good hand hygiene technique  - Identify and instruct in appropriate isolation precautions for identified infection/condition  Outcome: Progressing  Goal: Absence of fever/infection during neutropenic period  Description: INTERVENTIONS:  - Monitor WBC    Outcome: Progressing     Problem: SAFETY ADULT  Goal: Patient will remain free of falls  Description: INTERVENTIONS:  - Educate patient/family on patient safety including physical limitations  - Instruct patient to call for assistance with activity   - Consult OT/PT to assist with strengthening/mobility   - Keep Call bell within reach  - Keep bed low and locked with side rails adjusted as appropriate  - Keep care items and personal belongings within reach  - Initiate and maintain comfort rounds  - Make Fall Risk Sign visible to staff  - Offer Toileting every 2 Hours, in advance of need  - Initiate/Maintain bed alarm  - Obtain necessary fall risk management equipment  - Apply yellow socks and bracelet for high fall risk patients  - Consider moving patient to room near nurses station  Outcome: Progressing  Goal: Maintain or return to baseline ADL function  Description: INTERVENTIONS:  -  Assess patient's ability to carry out ADLs; assess patient's baseline for ADL function and identify physical deficits which impact ability to perform ADLs (bathing, care of mouth/teeth, toileting, grooming, dressing, etc )  - Assess/evaluate cause of self-care deficits   - Assess range of motion  - Assess patient's mobility; develop plan if impaired  - Assess patient's need for assistive devices and provide as appropriate  - Encourage maximum independence but intervene and supervise when necessary  - Involve family in performance of ADLs  - Assess for home care needs following discharge   - Consider OT consult to assist with ADL evaluation and planning for discharge  - Provide patient education as appropriate  Outcome: Progressing  Goal: Maintains/Returns to pre admission functional level  Description: INTERVENTIONS:  - Perform BMAT or MOVE assessment daily    - Set and communicate daily mobility goal to care team and patient/family/caregiver  - Collaborate with rehabilitation services on mobility goals if consulted  - Perform Range of Motion 2 times a day  - Reposition patient every 2 hours    - Dangle patient 2 times a day  - Stand patient 3 times a day  - Ambulate patient 3 times a day  - Out of bed to chair 3 times a day   - Out of bed for meals 3 times a day  - Out of bed for toileting  - Record patient progress and toleration of activity level   Outcome: Progressing     Problem: DISCHARGE PLANNING  Goal: Discharge to home or other facility with appropriate resources  Description: INTERVENTIONS:  - Identify barriers to discharge w/patient and caregiver  - Arrange for needed discharge resources and transportation as appropriate  - Identify discharge learning needs (meds, wound care, etc )  - Arrange for interpretive services to assist at discharge as needed  - Refer to Case Management Department for coordinating discharge planning if the patient needs post-hospital services based on physician/advanced practitioner order or complex needs related to functional status, cognitive ability, or social support system  Outcome: Progressing     Problem: Knowledge Deficit  Goal: Patient/family/caregiver demonstrates understanding of disease process, treatment plan, medications, and discharge instructions  Description: Complete learning assessment and assess knowledge base  Interventions:  - Provide teaching at level of understanding  - Provide teaching via preferred learning methods  Outcome: Progressing     Problem: Nutrition/Hydration-ADULT  Goal: Nutrient/Hydration intake appropriate for improving, restoring or maintaining nutritional needs  Description: Monitor and assess patient's nutrition/hydration status for malnutrition  Collaborate with interdisciplinary team and initiate plan and interventions as ordered  Monitor patient's weight and dietary intake as ordered or per policy  Utilize nutrition screening tool and intervene as necessary  Determine patient's food preferences and provide high-protein, high-caloric foods as appropriate       INTERVENTIONS:  - Monitor oral intake, urinary output, labs, and treatment plans  - Assess nutrition and hydration status and recommend course of action  - Evaluate amount of meals eaten  - Assist patient with eating if necessary   - Allow adequate time for meals  - Recommend/ encourage appropriate diets, oral nutritional supplements, and vitamin/mineral supplements  - Order, calculate, and assess calorie counts as needed  - Recommend, monitor, and adjust tube feedings and TPN/PPN based on assessed needs  - Assess need for intravenous fluids  - Provide specific nutrition/hydration education as appropriate  - Include patient/family/caregiver in decisions related to nutrition  Outcome: Progressing     Problem: MOBILITY - ADULT  Goal: Maintain or return to baseline ADL function  Description: INTERVENTIONS:  -  Assess patient's ability to carry out ADLs; assess patient's baseline for ADL function and identify physical deficits which impact ability to perform ADLs (bathing, care of mouth/teeth, toileting, grooming, dressing, etc )  - Assess/evaluate cause of self-care deficits   - Assess range of motion  - Assess patient's mobility; develop plan if impaired  - Assess patient's need for assistive devices and provide as appropriate  - Encourage maximum independence but intervene and supervise when necessary  - Involve family in performance of ADLs  - Assess for home care needs following discharge   - Consider OT consult to assist with ADL evaluation and planning for discharge  - Provide patient education as appropriate  Outcome: Progressing  Goal: Maintains/Returns to pre admission functional level  Description: INTERVENTIONS:  - Perform BMAT or MOVE assessment daily    - Set and communicate daily mobility goal to care team and patient/family/caregiver  - Collaborate with rehabilitation services on mobility goals if consulted  - Perform Range of Motion 2 times a day  - Reposition patient every 2 hours    - Dangle patient 2 times a day  - Stand patient 2 times a day  - Ambulate patient 3 times a day  - Out of bed to chair 3 times a day   - Out of bed for meals 3 times a day  - Out of bed for toileting  - Record patient progress and toleration of activity level   Outcome: Progressing

## 2023-06-21 NOTE — CASE MANAGEMENT
Case Management Assessment & Discharge Planning Note    Patient name Jose Roberto List  Location S /S -01 MRN [id number]  : 1965 Date 2023       Current Admission Date: 2023  Current Admission Diagnosis:Status post cervical spinal fusion, Other spondylosis with myelopathy, cervical region, Cervical radiculopathy, Cervical spine disease   Patient Active Problem List    Diagnosis Date Noted   • Cubital tunnel syndrome of both upper extremities 09/10/2022   • Arthrodesis status    • Chronic pain syndrome    • Other spondylosis with myelopathy, cervical region 12/10/2018   • Cervical radiculopathy 2018   • Cervical post-laminectomy syndrome 2018   • Chronic thoracic spine pain 2018      LOS (days): 2  Geometric Mean LOS (GMLOS) (days):   Days to GMLOS:     OBJECTIVE:    Risk of Unplanned Readmission Score: 7 87         Current admission status: Inpatient  Referral Reason: Other    Preferred Pharmacy:   [id number]- [place], [place] - [address]  Phone: [phone] Fax: [phone]    Primary Care Provider: Nils Bazan MD    Primary Insurance: WORKERS COMPENSATION  Secondary Insurance:     ASSESSMENT:  Active Health Care Proxies    There are no active Health Care Proxies on file  Readmission Root Cause  30 Day Readmission: No    Patient Information  Admitted from[de-identified] Home  Mental Status: Alert  During Assessment patient was accompanied by: Not accompanied during assessment  Assessment information provided by[de-identified] Patient  Primary Caregiver: Self  Support Systems: Self, Spouse/significant other, Family members  [place] of Residence: [place] [address] do you live in?: [address] entry access options  Select all that apply : Stairs  Number of steps to enter home  : 1  Do the steps have railings?: No  Type of Current Residence: 2 story home  Upon entering residence, is there a bedroom on the main floor (no further steps)?: Yes  Upon entering residence, is there a bathroom on the main floor (no further steps)?: Yes  In the last 12 months, was there a time when you were not able to pay the mortgage or rent on time?: No  In the last 12 months, how many places have you lived?: 1  In the last 12 months, was there a time when you did not have a steady place to sleep or slept in a shelter (including now)?: No  Homeless/housing insecurity resource given?: N/A  Living Arrangements: Lives w/ Spouse/significant other  Is patient a ?: Yes  Is patient active with [facility])?: Yes    Activities of Daily Living Prior to Admission  Functional Status: Independent  Completes ADLs independently?: Yes  Ambulates independently?: Yes  Does patient use assisted devices?: No  Does patient currently own DME?: No  Does patient have a history of Outpatient Therapy (PT/OT)?: Yes  Does the patient have a history of Short-Term Rehab?: No  Does patient have a history of HHC?: Yes  Does patient currently have [facility]?: No         Patient Information Continued  Income Source: SSI/SSD (Patient reports he has income from the government from his workmans comp claim)  Does patient have prescription coverage?: Yes  Within the past 12 months, you worried that your food would run out before you got the money to buy more : Never true  Within the past 12 months, the food you bought just didn't last and you didn't have money to get more : Never true  Food insecurity resource given?: N/A  Does patient receive dialysis treatments?: No  Does patient have a history of substance abuse?: No  Does patient have a history of Mental Health Diagnosis?: Yes (Anxiety)  Is patient receiving treatment for mental health?: Yes  Has patient received inpatient treatment related to mental health in the last 2 years?: No         Means of Transportation  Means of Transport to Appts[de-identified] Drives Self  In the past 12 months, has lack of transportation kept you from medical appointments or from getting medications?: No  In the past 12 months, has lack of transportation kept you from meetings, work, or from getting things needed for daily living?: No  Was application for public transport provided?: N/A        DISCHARGE DETAILS:    Discharge planning discussed with[de-identified] Patient  Freedom of Choice: Yes  Comments - Freedom of Choice: Discussed DCP recommendation for outpatient PT/OT and DME  CM contacted family/caregiver?: No- see comments (Patient is alert and oriented and declined CM outreach)  Were Treatment Team discharge recommendations reviewed with patient/caregiver?: Yes  Did patient/caregiver verbalize understanding of patient care needs?: Yes  Were patient/caregiver advised of the risks associated with not following Treatment Team discharge recommendations?: Yes    Contacts  Patient Contacts: Patient  Relationship to Patient[de-identified] Other (Comment)  Contact Method: In Person  Reason/Outcome: Discharge Planning, Continuity of Care, Referral    Requested [address]         Is the patient interested in [place] [address] at discharge?: No    DME Referral Provided  Referral made for DME?: Yes  DME referral completed for the following items[de-identified] Jerson Good  DME Supplier Name[de-identified] [employer]    Other Referral/Resources/Interventions Provided:  Interventions: DME         Treatment Team Recommendation: Home  Discharge Destination Plan[de-identified] Home  Transport at Discharge : Family                                         CM met with patient at bedside  CM name and role reviewed  CM assessment completed and charted above  CM reviewed PT/OT recommendations and DME requests/needs  Patient stated he purchased a lift recliner which is being delivered to his home tonight  CM reviewed rollator and shower chair request   A referral was made to [employer] via [place] for these items to get approved through his WC claim        Workman's Comp Claim info:  Date of Injury - 1/7/2005   Department of Labor  Case # [id number]  (P) [phone]    Patient verbalized concerns of the current pain he was still having  CM shared patient's concerns with the neurosurgery team     CM reviewed discharge planning process including the following: identifying caregivers at home, preference for d/c planning needs, [facility] Meds to Bed program, and discharge planning  CM will continue to follow for care coordination and update assessment as necessary

## 2023-06-21 NOTE — QUICK NOTE
Patient complains of severe break through pain that involves posterior neck , shoulder blade, up to occipital region and down to his shoulder and arm   Re-ordered Dilaudid PRN

## 2023-06-21 NOTE — PROGRESS NOTES
Progress Note - Neurosurgery   Navjot Moreno  56 y o  male MRN: [id number]  Unit/Bed#: S -01 Encounter: [id number]      Assessment:  1  S/P # 2 C3-T1 PCDF  2  Hx of cervical radiculopathy        Plan:   - Exam:  A&OX3, Damon,  including iOS 4/5 bilaterally, otherwise pain inhibition shoulder abduction weakness, elbow flex/ext 4+/5 bilat, sensation to LT decreased in the Ulnar distribution of bilateral hand and fingers  DTR 2+  No clonus bilaterally  Negative Khan's tests bilat  Dressing clean and dry  Drain in place, pinkish body fluid, OO=471fo/24h  [place] collar on      - Imaging is reviewed personally and findings as follows:  - Postop upright cervical spine x-rays 6/19/2023 demonstrates postop changes and stable hardware's, with good construction, all the screws in the pedicles  ? Pain control:   1  Tylenol 975 mg oral 3 times daily scheduled pain  2  Gabapentin 100 mg oral 3 times daily neuropathic pain  3  Dilaudid 1 mg IV every 4 hours as needed breakthrough pain  4  Methocarbamol every 6 hours scheduled-for muscle spasm  5  Oxycodone 10 mg oral every 4 hours as needed severe pain  6  Oxycodone 5 mg oral every 4 hours as needed moderate pain  ? DVT ppx: SCDs bilateral legs,   ? Activity: As tolerated  ? PT/OT evaluation & treatment  ? Brace: Wear [place] cervical collar all the time  ? Medical Mx:  1  Hypertension-on lisinopril and losartan  2  Diabetes mellitus-on insulin- sliding scale  3  Nausea and vomiting on Zofran  4  Constipation on Colace  ? Neurocheck: Routine  ? BRISA cvgsog=481db/20H, pinkish body fluid  ? Patient complains of severe shoulder blade pain, was OOB and walked to the recliner did PT, eats well but didn't have BM, will add Miralax to his bowel regimen  Drain 175 pinkish body fluid, consider putting on under gravity mode  DC IV fluid and  Dilaudid  PT recommends Home with home PT  Consider discharging him tomorrow   Barak with questions or "concerns        Subjective/Objective   Chief Complaint: \" Shoulder blade pain\"/2nd post op day progress note    Subjective: Patient reports shoulder blade pain, feels better with dilaudid injection, He reports resolution orf bilateral UE radicular pain, noticed improved left  quads weakness , residual paresthesia and numbness along pinky and ring fingers bilaterally,  some stiffness and tightness in both hands with  weakness and limited shoulder abduction likely related to pain inhibition  He was OOB, doing PT, appetite is good, voided urine and gases, but no BM  No fever, chills, rigors, cough or chest pain  BRISA drain in draining light red body fluid, [place] on, denies any drainage or discharge from incision  Objective: Patient supine in bed, communicates well and in no pain distress, appears slightly tired    I/O       06/19 0701  06/20 0700 06/20 0701  06/21 0700    P  O  200 480    I V  (mL/kg) 1500 (13 5) 850 (7 6)    NG/GT 0     Total Intake(mL/kg) 1700 (15 3) 1330 (11 9)    Urine (mL/kg/hr) 2300 (0 9) 1950 (0 7)    Drains 210 175    Blood 150     Total Output 2660 2125    Net -960 -795                Invasive Devices     Peripheral Intravenous Line  Duration           Peripheral IV 06/20/23 Right;Ventral (anterior) Forearm <1 day          Drain  Duration           Closed/Suction Drain Posterior Neck Bulb 1 day                Physical Exam:  Vitals: Blood pressure 107/74, pulse 73, temperature 98 6 °F (37 °C), temperature source Oral, resp  rate 21, weight 112 kg (247 lb 9 2 oz), SpO2 90 %  ,Body mass index is 37 64 kg/m²          General appearance: alert, appears stated age, cooperative and no distress  Head: Normocephalic, without obvious abnormality, atraumatic  Eyes: EOMI, PERRL 2 mm conjugate bilaterally  Neck: Posterior surgical wound dry dressing except slight staining in the inferior border, [place] collar on  Lungs: non labored breathing  Heart: regular heart rate  Neurologic:   Mental " "status: Alert, oriented x3, thought content appropriate  Cranial nerves: grossly intact (Cranial nerves II-XII)  Sensory: normal to light touch throughout except slightly decreased along the bilateral pinky and ring fingers  Motor: moving all extremities without focal weakness; see exam above  Reflexes: 2+ and symmetric  Coordination: finger to nose normal bilaterally, no drift bilaterally      Lab Results:  Results from last 7 days   Lab Units 06/20/23 0457 06/19/23 2054   WBC Thousand/uL 15 31* 12 71*   HEMOGLOBIN g/dL 14 2 15 3   HEMATOCRIT % 41 5 44 8   PLATELETS Thousands/uL 235 238     Results from last 7 days   Lab Units 06/20/23  0457 06/19/23 2054   POTASSIUM mmol/L 4 5 4 9   CHLORIDE mmol/L 101 101   CO2 mmol/L 27 25   BUN mg/dL 13 13   CREATININE mg/dL 0 86 0 97   CALCIUM mg/dL 8 5 9 1     Results from last 7 days   Lab Units 06/20/23  0457 06/19/23 2054   MAGNESIUM mg/dL 2 1 1 7*     Results from last 7 days   Lab Units 06/20/23 0457 06/19/23 2054   PHOSPHORUS mg/dL 3 8 2 3*         No results found for: \"TROPONINT\"  ABG:No results found for: \"PHART\", \"NNQ3KXF\", \"PO2ART\", \"AUN9CHT\", \"X6JGXEQZ\", \"BEART\", \"SOURCE\"    Imaging Studies: I have personally reviewed pertinent reports  and I have personally reviewed pertinent films in PACS    EKG, Pathology, and Other Studies: I have personally reviewed pertinent reports        VTE Pharmacologic Prophylaxis: Heparin SQ    VTE Mechanical Prophylaxis: sequential compression device      " Attending Attestation (For Attendings USE Only)...

## 2023-06-22 ENCOUNTER — TELEPHONE (OUTPATIENT)
Dept: NEUROSURGERY | Facility: CLINIC | Age: 58
End: 2023-06-22

## 2023-06-22 ENCOUNTER — APPOINTMENT (OUTPATIENT)
Dept: RADIOLOGY | Facility: HOSPITAL | Age: 58
DRG: 215 | End: 2023-06-22
Payer: OTHER MISCELLANEOUS

## 2023-06-22 VITALS
BODY MASS INDEX: 37.88 KG/M2 | HEART RATE: 69 BPM | DIASTOLIC BLOOD PRESSURE: 61 MMHG | SYSTOLIC BLOOD PRESSURE: 115 MMHG | RESPIRATION RATE: 16 BRPM | WEIGHT: 249.12 LBS | TEMPERATURE: 99.6 F | OXYGEN SATURATION: 92 %

## 2023-06-22 PROBLEM — Z98.1 S/P CERVICAL SPINAL FUSION: Status: ACTIVE | Noted: 2023-06-22

## 2023-06-22 LAB
BACTERIA UR QL AUTO: NORMAL /HPF
BASOPHILS # BLD AUTO: 0.04 THOUSANDS/ÂΜL (ref 0–0.1)
BASOPHILS NFR BLD AUTO: 1 % (ref 0–1)
BILIRUB UR QL STRIP: NEGATIVE
CLARITY UR: CLEAR
COLOR UR: COLORLESS
DME PARACHUTE DELIVERY DATE ACTUAL: NORMAL
DME PARACHUTE DELIVERY DATE REQUESTED: NORMAL
DME PARACHUTE ITEM DESCRIPTION: NORMAL
DME PARACHUTE ORDER STATUS: NORMAL
DME PARACHUTE SUPPLIER NAME: NORMAL
DME PARACHUTE SUPPLIER PHONE: NORMAL
EOSINOPHIL # BLD AUTO: 0.11 THOUSAND/ÂΜL (ref 0–0.61)
EOSINOPHIL NFR BLD AUTO: 1 % (ref 0–6)
ERYTHROCYTE [DISTWIDTH] IN BLOOD BY AUTOMATED COUNT: 13.2 % (ref 11.6–15.1)
GLUCOSE SERPL-MCNC: 150 MG/DL (ref 65–140)
GLUCOSE SERPL-MCNC: 165 MG/DL (ref 65–140)
GLUCOSE UR STRIP-MCNC: NEGATIVE MG/DL
HCT VFR BLD AUTO: 42.2 % (ref 36.5–49.3)
HGB BLD-MCNC: 14.1 G/DL (ref 12–17)
HGB UR QL STRIP.AUTO: NEGATIVE
IMM GRANULOCYTES # BLD AUTO: 0.03 THOUSAND/UL (ref 0–0.2)
IMM GRANULOCYTES NFR BLD AUTO: 0 % (ref 0–2)
KETONES UR STRIP-MCNC: NEGATIVE MG/DL
LEUKOCYTE ESTERASE UR QL STRIP: NEGATIVE
LYMPHOCYTES # BLD AUTO: 2.11 THOUSANDS/ÂΜL (ref 0.6–4.47)
LYMPHOCYTES NFR BLD AUTO: 24 % (ref 14–44)
MCH RBC QN AUTO: 31.2 PG (ref 26.8–34.3)
MCHC RBC AUTO-ENTMCNC: 33.4 G/DL (ref 31.4–37.4)
MCV RBC AUTO: 93 FL (ref 82–98)
MONOCYTES # BLD AUTO: 0.84 THOUSAND/ÂΜL (ref 0.17–1.22)
MONOCYTES NFR BLD AUTO: 10 % (ref 4–12)
NEUTROPHILS # BLD AUTO: 5.63 THOUSANDS/ÂΜL (ref 1.85–7.62)
NEUTS SEG NFR BLD AUTO: 64 % (ref 43–75)
NITRITE UR QL STRIP: NEGATIVE
NON-SQ EPI CELLS URNS QL MICRO: NORMAL /HPF
NRBC BLD AUTO-RTO: 0 /100 WBCS
PH UR STRIP.AUTO: 7.5 [PH]
PLATELET # BLD AUTO: 189 THOUSANDS/UL (ref 149–390)
PMV BLD AUTO: 9.8 FL (ref 8.9–12.7)
PROT UR STRIP-MCNC: NEGATIVE MG/DL
RBC # BLD AUTO: 4.52 MILLION/UL (ref 3.88–5.62)
RBC #/AREA URNS AUTO: NORMAL /HPF
SP GR UR STRIP.AUTO: 1.01 (ref 1–1.03)
UROBILINOGEN UR STRIP-ACNC: <2 MG/DL
WBC # BLD AUTO: 8.76 THOUSAND/UL (ref 4.31–10.16)
WBC #/AREA URNS AUTO: NORMAL /HPF

## 2023-06-22 PROCEDURE — 97530 THERAPEUTIC ACTIVITIES: CPT

## 2023-06-22 PROCEDURE — 82948 REAGENT STRIP/BLOOD GLUCOSE: CPT

## 2023-06-22 PROCEDURE — 71046 X-RAY EXAM CHEST 2 VIEWS: CPT

## 2023-06-22 PROCEDURE — 85025 COMPLETE CBC W/AUTO DIFF WBC: CPT | Performed by: PHYSICIAN ASSISTANT

## 2023-06-22 PROCEDURE — 99024 POSTOP FOLLOW-UP VISIT: CPT | Performed by: PHYSICIAN ASSISTANT

## 2023-06-22 PROCEDURE — 97116 GAIT TRAINING THERAPY: CPT

## 2023-06-22 PROCEDURE — 81001 URINALYSIS AUTO W/SCOPE: CPT | Performed by: PHYSICIAN ASSISTANT

## 2023-06-22 PROCEDURE — 97110 THERAPEUTIC EXERCISES: CPT

## 2023-06-22 RX ORDER — SENNOSIDES 8.6 MG
8.6 TABLET ORAL DAILY
Qty: 10 TABLET | Refills: 0 | Status: SHIPPED | OUTPATIENT
Start: 2023-06-23

## 2023-06-22 RX ORDER — POLYETHYLENE GLYCOL 3350 17 G/17G
17 POWDER, FOR SOLUTION ORAL DAILY PRN
Qty: 100 G | Refills: 0 | Status: SHIPPED | OUTPATIENT
Start: 2023-06-22

## 2023-06-22 RX ORDER — DOCUSATE SODIUM 100 MG/1
100 CAPSULE, LIQUID FILLED ORAL 2 TIMES DAILY
Qty: 20 CAPSULE | Refills: 0 | Status: SHIPPED | OUTPATIENT
Start: 2023-06-22

## 2023-06-22 RX ORDER — BISACODYL 10 MG
10 SUPPOSITORY, RECTAL RECTAL DAILY PRN
Status: DISCONTINUED | OUTPATIENT
Start: 2023-06-22 | End: 2023-06-22 | Stop reason: HOSPADM

## 2023-06-22 RX ORDER — OXYCODONE HYDROCHLORIDE 10 MG/1
10 TABLET ORAL EVERY 4 HOURS PRN
Qty: 40 TABLET | Refills: 0 | Status: SHIPPED | OUTPATIENT
Start: 2023-06-22 | End: 2023-07-02

## 2023-06-22 RX ORDER — CYCLOBENZAPRINE HCL 5 MG
5 TABLET ORAL 3 TIMES DAILY PRN
Qty: 60 TABLET | Refills: 0 | Status: SHIPPED | OUTPATIENT
Start: 2023-06-22

## 2023-06-22 RX ORDER — GABAPENTIN 600 MG/1
600 TABLET ORAL 3 TIMES DAILY
Qty: 90 TABLET | Refills: 0 | Status: SHIPPED | OUTPATIENT
Start: 2023-06-22

## 2023-06-22 RX ORDER — ACETAMINOPHEN 325 MG/1
975 TABLET ORAL EVERY 8 HOURS SCHEDULED
Qty: 40 TABLET | Refills: 0 | Status: SHIPPED | OUTPATIENT
Start: 2023-06-22

## 2023-06-22 RX ORDER — DIAPER,BRIEF,INFANT-TODD,DISP
EACH MISCELLANEOUS 4 TIMES DAILY PRN
Status: DISCONTINUED | OUTPATIENT
Start: 2023-06-22 | End: 2023-06-22 | Stop reason: HOSPADM

## 2023-06-22 RX ORDER — BISACODYL 10 MG
10 SUPPOSITORY, RECTAL RECTAL DAILY PRN
Qty: 3 SUPPOSITORY | Refills: 0 | Status: SHIPPED | OUTPATIENT
Start: 2023-06-22 | End: 2023-06-25

## 2023-06-22 RX ADMIN — GABAPENTIN 100 MG: 100 CAPSULE ORAL at 08:05

## 2023-06-22 RX ADMIN — METHOCARBAMOL 750 MG: 750 TABLET ORAL at 05:43

## 2023-06-22 RX ADMIN — SENNOSIDES 8.6 MG: 8.6 TABLET, FILM COATED ORAL at 08:02

## 2023-06-22 RX ADMIN — LISINOPRIL 10 MG: 10 TABLET ORAL at 08:06

## 2023-06-22 RX ADMIN — ACETAMINOPHEN 975 MG: 325 TABLET, FILM COATED ORAL at 15:17

## 2023-06-22 RX ADMIN — INSULIN LISPRO 1 UNITS: 100 INJECTION, SOLUTION INTRAVENOUS; SUBCUTANEOUS at 08:06

## 2023-06-22 RX ADMIN — METHOCARBAMOL 750 MG: 750 TABLET ORAL at 11:17

## 2023-06-22 RX ADMIN — GABAPENTIN 100 MG: 100 CAPSULE ORAL at 15:17

## 2023-06-22 RX ADMIN — OXYCODONE HYDROCHLORIDE 10 MG: 5 TABLET ORAL at 04:10

## 2023-06-22 RX ADMIN — OXYCODONE HYDROCHLORIDE 5 MG: 5 TABLET ORAL at 15:19

## 2023-06-22 RX ADMIN — LOSARTAN POTASSIUM 12.5 MG: 25 TABLET, FILM COATED ORAL at 08:06

## 2023-06-22 RX ADMIN — OXYCODONE HYDROCHLORIDE 5 MG: 5 TABLET ORAL at 08:06

## 2023-06-22 RX ADMIN — OXYCODONE HYDROCHLORIDE 10 MG: 5 TABLET ORAL at 11:19

## 2023-06-22 RX ADMIN — HEPARIN SODIUM 5000 UNITS: 5000 INJECTION INTRAVENOUS; SUBCUTANEOUS at 05:43

## 2023-06-22 RX ADMIN — DOCUSATE SODIUM 100 MG: 100 CAPSULE, LIQUID FILLED ORAL at 08:02

## 2023-06-22 RX ADMIN — ACETAMINOPHEN 975 MG: 325 TABLET, FILM COATED ORAL at 05:43

## 2023-06-22 RX ADMIN — PANTOPRAZOLE SODIUM 20 MG: 20 TABLET, DELAYED RELEASE ORAL at 05:43

## 2023-06-22 RX ADMIN — HYDROCORTISONE: 1 OINTMENT TOPICAL at 15:18

## 2023-06-22 RX ADMIN — METHOCARBAMOL 750 MG: 750 TABLET ORAL at 00:17

## 2023-06-22 NOTE — QUICK NOTE
Patient's CBC, CXR and U/A unremarkable for infection  BRISA drains minimal, removed and site secured with suture, no post removal oozing/bleeding  Patient c/o erythematous itchy skin rash on bilateral back which looks like along the tape that was used to pull his shoulders down  I ordered topical Hydrocortisone 1% ointment to Apply PRN

## 2023-06-22 NOTE — PLAN OF CARE
Problem: PHYSICAL THERAPY ADULT  Goal: Performs mobility at highest level of function for planned discharge setting  See evaluation for individualized goals  Description: Treatment/Interventions: Elevations, Gait training, Bed mobility, Spoke to nursing, Spoke to case management, Equipment eval/education    Equipment Recommended: Jennifer Deneny     See flowsheet documentation for full assessment, interventions and recommendations  Outcome: Progressing  Note: Prognosis: Good  Problem List: Decreased strength, Decreased endurance, Impaired balance, Decreased mobility, Decreased skin integrity, Pain, Orthopedic restrictions, Impaired sensation  Assessment: pt began tx session seated OOB in the recliner and was agreeable to participate in PT intervention  pt was able to tolerate some TE while seated in recliner in order to strengthen LE's prior to completing functional transfers  pt progressing with skilled PT intervention and can now perform multiple functional transfer w/ and w/o an AD independently  pt also was able to increase ambulation distance to 250'x1 RW /s w/o LOB whiel holding a conversation and avoiding obstacles in the hallway  pt did require seated rest breaks due to increased pain in todays tx session  pt educated on all stair trials prior to initating steps  Initially pt attempted to ascend with L LE and was unable to complete 1 step  pt then attempted to ascend with RLE and was gregory to complete 2 steps with /s and no LOB  POst tx session pt seated in recliner with call bell, all needs met and waiting on RN for pain medications  Barriers to Discharge: None     PT Discharge Recommendation: Home with outpatient rehabilitation    See flowsheet documentation for full assessment

## 2023-06-22 NOTE — DISCHARGE INSTR - AVS FIRST PAGE
Discharge instructions  Posterior cervical decompression and fixation/fusion      Surgical incisional care:  Keep incision clean and dry  Avoid applying creams, lotion or antiseptic to incision area  Allow steri-strips to fall off  If still in place at two week postoperative visit, we will remove them  Check the wound daily  If the incision becomes red, swollen, tender, warm, or has increased drainage please notify physician immediately  May shower 3 days after surgery, but do not soak in a tub and no swimming  Use mild antimicrobial soap and water with a clean washcloth  Pat incision dry after showering and a clean towel daily  Cervical VISTA collar to be worn at all times except for showering  Change from VISTA (grey) collar to the [place]pe[place] collar prior to showering  Incision may be cleaned with water and a mild antimicrobial soap using a clean washcloth  Incision is to be gently patted dry with a clean towel  Once dry, collar should be changed back to a VISTA (grey) collar with clean pads in place  Wash collar pads with mild soap and water  They are to be laid flat to dry on a clean towel  Recommend changing every 1-2 days  Please refer to VISTA collar instructions for further details  Activity Restrictions:  No heavy lifting greater than 5 - 10lbs  No strenuous activities  May walk as tolerated  Encourage at least 4 short walks per day  No driving while requiring cervical collar, anticipated six weeks  No significant neck movement  Diet: consider soft minced food with gravy  Recommend small bites with sips of water between  Postoperative medication:  Take pain medications to relieve incision pain, and muscle relaxants to prevent spasms as directed  Please see after visit summary (AVS) for details  Take over the counter stool softeners such as colace or senna-s to avoid constipation while on narcotics  Intake water and fiber intake     Do not take ibuprofen, Naproxen/Aleve or any NSAID until cleared by surgeon  May take Tylenol instead  If taking Coumadin, Aspirin, or Plavix, you may resume these medications when cleared by Neurosurgery  Follow-up as scheduled for a 2 week incision check  Follow-up 6 weeks after surgery with a repeat cervical spine upright x-rays to be completed prior to visit  **Please notify MD immediately if you experience a fever of 101F, have increased neck or arm pain, new numbness and/or weakness in your arms/hands, difficulty swallowing or breathing especially while lying down, numbness or weakness in your legs  **

## 2023-06-22 NOTE — PHYSICAL THERAPY NOTE
PHYSICAL THERAPY NOTE          Patient Name: Al Hager  Today's Date: 6/22/2023 06/22/23 1040   PT Last Visit   PT Visit Date 06/22/23   Note Type   Note Type Treatment   Pain Assessment   Pain Assessment Tool 0-10   Pain Score 6  (6/10 pre tx session, 8/10 post tx session RN made aware)   Pain Location/Orientation Location: Neck  (bilateral shoulder blades)   Pain Onset/Description Onset: Ongoing   Effect of Pain on Daily Activities limited activity tolerance   Patient's Stated Pain Goal No pain   Hospital Pain Intervention(s) Repositioned; Ambulation/increased activity; Rest   Multiple Pain Sites Yes  (neck and bilateral shoulder blades)   Restrictions/Precautions   Weight Bearing Precautions Per Order No   Braces or Orthoses C/S Collar  ([place]pen vista collar to be worn at all times)   Other Precautions Fall Risk;Spinal precautions;Pain   General   Chart Reviewed Yes   Response to Previous Treatment Patient with no complaints from previous session  Family/Caregiver Present No   Cognition   Overall Cognitive Status WFL   Arousal/Participation Alert; Responsive; Cooperative   Attention Within functional limits   Orientation Level Oriented X4   Memory Within functional limits   Following Commands Follows all commands and directions without difficulty   Comments pt was pleasant and cooperative throughout tx session   Subjective   Subjective pt was agreeable to participate in PT intervention   Bed Mobility   Rolling R Unable to assess   Rolling L Unable to assess   Supine to Sit Unable to assess   Sit to Supine Unable to assess   Additional Comments pt seated OOB in the recliner pre/post tx session   Transfers   Sit to Stand 7  Independent   Stand to Sit 7  Independent   Stand pivot 7  Independent   Additional Comments pt was able to complete multiple functional transfesr in todays tx session with and w/o an AD and independet w/o LOB   Ambulation/Elevation   Gait pattern WNL; Decreased foot clearance; Short stride   Gait Assistance 5  Supervision   Additional items Assist x 1   Assistive Device Rolling walker   Distance 250'x1 RW   Stair Management Assistance 5  Supervision   Additional items Assist x 1;Verbal cues; Tactile cues; Increased time required   Stair Management Technique Two rails; Step to pattern; Foreward;Backward  (pt was unable to ascend steps with LLE but was able to ascend step with RLE first)   Number of Stairs 2   Balance   Static Sitting Good   Dynamic Sitting Fair +   Static Standing Good   Dynamic Standing Fair +   Ambulatory Poor +  (w/ RW)   Endurance Deficit   Endurance Deficit Yes   Endurance Deficit Description limited due to pain increase   Activity Tolerance   Activity Tolerance Patient limited by fatigue;Patient limited by pain   Nurse Made Aware Spoke to RN destinee about pt increased pain   Exercises   Hip Abduction Sitting;10 reps;AROM; Bilateral   Hip Adduction Sitting;10 reps;AROM; Bilateral  (pillow squeezes)   Knee AROM Long Arc Quad Sitting;10 reps;AROM; Bilateral   Ankle Pumps Sitting;20 reps;AROM; Bilateral   Assessment   Prognosis Good   Problem List Decreased strength;Decreased endurance; Impaired balance;Decreased mobility; Decreased skin integrity;Pain;Orthopedic restrictions; Impaired sensation   Assessment pt began tx session seated OOB in the recliner and was agreeable to participate in PT intervention  pt was able to tolerate some TE while seated in recliner in order to strengthen LE's prior to completing functional transfers  pt progressing with skilled PT intervention and can now perform multiple functional transfer w/ and w/o an AD independently  pt also was able to increase ambulation distance to 250'x1 RW /s w/o LOB whiel holding a conversation and avoiding obstacles in the hallway  pt did require seated rest breaks due to increased pain in todays tx session   pt educated on all stair trials prior to initating steps  Initially pt attempted to ascend with L LE and was unable to complete 1 step  pt then attempted to ascend with RLE and was gregory to complete 2 steps with /s and no LOB  POst tx session pt seated in recliner with call bell, all needs met and waiting on RN for pain medications   Goals   Patient Goals to have less pain   STG Expiration Date 06/30/23   PT Treatment Day 1   Plan   Treatment/Interventions Bed mobility;Gait training;Elevations; Spoke to nursing;Equipment eval/education   Progress Progressing toward goals   PT Frequency 1-2x/wk   Recommendation   PT Discharge Recommendation Home with outpatient rehabilitation   Equipment Recommended [facility] Recommended Rollator   Change/add to "[employer]"? No   AM-PAC Basic Mobility Inpatient   Turning in Flat Bed Without Bedrails 3   Lying on Back to Sitting on Edge of Flat Bed Without Bedrails 3   Moving Bed to Chair 4   Standing Up From Chair Using Arms 4   Walk in Room 3   Climb 3-5 Stairs With Railing 3   Basic Mobility Inpatient Raw Score 20   Basic Mobility Standardized Score 43 99   Highest Level Of Mobility   JH-HLM Goal 6: Walk 10 steps or more   JH-HLM Achieved 8: Walk 250 feet ot more   Education   Education Provided Mobility training;Assistive device; Other  (stair trials)   Patient Demonstrates acceptance/verbal understanding   End of Consult   Patient Position at End of Consult Bedside chair;Bed/Chair alarm activated; All needs within reach   The patient's AM-PAC Basic Mobility Inpatient Short Form Raw Score is 20  A Raw score of greater than 16 suggests the patient may benefit from discharge to home  Please also refer to the recommendation of the Physical Therapist for safe discharge planning       Baljinder Mcknight

## 2023-06-22 NOTE — CASE MANAGEMENT
Case Management Discharge Planning Note    Patient name Fina Cowart  Location S /S -01 MRN [id number]  : 1965 Date 2023       Current Admission Date: 2023  Current Admission Diagnosis:S/P cervical spinal fusion   Patient Active Problem List    Diagnosis Date Noted   • S/P cervical spinal fusion 2023   • Cubital tunnel syndrome of both upper extremities 09/10/2022   • Arthrodesis status    • Chronic pain syndrome    • Other spondylosis with myelopathy, cervical region 12/10/2018   • Cervical radiculopathy 2018   • Cervical post-laminectomy syndrome 2018   • Chronic thoracic spine pain 2018      LOS (days): 3  Geometric Mean LOS (GMLOS) (days): 4 90  Days to GMLOS:1 5     OBJECTIVE:  Risk of Unplanned Readmission Score: 8 09         Current admission status: Inpatient   Preferred Pharmacy:   [facility]/pharmacy #[id number]- [place], [place] - [address]  Phone: [phone] Fax: [phone]    Primary Care Provider: Susan Brown MD    Primary Insurance: WORKERS COMPENSATION  Secondary Insurance:     DISCHARGE DETAILS:    Discharge planning discussed with[de-identified] Patient                      Contacts  Patient Contacts: Patient  Relationship to Patient[de-identified] Other (Comment)  Contact Method: In Person  Reason/Outcome: Discharge Planning, Continuity of [address]         Is the patient interested in [facility] at discharge?: No    DME Referral Provided  Referral made for DME?: Yes  DME referral completed for the following items[de-identified] Hayde Torres  DME Supplier Name[de-identified] [employer]    Other Referral/Resources/Interventions Provided:  Interventions: DME         Treatment Team Recommendation: Home, Other (Outpatient PT/OT)  Discharge Destination Plan[de-identified] Home, Other (Outpatient PT/OT)  Transport at Discharge : Family                                         CM followed up with [employer] on DME request made yesterday    CM was asked for the Al Detal which had been provided at time of DME request     After numerous conversations back and forth CM was told by Armani chapin that they were going to get a credit card on file just in case they are unable to process the DME through the [facility] claim  Armani chapin delivered the DME to patient's room and reviewed the above  Patient's  is here and will provide transportation home  CM encouraged patient to follow up with all recommended appointments after discharge  Patient advised of importance for patient and family to participate in managing patient's medical well being

## 2023-06-22 NOTE — DISCHARGE SUMMARY
Discharge Summary - neuro Surgery   Gerson Peoples  56 y o  male MRN: [id number]  Unit/Bed#: S -01 Encounter: [id number]    Admission Date: 6/19/2023  Admission Orders (From admission, onward)     Ordered        06/19/23 0717  Inpatient Admission  Once                         Discharge Date: 6/22/2023    Admitting Diagnosis: Status post cervical spinal fusion [Z98 1]  Other spondylosis with myelopathy, cervical region [M47 12]  Cervical radiculopathy [M54 12]  Cervical spine disease [M48 9]    Discharge Diagnosis: same    Medical Problems     Resolved Problems  Date Reviewed: 6/22/2023   None         Attending: Ebony Herron Physician(s): None    Procedures Performed: C3-T1 PCDF  Pathology: None    Hospital Course:   Patient is a 56 yrs old gentleman with Hx of C6-7 ACDF, developed adjacent segment degenerative disease, experiencing neck pain and bilateral ulnar dermatome radicular pain, and associated numbness and paresthesia along the medial forearm, pinky and ring fingers bilaterally, had also  and fine motor dysfunction bilaterally  MRI cervical spine moderate cervical spondylosis issues with spinal canal and neuroforaminal narrowing most notable at C2-3, C3-4, and C4-C5  Patient has some early sign of myelopathy  Management options discussed  Patient opted to have posterior cervical decompression and fixation fusion  On 6/19/2023, she underwent a C3-T1 posterior instrumented fixation fusion and C3 7 posterior decompression  Surgery went well  BRISA drain placed  Health Net collar put on  Patient was monitored in the PACU and subsequently transferred to the floor  During his hospital stays, his surgical pain was controlled with multimodal pain meds  BRISA drain was monitored, wearing brace  Patient noticed improvement with his radicular pain along the dermatome, only residual numbness persists on bilateral pinky and ring fingers  He was up and walking around doing physical therapy  Dressing was changed on the third postop day  Postop x-rays demonstrates stable hardware with good construction  PT recommends home with home PT  patient had 1 episode of 101 2 degrees Fahrenheit temperature  UA,CBC and CXR done and all unremarkable  patient discharged home in stable condition  Prescription for his pain medication was sent to his pharmacy  Condition at Discharge: stable     Discharge instructions/Information to patient and family:   See after visit summary for information provided to patient and family  Provisions for Follow-Up Care:  See after visit summary for information related to follow-up care and any pertinent home health orders  Disposition: Home          Planned Readmission: No    Discharge Statement   I spent 30 minutes discharging the patient  This time was spent on the day of discharge  I had direct contact with the patient on the day of discharge  Additional documentation is required if more than 30 minutes were spent on discharge  Discharge Medications:  See after visit summary for reconciled discharge medications provided to patient and family

## 2023-06-22 NOTE — TELEPHONE ENCOUNTER
6/23/23: DISCHARGED HOME    6/22/23: INPATIENT    6/19/23: SURGERY    2WK POV W/ NURSE  7/6/23 / 2:00 / Ihsan Rose    6WK POV SNPX W/ PATRICIA  8/2/23 / 1:00 / Rick Alexandra    IMAGING: XRAY CUATE    PER: [address]

## 2023-06-22 NOTE — PROGRESS NOTES
Progress Note - Neurosurgery   Kirsten Jackman  56 y o  male MRN: [id number]  Unit/Bed#: S -01 Encounter: [id number]      Assessment:  1  S/P # 3 C3-T1 PCDF ( 06/19/2023, Dr Aicha Sanchez)  2  Hx of cervical radiculopathy  3  Cx myelopathy        Plan:   - Exam:  A&OX3, Damon,  including iOS 4/5 stable,elbow flex/ext 4+/5 bilat, sensation to LT decreased in the Ulnar distribution of bilateral hand and fingers stable  DTR 2+  No clonus bilaterally  Negative Khan's tests bilat   Dressing stained with darkish body fluid 3rd inferior pole-change  Drain in place, pinkish body fluid, OP=35ml/24h-remove [place] collar on      - Imaging is reviewed personally and findings as follows:  - Postop upright cervical spine x-rays 6/19/2023 demonstrates postop changes and stable hardware's, with good construction, all the screws in the pedicles  ? Pain control:   1  Tylenol 975 mg oral 3 times daily scheduled pain  2  Gabapentin 100 mg oral 3 times daily neuropathic pain  3  Dilaudid 1 mg IV every 4 hours as needed breakthrough pain  4  Methocarbamol every 6 hours scheduled-for muscle spasm  5  Oxycodone 10 mg oral every 4 hours as needed severe pain  6  Oxycodone 5 mg oral every 4 hours as needed moderate pain  ? DVT ppx: SCDs bilateral legs,   ? Activity: As tolerated  ? PT/OT evaluation & treatment  ? Brace: Wear Health Net cervical collar all the time  ? Medical Mx:  1  Hypertension-on lisinopril and losartan  2  Diabetes mellitus-on insulin- sliding scale  3  Nausea and vomiting on Zofran  4  Constipation on Colace, Meralax, and Dulcolax  suppository  ? Neurocheck: Routine  ? BRISA drains=35ml/24H, serosanguinous body fluid  ? Patient reports feeling tired, unable to sleep last night due to interruption by CNAs to check his Vitals and sugars, Had one episode of 101 2 temp, no cough or chest pain, no urinary complaints  He didn't have BM yet  His WBC with in normal limit, Hgb Normal, ordered CXR and UA   Incisional and "shoulder blade pain somewhat better this morning, paresthesia and numbness along bilateral pinky and ring fingers stable, he was up to recliner  Wearing Health Net, using ISP  BRISA OP 35 mL/24 H, Will remove drain   DC IV Dilaudid  Apply Ice-pack and Lidocaine patch  intermittently on shoulder blade  Hold off Heparin SQ  PT recommends home  Consider discharging to home in the afternoon once his labs and imaging show no sign of infection  Subjective/Objective   Chief Complaint: \" 3rd day post PCDF\"/ progress note    Subjective: Patient reports is feeling tired because he did not sleep last night due to frequent awakening by nursing staff to check his blood & vitals  Reports this is similar blood and incisional pain somewhat better as of this morning  Had one-time IV Dilaudid last night, applying ice pack helped in relieving his pain  Reports this sweating at night and had 1 episode of fever, denies any cough, chest pain, dysuria, or abdominal complaints  Had gas but did not have bowel movement  Using ISP  No drainage or discharge from the incision sites  Patient out of bed to recliner  Wearing [place] collar  Objective: Patient appears slightly tired,  sitting in recliner, but communicates well and in no distress    I/O       06/20 0701  06/21 0700 06/21 0701  06/22 0700    P  O  480     I V  (mL/kg) 850 (7 6) 893 8 (7 9)    Total Intake(mL/kg) 1330 (11 9) 893 8 (7 9)    Urine (mL/kg/hr) 1950 (0 7)     Drains 175 15    Total Output 2125 15    Net -795 +878 8                Invasive Devices     Peripheral Intravenous Line  Duration           Peripheral IV 06/20/23 Right;Ventral (anterior) Forearm 1 day          Drain  Duration           Closed/Suction Drain Posterior Neck Bulb 2 days                Physical Exam:  Vitals: Blood pressure 107/59, pulse 62, temperature 99 6 °F (37 6 °C), resp  rate 16, weight 113 kg (249 lb 1 9 oz), SpO2 91 %  ,Body mass index is 37 88 kg/m²        General " "appearance: alert, appears stated age, cooperative and no distress  Head: Normocephalic, without obvious abnormality, atraumatic  Eyes: EOMI, 2 mm continues bilaterally  Neck: Dressing is stained with darkish body fluid, BRISA drain in place output 35 ml/24 h  Lungs: non labored breathing  Heart: regular heart rate  Neurologic:   Mental status: Alert, oriented x3, thought content appropriate  Cranial nerves: grossly intact (Cranial nerves II-XII)  Sensory: See exam above  Motor: moving all extremities without focal weakness; see exam above  Reflexes: 2+ and symmetric  Coordination: finger to nose normal bilaterally, no drift bilaterally      Lab Results:  Results from last 7 days   Lab Units 06/22/23  0511 06/21/23  2217 06/20/23 0457   WBC Thousand/uL 8 76 9 56 15 31*   HEMOGLOBIN g/dL 14 1 14 3 14 2   HEMATOCRIT % 42 2 42 1 41 5   PLATELETS Thousands/uL 189 196 235   NEUTROS PCT % 64 65  --    MONOS PCT % 10 10  --    EOS PCT % 1 1  --      Results from last 7 days   Lab Units 06/20/23  0457 06/19/23  2054   POTASSIUM mmol/L 4 5 4 9   CHLORIDE mmol/L 101 101   CO2 mmol/L 27 25   BUN mg/dL 13 13   CREATININE mg/dL 0 86 0 97   CALCIUM mg/dL 8 5 9 1     Results from last 7 days   Lab Units 06/20/23  0457 06/19/23  2054   MAGNESIUM mg/dL 2 1 1 7*     Results from last 7 days   Lab Units 06/20/23  0457 06/19/23  2054   PHOSPHORUS mg/dL 3 8 2 3*         No results found for: \"TROPONINT\"  ABG:No results found for: \"PHART\", \"LCV2HDN\", \"PO2ART\", \"DUW6GMP\", \"M0AKEEAX\", \"BEART\", \"SOURCE\"    Imaging Studies: I have personally reviewed pertinent reports  and I have personally reviewed pertinent films in PACS    EKG, Pathology, and Other Studies: I have personally reviewed pertinent reports        VTE Pharmacologic Prophylaxis: Heparin    VTE Mechanical Prophylaxis: sequential compression device      "

## 2023-06-23 ENCOUNTER — TELEPHONE (OUTPATIENT)
Dept: NEUROSURGERY | Facility: CLINIC | Age: 58
End: 2023-06-23

## 2023-06-23 NOTE — TELEPHONE ENCOUNTER
FMLA forms for patients spouse received and completed    Sent copy to patient via [employer] and spouse Sabino's email that was provided to me

## 2023-06-23 NOTE — TELEPHONE ENCOUNTER
Called patient to see how he is doing after surgery  Patient reports he is doing well overall and denies any incisional issues or fevers  Patient is able to ambulate around the house and complete ADLs  Educated the patient about the importance of preventing blood clots and provided measures how to prevent them  Patient hasn't moved his bowels since the surgery  Encouraged patient to take an over the counter stool softener, if he is taking narcotic pain medication  Encouraged fiber intake and fluids  Reviewed incision care with the patient  Advised that  he may take a shower and gently wash the surgical site with soap and water  Use clean wash cloth, towels, and clothing  Do not submerge in water until cleared by the surgeon  Do not apply any creams, ointments, or lotions to the site  Patient is aware to call the office if any redness, swelling, drainage, dehiscence of incision, or fever >100 F occurs  Patient is aware to call the office if any concerns or questions may arise  Reminded patient of his upcoming appointments with the date/time/location  Patient was appreciative for the call

## 2023-07-06 ENCOUNTER — CLINICAL SUPPORT (OUTPATIENT)
Dept: NEUROSURGERY | Facility: CLINIC | Age: 58
End: 2023-07-06

## 2023-07-06 VITALS
BODY MASS INDEX: 36.73 KG/M2 | SYSTOLIC BLOOD PRESSURE: 124 MMHG | OXYGEN SATURATION: 98 % | DIASTOLIC BLOOD PRESSURE: 80 MMHG | WEIGHT: 234 LBS | RESPIRATION RATE: 16 BRPM | HEART RATE: 70 BPM | TEMPERATURE: 97.8 F | HEIGHT: 67 IN

## 2023-07-06 DIAGNOSIS — Z48.89 ENCOUNTER FOR POST SURGICAL WOUND CHECK: ICD-10-CM

## 2023-07-06 DIAGNOSIS — Z98.890 STATUS POST SURGERY: Primary | ICD-10-CM

## 2023-07-06 PROCEDURE — 99024 POSTOP FOLLOW-UP VISIT: CPT

## 2023-07-06 NOTE — PATIENT INSTRUCTIONS
Please complete x-rays 2-3 days prior to your next scheduled office visit. Continue incision care as instructed. Cleans incision area(s) with soap and water and pat dry. Avoid lotions, creams or ointments for two more weeks. Avoid soaking in water (bath tubs, hot tubs) for two more weeks. Maintain activity restrictions until cleared by the surgeon. Avoid heavy lifting and frequent bending/twisting. Call the office immediately with any signs or symptoms of infection including: increasing redness, swelling, drainage or fevers (101 or greater). Please complete x-rays 2-3 days prior to your next scheduled office visit. Continue incision care as instructed. Cleans incision area(s) with soap and water and pat dry. Avoid lotions, creams or ointments for two more weeks. Avoid soaking in water (bath tubs, hot tubs) for two more weeks. Maintain activity restrictions until cleared by the surgeon. Avoid heavy lifting and frequent bending/twisting. Call the office immediately with any signs or symptoms of infection including: increasing redness, swelling, drainage or fevers (101 or greater).

## 2023-07-06 NOTE — PROGRESS NOTES
Post-Op Visit- Neurosurgery    Jannis Blizzard. 56 y.o. male MRN: [id number]    Chief Complaint:  Patient presents post: C3-T1 posterior decompression with instrumented fixation fusion. History of Present Illness:  Patient presents for 2 week POV for incision check ambulating without an assistive device. Patient reports he is doing well overall and denies any incisional issues or fevers. He denies any new weakness since the surgery but does report residual weakness in his left leg. He also has some numbness and occasional tingling in his bilateral hands but no longer has pain into his arms. He does report some numbness in his buttock region which is new since the surgery but denies any incontinence only urgency. Patient has been compliant with wearing the cervical collar. Patient admits to much improved surgical pain at this time and rates their pain as a Pain Score:   2/10 (pain and tightness between shoulder blades). Assessment:   Vitals:    07/06/23 1353   BP: 124/80   Pulse: 70   Resp: 16   Temp: 97.8 °F (36.6 °C)   SpO2: 98%       Wound Exam: Incision well approximated. No erythema, edema or drainage present. Location: posterior neck. Procedure:  Staple/suture removal.   Procedure Note: 18 staples and 1 drain stitch were removed. Cleansed area with NSS. Patient Status: the patient tolerated the procedure well. DSD & C-Collar in place. Complications: None. Discussion/Summary:  Doing well postoperatively. Reviewed incision care with patient including daily observation for s/s infection including: increased erythema, edema, drainage, dehiscence of incision or fever >101. Should these be observed, he understands that he is to call and/or return immediately for reassessment. Advised patient to continue cleansing area with mild soap and water and pat dry.  Not to apply any lotions, creams, or ointments, & not to submerge in any water for 4 more weeks. He is to maintain activity restrictions and continue wearing cervical collar until cleared by the surgeon. Activity levels were also reviewed with the patient in detail, he is to lift no greater than 10 pounds and ambulation is encouraged as tolerated. Patient is aware that he cannot drive while still in the cervical collar. Verified date/time/location of upcoming POV and reminded him to complete x-rays prior to his next appointment. He is to call the office with any further questions or concerns, or if any incisional issues or fevers would arise. Discussed buttock numbness with Dr. Anton Huddleston who recommended patient should continue to monitor these symptoms for now and reach out with any new or worsening symptoms.

## 2023-07-31 ENCOUNTER — TELEPHONE (OUTPATIENT)
Dept: NEUROSURGERY | Facility: CLINIC | Age: 58
End: 2023-07-31

## 2023-07-31 ENCOUNTER — APPOINTMENT (OUTPATIENT)
Dept: RADIOLOGY | Facility: CLINIC | Age: 58
End: 2023-07-31
Payer: OTHER MISCELLANEOUS

## 2023-07-31 DIAGNOSIS — M43.22 FUSION OF SPINE OF CERVICAL REGION: ICD-10-CM

## 2023-07-31 DIAGNOSIS — Z98.890 STATUS POST SURGERY: ICD-10-CM

## 2023-07-31 DIAGNOSIS — Z98.890 STATUS POST SURGERY: Primary | ICD-10-CM

## 2023-07-31 DIAGNOSIS — Z98.890 POST-OPERATIVE STATE: ICD-10-CM

## 2023-07-31 PROCEDURE — 72040 X-RAY EXAM NECK SPINE 2-3 VW: CPT

## 2023-08-02 ENCOUNTER — OFFICE VISIT (OUTPATIENT)
Dept: NEUROSURGERY | Facility: CLINIC | Age: 58
End: 2023-08-02

## 2023-08-02 VITALS
WEIGHT: 218 LBS | OXYGEN SATURATION: 99 % | SYSTOLIC BLOOD PRESSURE: 122 MMHG | DIASTOLIC BLOOD PRESSURE: 80 MMHG | BODY MASS INDEX: 34.21 KG/M2 | TEMPERATURE: 98.5 F | RESPIRATION RATE: 19 BRPM | HEIGHT: 67 IN | HEART RATE: 62 BPM

## 2023-08-02 DIAGNOSIS — M43.22 FUSION OF SPINE OF CERVICAL REGION: Primary | ICD-10-CM

## 2023-08-02 DIAGNOSIS — M47.12 OTHER SPONDYLOSIS WITH MYELOPATHY, CERVICAL REGION: ICD-10-CM

## 2023-08-02 PROCEDURE — 99024 POSTOP FOLLOW-UP VISIT: CPT | Performed by: NEUROLOGICAL SURGERY

## 2023-08-02 NOTE — ASSESSMENT & PLAN NOTE
Patient presents for 6 week f/u s/p C3-T1 PCDF (, 6/19/23)  · H/o C5-7 ACDF Mary Anne Roberson, 2011) with resolution of pre operative symptoms  · Re-presented in October 2022 with difficulty with gait imbalance, difficulty with fine motor skills over the past year. Constant pain BUE. · mJOA 13 preop  · Post op symptoms improving. Continued bilateral hand N/T middle finger to mid forearm, hand clumsiness, LLE weakness, balance difficulty    Imagining  · XR cervical spine, 7/31/23: Posterior jesus alberto and paired screw fusion C3-T1 with unilateral right screw at C5. ACDF C5-C7. Hardware appears intact. Cervical brace in place    Plan  · Continue to monitor neurological exam  · Imaging reviewed with the patient in the room today, showing intact fusion  · Discussed that it may take up to one year to see much improvement post surgery. Reiterated that the purpose of the surgery was to prevent worsening myelopathy and catastrophic injury to spinal cord with hyperextension, and improvement in symptoms was not guaranteed. · Ok to wean cervical collar at this time  · Pain medications PRN  · PT referral placed for cervical ROM/strenghtening (gentle) and balance training.   · Follow up in 6 months after repeat XR cervical spine

## 2023-08-02 NOTE — PROGRESS NOTES
Neurosurgery Office Note  Yousuf Nelson. 66 y.o. male MRN: [id number]      Assessment/Plan     Other spondylosis with myelopathy, cervical region  Patient presents for 6 week f/u s/p C3-T1 PCDF (, 6/19/23)  · H/o C5-7 ACDF Shelly Huber, 2011) with resolution of pre operative symptoms  · Re-presented in October 2022 with difficulty with gait imbalance, difficulty with fine motor skills over the past year. Constant pain BUE. · mJOA 13 preop  · Post op symptoms improving. Continued bilateral hand N/T middle finger to mid forearm, hand clumsiness, LLE weakness, balance difficulty    Imagining  · XR cervical spine, 7/31/23: Posterior jesus alberto and paired screw fusion C3-T1 with unilateral right screw at C5. ACDF C5-C7. Hardware appears intact. Cervical brace in place    Plan  · Continue to monitor neurological exam  · Imaging reviewed with the patient in the room today, showing intact fusion  · Discussed that it may take up to one year to see much improvement post surgery. Reiterated that the purpose of the surgery was to prevent worsening myelopathy and catastrophic injury to spinal cord with hyperextension, and improvement in symptoms was not guaranteed. · Ok to wean cervical collar at this time  · Pain medications PRN  · PT referral placed for cervical ROM/strenghtening (gentle) and balance training. · Follow up in 6 months after repeat XR cervical spine           Diagnoses and all orders for this visit:    Fusion of spine of cervical region  -     XR spine cervical 2 or 3 vw injury; Future  -     Ambulatory Referral to Physical Therapy; Future    Other spondylosis with myelopathy, cervical region  -     XR spine cervical 2 or 3 vw injury; Future  -     Ambulatory Referral to Physical Therapy;  Future          I have spent a total time of 20 minutes on 08/02/23 in caring for this patient including Diagnostic results, Prognosis, Risks and benefits of tx options, Instructions for management, Patient and family education, Importance of tx compliance, Risk factor reductions, Impressions, Counseling / Coordination of care, Documenting in the medical record, Reviewing / ordering tests, medicine, procedures  , Obtaining or reviewing history   and Communicating with other healthcare professionals . CHIEF COMPLAINT    Chief Complaint   Patient presents with   • Post-op       HISTORY    This is a 53-year-old male with a past medical history significant for C5-7 ACDF in 2011 by Dr. Ras Platt, diabetes, hypertension who is here today to review cervical x-rays 6 weeks s/p C3-T1 PCDF by Dr Ras Platt. He sustained a work injury in 2005 while working for [employer]. Ultimately he underwent C5-7 ACDF in 2011 for neck pain, right arm weakness and numbness. He did very well postoperatively with near complete reduction in symptoms. He was doing very well until approximately 1 year ago. He was working at a desk and started noticing worsening neck pain and difficulty typing. He described a constant pain in his bilateral upper extremities in no specific distribution with associated numbness and tingling. He could feel his hands very well. He had difficulty with range of motion in his neck. When he could move his neck, he felt electric shock sensations in his arms. He was also having significant difficulty with ambulation and balance. He underwent the above surgery on 6/19/23. He was maintained in a VISTA collar post operatively. His neck pain improved but he does have bilateral shoulder tightness. He feels stronger but is mainly complaining of continued hand numbness and clumsiness, LLE, and worsening balance over the past few weeks. See Discussion    REVIEW OF SYSTEMS    Review of Systems   Constitutional: Negative. HENT: Negative. Eyes: Negative. Respiratory: Negative. Cardiovascular: Negative. Gastrointestinal: Negative. Endocrine: Negative. Genitourinary: Positive for frequency and urgency. Musculoskeletal: Positive for back pain, gait problem (unsteady balance), myalgias (neck and upper back muscle tightness and spasms), neck pain (not pain achey at times) and neck stiffness. 1 fall down steps about 8 weeks ago    H/O ACDF    ov 9/9/22 Other spondylosis with myelopathy, cervical region  constant aching shooting down into base of neck then shooting  over to left shoulder and arms associated with intermittent like electric shock depending on how he turns head or moves neck to left or right. xray done 9/9/22   Skin: Negative. Allergic/Immunologic: Negative. Neurological: Positive for weakness (left leg feels weak  feels weak), light-headedness (with low sugars), numbness (arms and hands/fingers pinky and ring fingers both hand  more on right hand) and headaches. Negative for dizziness (bensing over). Left leg went dead twice in the past month   Hematological: Negative. Psychiatric/Behavioral: Positive for sleep disturbance (takes meds for sleeping). Negative for dysphoric mood. The patient is nervous/anxious. All other systems reviewed and are negative. ROS obtained by MA. Reviewed. See HPI.    ROS was personally reviewed and changes made as needed     Meds/Allergies     Current Outpatient Medications   Medication Sig Dispense Refill   • acetaminophen (TYLENOL) 325 mg tablet Take 3 tablets (975 mg total) by mouth every 8 (eight) hours 40 tablet 0   • benzonatate (TESSALON PERLES) 100 mg capsule Take 100 mg by mouth Three times daily as needed     • bisacodyl (DULCOLAX) 10 mg suppository Insert 1 suppository (10 mg total) into the rectum daily as needed for constipation for up to 3 days 3 suppository 0   • Cholecalciferol 25 MCG (1000 UT) capsule TAKE ONE TABLET BY MOUTH EVERY DAY (FOR LOW VITAMIN D)     • Cyanocobalamin (B-12 PO) Take by mouth     • cyclobenzaprine (FLEXERIL) 5 mg tablet Take 1 tablet (5 mg total) by mouth 3 (three) times a day as needed for muscle spasms 60 tablet 0   • dextrose 1 g CHEW daily as needed for low blood sugar     • Empagliflozin 25 MG TABS Take 25 mg by mouth every morning ---- Trevin Thrasher     • famotidine (PEPCID) 20 mg tablet Take 20 mg by mouth daily at bedtime     • gabapentin (Neurontin) 600 MG tablet Take 1 tablet (600 mg total) by mouth 3 (three) times a day 90 tablet 0   • lisinopril (ZESTRIL) 10 mg tablet Take 10 mg by mouth daily     • losartan (COZAAR) 25 mg tablet      • metFORMIN (GLUMETZA) 1000 MG (MOD) 24 hr tablet Take 1,000 mg by mouth 2 (two) times a day with meals     • pantoprazole (PROTONIX) 20 mg tablet Take 20 mg by mouth daily     • TRAZODONE HCL PO Take 75 mg by mouth daily at bedtime       • ASCORBIC ACID PO      • docusate sodium (COLACE) 100 mg capsule Take 1 capsule (100 mg total) by mouth 2 (two) times a day (Patient not taking: Reported on 8/2/2023) 20 capsule 0   • polyethylene glycol (MIRALAX) 17 g packet Take 17 g by mouth daily as needed (PRN per day) (Patient not taking: Reported on 7/6/2023) 100 g 0   • senna (SENOKOT) 8.6 mg Take 1 tablet (8.6 mg total) by mouth daily Do not start before June 23, 2023. (Patient not taking: Reported on 7/6/2023) 10 tablet 0     No current facility-administered medications for this visit.        Allergies   Allergen Reactions   • Metoprolol Other (See Comments)     Too low of a heart rate   • Penicillin V Shortness Of Breath   • Penicillins Shortness Of Breath and Rash     rash       PAST HISTORY    Past Medical History:   Diagnosis Date   • Anxiety    • Arthritis    • Bilateral occipital neuralgia    • Cervical post-laminectomy syndrome    • Cervical radiculopathy    • Cervical spine disease    • Cervical spondylosis with myelopathy    • Chronic lumbar pain    • Chronic pain syndrome    • Chronic thoracic spine pain    • Cubital tunnel syndrome of both upper extremities 09/10/2022   • Degenerative cervical spinal stenosis    • Dental crowns present    • Depression    • Diabetes ([address])     type 2   • Exercise involving walking     daily 5-10 minutes   • Failed neck syndrome    • Fatty liver    • Full dentures     upper   • GERD (gastroesophageal reflux disease)    • History of COVID-19 2021    per pt admitted to the [facility]--   • Hyperlipidemia    • Hypertension    • Irritable bowel syndrome    • Motion sickness    • Muscle weakness     "arms and legs"   • Myofascial muscle pain    • Neck stiffness     plate implanted-limited ROM   • Polyarthralgia    • Tinnitus    • Wears glasses        Past Surgical History:   Procedure Laterality Date   • CERVICAL DISC SURGERY     • CERVICAL FUSION  2011    ACDF with Dr. Mile Mclaughlin   • COLONOSCOPY     • IA ARTHRD PST/PSTLAT TQ 1NTRSPC CRV BELW C2 SEGMENT Bilateral 6/19/2023    Procedure: C3-T1 posterior decompression with instrumented fixation fusion (impulse monitoring); Surgeon: Miguel Escobedo MD;  Location: [facility];  Service: Neurosurgery   • TOTAL SHOULDER REPLACEMENT Right     Reversal       Social History     Tobacco Use   • Smoking status: Never   • Smokeless tobacco: Never   Vaping Use   • Vaping Use: Never used   Substance Use Topics   • Alcohol use: Yes     Alcohol/week: 1.0 standard drink of alcohol     Types: 1 Cans of beer per week     Comment: occasional, weekly   • Drug use: No       Family History   Problem Relation Age of Onset   • Stroke Mother    • Lung cancer Father          Above history personally reviewed. EXAM    Vitals:Blood pressure 122/80, pulse 62, temperature 98.5 °F (36.9 °C), temperature source Temporal, resp. rate 19, height 5' 7" (1.702 m), weight 98.9 kg (218 lb), SpO2 99 %. ,Body mass index is 34.14 kg/m². Physical Exam  Vitals and nursing note reviewed. Constitutional:       Appearance: Normal appearance. He is well-developed and normal weight. HENT:      Head: Normocephalic and atraumatic. Eyes:      Extraocular Movements: Extraocular movements intact.       Pupils: Pupils are equal, round, and reactive to light. Neck:      Comments: Limited cervical ROM    Cardiovascular:      Rate and Rhythm: Normal rate. Pulmonary:      Effort: Pulmonary effort is normal. No respiratory distress. Abdominal:      Palpations: Abdomen is soft. Musculoskeletal:         General: Normal range of motion. Skin:     General: Skin is warm and dry. Comments: Posterior cervical incision CDI, well healed   Neurological:      Mental Status: He is alert and oriented to person, place, and time. Cranial Nerves: Cranial nerves 2-12 are intact. Psychiatric:         Mood and Affect: Mood normal.         Speech: Speech normal.         Behavior: Behavior normal.         Thought Content: Thought content normal.         Judgment: Judgment normal.         Neurologic Exam     Mental Status   Oriented to person, place, and time. Follows 2 step commands. Attention: normal. Concentration: normal.   Speech: speech is normal   Level of consciousness: alert  Knowledge: good. Able to repeat. Normal comprehension. Cranial Nerves   Cranial nerves II through XII intact. CN III, IV, VI   Pupils are equal, round, and reactive to light. Motor Exam   Muscle bulk: normal  Overall muscle tone: normal    Strength   Strength 5/5 except as noted. 4-/5 bilateral  / IO  4+/5 bilateral delts  4+/5 LKF/KE  5/5 RLE     Sensory Exam   Decreased sensation to LT left medial / lateral forearm, bilateral hands from middle finger to just above wrist, LLE     Gait, Coordination, and Reflexes     Tremor   Resting tremor: absent    Reflexes   Right Khan: absent  Left Khan: absent  Right ankle clonus: absent  Left ankle clonus: absent        MEDICAL DECISION MAKING    Imaging Studies:     XR spine cervical 2 or 3 vw injury    Result Date: 8/2/2023  Narrative: CERVICAL SPINE INDICATION:   Z98.890: Other specified postprocedural states M43.22: Fusion of spine, cervical region Z98.890: Other specified postprocedural states. COMPARISON: Cervical spine radiographs 7/19/2023. VIEWS:  XR SPINE CERVICAL 2 OR 3 VW INJURY Images: 4 FINDINGS: Posterior jesus alberto and paired screw fusion C3-T1 with unilateral right screw at C5. ACDF C5-C7. Hardware appears intact. Cervical brace in place. No acute fracture or subluxation. Anatomic alignment is maintained. The space narrowing at C3-C4 and C6-C5. Normal prevertebral soft tissues. Clear lung apices. Impression: Intact anterior and posterior fusion hardware. Workstation performed: [id number]       I have personally reviewed pertinent reports.    and I have personally reviewed pertinent films in PACS

## 2023-08-15 DIAGNOSIS — M96.1 CERVICAL POST-LAMINECTOMY SYNDROME: ICD-10-CM

## 2023-08-15 DIAGNOSIS — M54.12 CERVICAL RADICULOPATHY: ICD-10-CM

## 2023-08-17 DIAGNOSIS — M62.838 CERVICAL PARASPINAL MUSCLE SPASM: ICD-10-CM

## 2023-08-17 DIAGNOSIS — M54.12 CERVICAL RADICULOPATHY: ICD-10-CM

## 2023-08-17 DIAGNOSIS — M96.1 CERVICAL POST-LAMINECTOMY SYNDROME: ICD-10-CM

## 2023-08-17 RX ORDER — GABAPENTIN 600 MG/1
600 TABLET ORAL 3 TIMES DAILY
Qty: 90 TABLET | Refills: 0 | Status: SHIPPED | OUTPATIENT
Start: 2023-08-17

## 2023-08-17 RX ORDER — CYCLOBENZAPRINE HCL 5 MG
5 TABLET ORAL 3 TIMES DAILY PRN
Qty: 60 TABLET | Refills: 0 | Status: SHIPPED | OUTPATIENT
Start: 2023-08-17

## 2023-08-17 NOTE — TELEPHONE ENCOUNTER
[employer] message sent requesting for refills of gabapentin and flexeril. This RN will route to a provider to approve however patient should follow up with PCP for further refills moving forward.

## 2023-08-25 ENCOUNTER — EVALUATION (OUTPATIENT)
Dept: PHYSICAL THERAPY | Facility: CLINIC | Age: 58
End: 2023-08-25
Payer: OTHER MISCELLANEOUS

## 2023-08-25 DIAGNOSIS — M47.12 OTHER SPONDYLOSIS WITH MYELOPATHY, CERVICAL REGION: ICD-10-CM

## 2023-08-25 DIAGNOSIS — M43.22 FUSION OF SPINE OF CERVICAL REGION: Primary | ICD-10-CM

## 2023-08-25 PROCEDURE — 97162 PT EVAL MOD COMPLEX 30 MIN: CPT

## 2023-08-25 PROCEDURE — 97535 SELF CARE MNGMENT TRAINING: CPT

## 2023-08-25 NOTE — PROGRESS NOTES
PT Evaluation     Today's date: 2023  Patient name: Gene Myles. : 1965  MRN: [id number]  Referring provider: Albert Walton PA-C  Dx:   Encounter Diagnosis     ICD-10-CM    1. 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 Other spondylosis with myelopathy, cervical region  M47.12                      Assessment  Assessment details: Pt presents with significant soft tissue and mobility restrictions in his neck and upper body. He has continued ulnar nerve symptoms bilaterally, as well as some weakness in left leg which is leading to minor balance issues. Impairments: abnormal or restricted ROM, activity intolerance, impaired balance, impaired physical strength, lacks appropriate home exercise program, pain with function and poor posture     Symptom irritability: moderateUnderstanding of Dx/Px/POC: good   Prognosis: fair    Goals  ST) Pt. Will be able to sleep through the night without being awoken with pain and with minimal to no pain upon waking in 6 weeks. 2) Pt. Will have elimination of headaches in 6 weeks. 3)  Pt.'s radicular symptoms will be centralized to neck in 6 weeks. LT) Pt. Will be able to complete all chores at home without pain or limitations in 12 weeks. 2)  Pt.  Will be able to to sleep a full night without limitations    Plan  Patient would benefit from: PT eval and skilled physical therapy  Planned modality interventions: thermotherapy: hydrocollator packs, cryotherapy and unattended electrical stimulation  Planned therapy interventions: manual therapy, neuromuscular re-education, patient education, self care, therapeutic activities, therapeutic exercise and home exercise program  Frequency: 3x week  Duration in weeks: 12  Treatment plan discussed with: patient        Subjective Evaluation    History of Present Illness  Date of onset: 2005  Date of surgery: 2023  Mechanism of injury: surgery  Mechanism of injury: c3-t1 decompression/ fusion after years of being denied surgical requests. Pt reports headaches, stiffness and weakness/ strain in neck. He still has same amount of (ulnar) nerve symptoms as prior to surgery. He also reports left leg weakness and stability. Recurrent probem    Quality of life: fair    Patient Goals  Patient goal: Gain maximum movement from his neck. 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 Objective     Postural Observations  Seated posture: poor  Standing posture: poor    Additional Postural Observation Details  Moderate forward head and rounded shoulders    Palpation   Left   Hypertonic in the cervical paraspinals, levator scapulae, middle trapezius, rhomboids, scalenes, sternocleidomastoid, suboccipitals and upper trapezius. Tenderness of the cervical paraspinals, levator scapulae, middle trapezius, rhomboids, scalenes, sternocleidomastoid, suboccipitals and upper trapezius. Trigger point to levator scapulae, suboccipitals and upper trapezius. Right   Hypertonic in the cervical paraspinals, levator scapulae, middle trapezius, rhomboids, scalenes, sternocleidomastoid, suboccipitals and upper trapezius. Tenderness of the cervical paraspinals, levator scapulae, middle trapezius, rhomboids, scalenes, sternocleidomastoid, suboccipitals and upper trapezius. Trigger point to levator scapulae, suboccipitals and upper trapezius.      Active Range of Motion   Cervical/Thoracic Spine       Cervical  Subcranial protraction:  WFL   Subcranial retraction:  with pain   Restriction level: maximal  Flexion:  with pain Restriction level: moderate  Extension:  with pain Restriction level: maximal  Left lateral flexion:  with pain Restriction level: maximal  Right lateral flexion:  with pain Restriction level maximal  Left rotation:  with pain Restriction level: moderate  Right rotation:  with pain Restriction level: moderate    Strength/Myotome Testing   Cervical Spine     Left   Neck lateral flexion (C3): 2    Right   Neck lateral flexion (C3): 2    Left Shoulder     Planes of Motion   Abduction: 3+     Right Shoulder     Planes of Motion   Abduction: 3+     Left Elbow   Flexion: 3+  Extension: 3+    Right Elbow   Flexion: 3+  Extension: 3+    Left Wrist/Hand   Wrist extension: 3+  Wrist flexion: 3+  Thumb extension: 3    Right Wrist/Hand   Wrist extension: 3+  Wrist flexion: 3  Thumb extension: 3                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 Fusion (6/19/23)       Manuals 8-25-23                                       Neuro Re-Ed                                                                 Ther Ex                                                        Pt Ed/ HEP Post surgical anatomy and physiology as well as issuing and reviewing HEP-15'               Ther Activity                        Gait Training                        Modalities

## 2023-08-28 ENCOUNTER — APPOINTMENT (OUTPATIENT)
Dept: PHYSICAL THERAPY | Facility: CLINIC | Age: 58
End: 2023-08-28
Payer: OTHER MISCELLANEOUS

## 2023-08-30 ENCOUNTER — APPOINTMENT (OUTPATIENT)
Dept: PHYSICAL THERAPY | Facility: CLINIC | Age: 58
End: 2023-08-30
Payer: OTHER MISCELLANEOUS

## 2023-08-30 ENCOUNTER — OFFICE VISIT (OUTPATIENT)
Dept: PHYSICAL THERAPY | Facility: CLINIC | Age: 58
End: 2023-08-30
Payer: OTHER MISCELLANEOUS

## 2023-08-30 DIAGNOSIS — M47.12 OTHER SPONDYLOSIS WITH MYELOPATHY, CERVICAL REGION: ICD-10-CM

## 2023-08-30 DIAGNOSIS — M43.22 FUSION OF SPINE OF CERVICAL REGION: Primary | ICD-10-CM

## 2023-08-30 PROCEDURE — 97010 HOT OR COLD PACKS THERAPY: CPT

## 2023-08-30 PROCEDURE — 97140 MANUAL THERAPY 1/> REGIONS: CPT

## 2023-08-30 PROCEDURE — 97110 THERAPEUTIC EXERCISES: CPT

## 2023-08-30 PROCEDURE — 97112 NEUROMUSCULAR REEDUCATION: CPT

## 2023-08-30 NOTE — PROGRESS NOTES
Daily Note     Today's date: 2023  Patient name: Teri Lopez. : 1965  MRN: [id number]  Referring provider: Shirley Lazo PA-C  Dx:   Encounter Diagnosis     ICD-10-CM    1. 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 Other spondylosis with myelopathy, cervical region  M47.12                      Subjective: Pt reports continuing to feel sore. Objective: See treatment diary below      Assessment: Tolerated treatment well. Patient would benefit from continued PT      Plan: Progress treatment as tolerated.          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 Fusion (23)       Manuals 23      STM to sub occipital to mid thoracic  20'      AROM c rotation w/ manual c2 block  8'                      Neuro Re-Ed         LTP/ MTP  2x10 each green      Cervical retraction in available ROM  5" x20                                              Ther Ex        Seated thoracic extension AROM mobs w/ roll and board  5" 20x 2 levels      Gentle UT side bend  10" x12 bilat                                      Pt Ed/ HEP Post surgical anatomy and physiology as well as issuing and reviewing HEP-15'               Ther Activity                        Gait Training                        Modalities

## 2023-09-01 ENCOUNTER — OFFICE VISIT (OUTPATIENT)
Dept: PHYSICAL THERAPY | Facility: CLINIC | Age: 58
End: 2023-09-01
Payer: OTHER MISCELLANEOUS

## 2023-09-01 DIAGNOSIS — M47.12 OTHER SPONDYLOSIS WITH MYELOPATHY, CERVICAL REGION: ICD-10-CM

## 2023-09-01 DIAGNOSIS — M43.22 FUSION OF SPINE OF CERVICAL REGION: Primary | ICD-10-CM

## 2023-09-01 PROCEDURE — 97112 NEUROMUSCULAR REEDUCATION: CPT

## 2023-09-01 PROCEDURE — 97010 HOT OR COLD PACKS THERAPY: CPT

## 2023-09-01 PROCEDURE — 97140 MANUAL THERAPY 1/> REGIONS: CPT

## 2023-09-01 NOTE — PROGRESS NOTES
Daily Note     Today's date: 2023  Patient name: Faina Estes. : 1965  MRN: [id number]  Referring provider: Carolina Carranza PA-C  Dx:   Encounter Diagnosis     ICD-10-CM    1. 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 Other spondylosis with myelopathy, cervical region  M47.12                      Subjective: Pt reports being sore but more mobile after last therapy session. Objective: See treatment diary below      Assessment: Tolerated treatment well. Patient would benefit from continued PT      Plan: Progress treatment as tolerated.        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 Fusion (23)       Manuals 23     STM to sub occipital to mid thoracic  20' 20'     AROM c rotation w/ manual c2 block  8' 8'                     Neuro Re-Ed         LTP/ MTP  2x10 each green 3x10 each   green     Cervical retraction in available ROM  5" x20 5" x20                                             Ther Ex        Seated thoracic extension AROM mobs w/ roll and board  5" 20x 2 levels      Gentle UT side bend  10" x12 bilat 10" x12 christopher                                     Pt Ed/ HEP Post surgical anatomy and physiology as well as issuing and reviewing HEP-15'               Ther Activity                        Gait Training                        Modalities           MHP 12' post tx

## 2023-09-13 ENCOUNTER — OFFICE VISIT (OUTPATIENT)
Dept: PHYSICAL THERAPY | Facility: CLINIC | Age: 58
End: 2023-09-13
Payer: OTHER MISCELLANEOUS

## 2023-09-13 DIAGNOSIS — M47.12 OTHER SPONDYLOSIS WITH MYELOPATHY, CERVICAL REGION: ICD-10-CM

## 2023-09-13 DIAGNOSIS — M43.22 FUSION OF SPINE OF CERVICAL REGION: Primary | ICD-10-CM

## 2023-09-13 PROCEDURE — 97112 NEUROMUSCULAR REEDUCATION: CPT

## 2023-09-13 PROCEDURE — 97140 MANUAL THERAPY 1/> REGIONS: CPT

## 2023-09-13 PROCEDURE — 97010 HOT OR COLD PACKS THERAPY: CPT

## 2023-09-13 NOTE — PROGRESS NOTES
Daily Note     Today's date: 2023  Patient name: Loki Alvarenga. : 1965  MRN: [id number]  Referring provider: Tammy Ace PA-C  Dx:   Encounter Diagnosis     ICD-10-CM    1. 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 Other spondylosis with myelopathy, cervical region  M47.12                      Subjective: Pt reports being fatigued and a little sore after his vacation. Objective: See treatment diary below      Assessment: Tolerated treatment fair. Patient would benefit from continued PT      Plan: Progress treatment as tolerated.        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 Fusion (23)       Manuals 23    STM to sub occipital to mid thoracic  20' 20' 20'    AROM c rotation w/ manual c2 block  8' 8' 8'                    Neuro Re-Ed         LTP/ MTP  2x10 each green 3x10 each   green 3x10 each green    Cervical retraction in available ROM  5" x20 5" x20 5" x20                                            Ther Ex        Seated thoracic extension AROM mobs w/ roll and board  5" 20x 2 levels  5" x20  2 levels    Gentle UT side bend  10" x12 bilat 10" x12 christopher 10" x12 christopher                                    Pt Ed/ HEP Post surgical anatomy and physiology as well as issuing and reviewing HEP-15'               Ther Activity                        Gait Training                        Modalities           MHP 12' post tx 12'

## 2023-09-15 ENCOUNTER — OFFICE VISIT (OUTPATIENT)
Dept: PHYSICAL THERAPY | Facility: CLINIC | Age: 58
End: 2023-09-15
Payer: OTHER MISCELLANEOUS

## 2023-09-15 DIAGNOSIS — M43.22 FUSION OF SPINE OF CERVICAL REGION: Primary | ICD-10-CM

## 2023-09-15 DIAGNOSIS — M47.12 OTHER SPONDYLOSIS WITH MYELOPATHY, CERVICAL REGION: ICD-10-CM

## 2023-09-15 PROCEDURE — 97010 HOT OR COLD PACKS THERAPY: CPT

## 2023-09-15 PROCEDURE — 97110 THERAPEUTIC EXERCISES: CPT

## 2023-09-15 PROCEDURE — 97140 MANUAL THERAPY 1/> REGIONS: CPT

## 2023-09-15 NOTE — PROGRESS NOTES
Daily Note     Today's date: 9/15/2023  Patient name: Catarina Mendosa. : 1965  MRN: [id number]  Referring provider: Kaleigh Rodriguez PA-C  Dx:   Encounter Diagnosis     ICD-10-CM    1. 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 Other spondylosis with myelopathy, cervical region  M47.12           Start Time: 0815  Stop Time: 0915  Total time in clinic (min): 60 minutes    Subjective: Pt reports doing "a little better". Objective: See treatment diary below      Assessment: Tolerated treatment well. Patient would benefit from continued PT      Plan: Progress treatment as tolerated.        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 Fusion (23)       Manuals 8-25-23 8-30-23 8-31-23 9-13-23 9-15-23   STM to sub occipital to mid thoracic  20' 20' 20' 20'   AROM c rotation w/ manual c2 block  8' 8' 8' 8'                   Neuro Re-Ed         LTP/ MTP  2x10 each green 3x10 each   green 3x10 each green 3x10 each green   Cervical retraction in available ROM  5" x20 5" x20 5" x20 5" x20                                           Ther Ex        Seated thoracic extension AROM mobs w/ roll and board  5" 20x 2 levels  5" x20  2 levels 5" x20 2 levels   Gentle UT side bend  10" x12 bilat 10" x12 christopher 10" x12 christopher 10" x12 christopher                                   Pt Ed/ HEP Post surgical anatomy and physiology as well as issuing and reviewing HEP-15'               Ther Activity                        Gait Training                        Modalities           MHP 12' post tx 12' 15'

## 2023-09-18 ENCOUNTER — APPOINTMENT (OUTPATIENT)
Dept: PHYSICAL THERAPY | Facility: CLINIC | Age: 58
End: 2023-09-18
Payer: OTHER MISCELLANEOUS

## 2023-09-19 NOTE — PROGRESS NOTES
Pain Medicine Follow-Up Note    Assessment:  1. Chronic pain syndrome    2. Cervical radiculopathy    3. Cervical post-laminectomy syndrome    4. Myofascial pain syndrome    5. Chronic post-operative pain        Plan:  Orders Placed This Encounter   Procedures   • FL spine and pain procedure     Standing Status:   Future     Standing Expiration Date:   9/21/2027     Order Specific Question:   Reason for Exam:     Answer:   trigger pt under US   Amairani     Order Specific Question:   Anticoagulant hold needed? Answer:   no       New Medications Ordered This Visit   Medications   • gabapentin (Neurontin) 600 MG tablet     Sig: Take 1 tablet (600 mg total) by mouth 3 (three) times a day     Dispense:  270 tablet     Refill:  0       My impressions and treatment recommendations were discussed in detail with the patient who verbalized understanding and had no further questions. Patient presents the office after having a cervical fusion on 6/19/2023 by Dr. Racheal Richey. Patient reports the surgery was long overdue and held up due to it being a Worker's Compensation case. Patient reports ongoing pain relief using gabapentin 600 mg 3 times daily patient denies any significant side effects therefore I will continue the patient on this medication. Patient does report tightness in his cervical paraspinal muscles as well as his bilateral trapezius muscles with pain going into his upper back. Trigger points noted on exam I recommend that the patient have trigger point injections. Patient is in agreement with this plan. Complete risks and benefits including bleeding, infection, tissue reaction, nerve injury and allergic reaction were discussed. The approach was demonstrated using models and literature was provided. Verbal and written consent was obtained. Follow-up is planned in 3 months time or sooner as warranted. Discharge instructions were provided.  I personally saw and examined the patient and I agree with the above discussed plan of care. History of Present Illness:    Jenn Christianson is a 66 y.o. male who presents to [facility] for interval re-evaluation of the above stated pain complaints. The patient has a past medical and chronic pain history as outlined in the assessment section. He was last seen on 5/18/2023. At today's visit patient states that their pain symptoms are the same with a pain score of 6/10 on the verbal numeric pain scale. The patient's pain is worse in the morning, evening, and at night. The patient's pain is intermittent in nature. And the quality of the patient's pain is described as dull-aching. The patient's pain is located in the posterior neck the entire length of his spine and patient verbalizes cervical radiculopathy shooting into his bilateral hands. Patient states the amount of pain relief he is now obtaining from his current pain relievers is enough to make a difference in his life by reducing most of his pain. Patient currently uses gabapentin 600 mg 3 times daily. Other than as stated above, the patient denies any interval changes in medications, medical condition, mental condition, symptoms, or allergies since the last office visit. Review of Systems:    Review of Systems   Respiratory: Negative for shortness of breath. Cardiovascular: Negative for chest pain. Gastrointestinal: Negative for constipation, diarrhea, nausea and vomiting. Musculoskeletal: Positive for back pain, neck pain and neck stiffness. Negative for arthralgias, gait problem, joint swelling and myalgias. Skin: Negative for rash. Neurological: Positive for dizziness and weakness. Negative for seizures. All other systems reviewed and are negative.         Past Medical History:   Diagnosis Date   • Anxiety    • Arthritis    • Bilateral occipital neuralgia    • Cervical post-laminectomy syndrome    • Cervical radiculopathy    • Cervical spine disease    • Cervical spondylosis with myelopathy    • Chronic lumbar pain    • Chronic pain syndrome    • Chronic thoracic spine pain    • Cubital tunnel syndrome of both upper extremities 09/10/2022   • Degenerative cervical spinal stenosis    • Dental crowns present    • Depression    • Diabetes ([address])     type 2   • Exercise involving walking     daily 5-10 minutes   • Failed neck syndrome    • Fatty liver    • Full dentures     upper   • GERD (gastroesophageal reflux disease)    • History of COVID-19 2021    per pt admitted to the [facility] hospital--   • Hyperlipidemia    • Hypertension    • Irritable bowel syndrome    • Motion sickness    • Muscle weakness     "arms and legs"   • Myofascial muscle pain    • Neck stiffness     plate implanted-limited ROM   • Polyarthralgia    • Tinnitus    • Wears glasses        Past Surgical History:   Procedure Laterality Date   • CERVICAL DISC SURGERY     • CERVICAL FUSION  2011    ACDF with Dr. Tammy Cain   • COLONOSCOPY     • LA ARTHRD PST/PSTLAT TQ 1NTRSPC CRV BELW C2 SEGMENT Bilateral 6/19/2023    Procedure: C3-T1 posterior decompression with instrumented fixation fusion (impulse monitoring); Surgeon: Shanice Carnes MD;  Location: [facility];  Service: Neurosurgery   • TOTAL SHOULDER REPLACEMENT Right     Reversal       Family History   Problem Relation Age of Onset   • Stroke Mother    • Lung cancer Father        Social History     Occupational History   • Not on file   Tobacco Use   • Smoking status: Never   • Smokeless tobacco: Never   Vaping Use   • Vaping Use: Never used   Substance and Sexual Activity   • Alcohol use:  Yes     Alcohol/week: 1.0 standard drink of alcohol     Types: 1 Cans of beer per week     Comment: occasional, weekly   • Drug use: No   • Sexual activity: Not on file     Comment: defer         Current Outpatient Medications:   •  acetaminophen (TYLENOL) 325 mg tablet, Take 3 tablets (975 mg total) by mouth every 8 (eight) hours, Disp: 40 tablet, Rfl: 0  •  ASCORBIC ACID PO, , Disp: , Rfl:   •  benzonatate (TESSALON PERLES) 100 mg capsule, Take 100 mg by mouth Three times daily as needed, Disp: , Rfl:   •  Cholecalciferol 25 MCG (1000 UT) capsule, TAKE ONE TABLET BY MOUTH EVERY DAY (FOR LOW VITAMIN D), Disp: , Rfl:   •  Cyanocobalamin (B-12 PO), Take by mouth, Disp: , Rfl:   •  cyclobenzaprine (FLEXERIL) 5 mg tablet, Take 1 tablet (5 mg total) by mouth 3 (three) times a day as needed for muscle spasms, Disp: 60 tablet, Rfl: 0  •  dextrose 1 g CHEW, daily as needed for low blood sugar, Disp: , Rfl:   •  Empagliflozin 25 MG TABS, Take 25 mg by mouth every morning ---- Jess Russ, Disp: , Rfl:   •  famotidine (PEPCID) 20 mg tablet, Take 20 mg by mouth daily at bedtime, Disp: , Rfl:   •  gabapentin (Neurontin) 600 MG tablet, Take 1 tablet (600 mg total) by mouth 3 (three) times a day, Disp: 270 tablet, Rfl: 0  •  lisinopril (ZESTRIL) 10 mg tablet, Take 10 mg by mouth daily, Disp: , Rfl:   •  losartan (COZAAR) 25 mg tablet, , Disp: , Rfl:   •  metFORMIN (GLUMETZA) 1000 MG (MOD) 24 hr tablet, Take 1,000 mg by mouth 2 (two) times a day with meals, Disp: , Rfl:   •  pantoprazole (PROTONIX) 20 mg tablet, Take 20 mg by mouth daily, Disp: , Rfl:   •  TRAZODONE HCL PO, Take 75 mg by mouth daily at bedtime  , Disp: , Rfl:   •  bisacodyl (DULCOLAX) 10 mg suppository, Insert 1 suppository (10 mg total) into the rectum daily as needed for constipation for up to 3 days, Disp: 3 suppository, Rfl: 0  •  docusate sodium (COLACE) 100 mg capsule, Take 1 capsule (100 mg total) by mouth 2 (two) times a day (Patient not taking: Reported on 8/2/2023), Disp: 20 capsule, Rfl: 0  •  polyethylene glycol (MIRALAX) 17 g packet, Take 17 g by mouth daily as needed (PRN per day) (Patient not taking: Reported on 7/6/2023), Disp: 100 g, Rfl: 0  •  senna (SENOKOT) 8.6 mg, Take 1 tablet (8.6 mg total) by mouth daily Do not start before June 23, 2023.  (Patient not taking: Reported on 7/6/2023), Disp: 10 tablet, Rfl: 0    Allergies   Allergen Reactions   • Metoprolol Other (See Comments)     Too low of a heart rate   • Penicillin V Shortness Of Breath   • Penicillins Shortness Of Breath and Rash     rash       Physical Exam:    /78   Pulse 60   Wt 110 kg (242 lb)   BMI 37.90 kg/m²     Constitutional:normal, well developed, well nourished, alert, in no distress and non-toxic and no overt pain behavior. and obese  Eyes:anicteric  HEENT:grossly intact  Neck:Trigger points noted, limited range of motion  Pulmonary:even and unlabored  Cardiovascular:No edema or pitting edema present  Skin:Normal without rashes or lesions and well hydrated  Psychiatric:Mood and affect appropriate  Neurologic:Cranial Nerves II-XII grossly intact  Musculoskeletal:antalgic      Imaging  FL spine and pain procedure    (Results Pending)         Orders Placed This Encounter   Procedures   • FL spine and pain procedure       This document was created using speech voice recognition software. Grammatical errors, random word insertions, pronoun errors, and incomplete sentences are an occasional consequence of this system due to software limitations, ambient noise, and hardware issues. Any formal questions or concerns about content, text, or information contained within the body of this dictation should be directly addressed to the provider for clarification.

## 2023-09-20 ENCOUNTER — OFFICE VISIT (OUTPATIENT)
Dept: PHYSICAL THERAPY | Facility: CLINIC | Age: 58
End: 2023-09-20
Payer: OTHER MISCELLANEOUS

## 2023-09-20 DIAGNOSIS — M43.22 FUSION OF SPINE OF CERVICAL REGION: Primary | ICD-10-CM

## 2023-09-20 DIAGNOSIS — M47.12 OTHER SPONDYLOSIS WITH MYELOPATHY, CERVICAL REGION: ICD-10-CM

## 2023-09-20 PROCEDURE — 97110 THERAPEUTIC EXERCISES: CPT

## 2023-09-20 PROCEDURE — 97010 HOT OR COLD PACKS THERAPY: CPT

## 2023-09-20 PROCEDURE — 97112 NEUROMUSCULAR REEDUCATION: CPT

## 2023-09-20 PROCEDURE — 97140 MANUAL THERAPY 1/> REGIONS: CPT

## 2023-09-20 NOTE — PROGRESS NOTES
Daily Note     Today's date: 2023  Patient name: Moshe Huang. : 1965  MRN: [id number]  Referring provider: Shon David PA-C  Dx:   Encounter Diagnosis     ICD-10-CM    1. 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 Other spondylosis with myelopathy, cervical region  M47.12                      Subjective: Pt reports being really stiff after missing Monday's visit      Objective: See treatment diary below      Assessment: Tolerated treatment well. Patient would benefit from continued PT      Plan: Progress treatment as tolerated.        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 Fusion (23)       Manuals 9-20-23 8-30-23 8-31-23 9-13-23 9-15-23   STM to sub occipital to mid thoracic 20' 20' 20' 20' 20'   AROM c rotation w/ manual c2 block 6' 8' 8' 8' 8'                   Neuro Re-Ed         LTP/ MTP 2x10 green 2x10 each green 3x10 each   green 3x10 each green 3x10 each green   Cervical retraction in available ROM 5" x20 5" x20 5" x20 5" x20 5" x20   scap retract/ ER in doorframe Green 2x10                                       Ther Ex        Seated thoracic extension AROM mobs w/ roll and board 5" 20x 2 levels 5" 20x 2 levels  5" x20  2 levels 5" x20 2 levels   Gentle UT side bend 10" x12 bilat 10" x12 bilat 10" x12 christopher 10" x12 christopher 10" x12 christopher                                   Pt Ed/ HEP Post surgical anatomy and physiology as well as issuing and reviewing HEP-15'               Ther Activity                        Gait Training                        Modalities         15'  MHP 12' post tx 12' 15'

## 2023-09-21 ENCOUNTER — OFFICE VISIT (OUTPATIENT)
Dept: PAIN MEDICINE | Facility: CLINIC | Age: 58
End: 2023-09-21
Payer: OTHER MISCELLANEOUS

## 2023-09-21 VITALS
BODY MASS INDEX: 37.9 KG/M2 | WEIGHT: 242 LBS | DIASTOLIC BLOOD PRESSURE: 78 MMHG | HEART RATE: 60 BPM | SYSTOLIC BLOOD PRESSURE: 150 MMHG

## 2023-09-21 DIAGNOSIS — M96.1 CERVICAL POST-LAMINECTOMY SYNDROME: ICD-10-CM

## 2023-09-21 DIAGNOSIS — G89.28 CHRONIC POST-OPERATIVE PAIN: ICD-10-CM

## 2023-09-21 DIAGNOSIS — M79.18 MYOFASCIAL PAIN SYNDROME: ICD-10-CM

## 2023-09-21 DIAGNOSIS — M54.12 CERVICAL RADICULOPATHY: ICD-10-CM

## 2023-09-21 DIAGNOSIS — G89.4 CHRONIC PAIN SYNDROME: Primary | ICD-10-CM

## 2023-09-21 PROCEDURE — 99214 OFFICE O/P EST MOD 30 MIN: CPT

## 2023-09-21 RX ORDER — GABAPENTIN 600 MG/1
600 TABLET ORAL 3 TIMES DAILY
Qty: 270 TABLET | Refills: 0 | Status: SHIPPED | OUTPATIENT
Start: 2023-09-21

## 2023-09-22 ENCOUNTER — OFFICE VISIT (OUTPATIENT)
Dept: PHYSICAL THERAPY | Facility: CLINIC | Age: 58
End: 2023-09-22
Payer: OTHER MISCELLANEOUS

## 2023-09-22 DIAGNOSIS — M47.12 OTHER SPONDYLOSIS WITH MYELOPATHY, CERVICAL REGION: ICD-10-CM

## 2023-09-22 DIAGNOSIS — M43.22 FUSION OF SPINE OF CERVICAL REGION: Primary | ICD-10-CM

## 2023-09-22 PROCEDURE — 97140 MANUAL THERAPY 1/> REGIONS: CPT

## 2023-09-22 PROCEDURE — 97110 THERAPEUTIC EXERCISES: CPT

## 2023-09-22 PROCEDURE — 97112 NEUROMUSCULAR REEDUCATION: CPT

## 2023-09-22 PROCEDURE — 97010 HOT OR COLD PACKS THERAPY: CPT

## 2023-09-22 NOTE — PROGRESS NOTES
Daily Note     Today's date: 2023  Patient name: Benjie Wakefield. : 1965  MRN: [id number]  Referring provider: Cecil Mir PA-C  Dx:   Encounter Diagnosis     ICD-10-CM    1. 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 Other spondylosis with myelopathy, cervical region  M47.12                      Subjective: Pt wants his balance and Left leg weakness addressed    Objective: See treatment diary below      Assessment: Tolerated treatment well. Patient would benefit from continued PT      Plan: Progress treatment as tolerated.        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 Fusion (23)       Manuals 9-20-23 9-22-23 8-31-23 9-13-23 9-15-23   STM to sub occipital to mid thoracic 20' 20' 20' 20 20   AROM c rotation w/ manual c2 block 6' 8' 8' 8' 8'                   Neuro Re-Ed         LTP/ MTP 2x10 green 2x10 each green 3x10 each   green 3x10 each green 3x10 each green   Cervical retraction in available ROM 5" x20 5" x20 5" x20 5" x20 5" x20   scap retract/ ER in doorframe Green 2x10 Green 2x10      Standing hip abd and ext  2x10 bilaterally each for strength and balance      [facility] mini lunges        Heel raises for strength and balance  2x10      Wall ball squats  2x10                                              Ther Ex        Seated thoracic extension AROM mobs w/ roll and board 5" 20x 2 levels 5" 20x 2 levels  5" x20  2 levels 5" x20 2 levels   Gentle UT side bend 10" x12 bilat 10" x12 bilat 10" x12 christopher 10" x12 christopher 10" x12 christopher                                   Pt Ed/ HEP Post surgical anatomy and physiology as well as issuing and reviewing HEP-15'               Ther Activity                        Gait Training                        Modalities         15'  MHP 12' post tx 12' 15'

## 2023-09-23 NOTE — PATIENT INSTRUCTIONS
Gabapentin (By mouth)   Gabapentin (graciela-a-PEN-tin)  Treats seizures and pain caused by shingles. Brand Name(s): FusePaq Fanatrex, Neurontin   There may be other brand names for this medicine. When This Medicine Should Not Be Used: This medicine is not right for everyone. Do not use it if you had an allergic reaction to gabapentin. How to Use This Medicine:   Capsule, Liquid, Tablet  Take your medicine as directed. Your dose may need to be changed several times to find what works best for you. If you have epilepsy, do not allow more than 12 hours to pass between doses. Capsule: Swallow the capsule whole with plenty of water. Do not open, crush, or chew it. Gralise® tablet: Swallow the tablet whole . Do not crush, break, or chew it. Neurontin® tablet: If you break a tablet into 2 pieces, use the second half as your next dose. Do not use the half-tablet if the whole tablet has been cut or broken after 28 days. Oral liquid: Measure the oral liquid medicine with a marked measuring spoon, oral syringe, or medicine cup. This medicine should come with a Medication Guide. Ask your pharmacist for a copy if you do not have one. Missed dose: Take a dose as soon as you remember. If it is almost time for your next dose, wait until then and take a regular dose. Do not take extra medicine to make up for a missed dose. Store the medicine in a closed container at room temperature, away from heat, moisture, and direct light. Store the Neurontin® oral liquid in the refrigerator. Do not freeze. Drugs and Foods to Avoid:   Ask your doctor or pharmacist before using any other medicine, including over-the-counter medicines, vitamins, and herbal products. Some medicines can affect how gabapentin works. Tell your doctor if you also using hydrocodone or morphine. If you take an antacid, wait at least 2 hours before you take gabapentin. Do not drink alcohol while you are using this medicine.   Tell your doctor if you use anything else that makes you sleepy. Some examples are allergy medicine, narcotic pain medicine, and alcohol. Tell your doctor if you are also using lorazepam, oxycodone, or zolpidem. Warnings While Using This Medicine:   Tell your doctor if you are pregnant or breastfeeding, or if you have kidney problems (including patients receiving dialysis) or lung problems. Tell your doctor if you have a history of depression or mental health problems. This medicine may cause the following problems:  Drug reaction with eosinophilia and systemic symptoms (DRESS) or multiorgan hypersensitivity, which may damage the liver, kidney, blood, heart, or muscles  Changes in mood or behavior, including suicidal thoughts or behavior  Respiratory depression (serious breathing problem that can be life-threatening), when used with narcotic pain medicines  Do not stop using this medicine suddenly. Your doctor will need to slowly decrease your dose before you stop it completely. This medicine may make you dizzy or drowsy. Do not drive or do anything else that could be dangerous until you know how this medicine affects you. Tell any doctor or dentist who treats you that you are using this medicine. This medicine may affect certain medical test results. Your doctor will check your progress and the effects of this medicine at regular visits. Keep all appointments. Keep all medicine out of the reach of children. Never share your medicine with anyone. Possible Side Effects While Using This Medicine:   Call your doctor right away if you notice any of these side effects:   Allergic reaction: Itching or hives, swelling in your face or hands, swelling or tingling in your mouth or throat, chest tightness, trouble breathing  Behavior problems, aggression, restlessness, trouble concentrating, moodiness (especially in children)  Blistering, peeling, red skin rash  Blue lips, fingernails, or skin, chest pain, fast heartbeat, trouble breathing  Change in how much or how often you urinate, bloody or cloudy urine  Dark urine or pale stools, nausea, vomiting, loss of appetite, stomach pain, yellow skin or eyes  Fever, chills, cough, sore throat, body aches  Problems with coordination, shakiness, unsteadiness, unusual eye movement  Rapid weight gain, swelling in your hands, ankles, or feet  Rash, swollen or tender glands in the neck, armpit, or groin  Unusual moods or behaviors, thoughts of hurting yourself, feeling depressed  If you notice these less serious side effects, talk with your doctor:   Dizziness, drowsiness, sleepiness, tiredness  If you notice other side effects that you think are caused by this medicine, tell your doctor. Call your doctor for medical advice about side effects. You may report side effects to FDA at 0-466-TSO-2126    © Copyright [employer] 2022 Information is for End User's use only and may not be sold, redistributed or otherwise used for commercial purposes. The above information is an  only. It is not intended as medical advice for individual conditions or treatments. Talk to your doctor, nurse or pharmacist before following any medical regimen to see if it is safe and effective for you. Trigger Point Injection   WHAT YOU NEED TO KNOW:   What do I need to know about a trigger point injection? A trigger point injection is used to relax a muscle knot. This helps relieve pain. You may be able to have more than one trigger point treated during a session. How do I prepare for a trigger point injection? Your healthcare provider will tell you how to prepare. Arrange to have someone drive you home after the injection. Tell your provider about all medicines you take, including pain medicine, blood thinners, and muscle relaxers. He or she will tell you if you need to stop any medicine for the injection, and when to stop. He or she will tell you which medicines to take or not take on the day of the injection.     Tell your provider about all your allergies, including to any pain medicine. What will happen during a trigger point injection? You may be sitting or lying, depending on where the trigger point is located. Your healthcare provider will feel for a knot in the muscle. He or she may myra your skin over the knot. Your provider will put a needle through your skin and into the trigger point. Saline (salt solution), pain relievers, or other medicines may be pushed through the needle into the trigger point. Your provider may use only a dry needle (no medicine). He or she will pull the needle almost all the way out and then push it in again. He or she will repeat this several times until the muscle stops twitching or feels relaxed. Your provider will remove the needle and stretch the muscle area. He or she may apply pressure to the area for 2 minutes. A bandage will be put over the injection site to prevent bleeding or an infection. What should I expect after a trigger point injection? You may feel pain relief right away. It is normal for some pain to start again 2 hours later. An ice pack or over-the-counter pain medicine can help lower the pain. You may feel sore in the injection site for a few days. If you need another injection in the same area, wait until the area is not sore. Your healthcare provider may give you specific activity instructions to follow at home or recommend physical therapy. In general, you should try to stay active. Avoid strenuous activity for the first 3 or 4 days after the injection. Do not have more injections if you still have trigger point pain after 2 or 3 injections. What are the risks of a trigger point injection? You may have a severe allergic reaction to pain medicine injected. The injection may be painful, or you may be sore where you got the injection. You may bleed, bruise, or develop an infection in the injection area. The injection may cause you to feel faint. Rarely, the needle may cause muscle or blood vessel damage or your lung may collapse if you get the injection near your chest.  CARE AGREEMENT:   You have the right to help plan your care. Learn about your health condition and how it may be treated. Discuss treatment options with your healthcare providers to decide what care you want to receive. You always have the right to refuse treatment. The above information is an  only. It is not intended as medical advice for individual conditions or treatments. Talk to your doctor, nurse or pharmacist before following any medical regimen to see if it is safe and effective for you. © Copyright [employer] 2022 Information is for End User's use only and may not be sold, redistributed or otherwise used for commercial purposes.  All illustrations and images included in CareNotes® are the copyrighted property of [employer] Inc[employer] or [address] [place] [address]

## 2023-09-27 ENCOUNTER — OFFICE VISIT (OUTPATIENT)
Dept: PHYSICAL THERAPY | Facility: CLINIC | Age: 58
End: 2023-09-27
Payer: OTHER MISCELLANEOUS

## 2023-09-27 DIAGNOSIS — M47.12 OTHER SPONDYLOSIS WITH MYELOPATHY, CERVICAL REGION: ICD-10-CM

## 2023-09-27 DIAGNOSIS — M43.22 FUSION OF SPINE OF CERVICAL REGION: Primary | ICD-10-CM

## 2023-09-27 PROCEDURE — 97110 THERAPEUTIC EXERCISES: CPT

## 2023-09-27 PROCEDURE — 97112 NEUROMUSCULAR REEDUCATION: CPT

## 2023-09-27 PROCEDURE — 97140 MANUAL THERAPY 1/> REGIONS: CPT

## 2023-09-27 NOTE — PROGRESS NOTES
Daily Note     Today's date: 2023  Patient name: Teri Lopez. : 1965  MRN: [id number]  Referring provider: Shirley Lazo PA-C  Dx:   Encounter Diagnosis     ICD-10-CM    1. 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 Other spondylosis with myelopathy, cervical region  M47.12                      Subjective: Pt reports still being in pain and stiff but getting stronger. Objective: See treatment diary below      Assessment: Tolerated treatment well. Patient would benefit from continued PT      Plan: Progress treatment as tolerated.        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 Fusion (23)       Manuals 9-20-23 9-22-23 9-27-23 9-13-23 9-15-23   STM to sub occipital to mid thoracic 20' 20' 20' 20 20'   AROM c rotation w/ manual c2 block 6' 8' 8' 8' 8'                   Neuro Re-Ed         LTP/ MTP 2x10 green 2x10 each green 3x10 each   green 3x10 each green 3x10 each green   Cervical retraction in available ROM 5" x20 5" x20 5" x20 5" x20 5" x20   scap retract/ ER in doorframe Green 2x10 Green 2x10 Green 2x10     Standing hip abd and ext  2x10 bilaterally each for strength and balance 2x10 bilaterally each for strength and balance     [facility] mini lunges        Heel raises for strength and balance  2x10 Deferred-time     Wall ball squats  2x10 Deferred- time                                             Ther Ex        Seated thoracic extension AROM mobs w/ roll and board 5" 20x 2 levels 5" 20x 2 levels 5" 20x 2 levels 5" x20  2 levels 5" x20 2 levels   Gentle UT side bend 10" x12 bilat 10" x12 bilat 10" x12 christopher 10" x12 christopher 10" x12 christopher                                   Pt Ed/ HEP Post surgical anatomy and physiology as well as issuing and reviewing HEP-15'               Ther Activity                        Gait Training                        Modalities         15'  MHP 12' post tx 12' 15'

## 2023-09-29 ENCOUNTER — EVALUATION (OUTPATIENT)
Dept: PHYSICAL THERAPY | Facility: CLINIC | Age: 58
End: 2023-09-29
Payer: OTHER MISCELLANEOUS

## 2023-09-29 DIAGNOSIS — M47.12 OTHER SPONDYLOSIS WITH MYELOPATHY, CERVICAL REGION: ICD-10-CM

## 2023-09-29 DIAGNOSIS — M43.22 FUSION OF SPINE OF CERVICAL REGION: Primary | ICD-10-CM

## 2023-09-29 PROCEDURE — 97112 NEUROMUSCULAR REEDUCATION: CPT

## 2023-09-29 PROCEDURE — 97110 THERAPEUTIC EXERCISES: CPT

## 2023-09-29 PROCEDURE — 97164 PT RE-EVAL EST PLAN CARE: CPT

## 2023-09-29 PROCEDURE — 97010 HOT OR COLD PACKS THERAPY: CPT

## 2023-09-29 NOTE — LETTER
2023    Elidia Lord, [address]    Patient: Bob Howe. YOB: 1965   Date of Visit: 2023     Encounter Diagnosis     ICD-10-CM    1. 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 Other spondylosis with myelopathy, cervical region  M47.12           Dear Dr. Rosario Carpenter: Thank you for your recent referral of Bob Howe. . Please review the attached evaluation summary from Efra's recent visit. Please verify that you agree with the plan of care by signing the attached order. If you have any questions or concerns, please do not hesitate to call. I sincerely appreciate the opportunity to share in the care of one of your patients and hope to have another opportunity to work with you in the near future. Sincerely,    Harpreet Fritz, PT      Referring Provider:      I certify that I have read the below Plan of Care and certify the need for these services furnished under this plan of treatment while under my care. Elidia Lord PA-C  [address] [place] [address]  [facility] [place]          PT Re-Evaluation    Today's date: 2023  Patient name: Bob Howe. : 1965  MRN: [id number]  Referring provider: Elidia Lord PA-C  Dx:   Encounter Diagnosis     ICD-10-CM    1. 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 Other spondylosis with myelopathy, cervical region  M47.12                      Assessment  Assessment details: Pt presents with significant soft tissue and mobility restrictions in his neck and upper body. He has continued ulnar nerve symptoms bilaterally, as well as some weakness in left leg which is leading to minor balance issues. 23:  Pt has shown some improvements in mobility and some increase in strength but has a long way to go for return to reasonable function.   Impairments: abnormal or restricted ROM, activity intolerance, impaired balance, impaired physical strength, lacks appropriate home exercise program, pain with function and poor posture     Symptom irritability: moderateUnderstanding of Dx/Px/POC: good   Prognosis: fair    Goals  ST) Pt. Will be able to sleep through the night without being awoken with pain and with minimal to no pain upon waking in 6 weeks. -SOME IMPROVEMENT  2) Pt. Will have elimination of headaches in 6 weeks. -SOME IMPROVEMENT   3)  Pt.'s radicular symptoms will be centralized to neck in 6 weeks. -INCONSISTENT IMPROVEMENT  LT) Pt. Will be able to complete all chores at home without pain or limitations in 12 weeks. -LITTLE IMPROVEMENT  2)  Pt. Will be able to to sleep a full night without limitations -SOME IMPROVEMENT    Plan  Patient would benefit from: PT eval and skilled physical therapy  Planned modality interventions: thermotherapy: hydrocollator packs, cryotherapy and unattended electrical stimulation  Planned therapy interventions: manual therapy, neuromuscular re-education, patient education, self care, therapeutic activities, therapeutic exercise and home exercise program  Frequency: 3x week  Duration in weeks: 12  Treatment plan discussed with: patient        Subjective Evaluation    History of Present Illness  Date of onset: 2005  Date of surgery: 2023  Mechanism of injury: surgery  Mechanism of injury: c3-t1 decompression/ fusion after years of being denied surgical requests. Pt reports headaches, stiffness and weakness/ strain in neck. He still has same amount of (ulnar) nerve symptoms as prior to surgery. He also reports left leg weakness and stability. 23:  Pt reports being a little looser and able to turn his head better but still in a fair amount of discomfort. Recurrent probem    Quality of life: fair    Patient Goals  Patient goal: Gain maximum movement from his neck.   Pain  Current pain ratin  At best pain rating: 3  At worst pain ratin  Location: anterior and posterior neck pain  Progression: no change      Diagnostic Tests    FCE comments: Pt reports being listed as temporarily totally disabled by the [facility] government. Objective     Postural Observations  Seated posture: poor  Standing posture: poor    Additional Postural Observation Details  Moderate forward head and rounded shoulders    Palpation   Left   Hypertonic in the cervical paraspinals, levator scapulae, middle trapezius, rhomboids, scalenes, sternocleidomastoid, suboccipitals and upper trapezius. Tenderness of the cervical paraspinals, levator scapulae, middle trapezius, rhomboids, scalenes, sternocleidomastoid, suboccipitals and upper trapezius. Trigger point to levator scapulae, suboccipitals and upper trapezius. Right   Hypertonic in the cervical paraspinals, levator scapulae, middle trapezius, rhomboids, scalenes, sternocleidomastoid, suboccipitals and upper trapezius. Tenderness of the cervical paraspinals, levator scapulae, middle trapezius, rhomboids, scalenes, sternocleidomastoid, suboccipitals and upper trapezius. Trigger point to levator scapulae, suboccipitals and upper trapezius.      Active Range of Motion   Cervical/Thoracic Spine       Cervical  Subcranial protraction:  WFL   Subcranial retraction:  with pain   Restriction level: maximal  Flexion:  with pain Restriction level: moderate  Extension:  with pain Restriction level: maximal  Left lateral flexion:  with pain Restriction level: maximal  Right lateral flexion:  with pain Restriction level maximal  Left rotation:  with pain Restriction level: moderate  Right rotation:  with pain Restriction level: moderate    Strength/Myotome Testing   Cervical Spine     Left   Neck lateral flexion (C3): 2    Right   Neck lateral flexion (C3): 2    Left Shoulder     Planes of Motion   Abduction: 3+     Right Shoulder     Planes of Motion   Abduction: 3+     Left Elbow   Flexion: 3+  Extension: 3+    Right Elbow   Flexion: 3+  Extension: 3+    Left Wrist/Hand   Wrist extension: 3+  Wrist flexion: 3+  Thumb extension: 3    Right Wrist/Hand   Wrist extension: 3+  Wrist flexion: 3  Thumb extension: 3              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 Fusion (6/19/23)       Manuals 9-20-23 9-22-23 9-27-23 9-29-23 9-15-23   STM to sub occipital to mid thoracic 20' 21' 20' 20' 20'   AROM c rotation w/ manual c2 block 6' 8' 8' 8' 8'                   Neuro Re-Ed         LTP/ MTP 2x10 green 2x10 each green 3x10 each   green 3x10 each green 3x10 each green   Cervical retraction in available ROM 5" x20 5" x20 5" x20 5" x20 5" x20   scap retract/ ER in doorframe Green 2x10 Green 2x10 Green 2x10 Green 3x10    Standing hip abd and ext  2x10 bilaterally each for strength and balance 2x10 bilaterally each for strength and balance 2x10 bilat each    [facility] mini lunges        Heel raises for strength and balance  2x10 Deferred-time     Wall ball squats  2x10 Deferred- time                                             Ther Ex        Seated thoracic extension AROM mobs w/ roll and board 5" 20x 2 levels 5" 20x 2 levels 5" 20x 2 levels 5" x20  2 levels 5" x20 2 levels   Gentle UT side bend 10" x12 bilat 10" x12 bilat 10" x12 christopher 10" x12 christopher 10" x12 christopher                                   Pt Ed/ HEP Post surgical anatomy and physiology as well as issuing and reviewing HEP-15'               Ther Activity                        Gait Training                        Modalities         15' MHP 15' post tx MHP 15' post tx 12' 15'

## 2023-09-29 NOTE — PROGRESS NOTES
PT Re-Evaluation    Today's date: 2023  Patient name: Catarina Mendosa. : 1965  MRN: [id number]  Referring provider: Kaleigh Rodriguez PA-C  Dx:   Encounter Diagnosis     ICD-10-CM    1. 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 Other spondylosis with myelopathy, cervical region  M47.12                      Assessment  Assessment details: Pt presents with significant soft tissue and mobility restrictions in his neck and upper body. He has continued ulnar nerve symptoms bilaterally, as well as some weakness in left leg which is leading to minor balance issues. 23:  Pt has shown some improvements in mobility and some increase in strength but has a long way to go for return to reasonable function. Impairments: abnormal or restricted ROM, activity intolerance, impaired balance, impaired physical strength, lacks appropriate home exercise program, pain with function and poor posture     Symptom irritability: moderateUnderstanding of Dx/Px/POC: good   Prognosis: fair    Goals  ST) Pt. Will be able to sleep through the night without being awoken with pain and with minimal to no pain upon waking in 6 weeks. -SOME IMPROVEMENT  2) Pt. Will have elimination of headaches in 6 weeks. -SOME IMPROVEMENT   3)  Pt.'s radicular symptoms will be centralized to neck in 6 weeks. -INCONSISTENT IMPROVEMENT  LT) Pt. Will be able to complete all chores at home without pain or limitations in 12 weeks. -LITTLE IMPROVEMENT  2)  Pt.  Will be able to to sleep a full night without limitations -SOME IMPROVEMENT    Plan  Patient would benefit from: PT eval and skilled physical therapy  Planned modality interventions: thermotherapy: hydrocollator packs, cryotherapy and unattended electrical stimulation  Planned therapy interventions: manual therapy, neuromuscular re-education, patient education, self care, therapeutic activities, therapeutic exercise and home exercise program  Frequency: 3x week  Duration in weeks: 12  Treatment plan discussed with: patient        Subjective Evaluation    History of Present Illness  Date of onset: 2005  Date of surgery: 2023  Mechanism of injury: surgery  Mechanism of injury: c3-t1 decompression/ fusion after years of being denied surgical requests. Pt reports headaches, stiffness and weakness/ strain in neck. He still has same amount of (ulnar) nerve symptoms as prior to surgery. He also reports left leg weakness and stability. 23:  Pt reports being a little looser and able to turn his head better but still in a fair amount of discomfort. Recurrent probem    Quality of life: fair    Patient Goals  Patient goal: Gain maximum movement from his neck. 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 Objective     Postural Observations  Seated posture: poor  Standing posture: poor    Additional Postural Observation Details  Moderate forward head and rounded shoulders    Palpation   Left   Hypertonic in the cervical paraspinals, levator scapulae, middle trapezius, rhomboids, scalenes, sternocleidomastoid, suboccipitals and upper trapezius. Tenderness of the cervical paraspinals, levator scapulae, middle trapezius, rhomboids, scalenes, sternocleidomastoid, suboccipitals and upper trapezius. Trigger point to levator scapulae, suboccipitals and upper trapezius. Right   Hypertonic in the cervical paraspinals, levator scapulae, middle trapezius, rhomboids, scalenes, sternocleidomastoid, suboccipitals and upper trapezius. Tenderness of the cervical paraspinals, levator scapulae, middle trapezius, rhomboids, scalenes, sternocleidomastoid, suboccipitals and upper trapezius.    Trigger point to levator scapulae, suboccipitals and upper trapezius.      Active Range of Motion   Cervical/Thoracic Spine       Cervical  Subcranial protraction:  WFL   Subcranial retraction:  with pain   Restriction level: maximal  Flexion:  with pain Restriction level: moderate  Extension:  with pain Restriction level: maximal  Left lateral flexion:  with pain Restriction level: maximal  Right lateral flexion:  with pain Restriction level maximal  Left rotation:  with pain Restriction level: moderate  Right rotation:  with pain Restriction level: moderate    Strength/Myotome Testing   Cervical Spine     Left   Neck lateral flexion (C3): 2    Right   Neck lateral flexion (C3): 2    Left Shoulder     Planes of Motion   Abduction: 3+     Right Shoulder     Planes of Motion   Abduction: 3+     Left Elbow   Flexion: 3+  Extension: 3+    Right Elbow   Flexion: 3+  Extension: 3+    Left Wrist/Hand   Wrist extension: 3+  Wrist flexion: 3+  Thumb extension: 3    Right Wrist/Hand   Wrist extension: 3+  Wrist flexion: 3  Thumb extension: 3                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 Fusion (6/19/23)       Manuals 9-20-23 9-22-23 9-27-23 9-29-23 9-15-23   STM to sub occipital to mid thoracic 20' 20' 20' 20' 20'   AROM c rotation w/ manual c2 block 6' 8' 8' 8' 8'                   Neuro Re-Ed         LTP/ MTP 2x10 green 2x10 each green 3x10 each   green 3x10 each green 3x10 each green   Cervical retraction in available ROM 5" x20 5" x20 5" x20 5" x20 5" x20   scap retract/ ER in doorframe Green 2x10 Green 2x10 Green 2x10 Green 3x10    Standing hip abd and ext  2x10 bilaterally each for strength and balance 2x10 bilaterally each for strength and balance 2x10 bilat each    [facility] mini lunges        Heel raises for strength and balance  2x10 Deferred-time     Wall ball squats  2x10 Deferred- time                                             Ther Ex        Seated thoracic extension AROM mobs w/ roll and board 5" 20x 2 levels 5" 20x 2 levels 5" 20x 2 levels 5" x20  2 levels 5" x20 2 levels   Gentle UT side bend 10" x12 bilat 10" x12 bilat 10" x12 christopher 10" x12 christopher 10" x12 christopher                                   Pt Ed/ HEP Post surgical anatomy and physiology as well as issuing and reviewing HEP-15'               Ther Activity                        Gait Training                        Modalities         15' MHP 12' post tx MHP 15' post tx 12' 15'

## 2023-10-02 ENCOUNTER — EVALUATION (OUTPATIENT)
Dept: PHYSICAL THERAPY | Facility: CLINIC | Age: 58
End: 2023-10-02
Payer: OTHER MISCELLANEOUS

## 2023-10-02 DIAGNOSIS — M43.22 FUSION OF SPINE OF CERVICAL REGION: Primary | ICD-10-CM

## 2023-10-02 DIAGNOSIS — M47.12 OTHER SPONDYLOSIS WITH MYELOPATHY, CERVICAL REGION: ICD-10-CM

## 2023-10-02 PROCEDURE — 97010 HOT OR COLD PACKS THERAPY: CPT

## 2023-10-02 PROCEDURE — 97112 NEUROMUSCULAR REEDUCATION: CPT

## 2023-10-02 PROCEDURE — 97140 MANUAL THERAPY 1/> REGIONS: CPT

## 2023-10-02 NOTE — PROGRESS NOTES
Daily Note     Today's date: 10/2/2023  Patient name: Delfino Meraz. : 1965  MRN: [id number]  Referring provider: Otoniel Cifuentes PA-C  Dx:   Encounter Diagnosis     ICD-10-CM    1. 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 Other spondylosis with myelopathy, cervical region  M47.12                      Subjective: Pt reports being very sore after having a busy weekend attempting to perform tasks around his property. Objective: See treatment diary below      Assessment: Tolerated treatment well. Patient would benefit from continued PT      Plan: Progress treatment as tolerated.          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 Fusion (23)       Manuals 9-20-23 9-22-23 9-27-23 9-29-23 10-2-23   STM to sub occipital to mid thoracic 20' 20' 20' 20' 20'   AROM c rotation w/ manual c2 block 6' 8' 8' 8' 8'                   Neuro Re-Ed         LTP/ MTP 2x10 green 2x10 each green 3x10 each   green 3x10 each green 3x10 each green   Cervical retraction in available ROM 5" x20 5" x20 5" x20 5" x20 5" x20   scap retract/ ER in doorframe Green 2x10 Green 2x10 Green 2x10 Green 3x10 Green 3x10   Standing hip abd and ext  2x10 bilaterally each for strength and balance 2x10 bilaterally each for strength and balance 2x10 bilat each ==   BOSU mini lunges        Heel raises for strength and balance  2x10 Deferred-time     Wall ball squats  2x10 Deferred- time                                             Ther Ex        Seated thoracic extension AROM mobs w/ roll and board 5" 20x 2 levels 5" 20x 2 levels 5" 20x 2 levels 5" x20  2 levels 5" x20 2 levels   Gentle UT side bend 10" x12 bilat 10" x12 bilat 10" x12 christopher 10" x12 christopher 10" x12 christopher                                   Pt Ed/ HEP Post surgical anatomy and physiology as well as issuing and reviewing HEP-15'               Ther Activity                        Gait Training                        Modalities         15' MHP 15' post tx MHP 15' post tx 12' 15'

## 2023-10-04 ENCOUNTER — PROCEDURE VISIT (OUTPATIENT)
Dept: PAIN MEDICINE | Facility: CLINIC | Age: 58
End: 2023-10-04
Payer: OTHER MISCELLANEOUS

## 2023-10-04 ENCOUNTER — OFFICE VISIT (OUTPATIENT)
Dept: PHYSICAL THERAPY | Facility: CLINIC | Age: 58
End: 2023-10-04
Payer: OTHER MISCELLANEOUS

## 2023-10-04 VITALS
WEIGHT: 242 LBS | HEART RATE: 78 BPM | DIASTOLIC BLOOD PRESSURE: 72 MMHG | SYSTOLIC BLOOD PRESSURE: 139 MMHG | HEIGHT: 67 IN | BODY MASS INDEX: 37.98 KG/M2

## 2023-10-04 DIAGNOSIS — M79.18 MYOFASCIAL PAIN SYNDROME: ICD-10-CM

## 2023-10-04 DIAGNOSIS — G89.28 CHRONIC POST-OPERATIVE PAIN: ICD-10-CM

## 2023-10-04 DIAGNOSIS — M47.12 OTHER SPONDYLOSIS WITH MYELOPATHY, CERVICAL REGION: ICD-10-CM

## 2023-10-04 DIAGNOSIS — M43.22 FUSION OF SPINE OF CERVICAL REGION: Primary | ICD-10-CM

## 2023-10-04 PROCEDURE — 97140 MANUAL THERAPY 1/> REGIONS: CPT

## 2023-10-04 PROCEDURE — 97112 NEUROMUSCULAR REEDUCATION: CPT

## 2023-10-04 PROCEDURE — 20553 NJX 1/MLT TRIGGER POINTS 3/>: CPT | Performed by: STUDENT IN AN ORGANIZED HEALTH CARE EDUCATION/TRAINING PROGRAM

## 2023-10-04 PROCEDURE — 97010 HOT OR COLD PACKS THERAPY: CPT

## 2023-10-04 RX ORDER — BUPIVACAINE HYDROCHLORIDE 2.5 MG/ML
7 INJECTION, SOLUTION EPIDURAL; INFILTRATION; INTRACAUDAL ONCE
Status: COMPLETED | OUTPATIENT
Start: 2023-10-04 | End: 2023-10-06

## 2023-10-04 RX ORDER — METHYLPREDNISOLONE ACETATE 40 MG/ML
40 INJECTION, SUSPENSION INTRA-ARTICULAR; INTRALESIONAL; INTRAMUSCULAR; SOFT TISSUE ONCE
Status: COMPLETED | OUTPATIENT
Start: 2023-10-04 | End: 2023-10-06

## 2023-10-04 NOTE — PROGRESS NOTES
Daily Note     Today's date: 10/4/2023  Patient name: Gene Myles. : 1965  MRN: [id number]  Referring provider: Albert Walton PA-C  Dx:   Encounter Diagnosis     ICD-10-CM    1. 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 Other spondylosis with myelopathy, cervical region  M47.12                      Subjective: pt reports his neck is very sore today. He also states he is going for injections tomorrow      Objective: See treatment diary below      Assessment: Tolerated treatment well. Patient would benefit from continued PT      Plan: Progress treatment as tolerated.     Pt will have to hold heat and possibly manual next visit due to recency of injections     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 Fusion (23)       Manuals 10-4-23 9-22-23 9-27-23 9-29-23 10-2-23   STM to sub occipital to mid thoracic 20' 20' 20' 20' 20'   AROM c rotation w/ manual c2 block 6' 8' 8' 8' 8'                   Neuro Re-Ed         LTP/ MTP 3x10 green 2x10 each green 3x10 each   green 3x10 each green 3x10 each green   Cervical retraction in available ROM 5" x20 5" x20 5" x20 5" x20 5" x20   scap retract/ ER in doorframe Green 3x10 Green 2x10 Green 2x10 Green 3x10 Green 3x10   Standing hip abd and ext  2x10 bilaterally each for strength and balance 2x10 bilaterally each for strength and balance 2x10 bilat each ==   BOSU mini lunges        Heel raises for strength and balance  2x10 Deferred-time     Wall ball squats  2x10 Deferred- time                                             Ther Ex        Seated thoracic extension AROM mobs w/ roll and board 5" 20x 2 levels 5" 20x 2 levels 5" 20x 2 levels 5" x20  2 levels 5" x20 2 levels   Gentle UT side bend 10" x12 bilat 10" x12 bilat 10" x12 christopher 10" x12 christopher 10" x12 christopher                                   Pt Ed/ HEP Post surgical anatomy and physiology as well as issuing and reviewing HEP-15'               Ther Activity                        Gait Training                        Modalities 15' MHP 12' post tx P 15' post tx 12' 15'

## 2023-10-04 NOTE — PROGRESS NOTES
Universal Protocol:  Consent: Verbal consent obtained. Written consent obtained. Risks and benefits: risks, benefits and alternatives were discussed  Consent given by: patient  Time out: Immediately prior to procedure a "time out" was called to verify the correct patient, procedure, equipment, support staff and site/side marked as required. Timeout called at: 10/4/2023 3:09 PM.  Patient understanding: patient states understanding of the procedure being performed  Patient consent: the patient's understanding of the procedure matches consent given  Procedure consent: procedure consent matches procedure scheduled  Relevant documents: relevant documents present and verified  Test results: test results available and properly labeled  Site marked: the operative site was marked  Radiology Images displayed and confirmed. If images not available, report reviewed: imaging studies available  Required items: required blood products, implants, devices, and special equipment available  Patient identity confirmed: verbally with patient    Supporting Documentation  Indications: pain   Procedure Details  Location(s):  Additional procedure details: PROCEDURE NOTE    PATIENT NAME:  Jackee Halsted. MEDICAL RECORD NUMBER:  [id number]    YOB: 1965    DATE OF PROCEDURE:  10/04/23    PROCEDURE:  Trigger point injection x 8 with local anesthetic and steroid in the bilateral splenius capitis, bilateral trapezius, and left rhomboid muscle groups under ultrasound guidance. ATTENDING PHYSICIAN:  Chacha Deluna M.D. PREPROCEDURE DIAGNOSIS:  Myofascial pain with identifiable trigger points. POSTPROCEDURE DIAGNOSIS:  Myofascial pain with identifiable trigger points. ANESTHESIA:  Local  ESTIMATED BLOOD LOSS:  Minimal  COMPLICATIONS: None  CONSENT:  Today's procedure, its potential benefits as well as its risks and potential side effects were reviewed.   Discussed risks of the procedure include bleeding, infection, nerve irritation or damage, reactions to the medications, failure of the pain to improve and exacerbation of the pain were explained to the patient, who verbalized understanding and who wished to proceed. Informed consent was signed. DESCRIPTION OF THE PROCEDURE:  After informed consent was obtained, the patient was placed in the seated position. 8 trigger points were identified via palpation and marked with a surgical skin marker. The skin was prepped with antiseptic in the usual sterile fashion. Strict aseptic technique was utilized throughout the procedure. Using ultrasound guidance a 25 gauge, 2inch needle was then advanced into each identified trigger point. Care was taken to visualize the entirety of the needle throughout the injection. An injectate consisting of 7 ml of 0.25% marcaine with 1 mL of 40mg/mL depo-medrol was slowly injected in divided doses after negative aspiration. The needle was removed with tip intact. The patient tolerated the procedure and hemostasis was maintained. There were no apparent paresthesias or complications. The skin was wiped clean, and a band-aid was placed as appropriate. The patient was monitored for an appropriate period of time following the procedure and remained hemodynamically stable and neurovascularly intact. The patient was ultimately discharged to home with supervision in good condition and instructed to call the office to report the response.

## 2023-10-06 ENCOUNTER — APPOINTMENT (OUTPATIENT)
Dept: PHYSICAL THERAPY | Facility: CLINIC | Age: 58
End: 2023-10-06
Payer: OTHER MISCELLANEOUS

## 2023-10-06 RX ADMIN — METHYLPREDNISOLONE ACETATE 40 MG: 40 INJECTION, SUSPENSION INTRA-ARTICULAR; INTRALESIONAL; INTRAMUSCULAR; SOFT TISSUE at 12:13

## 2023-10-06 RX ADMIN — BUPIVACAINE HYDROCHLORIDE 7 ML: 2.5 INJECTION, SOLUTION EPIDURAL; INFILTRATION; INTRACAUDAL at 12:12

## 2023-10-13 ENCOUNTER — DOCUMENTATION (OUTPATIENT)
Dept: PAIN MEDICINE | Facility: CLINIC | Age: 58
End: 2023-10-13

## 2023-10-16 ENCOUNTER — OFFICE VISIT (OUTPATIENT)
Dept: FAMILY MEDICINE CLINIC | Facility: CLINIC | Age: 58
End: 2023-10-16
Payer: OTHER MISCELLANEOUS

## 2023-10-16 VITALS
WEIGHT: 243 LBS | HEART RATE: 66 BPM | BODY MASS INDEX: 38.14 KG/M2 | DIASTOLIC BLOOD PRESSURE: 82 MMHG | OXYGEN SATURATION: 97 % | TEMPERATURE: 96.8 F | SYSTOLIC BLOOD PRESSURE: 138 MMHG | HEIGHT: 67 IN

## 2023-10-16 DIAGNOSIS — M54.12 CERVICAL RADICULOPATHY: Primary | ICD-10-CM

## 2023-10-16 DIAGNOSIS — G89.4 CHRONIC PAIN SYNDROME: ICD-10-CM

## 2023-10-16 DIAGNOSIS — M96.1 CERVICAL POST-LAMINECTOMY SYNDROME: ICD-10-CM

## 2023-10-16 DIAGNOSIS — Z98.1 S/P CERVICAL SPINAL FUSION: ICD-10-CM

## 2023-10-16 PROCEDURE — 99204 OFFICE O/P NEW MOD 45 MIN: CPT | Performed by: FAMILY MEDICINE

## 2023-10-16 RX ORDER — GABAPENTIN 800 MG/1
800 TABLET ORAL 3 TIMES DAILY
Qty: 90 TABLET | Refills: 3 | Status: SHIPPED | OUTPATIENT
Start: 2023-10-16

## 2023-10-16 NOTE — PROGRESS NOTES
Name: Nida Primrose. : 1965      MRN: [id number]  Encounter Provider: Merari Falk MD  Encounter Date: 10/16/2023   Encounter department: [address] [place] [address]     1. Cervical radiculopathy  2. Cervical post-laminectomy syndrome  3. S/P cervical spinal fusion  4. Chronic pain syndrome  Recommend to take ibuprofen 600 mg as needed and increase gabapentin to 800 mg 3 times daily  F/u with Dr. Dakota Dozier and Dr. Karmen Ocampo    Follow up in 6 months            Depression Screening and Follow-up Plan: Patient was screened for depression during today's encounter. They screened negative with a PHQ-2 score of 0. Subjective     Patient is here to establish care. This is a worker's compensation case. He sustained a work injury in  while working for [employer] when he fell backwards in the snow while carrying some equipment. He suffered neck pain for many years. He underwent C5-7 ACDF in  for neck pain, right arm weakness and numbness. He did very well postoperatively with near complete reduction in symptoms. He was doing very well until approximately 1 year ago. He was working at a desk and started noticing worsening neck pain and difficulty typing. He described a constant pain in his bilateral upper extremities in no specific distribution with associated numbness and tingling. He could feel his hands very well. He had difficulty with range of motion in his neck. When he could move his neck, he felt electric shock sensations in his arms. He was also having significant difficulty with ambulation and balance. He had a C3-T1 PCDF by Dr Karmen Ocampo, neurosurgeon on 2023. Unfortunately he continues to have neck pain ad stiffness. Saw Dr. Dakota Dozier pain management 10/04 and had neck injections done which did not improve his symptoms. Taking gabapentin 600 mg 3 times daily, asl well as tylenol as needed.         Review of Systems   Constitutional:  Negative for activity change, appetite change, fatigue and fever. HENT:  Negative for congestion and ear discharge. Respiratory:  Negative for cough and shortness of breath. Cardiovascular:  Negative for chest pain and palpitations. Gastrointestinal:  Negative for diarrhea and nausea. Musculoskeletal:  Positive for neck pain and neck stiffness. Negative for arthralgias and back pain. Skin:  Negative for color change and rash. Neurological:  Negative for dizziness and headaches. Psychiatric/Behavioral:  Negative for agitation and behavioral problems. Past Medical History:   Diagnosis Date    Anxiety     Arthritis     Bilateral occipital neuralgia     Cervical post-laminectomy syndrome     Cervical radiculopathy     Cervical spine disease     Cervical spondylosis with myelopathy     Chronic lumbar pain     Chronic pain syndrome     Chronic thoracic spine pain     Cubital tunnel syndrome of both upper extremities 09/10/2022    Degenerative cervical spinal stenosis     Dental crowns present     Depression     Diabetes ([address])     type 2    Exercise involving walking     daily 5-10 minutes    Failed neck syndrome     Fatty liver     Full dentures     upper    GERD (gastroesophageal reflux disease)     History of COVID-19 2021    per pt admitted to the [facility]--    Hyperlipidemia     Hypertension     Irritable bowel syndrome     Motion sickness     Muscle weakness     "arms and legs"    Myofascial muscle pain     Neck stiffness     plate implanted-limited ROM    Polyarthralgia     Tinnitus     Wears glasses      Past Surgical History:   Procedure Laterality Date    CERVICAL DISC SURGERY      CERVICAL FUSION  2011    ACDF with Dr. Yi BRADEN PST/PSTLAT TQ 1NTRSPC CRV BELW C2 SEGMENT Bilateral 6/19/2023    Procedure: C3-T1 posterior decompression with instrumented fixation fusion (impulse monitoring);   Surgeon: Clarisse Bustamante MD;  Location: [facility];  Service: Neurosurgery    TOTAL SHOULDER REPLACEMENT Right     Reversal     Family History   Problem Relation Age of Onset    Stroke Mother     Lung cancer Father      Social History     Socioeconomic History    Marital status: /Civil Union     Spouse name: None    Number of children: None    Years of education: None    Highest education level: None   Occupational History    None   Tobacco Use    Smoking status: Never    Smokeless tobacco: Never   Vaping Use    Vaping Use: Never used   Substance and Sexual Activity    Alcohol use: Yes     Alcohol/week: 1.0 standard drink of alcohol     Types: 1 Cans of beer per week     Comment: occasional, weekly    Drug use: No    Sexual activity: None     Comment: defer   Other Topics Concern    None   Social History Narrative    None     Social Determinants of Health     Financial Resource Strain: Not on file   Food Insecurity: No Food Insecurity (6/21/2023)    Hunger Vital Sign     Worried About Running Out of Food in the Last Year: Never true     Ran Out of Food in the Last Year: Never true   Transportation Needs: No Transportation Needs (6/21/2023)    PRAPARE - Transportation     Lack of Transportation (Medical): No     Lack of Transportation (Non-Medical):  No   Physical Activity: Not on file   Stress: Not on file   Social Connections: Not on file   Intimate Partner Violence: Not on file   Housing Stability: Low Risk  (6/21/2023)    Housing Stability Vital Sign     Unable to Pay for Housing in the Last Year: No     Number of Places Lived in the Last Year: 1     Unstable Housing in the Last Year: No     Current Outpatient Medications on File Prior to Visit   Medication Sig    acetaminophen (TYLENOL) 325 mg tablet Take 3 tablets (975 mg total) by mouth every 8 (eight) hours    ASCORBIC ACID PO     benzonatate (TESSALON PERLES) 100 mg capsule Take 100 mg by mouth Three times daily as needed    bisacodyl (DULCOLAX) 10 mg suppository Insert 1 suppository (10 mg total) into the rectum daily as needed for constipation for up to 3 days    Cholecalciferol 25 MCG (1000 UT) capsule TAKE ONE TABLET BY MOUTH EVERY DAY (FOR LOW VITAMIN D)    Cyanocobalamin (B-12 PO) Take by mouth    cyclobenzaprine (FLEXERIL) 5 mg tablet Take 1 tablet (5 mg total) by mouth 3 (three) times a day as needed for muscle spasms    dextrose 1 g CHEW daily as needed for low blood sugar    Empagliflozin 25 MG TABS Take 25 mg by mouth every morning ---- Versa Vaibhav    famotidine (PEPCID) 20 mg tablet Take 20 mg by mouth daily at bedtime    gabapentin (Neurontin) 600 MG tablet Take 1 tablet (600 mg total) by mouth 3 (three) times a day    lisinopril (ZESTRIL) 10 mg tablet Take 10 mg by mouth daily    metFORMIN (GLUMETZA) 1000 MG (MOD) 24 hr tablet Take 1,000 mg by mouth 2 (two) times a day with meals    pantoprazole (PROTONIX) 20 mg tablet Take 20 mg by mouth daily    polyethylene glycol (MIRALAX) 17 g packet Take 17 g by mouth daily as needed (PRN per day) (Patient not taking: Reported on 7/6/2023)    TRAZODONE HCL PO Take 75 mg by mouth daily at bedtime      [DISCONTINUED] docusate sodium (COLACE) 100 mg capsule Take 1 capsule (100 mg total) by mouth 2 (two) times a day (Patient not taking: Reported on 8/2/2023)    [DISCONTINUED] losartan (COZAAR) 25 mg tablet  (Patient not taking: Reported on 10/4/2023)    [DISCONTINUED] senna (SENOKOT) 8.6 mg Take 1 tablet (8.6 mg total) by mouth daily Do not start before June 23, 2023.  (Patient not taking: Reported on 7/6/2023)     Allergies   Allergen Reactions    Metoprolol Other (See Comments)     Too low of a heart rate    Penicillin V Shortness Of Breath    Penicillins Shortness Of Breath and Rash     rash     Immunization History   Administered Date(s) Administered    COVID-19 MODERNA VACC 0.5 ML IM 01/04/2021, 02/02/2021, 11/26/2021    COVID-19 Moderna Vac BIVALENT 12 Yr+ IM 0.5 ML 09/23/2022    INFLUENZA 09/28/2009, 01/20/2011, 11/01/2019, 10/19/2020, 12/01/2021, 09/22/2023    Pneumococcal Polysaccharide PPV23 05/30/2019    Tdap 07/31/2015       Objective     /82 (BP Location: Left arm, Patient Position: Sitting, Cuff Size: Large)   Pulse 66   Temp (!) 96.8 °F (36 °C)   Ht 5' 7" (1.702 m)   Wt 110 kg (243 lb)   SpO2 97%   BMI 38.06 kg/m²     Physical Exam  Constitutional:       General: He is not in acute distress. Appearance: He is well-developed. He is not diaphoretic. Eyes:      General: No scleral icterus. Pupils: Pupils are equal, round, and reactive to light. Cardiovascular:      Rate and Rhythm: Normal rate and regular rhythm. Heart sounds: Normal heart sounds. No murmur heard. Pulmonary:      Effort: Pulmonary effort is normal. No respiratory distress. Breath sounds: Normal breath sounds. No wheezing. Abdominal:      General: Bowel sounds are normal. There is no distension. Palpations: Abdomen is soft. Tenderness: There is no abdominal tenderness. Musculoskeletal:         General: Tenderness present. Comments: Limited ROM of cervical spine on extension and flexion as well as lateral rotation  Tenderness associated with movements   Skin:     General: Skin is warm and dry. Findings: No rash. Neurological:      Mental Status: He is alert and oriented to person, place, and time.        Tanya Jeffers MD

## 2023-10-17 ENCOUNTER — TELEPHONE (OUTPATIENT)
Dept: FAMILY MEDICINE CLINIC | Facility: CLINIC | Age: 58
End: 2023-10-17

## 2023-10-17 NOTE — TELEPHONE ENCOUNTER
----- Message from Jennifer Nguyen MD sent at 10/16/2023  8:55 PM EDT -----  Gabapentin 800 mg 3 times daily sent to his pharmacy. Advise pt  Thanks    ----- Message -----  From: NAVEED Irwin  Sent: 10/16/2023   4:33 PM EDT  To: Jennifer Nguyen MD    Up to you. I can increase it or you can. Kind regards,    Reji Sy   ----- Message -----  From: Jennifer Nguyen MD  Sent: 10/16/2023   4:23 PM EDT  To: NAVEED Irwin,  I just wanted to reach out to you in regards to our mutual patient Tiffany Velez. I saw him today for neck pain. I was wondering if you would be ok to increase his gabapentin dose to 800 mg 3 times daily given increased pain. I can certainly do it as well.   Thanks,  Dr. Jennifer Nguyen

## 2023-10-18 ENCOUNTER — OFFICE VISIT (OUTPATIENT)
Dept: PHYSICAL THERAPY | Facility: CLINIC | Age: 58
End: 2023-10-18
Payer: OTHER MISCELLANEOUS

## 2023-10-18 DIAGNOSIS — M43.22 FUSION OF SPINE OF CERVICAL REGION: Primary | ICD-10-CM

## 2023-10-18 DIAGNOSIS — M47.12 OTHER SPONDYLOSIS WITH MYELOPATHY, CERVICAL REGION: ICD-10-CM

## 2023-10-18 PROCEDURE — 97140 MANUAL THERAPY 1/> REGIONS: CPT

## 2023-10-18 PROCEDURE — 97110 THERAPEUTIC EXERCISES: CPT

## 2023-10-18 NOTE — PROGRESS NOTES
Daily Note     Today's date: 10/18/2023  Patient name: Fara Berman. : 1965  MRN: [id number]  Referring provider: Kate Cuevas PA-C  Dx:   Encounter Diagnosis     ICD-10-CM    1. 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 Other spondylosis with myelopathy, cervical region  M47.12                      Subjective: Pt reports having continued increase in bilateral arm pain (ulnar nerve pattern) from the elbows to fingers. He reports he is going for a consult with his primary physician today. Objective: See treatment diary below. Positive Tinell Sign in bilateral cubital tunnels. Assessment: Tolerated treatment fair.  Patient would benefit from continued PT      Plan: Progress with addition of ulnar nerve mobilization     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 Fusion (23)       Manuals 10-4-23 10-18-23 9-27-23 9-29-23 10-2-23   STM to sub occipital to mid thoracic 20' 20' 20' 20' 20'   AROM c rotation w/ manual c2 block 6' Deferred due to pt time constraints 8' 8' 8'   Bilateral ulnar nerve mobilization  Graston, cupping and K-Tape 10'              Neuro Re-Ed         LTP/ MTP 3x10 green Deferred-time constraints 3x10 each   green 3x10 each green 3x10 each green   Cervical retraction in available ROM 5" x20 5" x20 5" x20 5" x20 5" x20   scap retract/ ER in doorframe Green 3x10 Deferred-time constraints Green 2x10 Green 3x10 Green 3x10   Standing hip abd and ext  Deferred-time constraints 2x10 bilaterally each for strength and balance 2x10 bilat each ==   BOSU mini lunges        Heel raises for strength and balance  Deferred- time constraints Deferred-time     Wall ball squats  Deferred -time constraints Deferred- time                                             Ther Ex        Seated thoracic extension AROM mobs w/ roll and board 5" 20x 2 levels 5" 20x 2 levels 5" 20x 2 levels 5" x20  2 levels 5" x20 2 levels   Gentle UT side bend 10" x12 bilat 10" x12 bilat 10" x12 christopher 10" x12 christopher 10" x12 christopher Pt Ed/ HEP Post surgical anatomy and physiology as well as issuing and reviewing HEP-15'               Ther Activity                        Gait Training                        Modalities         15' MHP 12' post tx MHP 15' post tx 12' 15'

## 2023-10-20 ENCOUNTER — OFFICE VISIT (OUTPATIENT)
Dept: PHYSICAL THERAPY | Facility: CLINIC | Age: 58
End: 2023-10-20
Payer: OTHER MISCELLANEOUS

## 2023-10-20 DIAGNOSIS — M47.12 OTHER SPONDYLOSIS WITH MYELOPATHY, CERVICAL REGION: ICD-10-CM

## 2023-10-20 DIAGNOSIS — M43.22 FUSION OF SPINE OF CERVICAL REGION: Primary | ICD-10-CM

## 2023-10-20 PROCEDURE — 97140 MANUAL THERAPY 1/> REGIONS: CPT

## 2023-10-20 PROCEDURE — 97112 NEUROMUSCULAR REEDUCATION: CPT

## 2023-10-20 PROCEDURE — 97010 HOT OR COLD PACKS THERAPY: CPT

## 2023-10-20 NOTE — PROGRESS NOTES
Daily Note     Today's date: 10/20/2023  Patient name: Quinton Dennis. : 1965  MRN: [id number]  Referring provider: Noemi Mireles PA-C  Dx:   Encounter Diagnosis     ICD-10-CM    1. 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 Other spondylosis with myelopathy, cervical region  M47.12                      Subjective: Pt reports the ulnar nerve mobilization activities were helpful and gave relief. Objective: See treatment diary below      Assessment: Tolerated treatment well. Patient would benefit from continued PT      Plan: Progress treatment as tolerated.        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 Fusion (23)       Manuals 10-4-23 10-18-23 10-20-23 9-29-23 10-2-23   STM to sub occipital to mid thoracic 20' 20' 20' 20 20'   AROM c rotation w/ manual c2 block 6' Deferred due to pt time constraints 8' 8' 8'   Bilateral ulnar nerve mobilization  Graston, cupping and K-Tape 10' 10'             Neuro Re-Ed         LTP/ MTP 3x10 green Deferred-time constraints 3x10 each   green 3x10 each green 3x10 each green   Cervical retraction in available ROM 5" x20 5" x20 5" x20 5" x20 5" x20   scap retract/ ER in doorframe Green 3x10 Deferred-time constraints Green 2x10 Green 3x10 Green 3x10   Standing hip abd and ext  Deferred-time constraints 2x10 bilaterally each for strength and balance 2x10 bilat each ==   BOSU mini lunges        Heel raises for strength and balance  Deferred- time constraints Deferred-time     Wall ball squats  Deferred -time constraints Deferred- time                                             Ther Ex        Seated thoracic extension AROM mobs w/ roll and board 5" 20x 2 levels 5" 20x 2 levels 5" 20x 2 levels 5" x20  2 levels 5" x20 2 levels   Gentle UT side bend 10" x12 bilat 10" x12 bilat 10" x12 christopher 10" x12 christopher 10" x12 christopher                                   Pt Ed/ HEP Post surgical anatomy and physiology as well as issuing and reviewing HEP-15'               Ther Activity Gait Training                        Modalities         15' MHP 12' post tx MHP 15' post tx 12' 15'

## 2023-10-23 ENCOUNTER — OFFICE VISIT (OUTPATIENT)
Dept: PHYSICAL THERAPY | Facility: CLINIC | Age: 58
End: 2023-10-23
Payer: OTHER MISCELLANEOUS

## 2023-10-23 DIAGNOSIS — M47.12 OTHER SPONDYLOSIS WITH MYELOPATHY, CERVICAL REGION: ICD-10-CM

## 2023-10-23 DIAGNOSIS — M43.22 FUSION OF SPINE OF CERVICAL REGION: Primary | ICD-10-CM

## 2023-10-23 PROCEDURE — 97140 MANUAL THERAPY 1/> REGIONS: CPT

## 2023-10-23 PROCEDURE — 97110 THERAPEUTIC EXERCISES: CPT

## 2023-10-23 NOTE — PROGRESS NOTES
Daily Note     Today's date: 10/23/2023  Patient name: Nghia Fuller. : 1965  MRN: [id number]  Referring provider: Adelina Obrien PA-C  Dx:   Encounter Diagnosis     ICD-10-CM    1. 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 Other spondylosis with myelopathy, cervical region  M47.12           Start Time: 919          Subjective: Marium Ramirez reports  are "tough" for him. He reports tightness to musculature surrounding c-spine. Overall having improvements with IASTM to relieve nerve entrapment and is having increased sensation to digits 4/5 christopher. Objective: See treatment diary below      Assessment: Improvement in soft tissue quality/tone following STM/gentle TPR to christopher UT/scap retractors and STM to christopher cerv paraspinals as well as SOR. Difficulty with cerv retractions this visit; pt performed in tolerable range. Decreased R rotation compared to L with AROM. Pt deferred strengthening exercises this visit. Pt would continue to benefit from skilled PT. Plan: Continue with current POC to address pt deficits.       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 Fusion (23)       Manuals 10-4-23 10-18-23 10-20-23 10-23-23 10-2-23   STM to sub occipital to mid thoracic 20' 20' 20' 28' 20'   AROM c rotation w/ manual c2 block 6' Deferred due to pt time constraints 8'  8'   Bilateral ulnar nerve mobilization  [place], cupping and K-Tape 10' 10' 10' [facility]             Neuro Re-Ed         LTP/ MTP 3x10 green Deferred-time constraints 3x10 each   green  3x10 each green   Cervical retraction in available ROM 5" x20 5" x20 5" x20 5" x20  5" x20   scap retract/ ER in doorframe Green 3x10 Deferred-time constraints Green 2x10  Green 3x10   Standing hip abd and ext  Deferred-time constraints 2x10 bilaterally each for strength and balance  ==   BOSU mini lunges        Heel raises for strength and balance  Deferred- time constraints Deferred-time     Wall ball squats  Deferred -time constraints Deferred- time Ther Ex        Seated thoracic extension AROM mobs w/ roll and board 5" 20x 2 levels 5" 20x 2 levels 5" 20x 2 levels 5" x20 2 lvls +cerv retaction 5" x20 5" x20 2 levels   Gentle UT side bend 10" x12 bilat 10" x12 bilat 10" x12 christopher  10" x12 christopher                                   Pt Ed/ HEP Post surgical anatomy and physiology as well as issuing and reviewing HEP-15'               Ther Activity                        Gait Training                        Modalities         15' MHP 15' post tx MHP 15' post tx MHP 13' post tx c-spine and t-spine  15'

## 2023-10-25 ENCOUNTER — OFFICE VISIT (OUTPATIENT)
Dept: PHYSICAL THERAPY | Facility: CLINIC | Age: 58
End: 2023-10-25
Payer: OTHER MISCELLANEOUS

## 2023-10-25 DIAGNOSIS — M43.22 FUSION OF SPINE OF CERVICAL REGION: Primary | ICD-10-CM

## 2023-10-25 DIAGNOSIS — M47.12 OTHER SPONDYLOSIS WITH MYELOPATHY, CERVICAL REGION: ICD-10-CM

## 2023-10-25 PROCEDURE — 97110 THERAPEUTIC EXERCISES: CPT

## 2023-10-25 PROCEDURE — 97140 MANUAL THERAPY 1/> REGIONS: CPT

## 2023-10-25 NOTE — PROGRESS NOTES
Daily Note     Today's date: 10/25/2023  Patient name: Loki Alvarenga. : 1965  MRN: [id number]  Referring provider: Tammy Ace PA-C  Dx:   Encounter Diagnosis     ICD-10-CM    1. 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 Other spondylosis with myelopathy, cervical region  M47.12           Start Time: 0900  Stop Time: 1056  Total time in clinic (min): 116 minutes    Subjective: Dariusz Miller reports increased pain and stiffness to c-spine and having difficulty sleeping last night. He is unsure what caused it but is also taking more gabapentin so is unsure if this is causing increased stiffness and will discuss with Dr. Woo Leonardo at his appt tomorrow. Objective: See treatment diary below      Assessment: Improvement in soft tissue quality/tone following STM; a more gentle approach with manuals due to increased sensitivity with improved tolerance. Emphasis on stretching/postural exercises this improvement mobility of c-spine with mild improvement. Ended with MHP to decrease pain and relax surrounding musculature. Resume program if able NV. Plan: Continue with current POC to address pt deficits.       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 Fusion (23)       Manuals 10-4-23 10-18-23 10-20-23 10-23-23 10-25-23   STM to sub occipital to mid thoracic 20' 20' 20' 28' 30'   AROM c rotation w/ manual c2 block 6' Deferred due to pt time constraints 8'     Bilateral ulnar nerve mobilization  [place], cupping and K-Tape 10' 10' 10' [facility] KATHY  10'           Neuro Re-Ed         LTP/ MTP 3x10 green Deferred-time constraints 3x10 each   green     Cervical retraction in available ROM 5" x20 5" x20 5" x20 5" x20  5" x20   scap retract/ ER in doorframe Green 3x10 Deferred-time constraints Green 2x10     Standing hip abd and ext  Deferred-time constraints 2x10 bilaterally each for strength and balance     BOSU mini lunges        Heel raises for strength and balance  Deferred- time constraints Deferred-time     Wall ball squats Deferred -time constraints Deferred- time                                             Ther Ex        Seated thoracic extension AROM mobs w/ roll and board 5" 20x 2 levels 5" 20x 2 levels 5" 20x 2 levels 5" x20 2 lvls +cerv retaction 5" x20 5" x20 2 lvls    Gentle UT side bend 10" x12 bilat 10" x12 bilat 10" x12 christopher  10"x12 christopher                                    Pt Ed/ HEP Post surgical anatomy and physiology as well as issuing and reviewing HEP-15'               Ther Activity                        Gait Training                        Modalities         15' MHP 15' post tx MHP 15' post tx MHP 13' post tx c-spine and t-spine  MHP 13' post tx c-spine and thoracic spine

## 2023-10-27 ENCOUNTER — APPOINTMENT (OUTPATIENT)
Dept: PHYSICAL THERAPY | Facility: CLINIC | Age: 58
End: 2023-10-27
Payer: OTHER MISCELLANEOUS

## 2023-10-30 ENCOUNTER — OFFICE VISIT (OUTPATIENT)
Dept: PHYSICAL THERAPY | Facility: CLINIC | Age: 58
End: 2023-10-30
Payer: OTHER MISCELLANEOUS

## 2023-10-30 ENCOUNTER — TELEPHONE (OUTPATIENT)
Dept: NEUROSURGERY | Facility: CLINIC | Age: 58
End: 2023-10-30

## 2023-10-30 DIAGNOSIS — M47.12 OTHER SPONDYLOSIS WITH MYELOPATHY, CERVICAL REGION: ICD-10-CM

## 2023-10-30 DIAGNOSIS — M43.22 FUSION OF SPINE OF CERVICAL REGION: Primary | ICD-10-CM

## 2023-10-30 DIAGNOSIS — M43.22 FUSION OF SPINE, CERVICAL REGION: Primary | ICD-10-CM

## 2023-10-30 PROCEDURE — 97110 THERAPEUTIC EXERCISES: CPT

## 2023-10-30 PROCEDURE — 97140 MANUAL THERAPY 1/> REGIONS: CPT

## 2023-10-30 NOTE — PROGRESS NOTES
Daily Note     Today's date: 10/30/2023  Patient name: Ninoska Kay. : 1965  MRN: [id number]  Referring provider: Justin Mosher PA-C  Dx:   Encounter Diagnosis     ICD-10-CM    1. 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 Other spondylosis with myelopathy, cervical region  M47.12                      Subjective: Stan reports increased pain in neck after hosting a party on Friday. He feels his sensation is improving in christopher hands. Objective: See treatment diary below      Assessment: Due to increased sensitivity, performed STM in seated with massage cream with improved tolerance. Improvement in soft tissue quality following manuals. Mild improvement in c-spine lateral flexion following self stretching. Ended with MHP to decrease pain and relax surrounding musculature. Plan: Continue with current POC to address pt deficits.       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 Fusion (23)       Manuals 10-30-23 10-18-23 10-20-23 10-23-23 10-25-23   STM to sub occipital to mid thoracic 30' 20' 20' 28' 30'   AROM c rotation w/ manual c2 block  Deferred due to pt time constraints 8'     Bilateral ulnar nerve mobilization 10' [place], cupping and K-Tape 10' 10' 10' [facility] KATHY  10'           Neuro Re-Ed         LTP/ MTP  Deferred-time constraints 3x10 each   green     Cervical retraction in available ROM 5" x20 5" x20 5" x20 5" x20  5" x20   scap retract/ ER in doorframe  Deferred-time constraints Green 2x10     Standing hip abd and ext  Deferred-time constraints 2x10 bilaterally each for strength and balance     BOSU mini lunges        Heel raises for strength and balance  Deferred- time constraints Deferred-time     Wall ball squats  Deferred -time constraints Deferred- time                                             Ther Ex        Seated thoracic extension AROM mobs w/ roll and board 5" x20 2 lvls  5" 20x 2 levels 5" 20x 2 levels 5" x20 2 lvls +cerv retaction 5" x20 5" x20 2 lvls    Gentle UT side bend 10"x12 christopher 10" x12 bilat 10" x12 christopher  10"x12 christopher                                    Pt Ed/ HEP                Ther Activity                        Gait Training                        Modalities         MHP 15' post tx c-spine and thoracic spine MHP 15' post tx MHP 15' post tx MHP 13' post tx c-spine and t-spine  MHP 13' post tx c-spine and thoracic spine

## 2023-10-30 NOTE — TELEPHONE ENCOUNTER
Received call on nurseline from patient requested renewed script for physical therapy. This RN placed new order, called patient to make him aware, no answer, left detailed message encouraging callback with any other questions.

## 2023-11-01 ENCOUNTER — APPOINTMENT (OUTPATIENT)
Dept: PHYSICAL THERAPY | Facility: CLINIC | Age: 58
End: 2023-11-01
Payer: OTHER MISCELLANEOUS

## 2023-11-03 ENCOUNTER — APPOINTMENT (OUTPATIENT)
Dept: PHYSICAL THERAPY | Facility: CLINIC | Age: 58
End: 2023-11-03
Payer: OTHER MISCELLANEOUS

## 2023-11-03 NOTE — PROGRESS NOTES
Daily Note     Today's date: 11/3/2023  Patient name: Faina Estes. : 1965  MRN: [id number]  Referring provider: Carolina Carranza PA-C  Dx: No diagnosis found. Subjective: Stan reports       Objective: See treatment diary below      Assessment:       Plan: Continue with current POC to address pt deficits.       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 Fusion (23)       Manuals 10-30-23 11-3-23 10-20-23 10-23-23 10-25-23   STM to sub occipital to mid thoracic 30'  20'  30'   AROM c rotation w/ manual c2 block   8'     Bilateral ulnar nerve mobilization 10'  10' 10' [facility] KATHY  10'           Neuro Re-Ed         LTP/ MTP   3x10 each   green     Cervical retraction in available ROM 5" x20  5" x20 5" x20  5" x20   scap retract/ ER in doorframe   Green 2x10     Standing hip abd and ext   2x10 bilaterally each for strength and balance     BOSU mini lunges        Heel raises for strength and balance   Deferred-time     Wall ball squats   Deferred- time                                             Ther Ex        Seated thoracic extension AROM mobs w/ roll and board 5" x20 2 lvls   5" 20x 2 levels 5" x20 2 lvls +cerv retaction 5" x20 5" x20 2 lvls    Gentle UT side bend 10"x12 christopher   10" x12 christopher  10"x12 christopher                                    Pt Ed/ HEP                Ther Activity                        Gait Training                        Modalities         MHP 15' post tx c-spine and thoracic spine  MHP 15' post tx MHP 13' post tx c-spine and t-spine  MHP 13' post tx c-spine and thoracic spine

## 2023-11-06 ENCOUNTER — APPOINTMENT (OUTPATIENT)
Dept: PHYSICAL THERAPY | Facility: CLINIC | Age: 58
End: 2023-11-06
Payer: OTHER MISCELLANEOUS

## 2023-11-08 ENCOUNTER — APPOINTMENT (OUTPATIENT)
Dept: PHYSICAL THERAPY | Facility: CLINIC | Age: 58
End: 2023-11-08
Payer: OTHER MISCELLANEOUS

## 2023-11-10 ENCOUNTER — APPOINTMENT (OUTPATIENT)
Dept: PHYSICAL THERAPY | Facility: CLINIC | Age: 58
End: 2023-11-10
Payer: OTHER MISCELLANEOUS

## 2023-11-13 RX ORDER — GABAPENTIN 600 MG/1
600 TABLET ORAL 3 TIMES DAILY
Qty: 90 TABLET | Refills: 0 | OUTPATIENT
Start: 2023-11-13

## 2023-11-16 ENCOUNTER — APPOINTMENT (OUTPATIENT)
Dept: PHYSICAL THERAPY | Facility: CLINIC | Age: 58
End: 2023-11-16
Payer: OTHER MISCELLANEOUS

## 2023-11-16 NOTE — PROGRESS NOTES
Daily Note     Today's date: 2023  Patient name: Gene Myles. : 1965  MRN: [id number]  Referring provider: Albert Walton PA-C  Dx: No diagnosis found. Subjective: Caitlyn Cochran reports       Objective: See treatment diary below      Assessment:       Plan: Continue with current PO  address pt deficits.       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 Fusion (23)       Manuals 10-30-23 11-16-23 10-20-23 10-23-23 10-25-23   STM to sub occipital to mid thoracic 30'  20'  30'   AROM c rotation w/ manual c2 block   8'     Bilateral ulnar nerve mobilization 10'  10' 10' [facility] KATHY  10'           Neuro Re-Ed         LTP/ MTP   3x10 each   green     Cervical retraction in available ROM 5" x20  5" x20 5" x20  5" x20   scap retract/ ER in doorframe   Green 2x10     Standing hip abd and ext   2x10 bilaterally each for strength and balance     BOSU mini lunges        Heel raises for strength and balance   Deferred-time     Wall ball squats   Deferred- time                                             Ther Ex        Seated thoracic extension AROM mobs w/ roll and board 5" x20 2 lvls   5" 20x 2 levels 5" x20 2 lvls +cerv retaction 5" x20 5" x20 2 lvls    Gentle UT side bend 10"x12 christopher   10" x12 christopher  10"x12 christopher                                    Pt Ed/ HEP                Ther Activity                        Gait Training                        Modalities         MHP 15' post tx c-spine and thoracic spine  MHP 15' post tx MHP 13' post tx c-spine and t-spine  MHP 13' post tx c-spine and thoracic spine

## 2023-11-20 ENCOUNTER — OFFICE VISIT (OUTPATIENT)
Dept: PHYSICAL THERAPY | Facility: CLINIC | Age: 58
End: 2023-11-20
Payer: OTHER MISCELLANEOUS

## 2023-11-20 DIAGNOSIS — M47.12 OTHER SPONDYLOSIS WITH MYELOPATHY, CERVICAL REGION: ICD-10-CM

## 2023-11-20 DIAGNOSIS — M43.22 FUSION OF SPINE OF CERVICAL REGION: Primary | ICD-10-CM

## 2023-11-20 PROCEDURE — 97112 NEUROMUSCULAR REEDUCATION: CPT

## 2023-11-20 PROCEDURE — 97014 ELECTRIC STIMULATION THERAPY: CPT

## 2023-11-20 PROCEDURE — 97110 THERAPEUTIC EXERCISES: CPT

## 2023-11-20 PROCEDURE — 97140 MANUAL THERAPY 1/> REGIONS: CPT

## 2023-11-20 NOTE — PROGRESS NOTES
Daily Note     Today's date: 2023  Patient name: Fara Berman. : 1965  MRN: [id number]  Referring provider: Kate Cuevas PA-C  Dx:   Encounter Diagnosis     ICD-10-CM    1. 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 Other spondylosis with myelopathy, cervical region  M47.12                      Subjective: Neck and upper back remains stiff and very painful      Objective: See treatment diary below      Assessment: Tolerated treatment well. Patient would benefit from continued PT      Plan: Progress treatment as tolerated.        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 Fusion (23)       Manuals 10-30-23 11-20-23 10-20-23 10-23-23 10-25-23   STM to sub occipital to mid thoracic 30' 20' 20' 28' 30'   AROM c rotation w/ manual c2 block  Deferred due to pt time constraints 8'     Bilateral ulnar nerve mobilization 10' [place], cupping and K-Tape 10' 10' 10' [facility] KATHY  10'           Neuro Re-Ed         LTP/ MTP  Deferred-time constraints 3x10 each   green     Cervical retraction in available ROM 5" x20 5" x20 5" x20 5" x20  5" x20   scap retract/ ER in doorframe  Red 2x10 Green 2x10     Standing hip abd and ext  Deferred-time constraints 2x10 bilaterally each for strength and balance     BOSU mini lunges        Heel raises for strength and balance  Deferred- time constraints Deferred-time     Wall ball squats  Deferred -time constraints Deferred- time                                             Ther Ex        Seated thoracic extension AROM mobs w/ roll and board 5" x20 2 lvls  5" 20x 2 levels 5" 20x 2 levels 5" x20 2 lvls +cerv retaction 5" x20 5" x20 2 lvls    Gentle UT side bend 10"x12 christopher  10" x12 bilat 10" x12 christopher  10"x12 christopher                                    Pt Ed/ HEP                Ther Activity                        Gait Training                        Modalities         MHP 15' post tx c-spine and thoracic spine MHP 15' post tx MHP 15' post tx MHP 13' post tx c-spine and t-spine  MHP 13' post tx c-spine and thoracic spine

## 2023-11-22 ENCOUNTER — OFFICE VISIT (OUTPATIENT)
Dept: PHYSICAL THERAPY | Facility: CLINIC | Age: 58
End: 2023-11-22
Payer: OTHER MISCELLANEOUS

## 2023-11-22 DIAGNOSIS — M47.12 OTHER SPONDYLOSIS WITH MYELOPATHY, CERVICAL REGION: ICD-10-CM

## 2023-11-22 DIAGNOSIS — M43.22 FUSION OF SPINE OF CERVICAL REGION: Primary | ICD-10-CM

## 2023-11-22 PROCEDURE — 97014 ELECTRIC STIMULATION THERAPY: CPT

## 2023-11-22 PROCEDURE — 97140 MANUAL THERAPY 1/> REGIONS: CPT

## 2023-11-22 PROCEDURE — 97112 NEUROMUSCULAR REEDUCATION: CPT

## 2023-11-22 PROCEDURE — 97110 THERAPEUTIC EXERCISES: CPT

## 2023-11-24 ENCOUNTER — OFFICE VISIT (OUTPATIENT)
Dept: PHYSICAL THERAPY | Facility: CLINIC | Age: 58
End: 2023-11-24
Payer: OTHER MISCELLANEOUS

## 2023-11-24 DIAGNOSIS — M47.12 OTHER SPONDYLOSIS WITH MYELOPATHY, CERVICAL REGION: ICD-10-CM

## 2023-11-24 DIAGNOSIS — M43.22 FUSION OF SPINE OF CERVICAL REGION: Primary | ICD-10-CM

## 2023-11-24 PROCEDURE — 97112 NEUROMUSCULAR REEDUCATION: CPT

## 2023-11-24 PROCEDURE — 97110 THERAPEUTIC EXERCISES: CPT

## 2023-11-24 PROCEDURE — 97014 ELECTRIC STIMULATION THERAPY: CPT

## 2023-11-24 NOTE — PROGRESS NOTES
Daily Note     Today's date: 2023  Patient name: Grazyna Cifuentes. : 1965  MRN: [id number]  Referring provider: Thiago Alfredo PA-C  Dx:   Encounter Diagnosis     ICD-10-CM    1. 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 Other spondylosis with myelopathy, cervical region  M47.12           Start Time: 901          Subjective: Stan reports having a migraine following therapy session on Wednesday and is uncertain if the IFC was up too high. He continues to have pain in neck but does report improvement in sensation in christopher hands. Objective: See treatment diary below      Assessment:       Plan: Continue with current POC to address pt deficits.       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 Fusion (23)       Manuals 10-30-23 11-20-23 11-22-23 11-24-23 10-25-23   STM to sub occipital to mid thoracic 30' 20' 20' 20' 30'   AROM c rotation w/ manual c2 block  Deferred due to pt time constraints 8'     Bilateral ulnar nerve mobilization 10' [place], cupping and K-Tape 10' 10' 10' IASTM, cupping and K-tape  10'           Neuro Re-Ed         LTP/ MTP  Deferred-time constraints 3x10 each   green     Cervical retraction in available ROM 5" x20 5" x20 5" x20  5" x20   scap retract/ ER in doorframe  Red 2x10 Green 2x10     Standing hip abd and ext  Deferred-time constraints 2x10 bilaterally each for strength and balance     BOSU mini lunges        Heel raises for strength and balance  Deferred- time constraints 20x     Wall ball squats  Deferred -time constraints Deferred- time                                             Ther Ex        Seated thoracic extension AROM mobs w/ roll and board 5" x20 2 lvls  5" 20x 2 levels 5" 20x 2 levels  5" x20 2 lvls    Gentle UT side bend 10"x12 christopher  10" x12 bilat 10" x12 christopher  10"x12 christopher                                    Pt Ed/ HEP                Ther Activity                        Gait Training                        Modalities         MHP 15' post tx c-spine and thoracic spine MHP 12' post tx MHP and Stim  15'  MHP 15' post tx c-spine and thoracic spine

## 2023-11-24 NOTE — PROGRESS NOTES
Daily Note     Today's date: 2023  Patient name: Kuldeep Desai. : 1965  MRN: [id number]  Referring provider: Siddhartha Humphreys PA-C  Dx:   Encounter Diagnosis     ICD-10-CM    1. 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 Other spondylosis with myelopathy, cervical region  M47.12           Start Time: 901          Subjective: Stan reports having a migraine following therapy session on Wednesday and is uncertain if the IFC was up too high. He continues to have pain in neck but does report improvement in sensation in christopher hands. Objective: See treatment diary below      Assessment: Improvement in soft tissue quality to christopher cerv paraspinals, suboccipitals, christopher UT, scap retractors following TM/gentle TPR. Pain with t-ext this visit therefore only performed on one level. Ended with MHP and IFC to c-spine to decrease pain and relax surrounding musculature. Progress as able. Plan: Continue with current POC to address pt deficits.       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 Fusion (23)       Manuals 10-30-23 11-20-23 11-22-23 11-24-23 10-25-23   STM to sub occipital to mid thoracic 30' 20' 20' 20' 30'   AROM c rotation w/ manual c2 block  Deferred due to pt time constraints 8'     Bilateral ulnar nerve mobilization 10' [place], cupping and K-Tape 10' 10' 10' IASTM, cupping and K-tape  10'           Neuro Re-Ed         LTP/ MTP  Deferred-time constraints 3x10 each   green     Cervical retraction in available ROM 5" x20 5" x20 5" x20 5" x20 5" x20   scap retract/ ER in doorframe  Red 2x10 Green 2x10     Standing hip abd and ext  Deferred-time constraints 2x10 bilaterally each for strength and balance     BOSU mini lunges        Heel raises for strength and balance  Deferred- time constraints 20x     Wall ball squats  Deferred -time constraints Deferred- time                                             Ther Ex        Seated thoracic extension AROM mobs w/ roll and board 5" x20 2 lvls  5" 20x 2 levels 5" 20x 2 levels 5" x20 1lvl due to pain  5" x20 2 lvls    Gentle UT side bend 10"x12 christopher  10" x12 bilat 10" x12 christopher 10"x12 christopher  10"x12 christopher                                    Pt Ed/ HEP                Ther Activity                        Gait Training                        Modalities         MHP 15' post tx c-spine and thoracic spine MHP 15' post tx MHP and Stim  13' MHP and IFC 15' MHP 15' post tx c-spine and thoracic spine

## 2023-11-29 ENCOUNTER — OFFICE VISIT (OUTPATIENT)
Dept: PHYSICAL THERAPY | Facility: CLINIC | Age: 58
End: 2023-11-29
Payer: OTHER MISCELLANEOUS

## 2023-11-29 DIAGNOSIS — M47.12 OTHER SPONDYLOSIS WITH MYELOPATHY, CERVICAL REGION: ICD-10-CM

## 2023-11-29 DIAGNOSIS — M43.22 FUSION OF SPINE OF CERVICAL REGION: Primary | ICD-10-CM

## 2023-11-29 PROCEDURE — 97110 THERAPEUTIC EXERCISES: CPT

## 2023-11-29 PROCEDURE — 97140 MANUAL THERAPY 1/> REGIONS: CPT

## 2023-11-29 PROCEDURE — 97112 NEUROMUSCULAR REEDUCATION: CPT

## 2023-11-29 NOTE — PROGRESS NOTES
Daily Note     Today's date: 2023  Patient name: Jackee Halsted. : 1965  MRN: [id number]  Referring provider: Calli Macedo PA-C  Dx:   Encounter Diagnosis     ICD-10-CM    1. 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 Other spondylosis with myelopathy, cervical region  M47.12                      Subjective: Pt reports intense migraines two days ago along with significant muscular pain and strain. Objective: See treatment diary below      Assessment: Tolerated treatment well. Significant palpable spasms in right sided cervical paraspinals as well as right levator. These were somewhat reduced with manual therapy. Patient demonstrated fatigue post treatment and would benefit from continued PT      Plan: Progress treatment as tolerated.        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 Fusion (23)       Manuals 10-30-23 11-20-23 11-22-23 11-24-23 11-29-23   STM to sub occipital to mid thoracic 30' 20' 20' 20' 30'   AROM c rotation w/ manual c2 block  Deferred due to pt time constraints 8'     Bilateral ulnar nerve mobilization 10' [place], cupping and K-Tape 10' 10' 10' IASTM, cupping and K-tape  10'           Neuro Re-Ed         LTP/ MTP  Deferred-time constraints 3x10 each   green  Green 3x10   Cervical retraction in available ROM 5" x20 5" x20 5" x20 5" x20 5" x20   scap retract/ ER in doorframe  Red 2x10 Green 2x10  Green 2x10   Standing hip abd and ext  Deferred-time constraints 2x10 bilaterally each for strength and balance     BOSU mini lunges        Heel raises for strength and balance  Deferred- time constraints 20x  30x each   Wall ball squats  Deferred -time constraints Deferred- time  3x10                                           Ther Ex        Seated thoracic extension AROM mobs w/ roll and board 5" x20 2 lvls  5" 20x 2 levels 5" 20x 2 levels 5" x20 1lvl due to pain  5" x20 2 lvls    Gentle UT side bend 10"x12 christopher  10" x12 bilat 10" x12 christopher 10"x12 christopher  10"x12 christopher Pt Ed/ HEP                Ther Activity                        Gait Training                        Modalities         MHP 15' post tx c-spine and thoracic spine MHP 15' post tx MHP and Stim  13' MHP and IFC 13' MHP 15' post tx c-spine and thoracic spine

## 2023-12-01 ENCOUNTER — APPOINTMENT (OUTPATIENT)
Dept: PHYSICAL THERAPY | Facility: CLINIC | Age: 58
End: 2023-12-01
Payer: OTHER MISCELLANEOUS

## 2023-12-04 ENCOUNTER — OFFICE VISIT (OUTPATIENT)
Dept: PHYSICAL THERAPY | Facility: CLINIC | Age: 58
End: 2023-12-04
Payer: OTHER MISCELLANEOUS

## 2023-12-04 DIAGNOSIS — M43.22 FUSION OF SPINE OF CERVICAL REGION: Primary | ICD-10-CM

## 2023-12-04 DIAGNOSIS — M47.12 OTHER SPONDYLOSIS WITH MYELOPATHY, CERVICAL REGION: ICD-10-CM

## 2023-12-04 PROCEDURE — 97110 THERAPEUTIC EXERCISES: CPT

## 2023-12-04 PROCEDURE — 97140 MANUAL THERAPY 1/> REGIONS: CPT

## 2023-12-04 PROCEDURE — 97112 NEUROMUSCULAR REEDUCATION: CPT

## 2023-12-04 PROCEDURE — 97014 ELECTRIC STIMULATION THERAPY: CPT

## 2023-12-04 NOTE — PROGRESS NOTES
Daily Note     Today's date: 2023  Patient name: Sharifa Savage. : 1965  MRN: [id number]  Referring provider: Mayito Kelly PA-C  Dx:   Encounter Diagnosis     ICD-10-CM    1. 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 Other spondylosis with myelopathy, cervical region  M47.12                      Subjective: Pt reports a lot of posterior shoulder pain bilaterally, left worse than right. Objective: See treatment diary below      Assessment: Tolerated treatment well. Patient would benefit from continued PT      Plan: Continue per plan of care.       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 Fusion (23)       Manuals 23   STM to sub occipital to mid thoracic 30' 20' 20' 20' 30'   AROM c rotation w/ manual c2 block  Deferred due to pt time constraints 8'     Bilateral ulnar nerve mobilization  [place], cupping and K-Tape 10' 10' 10' IASTM, cupping and K-tape  10'           Neuro Re-Ed         LTP/ MTP Blue 3x10 each Deferred-time constraints 3x10 each   green  Green 3x10   Cervical retraction in available ROM 5" x20 5" x20 5" x20 5" x20 5" x20   scap retract/ ER in doorframe Green 2x10 Red 2x10 Green 2x10  Green 2x10   Standing hip abd and ext  Deferred-time constraints 2x10 bilaterally each for strength and balance     BOSU mini lunges        Heel raises for strength and balance  Deferred- time constraints 20x  30x each   Wall ball squats  Deferred -time constraints Deferred- time  3x10                                           Ther Ex        Seated thoracic extension AROM mobs w/ roll and board 5" x20 2 lvls  5" 20x 2 levels 5" 20x 2 levels 5" x20 1lvl due to pain  5" x20 2 lvls    Gentle UT side bend 10"x12 christopher  10" x12 bilat 10" x12 christopher 10"x12 christopher  10"x12 christopher                                    Pt Ed/ HEP                Ther Activity                        Gait Training                        Modalities         MHP 15' post tx c-spine and thoracic spine MHP 15' post tx MHP and Stim  13' MHP and IFC 15' MHP 15' post tx c-spine and thoracic spine

## 2023-12-06 ENCOUNTER — OFFICE VISIT (OUTPATIENT)
Dept: PHYSICAL THERAPY | Facility: CLINIC | Age: 58
End: 2023-12-06
Payer: OTHER MISCELLANEOUS

## 2023-12-06 DIAGNOSIS — M43.22 FUSION OF SPINE OF CERVICAL REGION: Primary | ICD-10-CM

## 2023-12-06 DIAGNOSIS — M47.12 OTHER SPONDYLOSIS WITH MYELOPATHY, CERVICAL REGION: ICD-10-CM

## 2023-12-06 PROCEDURE — 97140 MANUAL THERAPY 1/> REGIONS: CPT

## 2023-12-06 PROCEDURE — 97110 THERAPEUTIC EXERCISES: CPT

## 2023-12-06 NOTE — PROGRESS NOTES
Daily Note     Today's date: 2023  Patient name: Quinton Dennis. : 1965  MRN: [id number]  Referring provider: Noemi Mireles PA-C  Dx:   Encounter Diagnosis     ICD-10-CM    1. 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 Other spondylosis with myelopathy, cervical region  M47.12                      Subjective: Pt reports still being very sore but having more mobility since last treatment. Objective: See treatment diary below      Assessment: Tolerated treatment well. Patient demonstrated fatigue post treatment and would benefit from continued PT      Plan: Progress treatment as tolerated.        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 Fusion (23)       Manuals 23   STM to sub occipital to mid thoracic 30' 20' 20' 20' 30'   AROM c rotation w/ manual c2 block  Deferred due to pt time constraints 8'     Bilateral ulnar nerve mobilization  [place], cupping and K-Tape 10' 10' 10' IASTM, cupping and K-tape  10'           Neuro Re-Ed         LTP/ MTP Blue 3x10 each Deferred-time constraints 3x10 each   green  Green 3x10   Cervical retraction in available ROM 5" x20 5" x20 5" x20 5" x20 5" x20   scap retract/ ER in doorframe Green 2x10 Red 2x10 Green 2x10  Green 2x10   Standing hip abd and ext  Deferred-time constraints 2x10 bilaterally each for strength and balance     BOSU mini lunges        Heel raises for strength and balance  Deferred- time constraints 20x  30x each   Wall ball squats  Deferred -time constraints Deferred- time  3x10                                           Ther Ex        Seated thoracic extension AROM mobs w/ roll and board 5" x20 2 lvls  5" 20x 2 levels 5" 20x 2 levels 5" x20 1lvl due to pain  5" x20 2 lvls    Gentle UT side bend 10"x12 christopher  10" x12 bilat 10" x12 christopher 10"x12 christopher  10"x12 christopher                                    Pt Ed/ HEP                Ther Activity                        Gait Training                        Modalities Stim and MHP 15' post tx c-spine and thoracic spine MHP 15' post tx MHP and Stim  13' MHP and IFC 13' MHP 15' post tx c-spine and thoracic spine

## 2023-12-08 ENCOUNTER — OFFICE VISIT (OUTPATIENT)
Dept: PHYSICAL THERAPY | Facility: CLINIC | Age: 58
End: 2023-12-08
Payer: OTHER MISCELLANEOUS

## 2023-12-08 DIAGNOSIS — M43.22 FUSION OF SPINE OF CERVICAL REGION: Primary | ICD-10-CM

## 2023-12-08 DIAGNOSIS — M47.12 OTHER SPONDYLOSIS WITH MYELOPATHY, CERVICAL REGION: ICD-10-CM

## 2023-12-08 PROCEDURE — 97110 THERAPEUTIC EXERCISES: CPT

## 2023-12-08 PROCEDURE — 97112 NEUROMUSCULAR REEDUCATION: CPT

## 2023-12-08 PROCEDURE — 97014 ELECTRIC STIMULATION THERAPY: CPT

## 2023-12-08 PROCEDURE — 97140 MANUAL THERAPY 1/> REGIONS: CPT

## 2023-12-08 NOTE — PROGRESS NOTES
Daily Note     Today's date: 2023  Patient name: John Becker. : 1965  MRN: [id number]  Referring provider: Jay Quintanilla PA-C  Dx:   Encounter Diagnosis     ICD-10-CM    1. 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 Other spondylosis with myelopathy, cervical region  M47.12                      Subjective: Pt reports 2 days of PT this week has helped him feel a little better. Objective: See treatment diary below      Assessment: Tolerated treatment well. Patient would benefit from continued PT      Plan: Progress treatment as tolerated.        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 Fusion (23)       Manuals 23   STM to sub occipital to mid thoracic 30' 20' 20' 20' 30'   AROM c rotation w/ manual c2 block  Deferred due to pt time constraints 8'     Bilateral ulnar nerve mobilization  [place], cupping and K-Tape 10' 10' 10' IASTM, cupping and K-tape  10'           Neuro Re-Ed         LTP/ MTP Blue 3x10 each Deferred-time constraints 3x10 each   green  Green 3x10   Cervical retraction in available ROM 5" x20 5" x20 5" x20 5" x20 5" x20   scap retract/ ER in doorframe Green 2x10 Red 2x10 Green 2x10  Green 2x10   Standing hip abd and ext  Deferred-time constraints 2x10 bilaterally each for strength and balance     BOSU mini lunges        Heel raises for strength and balance  Deferred- time constraints 20x  30x each   Wall ball squats  Deferred -time constraints Deferred- time  3x10                                           Ther Ex        Seated thoracic extension AROM mobs w/ roll and board 5" x20 2 lvls  5" 20x 2 levels 5" 20x 2 levels 5" x20 1lvl due to pain  5" x20 2 lvls    Gentle UT side bend 10"x12 chrisotpher  10" x12 bilat 10" x12 christopher 10"x12 christopher  10"x12 christopher                                    Pt Ed/ HEP                Ther Activity                        Gait Training                        Modalities         Stim and MHP 15' post tx c-spine and thoracic spine MHP 12' post tx MHP and Stim  13' MHP and IFC 15' MHP 15' post tx c-spine and thoracic spine

## 2023-12-13 ENCOUNTER — OFFICE VISIT (OUTPATIENT)
Dept: PHYSICAL THERAPY | Facility: CLINIC | Age: 58
End: 2023-12-13
Payer: OTHER MISCELLANEOUS

## 2023-12-13 DIAGNOSIS — M43.22 FUSION OF SPINE OF CERVICAL REGION: Primary | ICD-10-CM

## 2023-12-13 DIAGNOSIS — M47.12 OTHER SPONDYLOSIS WITH MYELOPATHY, CERVICAL REGION: ICD-10-CM

## 2023-12-13 PROCEDURE — 97140 MANUAL THERAPY 1/> REGIONS: CPT

## 2023-12-13 PROCEDURE — 97014 ELECTRIC STIMULATION THERAPY: CPT

## 2023-12-13 PROCEDURE — 97112 NEUROMUSCULAR REEDUCATION: CPT

## 2023-12-13 NOTE — PROGRESS NOTES
Daily Note     Today's date: 2023  Patient name: Irene Kat. : 1965  MRN: [id number]  Referring provider: Bobby Opitz, PA-C  Dx:   Encounter Diagnosis     ICD-10-CM    1. 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 Other spondylosis with myelopathy, cervical region  M47.12                      Subjective: Pt reports still a little better that he has been coming more regularly. Objective: See treatment diary below      Assessment: Tolerated treatment well. Patient would benefit from continued PT      Plan: Progress treatment as tolerated.        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 Fusion (23)       Manuals 23   STM to sub occipital to mid thoracic 30' 20' 20' 20' 30'   AROM c rotation w/ manual c2 block  Deferred due to pt time constraints 8'     Bilateral ulnar nerve mobilization  Graston, cupping and K-Tape 10'  10' IASTM, cupping and K-tape  10'           Neuro Re-Ed         LTP/ MTP Blue 3x10 each Deferred-time constraints 3x10 each   green  Green 3x10   Cervical retraction in available ROM 5" x20 5" x20 5" x20 5" x20 5" x20   scap retract/ ER in doorframe Green 2x10 Red 2x10 Green 2x10  Green 2x10   Standing hip abd and ext  Deferred-time constraints 2x10 bilaterally each for strength and balance     BOSU mini lunges        Heel raises for strength and balance  Deferred- time constraints 20x  30x each   Wall ball squats  Deferred -time constraints Deferred- time  3x10                                           Ther Ex        Seated thoracic extension AROM mobs w/ roll and board 5" x20 2 lvls  5" 20x 2 levels 5" 20x 2 levels 5" x20 1lvl due to pain  5" x20 2 lvls    Gentle UT side bend 10"x12 christopher  10" x12 bilat 10" x12 christopher 10"x12 christopher  10"x12 christopher                                    Pt Ed/ HEP                Ther Activity                        Gait Training                        Modalities         Stim and MHP 15' post tx c-spine and thoracic spine MHP 12' post tx MHP and Stim  13' MHP and IFC 15' MHP 15' post tx c-spine and thoracic spine

## 2023-12-15 ENCOUNTER — OFFICE VISIT (OUTPATIENT)
Dept: PHYSICAL THERAPY | Facility: CLINIC | Age: 58
End: 2023-12-15
Payer: OTHER MISCELLANEOUS

## 2023-12-15 DIAGNOSIS — M47.12 OTHER SPONDYLOSIS WITH MYELOPATHY, CERVICAL REGION: ICD-10-CM

## 2023-12-15 DIAGNOSIS — M43.22 FUSION OF SPINE OF CERVICAL REGION: Primary | ICD-10-CM

## 2023-12-15 PROCEDURE — 97110 THERAPEUTIC EXERCISES: CPT

## 2023-12-15 PROCEDURE — 97140 MANUAL THERAPY 1/> REGIONS: CPT

## 2023-12-15 PROCEDURE — 97112 NEUROMUSCULAR REEDUCATION: CPT

## 2023-12-15 PROCEDURE — 97014 ELECTRIC STIMULATION THERAPY: CPT

## 2023-12-15 NOTE — PROGRESS NOTES
Daily Note     Today's date: 12/15/2023  Patient name: Gene Myles. : 1965  MRN: [id number]  Referring provider: Albert Walton PA-C  Dx:   Encounter Diagnosis     ICD-10-CM    1. 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 Other spondylosis with myelopathy, cervical region  M47.12                      Subjective: No new complaints      Objective: See treatment diary below      Assessment: Tolerated treatment well. Patient would benefit from continued PT      Plan: Progress treatment as tolerated.        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 Fusion (23)       Manuals 12-6-23 12-8-23 12-13-23 12-15-23 11-29-23   STM to sub occipital to mid thoracic 30' 20' 20' 20' 30'   AROM c rotation w/ manual c2 block  Deferred due to pt time constraints 8' 8'    Bilateral ulnar nerve mobilization  Graston, cupping and K-Tape 10'   10'           Neuro Re-Ed         LTP/ MTP Blue 3x10 each Deferred-time constraints 3x10 each   green 3x10 blue each Green 3x10   Cervical retraction in available ROM 5" x20 5" x20 5" x20 5" x20 5" x20   scap retract/ ER in doorframe Green 2x10 Red 2x10 Green 2x10 Green 2x10 Green 2x10   Standing hip abd and ext  Deferred-time constraints 2x10 bilaterally each for strength and balance     BOSU mini lunges        Heel raises for strength and balance  Deferred- time constraints 20x  30x each   Wall ball squats  Deferred -time constraints Deferred- time  3x10                                           Ther Ex        Seated thoracic extension AROM mobs w/ roll and board 5" x20 2 lvls  5" 20x 2 levels 5" 20x 2 levels 5" x20 1lvl due to pain  5" x20 2 lvls    Gentle UT side bend 10"x12 christopher  10" x12 bilat 10" x12 christopher 10"x12 christopher  10"x12 christopher                                    Pt Ed/ HEP                Ther Activity                        Gait Training                        Modalities         Stim and MHP 15' post tx c-spine and thoracic spine MHP 15' post tx MHP and Stim  13' MHP and IFC 15' MHP 13' post tx c-spine and thoracic spine

## 2023-12-18 ENCOUNTER — APPOINTMENT (OUTPATIENT)
Dept: PHYSICAL THERAPY | Facility: CLINIC | Age: 58
End: 2023-12-18
Payer: OTHER MISCELLANEOUS

## 2023-12-22 ENCOUNTER — OFFICE VISIT (OUTPATIENT)
Dept: PHYSICAL THERAPY | Facility: CLINIC | Age: 58
End: 2023-12-22
Payer: OTHER MISCELLANEOUS

## 2023-12-22 DIAGNOSIS — M47.12 OTHER SPONDYLOSIS WITH MYELOPATHY, CERVICAL REGION: ICD-10-CM

## 2023-12-22 DIAGNOSIS — M43.22 FUSION OF SPINE OF CERVICAL REGION: Primary | ICD-10-CM

## 2023-12-22 PROCEDURE — 97140 MANUAL THERAPY 1/> REGIONS: CPT

## 2023-12-22 PROCEDURE — 97014 ELECTRIC STIMULATION THERAPY: CPT

## 2023-12-22 PROCEDURE — 97110 THERAPEUTIC EXERCISES: CPT

## 2023-12-22 NOTE — PROGRESS NOTES
"Daily Note     Today's date: 2023  Patient name: Efra Garcia Jr.  : 1965  MRN: [id number]  Referring provider: Mimi Manuel PA-C  Dx:   Encounter Diagnosis     ICD-10-CM    1. 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 Other spondylosis with myelopathy, cervical region  M47.12                      Subjective: Pt reports his head is moving a little better but his shoulder continues to be very painful.      Objective: See treatment diary below      Assessment: Tolerated treatment well. Patient would benefit from continued PT      Plan: Continue per plan of care.  Progress treatment as tolerated.       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 Fusion (23)       Manuals 12-6-23 12-8-23 12-13-23 12-15-23 12-22-23   STM to sub occipital to mid thoracic 30' 20' 20' 20' 20'   AROM c rotation w/ manual c2 block  Deferred due to pt time constraints 8' 8' 8'   Bilateral ulnar nerve mobilization  Graston, cupping and K-Tape 10'              Neuro Re-Ed         LTP/ MTP Blue 3x10 each Deferred-time constraints 3x10 each   green 3x10 blue each Blue 3x10   Cervical retraction in available ROM 5\" x20 5\" x20 5\" x20 5\" x20 Home   scap retract/ ER in doorframe Green 2x10 Red 2x10 Green 2x10 Green 2x10 Green 2x10   Standing hip abd and ext  Deferred-time constraints 2x10 bilaterally each for strength and balance     BOSU mini lunges        Heel raises for strength and balance  Deferred- time constraints 20x     Wall ball squats  Deferred -time constraints Deferred- time                                             Ther Ex        Seated thoracic extension AROM mobs w/ roll and board 5\" x20 2 lvls  5\" 20x 2 levels 5\" 20x 2 levels 5\" x20 1lvl due to pain  5\" x20 2 lvls    Gentle UT side bend 10\"x12 christopher  10\" x12 bilat 10\" x12 christopher 10\"x12 christopher  10\"x12 christopher                                    Pt Ed/ HEP                Ther Activity                        Gait Training                        Modalities         Stim and MHP 15' post " tx c-spine and thoracic spine MHP 12' post tx MHP and Stim  15' MHP and IFC 15' MHP 15' post tx c-spine and thoracic spine

## 2023-12-28 DIAGNOSIS — M54.12 CERVICAL RADICULOPATHY: ICD-10-CM

## 2023-12-28 RX ORDER — GABAPENTIN 800 MG/1
800 TABLET ORAL 3 TIMES DAILY
Qty: 90 TABLET | Refills: 3 | Status: SHIPPED | OUTPATIENT
Start: 2023-12-28

## 2023-12-29 ENCOUNTER — OFFICE VISIT (OUTPATIENT)
Dept: PHYSICAL THERAPY | Facility: CLINIC | Age: 58
End: 2023-12-29
Payer: OTHER MISCELLANEOUS

## 2023-12-29 DIAGNOSIS — M43.22 FUSION OF SPINE OF CERVICAL REGION: Primary | ICD-10-CM

## 2023-12-29 DIAGNOSIS — M47.12 OTHER SPONDYLOSIS WITH MYELOPATHY, CERVICAL REGION: ICD-10-CM

## 2023-12-29 PROCEDURE — 97112 NEUROMUSCULAR REEDUCATION: CPT

## 2023-12-29 PROCEDURE — 97140 MANUAL THERAPY 1/> REGIONS: CPT

## 2023-12-29 NOTE — PROGRESS NOTES
"Daily Note     Today's date: 2023  Patient name: Efra Garcia Jr.  : 1965  MRN: [id number]  Referring provider: Mimi Manuel PA-C  Dx:   Encounter Diagnosis     ICD-10-CM    1. 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 Other spondylosis with myelopathy, cervical region  M47.12                      Subjective: Stan reports increased stiffness in c-spine today.       Objective: See treatment diary below      Assessment: Improvement in soft tissue quality following STM to cerv paraspinals/suboccipitals/scap retractors/christopher UT. Visual and VC to ensure correct exercise technique. Pt would continue to benefit from skilled PT.       Plan: Continue with current POC to address pt deficits.      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 Fusion (23)       Manuals 12-29-23 12-8-23 12-13-23 12-15-23 12-22-23   STM to sub occipital to mid thoracic 15' seated and supine  20' 20' 20' 20'   AROM c rotation w/ manual c2 block Not today  Deferred due to pt time constraints 8' 8' 8'   Bilateral ulnar nerve mobilization  Graston, cupping and K-Tape 10'              Neuro Re-Ed         LTP/ MTP Blue 3x10 Deferred-time constraints 3x10 each   green 3x10 blue each Blue 3x10   Cervical retraction in available ROM  5\" x20 5\" x20 5\" x20 Home   scap retract/ ER in doorframe Red 2x10 Red 2x10 Green 2x10 Green 2x10 Green 2x10   Standing hip abd and ext  Deferred-time constraints 2x10 bilaterally each for strength and balance     BOSU mini lunges        Heel raises for strength and balance  Deferred- time constraints 20x     Wall ball squats  Deferred -time constraints Deferred- time                                             Ther Ex        Seated thoracic extension AROM mobs w/ roll and board 5\" x20 2lvls  5\" 20x 2 levels 5\" 20x 2 levels 5\" x20 1lvl due to pain  5\" x20 2 lvls    Gentle UT side bend  10\" x12 bilat 10\" x12 christopher 10\"x12 christopher  10\"x12 christopher    Cerv nod  10\"x12                               Pt Ed/ HEP                Ther " Activity                        Gait Training                        Modalities         MHP 15' post tx c-spine and thoracic spine MHP 12' post tx MHP and Stim  15' MHP and IFC 15' MHP 15' post tx c-spine and thoracic spine

## 2024-01-03 ENCOUNTER — OFFICE VISIT (OUTPATIENT)
Dept: PHYSICAL THERAPY | Facility: CLINIC | Age: 59
End: 2024-01-03
Payer: OTHER MISCELLANEOUS

## 2024-01-03 DIAGNOSIS — M43.22 FUSION OF SPINE OF CERVICAL REGION: Primary | ICD-10-CM

## 2024-01-03 DIAGNOSIS — M47.12 OTHER SPONDYLOSIS WITH MYELOPATHY, CERVICAL REGION: ICD-10-CM

## 2024-01-03 PROCEDURE — 97110 THERAPEUTIC EXERCISES: CPT

## 2024-01-03 PROCEDURE — 97014 ELECTRIC STIMULATION THERAPY: CPT

## 2024-01-03 PROCEDURE — 97112 NEUROMUSCULAR REEDUCATION: CPT

## 2024-01-03 PROCEDURE — 97140 MANUAL THERAPY 1/> REGIONS: CPT

## 2024-01-03 NOTE — PROGRESS NOTES
"Daily Note     Today's date: 1/3/2024  Patient name: Efra Garcia Jr.  : 1965  MRN: [id number]  Referring provider: Mimi Manuel PA-C  Dx:   Encounter Diagnosis     ICD-10-CM    1. 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 Other spondylosis with myelopathy, cervical region  M47.12                      Subjective: I am sore      Objective: See treatment diary below      Assessment: Tolerated treatment well. Patient would benefit from continued PT      Plan: Continue per plan of care.      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 Fusion (23)       Manuals 12-29-23 1-3-24 12-13-23 12-15-23 12-22-23   STM to sub occipital to mid thoracic 15' seated and supine  20' 20' 20' 20'   AROM c rotation w/ manual c2 block Not today  Deferred due to pt time constraints 8' 8' 8'   Bilateral ulnar nerve mobilization                Neuro Re-Ed         LTP/ MTP Blue 3x10 Blue 3x10 3x10 each   green 3x10 blue each Blue 3x10   Cervical retraction in available ROM  5\" x20 5\" x20 5\" x20 Home   scap retract/ ER in doorframe Red 2x10 Red 2x10 Green 2x10 Green 2x10 Green 2x10   Standing hip abd and ext  Deferred-time constraints 2x10 bilaterally each for strength and balance     BOSU mini lunges        Heel raises for strength and balance  Deferred- time constraints 20x     Wall ball squats  Deferred -time constraints Deferred- time                                             Ther Ex        Seated thoracic extension AROM mobs w/ roll and board 5\" x20 2lvls  5\" 20x 2 levels 5\" 20x 2 levels 5\" x20 1lvl due to pain  5\" x20 2 lvls    Gentle UT side bend  10\" x12 bilat 10\" x12 christopher 10\"x12 christopher  10\"x12 christopher    Cerv nod  10\"x12 10\" x12                              Pt Ed/ HEP                Ther Activity                        Gait Training                        Modalities         MHP 15' post tx c-spine and thoracic spine MHP 12' post tx MHP and Stim  15' MHP and IFC 15' MHP 15' post tx c-spine and thoracic spine          "

## 2024-01-05 ENCOUNTER — OFFICE VISIT (OUTPATIENT)
Dept: PHYSICAL THERAPY | Facility: CLINIC | Age: 59
End: 2024-01-05
Payer: OTHER MISCELLANEOUS

## 2024-01-05 DIAGNOSIS — M43.22 FUSION OF SPINE OF CERVICAL REGION: Primary | ICD-10-CM

## 2024-01-05 DIAGNOSIS — M47.12 OTHER SPONDYLOSIS WITH MYELOPATHY, CERVICAL REGION: ICD-10-CM

## 2024-01-05 PROCEDURE — 97014 ELECTRIC STIMULATION THERAPY: CPT

## 2024-01-05 PROCEDURE — 97140 MANUAL THERAPY 1/> REGIONS: CPT

## 2024-01-05 PROCEDURE — 97110 THERAPEUTIC EXERCISES: CPT

## 2024-01-05 NOTE — PROGRESS NOTES
PT Re-Evaluation  Collection of subjective/objective data performed by Miguelina Russo PTA; Assessment, POC, and Goal attainment performed by Alfie Kumar DPT      Today's date: 2024  Patient name: Efra Garcia Jr.  : 1965  MRN: [id number]  Referring provider: Mimi Manuel PA-C  Dx:   Encounter Diagnosis     ICD-10-CM    1. 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 Other spondylosis with myelopathy, cervical region  M47.12                      Assessment  Assessment details: Pt presents with significant soft tissue and mobility restrictions in his neck and upper body.  He has continued ulnar nerve symptoms bilaterally, as well as some weakness in left leg which is leading to minor balance issues.    23:  Pt has shown some improvements in mobility and some increase in strength but has a long way to go for return to reasonable function.    24:  Pt still in a great deal of pain with neck and shoulders.  Still needs to further progress strength with care to no fully exacerbate pain symptoms.  Impairments: abnormal or restricted ROM, activity intolerance, impaired balance, impaired physical strength, lacks appropriate home exercise program, pain with function and poor posture     Symptom irritability: moderateUnderstanding of Dx/Px/POC: good   Prognosis: fair    Goals  ST) Pt. Will be able to sleep through the night without being awoken with pain and with minimal to no pain upon waking in 6 weeks. -SOME IMPROVEMENT  2) Pt. Will have elimination of headaches in 6 weeks. -SOME IMPROVEMENT   3)  Pt.'s radicular symptoms will be centralized to neck in 6 weeks. -INCONSISTENT IMPROVEMENT  LT) Pt. Will be able to complete all chores at home without pain or limitations in 12 weeks. -LITTLE IMPROVEMENT  2)  Pt. Will be able to to sleep a full night without limitations -SOME IMPROVEMENT    Plan  Patient would benefit from: PT eval and skilled physical therapy  Planned modality  interventions: thermotherapy: hydrocollator packs, cryotherapy and unattended electrical stimulation  Planned therapy interventions: manual therapy, neuromuscular re-education, patient education, self care, therapeutic activities, therapeutic exercise and home exercise program  Frequency: 2x week  Duration in weeks: 8  Treatment plan discussed with: patient      Subjective Evaluation    History of Present Illness  Date of onset: 2005  Date of surgery: 2023  Mechanism of injury: surgery  Mechanism of injury: c3-t1 decompression/ fusion after years of being denied surgical requests.  Pt reports headaches, stiffness and weakness/ strain in neck.  He still has same amount of (ulnar) nerve symptoms as prior to surgery.  He also reports left leg weakness and stability.    23:  Pt reports being a little looser and able to turn his head better but still in a fair amount of discomfort.    24: Pt reports almost going to the ER yesterday due to pain and spasms in c-spine specifically christopher UT, cerv paraspinals, suboccipitals. He reports numbness in L LE. He reports pain in L shoulder and reports not being able to sleep comfortable during the night time due to pain/discomfort.           Recurrent probem    Quality of life: fair    Patient Goals  Patient goal: Gain maximum movement from his neck.  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      Objective     Postural Observations  Seated posture: poor  Standing posture: poor    Additional Postural Observation Details  Moderate forward head and rounded shoulders    Palpation   Left   Hypertonic in the cervical paraspinals, levator scapulae, middle trapezius, rhomboids, scalenes, sternocleidomastoid, suboccipitals and upper trapezius.    Tenderness of the cervical paraspinals, levator scapulae, middle trapezius, rhomboids, scalenes, sternocleidomastoid, suboccipitals and upper trapezius.   Trigger point to levator scapulae, suboccipitals and upper trapezius.     Right   Hypertonic in the cervical paraspinals, levator scapulae, middle trapezius, rhomboids, scalenes, sternocleidomastoid, suboccipitals and upper trapezius.   Tenderness of the cervical paraspinals, levator scapulae, middle trapezius, rhomboids, scalenes, sternocleidomastoid, suboccipitals and upper trapezius.   Trigger point to levator scapulae, suboccipitals and upper trapezius.     Active Range of Motion   Cervical/Thoracic Spine       Cervical  Subcranial protraction:  WFL   Subcranial retraction:  with pain   Restriction level: maximal  Flexion:  Restriction level: moderate  Extension:  with pain Restriction level: maximal  Left lateral flexion:  Restriction level: maximal  Right lateral flexion:  Restriction level maximal  Left rotation:  with pain Restriction level: moderate  Right rotation:  with pain Restriction level: moderate    Strength/Myotome Testing   Cervical Spine     Left   Neck lateral flexion (C3): 3+    Right   Neck lateral flexion (C3): 3+    Left Shoulder     Planes of Motion   Abduction: 4-     Right Shoulder     Planes of Motion   Abduction: 4-     Left Elbow   Flexion: 3+  Extension: 4    Right Elbow   Flexion: 4  Extension: 4    Left Wrist/Hand   Wrist extension: 3+  Wrist flexion: 3+  Thumb extension: 3    Right Wrist/Hand   Wrist extension: 3+  Wrist flexion: 3  Thumb extension: 3          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 Fusion (6/19/23)       Manuals 12-29-23 1-3-24 1-5-24 12-15-23 12-22-23   STM to sub occipital to mid thoracic 15' seated and supine  20' 23' 20' 20'   AROM c rotation w/ manual c2 block Not today  Deferred due to pt time constraints  8' 8'   Bilateral ulnar nerve mobilization                Neuro Re-Ed         LTP/ MTP Blue 3x10 Blue 3x10  3x10 blue  "each Blue 3x10   Cervical retraction in available ROM  5\" x20 5\" x20 5\" x20 Home   scap retract/ ER in doorframe Red 2x10 Red 2x10  Green 2x10 Green 2x10   Standing hip abd and ext  Deferred-time constraints      BOSU mini lunges        Heel raises for strength and balance  Deferred- time constraints      Wall ball squats  Deferred -time constraints                                              Ther Ex        Seated thoracic extension AROM mobs w/ roll and board 5\" x20 2lvls  5\" 20x 2 levels 5\" 20x 2 levels 5\" x20 1lvl due to pain  5\" x20 2 lvls    Gentle UT side bend  10\" x12 bilat  10\"x12 christopher  10\"x12 christopher    Cerv nod  10\"x12 10\" x12 10\"x12                             Pt Ed/ HEP   Objective measures 8'             Ther Activity                        Gait Training                        Modalities         MHP 15' post tx c-spine and thoracic spine MHP 12' post tx  MHP and IFC 15' MHP 15' post tx c-spine and thoracic spine                               "

## 2024-01-05 NOTE — LETTER
2024      No Recipients    Patient: Efra Garcia Jr.   YOB: 1965   Date of Visit: 2024     Encounter Diagnosis     ICD-10-CM    1. 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 Other spondylosis with myelopathy, cervical region  M47.12           Dear Dr. Manning Recipients:    Thank you for your recent referral of Efra Garcia Jr.. Please review the attached evaluation summary from Efra's recent visit.     Please verify that you agree with the plan of care by signing the attached order.     If you have any questions or concerns, please do not hesitate to call.     I sincerely appreciate the opportunity to share in the care of one of your patients and hope to have another opportunity to work with you in the near future.       Sincerely,    Miguelina Russo      Referring Provider:      I certify that I have read the below Plan of Care and certify the need for these services furnished under this plan of treatment while under my care.                      No Recipients          PT Re-Evaluation  Collection of subjective/objective data performed by Miguelina Russo PTA; Assessment, POC, and Goal attainment performed by Alfie Kumar DPT      Today's date: 2024  Patient name: Efra Garcia Jr.  : 1965  MRN: [id number]  Referring provider: Mimi Manuel PA-C  Dx:   Encounter Diagnosis     ICD-10-CM    1. 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 Other spondylosis with myelopathy, cervical region  M47.12                      Assessment  Assessment details: Pt presents with significant soft tissue and mobility restrictions in his neck and upper body.  He has continued ulnar nerve symptoms bilaterally, as well as some weakness in left leg which is leading to minor balance issues.    23:  Pt has shown some improvements in mobility and some increase in strength but has a long way to go for return to reasonable function.    24:  Pt still in a great deal of pain  with neck and shoulders.  Still needs to further progress strength with care to no fully exacerbate pain symptoms.  Impairments: abnormal or restricted ROM, activity intolerance, impaired balance, impaired physical strength, lacks appropriate home exercise program, pain with function and poor posture     Symptom irritability: moderateUnderstanding of Dx/Px/POC: good   Prognosis: fair    Goals  ST) Pt. Will be able to sleep through the night without being awoken with pain and with minimal to no pain upon waking in 6 weeks. -SOME IMPROVEMENT  2) Pt. Will have elimination of headaches in 6 weeks. -SOME IMPROVEMENT   3)  Pt.'s radicular symptoms will be centralized to neck in 6 weeks. -INCONSISTENT IMPROVEMENT  LT) Pt. Will be able to complete all chores at home without pain or limitations in 12 weeks. -LITTLE IMPROVEMENT  2)  Pt. Will be able to to sleep a full night without limitations -SOME IMPROVEMENT    Plan  Patient would benefit from: PT eval and skilled physical therapy  Planned modality interventions: thermotherapy: hydrocollator packs, cryotherapy and unattended electrical stimulation  Planned therapy interventions: manual therapy, neuromuscular re-education, patient education, self care, therapeutic activities, therapeutic exercise and home exercise program  Frequency: 2x week  Duration in weeks: 8  Treatment plan discussed with: patient      Subjective Evaluation    History of Present Illness  Date of onset: 2005  Date of surgery: 2023  Mechanism of injury: surgery  Mechanism of injury: c3-t1 decompression/ fusion after years of being denied surgical requests.  Pt reports headaches, stiffness and weakness/ strain in neck.  He still has same amount of (ulnar) nerve symptoms as prior to surgery.  He also reports left leg weakness and stability.    23:  Pt reports being a little looser and able to turn his head better but still in a fair amount of discomfort.    24: Pt reports almost  going to the ER yesterday due to pain and spasms in c-spine specifically christopher UT, cerv paraspinals, suboccipitals. He reports numbness in L LE. He reports pain in L shoulder and reports not being able to sleep comfortable during the night time due to pain/discomfort.           Recurrent probem    Quality of life: fair    Patient Goals  Patient goal: Gain maximum movement from his neck.  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      Objective     Postural Observations  Seated posture: poor  Standing posture: poor    Additional Postural Observation Details  Moderate forward head and rounded shoulders    Palpation   Left   Hypertonic in the cervical paraspinals, levator scapulae, middle trapezius, rhomboids, scalenes, sternocleidomastoid, suboccipitals and upper trapezius.   Tenderness of the cervical paraspinals, levator scapulae, middle trapezius, rhomboids, scalenes, sternocleidomastoid, suboccipitals and upper trapezius.   Trigger point to levator scapulae, suboccipitals and upper trapezius.     Right   Hypertonic in the cervical paraspinals, levator scapulae, middle trapezius, rhomboids, scalenes, sternocleidomastoid, suboccipitals and upper trapezius.   Tenderness of the cervical paraspinals, levator scapulae, middle trapezius, rhomboids, scalenes, sternocleidomastoid, suboccipitals and upper trapezius.   Trigger point to levator scapulae, suboccipitals and upper trapezius.     Active Range of Motion   Cervical/Thoracic Spine       Cervical  Subcranial protraction:  WFL   Subcranial retraction:  with pain   Restriction level: maximal  Flexion:  Restriction level: moderate  Extension:  with pain Restriction level: maximal  Left lateral flexion:  Restriction level: maximal  Right lateral  "flexion:  Restriction level maximal  Left rotation:  with pain Restriction level: moderate  Right rotation:  with pain Restriction level: moderate    Strength/Myotome Testing   Cervical Spine     Left   Neck lateral flexion (C3): 3+    Right   Neck lateral flexion (C3): 3+    Left Shoulder     Planes of Motion   Abduction: 4-     Right Shoulder     Planes of Motion   Abduction: 4-     Left Elbow   Flexion: 3+  Extension: 4    Right Elbow   Flexion: 4  Extension: 4    Left Wrist/Hand   Wrist extension: 3+  Wrist flexion: 3+  Thumb extension: 3    Right Wrist/Hand   Wrist extension: 3+  Wrist flexion: 3  Thumb extension: 3          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 Fusion (6/19/23)       Manuals 12-29-23 1-3-24 1-5-24 12-15-23 12-22-23   STM to sub occipital to mid thoracic 15' seated and supine  20' 23' 20' 20'   AROM c rotation w/ manual c2 block Not today  Deferred due to pt time constraints  8' 8'   Bilateral ulnar nerve mobilization                Neuro Re-Ed         LTP/ MTP Blue 3x10 Blue 3x10  3x10 blue each Blue 3x10   Cervical retraction in available ROM  5\" x20 5\" x20 5\" x20 Home   scap retract/ ER in doorframe Red 2x10 Red 2x10  Green 2x10 Green 2x10   Standing hip abd and ext  Deferred-time constraints      BOSU mini lunges        Heel raises for strength and balance  Deferred- time constraints      Wall ball squats  Deferred -time constraints                                              Ther Ex        Seated thoracic extension AROM mobs w/ roll and board 5\" x20 2lvls  5\" 20x 2 levels 5\" 20x 2 levels 5\" x20 1lvl due to pain  5\" x20 2 lvls    Gentle UT side bend  10\" x12 bilat  10\"x12 christopher  10\"x12 christopher    Cerv nod  10\"x12 10\" x12 10\"x12                             Pt Ed/ HEP   Objective measures 8'             Ther Activity                        Gait Training                        Modalities         MHP 15' post tx c-spine and thoracic spine MHP 12' post tx  MHP and IFC 15' MHP 15' post tx c-spine and thoracic spine     "

## 2024-01-08 ENCOUNTER — OFFICE VISIT (OUTPATIENT)
Dept: PHYSICAL THERAPY | Facility: CLINIC | Age: 59
End: 2024-01-08
Payer: OTHER MISCELLANEOUS

## 2024-01-08 DIAGNOSIS — M43.22 FUSION OF SPINE OF CERVICAL REGION: Primary | ICD-10-CM

## 2024-01-08 DIAGNOSIS — M47.12 OTHER SPONDYLOSIS WITH MYELOPATHY, CERVICAL REGION: ICD-10-CM

## 2024-01-08 PROCEDURE — 97140 MANUAL THERAPY 1/> REGIONS: CPT

## 2024-01-08 PROCEDURE — 97014 ELECTRIC STIMULATION THERAPY: CPT

## 2024-01-08 PROCEDURE — 97110 THERAPEUTIC EXERCISES: CPT

## 2024-01-08 NOTE — PROGRESS NOTES
"Daily Note     Today's date: 2024  Patient name: Efra Garcia Jr.  : 1965  MRN: [id number]  Referring provider: Mimi Manuel PA-C  Dx:   Encounter Diagnosis     ICD-10-CM    1. 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 Other spondylosis with myelopathy, cervical region  M47.12                      Subjective: Stan reports feeling good after Friday's therapy session in reference to c-spine. He reports increased pain to L shoulder ant/post and into clavicle. He is concerned something may be torn.       Objective: See treatment diary below      Assessment: Visual and VC to ensure correct exercise technique. Improvement in soft tissue quality following STM to christopher UT, cerv paraspinals, suboccipitals. Due to increased pain today deferred strengthening exercises. Resume program as able NV.       Plan: Continue with current POC to address pt deficits.         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 Fusion (23)       Manuals 12-29-23 1-3-24 1-5-24 1-8-24 12-22-23   STM to sub occipital to mid thoracic 15' seated and supine  20' 23' 30' 20'   AROM c rotation w/ manual c2 block Not today  Deferred due to pt time constraints   8'   Bilateral ulnar nerve mobilization                Neuro Re-Ed         LTP/ MTP Blue 3x10 Blue 3x10  Deferred  Blue 3x10   Cervical retraction in available ROM  5\" x20 5\" x20 P!  Home   scap retract/ ER in doorframe Red 2x10 Red 2x10   Green 2x10   Standing hip abd and ext  Deferred-time constraints      BOSU mini lunges        Heel raises for strength and balance  Deferred- time constraints      Wall ball squats  Deferred -time constraints                                              Ther Ex        Seated thoracic extension AROM mobs w/ roll and board 5\" x20 2lvls  5\" 20x 2 levels 5\" 20x 2 levels 5\" x20 2 lvls  5\" x20 2 lvls    Gentle UT side bend  10\" x12 bilat   10\"x12 christopher    Cerv nod  10\"x12 10\" x12 10\"x12 10\"x12                            Pt Ed/ HEP   Objective measures 8'           "   Ther Activity                        Gait Training                        Modalities         MHP 15' post tx c-spine and thoracic spine MHP 12' post tx  MHP premod 15' c-spine MHP 15' post tx c-spine and thoracic spine

## 2024-01-12 ENCOUNTER — OFFICE VISIT (OUTPATIENT)
Dept: PHYSICAL THERAPY | Facility: CLINIC | Age: 59
End: 2024-01-12
Payer: OTHER MISCELLANEOUS

## 2024-01-12 DIAGNOSIS — M47.12 OTHER SPONDYLOSIS WITH MYELOPATHY, CERVICAL REGION: ICD-10-CM

## 2024-01-12 DIAGNOSIS — M43.22 FUSION OF SPINE OF CERVICAL REGION: Primary | ICD-10-CM

## 2024-01-12 PROCEDURE — 97140 MANUAL THERAPY 1/> REGIONS: CPT

## 2024-01-12 PROCEDURE — 97014 ELECTRIC STIMULATION THERAPY: CPT

## 2024-01-12 PROCEDURE — 97110 THERAPEUTIC EXERCISES: CPT

## 2024-01-12 NOTE — PROGRESS NOTES
"Daily Note     Today's date: 2024  Patient name: Efra Garcia Jr.  : 1965  MRN: [id number]  Referring provider: Mimi Manuel PA-C  Dx:   Encounter Diagnosis     ICD-10-CM    1. 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 Other spondylosis with myelopathy, cervical region  M47.12                      Subjective: Stan reports having a \"bad week\" and ambulates to therapy with stiff posturing; lacking reciprocal arm swing. He reports most pain to base of c-spine and a \"knot\" to the middle of thoracic region.       Objective: See treatment diary below      Assessment: Visual and VC to ensure correct exercise technique. Improvement in soft tissue quality/tone following STM to christopher UT/cerv paraspinals/suboccipitals following STM. Pt reported feeling less pain and less stiffness in c-spine following therapy session.       Plan: Continue with current POC to address pt deficits.      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 Fusion (23)       Manuals 12-29-23 1-3-24 1-5-24 1-8-24 1-12-24   STM to sub occipital to mid thoracic 15' seated and supine  20' 23' 30' 30'   AROM c rotation w/ manual c2 block Not today  Deferred due to pt time constraints      Bilateral ulnar nerve mobilization                Neuro Re-Ed         LTP/ MTP Blue 3x10 Blue 3x10  Deferred     Cervical retraction in available ROM  5\" x20 5\" x20 P!     scap retract/ ER in doorframe Red 2x10 Red 2x10      Standing hip abd and ext  Deferred-time constraints      BOSU mini lunges        Heel raises for strength and balance  Deferred- time constraints      Wall ball squats  Deferred -time constraints                                              Ther Ex        Seated thoracic extension AROM mobs w/ roll and board 5\" x20 2lvls  5\" 20x 2 levels 5\" 20x 2 levels 5\" x20 2 lvls  5\" x20 2 lvls    Gentle UT side bend  10\" x12 bilat      Cerv nod  10\"x12 10\" x12 10\"x12 10\"x12 10\"x12                           Pt Ed/ HEP   Objective measures 8'             Ther " Activity                        Gait Training                        Modalities         MHP 15' post tx c-spine and thoracic spine MHP 12' post tx  MHP premod 15' c-spine MHP premod 15' c-spine

## 2024-01-15 ENCOUNTER — OFFICE VISIT (OUTPATIENT)
Dept: PHYSICAL THERAPY | Facility: CLINIC | Age: 59
End: 2024-01-15
Payer: OTHER MISCELLANEOUS

## 2024-01-15 DIAGNOSIS — M43.22 FUSION OF SPINE OF CERVICAL REGION: Primary | ICD-10-CM

## 2024-01-15 DIAGNOSIS — M47.12 OTHER SPONDYLOSIS WITH MYELOPATHY, CERVICAL REGION: ICD-10-CM

## 2024-01-15 PROCEDURE — 97110 THERAPEUTIC EXERCISES: CPT | Performed by: PHYSICAL THERAPIST

## 2024-01-15 PROCEDURE — 97140 MANUAL THERAPY 1/> REGIONS: CPT | Performed by: PHYSICAL THERAPIST

## 2024-01-15 PROCEDURE — 97014 ELECTRIC STIMULATION THERAPY: CPT | Performed by: PHYSICAL THERAPIST

## 2024-01-15 NOTE — PROGRESS NOTES
"Daily Note     Today's date: 1/15/2024  Patient name: Efra Garcia Jr.  : 1965  MRN: [id number]  Referring provider: Mimi Manuel PA-C  Dx:   Encounter Diagnosis     ICD-10-CM    1. 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 Other spondylosis with myelopathy, cervical region  M47.12                      Subjective: Pt reports his left shoulder into his sternum was really hurting him last night, depending on how he moved his left arm.      Objective: See treatment diary below      Assessment: Tolerated treatment well. Patient demonstrated fatigue post treatment, exhibited good technique with therapeutic exercises, and would benefit from continued PT      Plan: Progress treatment as tolerated.   Progress treament per protocol.      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 Fusion (23)       Manuals 1-15-24 1-3-24 1-5-24 1-8-24 1-12-24   STM to sub occipital to mid thoracic 15' seated and supine  20' 23' 30' 30'   AROM c rotation w/ manual c2 block 10\" x12 bilat Deferred due to pt time constraints      Bilateral ulnar nerve mobilization                Neuro Re-Ed         LTP/ MTP Blue 3x10 Blue 3x10  Deferred     Cervical retraction in available ROM  5\" x20 5\" x20 P!     scap retract/ ER in doorframe Red 2x10 Red 2x10      Standing hip abd and ext  Deferred-time constraints      BOSU mini lunges        Heel raises for strength and balance  Deferred- time constraints      Wall ball squats  Deferred -time constraints                                              Ther Ex        Seated thoracic extension AROM mobs w/ roll and board 5\" x20 2lvls  5\" 20x 2 levels 5\" 20x 2 levels 5\" x20 2 lvls  5\" x20 2 lvls    Gentle UT side bend 10\" x12 bilat 10\" x12 bilat      Cerv nod  10\"x12 10\" x12 10\"x12 10\"x12 10\"x12                           Pt Ed/ HEP   Objective measures 8'             Ther Activity                        Gait Training                        Modalities         MHP 15' post tx c-spine and thoracic spine MHP " 12' post tx  MHP premod 15' c-spine MHP premod 15' c-spine

## 2024-01-22 ENCOUNTER — OFFICE VISIT (OUTPATIENT)
Dept: PHYSICAL THERAPY | Facility: CLINIC | Age: 59
End: 2024-01-22
Payer: OTHER MISCELLANEOUS

## 2024-01-22 DIAGNOSIS — M43.22 FUSION OF SPINE OF CERVICAL REGION: Primary | ICD-10-CM

## 2024-01-22 DIAGNOSIS — M47.12 OTHER SPONDYLOSIS WITH MYELOPATHY, CERVICAL REGION: ICD-10-CM

## 2024-01-22 PROCEDURE — 97140 MANUAL THERAPY 1/> REGIONS: CPT

## 2024-01-22 PROCEDURE — 97110 THERAPEUTIC EXERCISES: CPT

## 2024-01-22 NOTE — PROGRESS NOTES
"Daily Note     Today's date: 2024  Patient name: Efra Garcia Jr.  : 1965  MRN: [id number]  Referring provider: Mimi Manuel PA-C  Dx:   Encounter Diagnosis     ICD-10-CM    1. 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 Other spondylosis with myelopathy, cervical region  M47.12           Start Time: 0900  Stop Time: 1025  Total time in clinic (min): 85 minutes    Subjective: Patient reports pain as 7/10 in cervical area and sub occipital.  Patient lifted items into car from [employer] this weekend.       Objective: See treatment diary below      Assessment: Tolerated treatment well.   Patient participated in skilled PT session focused on strengthening, stretching, and ROM.  Patient able to complete exercise program with no increase in pain.  Patient with increased tightness and pain today.  Patient continues to demonstrate decreased cervical ROM, R>L.  Patient would continue to benefit from skilled PT interventions to address strengthening, stretching, and ROM. Patient demonstrated fatigue post treatment      Plan: Continue per plan of care.      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 Fusion (23)       Manuals -15-24 2024-24 -824 24   STM to sub occipital to mid thoracic 15' seated and supine  Seated 15'  23' 30' 30'   AROM c rotation w/ manual c2 block 10\" x12 bilat       Bilateral ulnar nerve mobilization                Neuro Re-Ed         LTP/ MTP Blue 3x10 Blue 3x10 ea  Deferred     Cervical retraction in available ROM   5\" x20 P!     scap retract/ ER in doorframe Red 2x10       Standing hip abd and ext        BOSU mini lunges        Heel raises for strength and balance        Wall ball squats                                                Ther Ex        Seated thoracic extension AROM mobs w/ roll and board 5\" x20 2lvls  5\" 20x 2 lvls 5\" 20x 2 levels 5\" x20 2 lvls  5\" x20 2 lvls    Gentle UT side bend 10\" x12 bilat 10\" 12x B/L      Cerv nod  10\"x12 10\" 12x 10\"x12 10\"x12 10\"x12      "                      Pt Ed/ HEP   Objective measures 8'             Ther Activity                        Gait Training                        Modalities         MHP 15' post tx c-spine and thoracic spine MHP cervical pre tx x 10'  MHP 15' post tx c-spine and thoracic estm  premod  MHP premod 15' c-spine MHP premod 15' c-spine

## 2024-01-24 ENCOUNTER — OFFICE VISIT (OUTPATIENT)
Dept: PHYSICAL THERAPY | Facility: CLINIC | Age: 59
End: 2024-01-24
Payer: OTHER MISCELLANEOUS

## 2024-01-24 DIAGNOSIS — M43.22 FUSION OF SPINE OF CERVICAL REGION: Primary | ICD-10-CM

## 2024-01-24 DIAGNOSIS — M47.12 OTHER SPONDYLOSIS WITH MYELOPATHY, CERVICAL REGION: ICD-10-CM

## 2024-01-24 PROCEDURE — 97014 ELECTRIC STIMULATION THERAPY: CPT | Performed by: PHYSICAL THERAPIST

## 2024-01-24 PROCEDURE — 97140 MANUAL THERAPY 1/> REGIONS: CPT | Performed by: PHYSICAL THERAPIST

## 2024-01-24 PROCEDURE — 97110 THERAPEUTIC EXERCISES: CPT | Performed by: PHYSICAL THERAPIST

## 2024-01-24 NOTE — PROGRESS NOTES
"Daily Note     Today's date: 2024  Patient name: Efra Garcia Jr.  : 1965  MRN: [id number]  Referring provider: Mimi Manuel PA-C  Dx:   Encounter Diagnosis     ICD-10-CM    1. 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 Other spondylosis with myelopathy, cervical region  M47.12                      Subjective: Pt still remains very sore and stiff      Objective: See treatment diary below      Assessment: Tolerated treatment fair. Patient would benefit from continued PT      Plan: Progress treatment as tolerated.  Consider conditioning and balance exercises     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 Fusion (23)       Manuals 1-15-24 2024 1-24-24 1-8-24 1-12-24   STM to sub occipital to mid thoracic 15' seated and supine  Seated 15'  20' 30' 30'   AROM c rotation w/ manual c2 block 10\" x12 bilat  10\" x12 bilat     Bilateral ulnar nerve mobilization                Neuro Re-Ed         LTP/ MTP Blue 3x10 Blue 3x10 ea Blue 3x12 ea Deferred     Cervical retraction in available ROM   5\" x20 P!     scap retract/ ER in doorframe Red 2x10  Red 3x10     Standing hip abd and ext        BOSU mini lunges        Heel raises for strength and balance        Wall ball squats                                                Ther Ex        Seated thoracic extension AROM mobs w/ roll and board 5\" x20 2lvls  5\" 20x 2 lvls 5\" 20x 2 levels 5\" x20 2 lvls  5\" x20 2 lvls    Gentle UT side bend 10\" x12 bilat 10\" 12x B/L 10\" x12 B/L     Cerv nod  10\"x12 10\" 12x 10\"x12 10\"x12 10\"x12                           Pt Ed/ HEP   Objective measures 8'             Ther Activity                        Gait Training                        Modalities         MHP 15' post tx c-spine and thoracic spine MHP cervical pre tx x 10'  MHP 15' post tx c-spine and thoracic estm  premod IFC w/ MHP 15' post tx MHP premod 15' c-spine MHP premod 15' c-spine                              "

## 2024-01-26 ENCOUNTER — OFFICE VISIT (OUTPATIENT)
Dept: PHYSICAL THERAPY | Facility: CLINIC | Age: 59
End: 2024-01-26
Payer: OTHER MISCELLANEOUS

## 2024-01-26 DIAGNOSIS — M43.22 FUSION OF SPINE OF CERVICAL REGION: Primary | ICD-10-CM

## 2024-01-26 DIAGNOSIS — M47.12 OTHER SPONDYLOSIS WITH MYELOPATHY, CERVICAL REGION: ICD-10-CM

## 2024-01-26 PROCEDURE — 97014 ELECTRIC STIMULATION THERAPY: CPT

## 2024-01-26 PROCEDURE — 97112 NEUROMUSCULAR REEDUCATION: CPT

## 2024-01-26 PROCEDURE — 97110 THERAPEUTIC EXERCISES: CPT

## 2024-01-26 PROCEDURE — 97140 MANUAL THERAPY 1/> REGIONS: CPT

## 2024-01-26 NOTE — PROGRESS NOTES
"Daily Note     Today's date: 2024  Patient name: Efra Garcia Jr.  : 1965  MRN: [id number]  Referring provider: Mimi Manuel PA-C  Dx:   Encounter Diagnosis     ICD-10-CM    1. 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 Other spondylosis with myelopathy, cervical region  M47.12                      Subjective: Stan reports feeling better today in reference to cervical and thoracic spine as he has been to therapy all scheduled days this week.       Objective: See treatment diary below      Assessment: Improvement in soft tissue quality to christopher UT and cerv paraspinals, scap retractors following STM. Visual and VC to ensure correct exercise technique. Pt has most difficulty with cervical nods but was able to complete. Pt denied any increases in pain with exercises performed. Pt would continue to benefit from skilled PT.      Plan: Continue with current POC to address pt deficits.      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 Fusion (23)       Manuals 1-15-24 2024 1-24-24 1-26-24 1-12-24   STM to sub occipital to mid thoracic 15' seated and supine  Seated 15'  20' 20' 30'   AROM c rotation w/ manual c2 block 10\" x12 bilat  10\" x12 bilat     Bilateral ulnar nerve mobilization                Neuro Re-Ed         LTP/ MTP Blue 3x10 Blue 3x10 ea Blue 3x12 ea Blue 3x12 ea    Cervical retraction in available ROM   5\" x20 5\" x20    scap retract/ ER in doorframe Red 2x10  Red 3x10 Red 3x10    Standing hip abd and ext        BOSU mini lunges        Heel raises for strength and balance        Wall ball squats                                                Ther Ex        Seated thoracic extension AROM mobs w/ roll and board 5\" x20 2lvls  5\" 20x 2 lvls 5\" 20x 2 levels 5\" x20 2levels  5\" x20 2 lvls    Gentle UT side bend 10\" x12 bilat 10\" 12x B/L 10\" x12 B/L 10\"x12 christopher     Cerv nod  10\"x12 10\" 12x 10\"x12 10\"x12 10\"x12                           Pt Ed/ HEP   Objective measures 8'             Ther Activity              "           Gait Training                        Modalities         MHP 15' post tx c-spine and thoracic spine MHP cervical pre tx x 10'  MHP 15' post tx c-spine and thoracic estm  premod IFC w/ MHP 15' post tx IFC w/MHP 15' post tx  MHP premod 15' c-spine

## 2024-01-29 ENCOUNTER — OFFICE VISIT (OUTPATIENT)
Dept: PHYSICAL THERAPY | Facility: CLINIC | Age: 59
End: 2024-01-29
Payer: OTHER MISCELLANEOUS

## 2024-01-29 DIAGNOSIS — M47.12 OTHER SPONDYLOSIS WITH MYELOPATHY, CERVICAL REGION: ICD-10-CM

## 2024-01-29 DIAGNOSIS — M43.22 FUSION OF SPINE OF CERVICAL REGION: Primary | ICD-10-CM

## 2024-01-29 PROCEDURE — 97140 MANUAL THERAPY 1/> REGIONS: CPT

## 2024-01-29 PROCEDURE — 97014 ELECTRIC STIMULATION THERAPY: CPT

## 2024-01-29 PROCEDURE — 97110 THERAPEUTIC EXERCISES: CPT

## 2024-01-29 NOTE — PROGRESS NOTES
"Daily Note     Today's date: 2024  Patient name: Efra Garcia Jr.  : 1965  MRN: [id number]  Referring provider: Mimi Manuel PA-C  Dx:   Encounter Diagnosis     ICD-10-CM    1. 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 Other spondylosis with myelopathy, cervical region  M47.12                      Subjective: Stan reports aching in christopher UE down to christopher wrists upon waking on Saturday morning and lots of pain in neck. He has f/u with surgeon next Wednesday.       Objective: See treatment diary below      Assessment: Visual and VC to ensure correct exercise technique. Improvement in soft tissue quality/tone following STM to cerv paraspinals, christopher UT following STM; R side tighter then L. Held additional exercises due to pain this visit. Pt would continue to benefit from skilled PT.       Plan: Continue with current POC to address pt deficits.      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 Fusion (23)       Manuals 1-15-24 2024 1-24-24 1-26-24 1-29-24   STM to sub occipital to mid thoracic 15' seated and supine  Seated 15'  20' 20' 25'   AROM c rotation w/ manual c2 block 10\" x12 bilat  10\" x12 bilat     Bilateral ulnar nerve mobilization                Neuro Re-Ed         LTP/ MTP Blue 3x10 Blue 3x10 ea Blue 3x12 ea Blue 3x12 ea Held    Cervical retraction in available ROM   5\" x20 5\" x20 5\" x20   scap retract/ ER in doorframe Red 2x10  Red 3x10 Red 3x10 Held    Standing hip abd and ext        BOSU mini lunges        Heel raises for strength and balance        Wall ball squats                                                Ther Ex        Seated thoracic extension AROM mobs w/ roll and board 5\" x20 2lvls  5\" 20x 2 lvls 5\" 20x 2 levels 5\" x20 2levels  5\" x20 2 levels    Gentle UT side bend 10\" x12 bilat 10\" 12x B/L 10\" x12 B/L 10\"x12 christopher  10\"x12 christopher    Cerv nod  10\"x12 10\" 12x 10\"x12 10\"x12 10\"x12                           Pt Ed/ HEP   Objective measures 8'             Ther Activity                      "   Gait Training                        Modalities         MHP 15' post tx c-spine and thoracic spine MHP cervical pre tx x 10'  MHP 15' post tx c-spine and thoracic estm  premod IFC w/ MHP 15' post tx IFC w/MHP 15' post tx  IFC w/MHP 15' post tx

## 2024-01-31 ENCOUNTER — OFFICE VISIT (OUTPATIENT)
Dept: PHYSICAL THERAPY | Facility: CLINIC | Age: 59
End: 2024-01-31
Payer: OTHER MISCELLANEOUS

## 2024-01-31 ENCOUNTER — TELEPHONE (OUTPATIENT)
Dept: NEUROSURGERY | Facility: CLINIC | Age: 59
End: 2024-01-31

## 2024-01-31 DIAGNOSIS — M43.22 FUSION OF SPINE OF CERVICAL REGION: Primary | ICD-10-CM

## 2024-01-31 DIAGNOSIS — M47.12 OTHER SPONDYLOSIS WITH MYELOPATHY, CERVICAL REGION: ICD-10-CM

## 2024-01-31 PROCEDURE — 97014 ELECTRIC STIMULATION THERAPY: CPT

## 2024-01-31 PROCEDURE — 97112 NEUROMUSCULAR REEDUCATION: CPT

## 2024-01-31 PROCEDURE — 97110 THERAPEUTIC EXERCISES: CPT

## 2024-01-31 PROCEDURE — 97140 MANUAL THERAPY 1/> REGIONS: CPT

## 2024-01-31 NOTE — TELEPHONE ENCOUNTER
Received call on nurseline from patient questioning as to whether he had order for cervical x-ray. Attempted to return call to patient, no answer, left detailed message answered patient questions.

## 2024-01-31 NOTE — PROGRESS NOTES
"Daily Note     Today's date: 2024  Patient name: Efra Garcia Jr.  : 1965  MRN: [id number]  Referring provider: Mimi Manuel PA-C  Dx:   Encounter Diagnosis     ICD-10-CM    1. 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 Other spondylosis with myelopathy, cervical region  M47.12                      Subjective: Stan reports continued weakness to christopher UE but overall less pain in c-spine and less intensity of weakness compared to last visit.       Objective: See treatment diary below      Assessment: Visual and VC to ensure correct exercise technique. Improvement in soft tissue quality following STM to christopher UT, cerv paraspinals, and scap retractors. Able to resume majority of exercises minus tband ER this visit with no adverse effects. Pt reported feeling \"better\" leaving therapy session.       Plan: Continue with current POC to address pt deficits.      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 Fusion (23)       Manuals 24   STM to sub occipital to mid thoracic 23' Seated 15'  20' 20' 25'   AROM c rotation w/ manual c2 block   10\" x12 bilat     Bilateral ulnar nerve mobilization                Neuro Re-Ed         LTP/ MTP Blue 3x10 ea  Blue 3x10 ea Blue 3x12 ea Blue 3x12 ea Held    Cervical retraction in available ROM 5\" x20  5\" x20 5\" x20 5\" x20   scap retract/ ER in doorframe Held   Red 3x10 Red 3x10 Held    Standing hip abd and ext        BOSU mini lunges        Heel raises for strength and balance        Wall ball squats                                                Ther Ex        Seated thoracic extension AROM mobs w/ roll and board 5\" x20 2 lvls 5\" 20x 2 lvls 5\" 20x 2 levels 5\" x20 2levels  5\" x20 2 levels    Gentle UT side bend 10\"x12 christopher  10\" 12x B/L 10\" x12 B/L 10\"x12 christopher  10\"x12 christopher    Cerv nod  10\"x12 10\" 12x 10\"x12 10\"x12 10\"x12                           Pt Ed/ HEP   Objective measures 8'             Ther Activity                        Gait Training         "                Modalities         IFC w/MHP 15' post tx  MHP cervical pre tx x 10'  MHP 15' post tx c-spine and thoracic estm  premod IFC w/ MHP 15' post tx IFC w/MHP 15' post tx  IFC w/MHP 15' post tx

## 2024-02-05 ENCOUNTER — APPOINTMENT (OUTPATIENT)
Dept: RADIOLOGY | Facility: CLINIC | Age: 59
End: 2024-02-05
Payer: OTHER MISCELLANEOUS

## 2024-02-05 DIAGNOSIS — M43.22 FUSION OF SPINE OF CERVICAL REGION: ICD-10-CM

## 2024-02-05 DIAGNOSIS — M47.12 OTHER SPONDYLOSIS WITH MYELOPATHY, CERVICAL REGION: ICD-10-CM

## 2024-02-05 PROCEDURE — 72040 X-RAY EXAM NECK SPINE 2-3 VW: CPT

## 2024-02-06 NOTE — ASSESSMENT & PLAN NOTE
6 month follow up s/p C3-T1 PCDF by Dr. Purdy 6/19/23  H/o C5-7 ACDF (Dr. Purdy, 2011) with resolution of pre operative symptoms  Re-presented October 2022 with gait difficulty/imbalance, difficulty with fine motor skills over the past year. Constant pain BUE.  mJOA 13 preop  Post op symptoms improving. Continued bilateral hand N/T middle finger to mid forearm, hand clumsiness, LLE weakness, balance difficulty    Imagining  XR cervical 2/5/24: Anterior and posterior spinal fusion hardware without evidence of complication     Plan  Imaging reviewed with patient and Dr. Purdy. Stable hardware.   He has not noticed a significant improvement in arm weakness, numbness/clumsiness or with his left leg. Still having neck and shoulder pain.  Discussed that it may take up to 12-18 months to see improvement post surgery.   Reiterated that the purpose of the surgery was to prevent worsening myelopathy and catastrophic injury to spinal cord with hyperextension, and improvement in symptoms was not guaranteed.  With his current symptoms, it is less likely to be related to his cervical spine, but possibly from his shoulders, myofacial pain, possible PMR or other rheumatologic disorder. Previously had +RF several years ago.   No additional neurosurgery intervention recommended.   Recommend following up with his PCP for possible shoulder evaluation as well as rheumatologic work up. May need to see Rheumatology or Orthopedics depending on results.  Referral placed for PT for gentle ROM and stretching as well as core/back strengthening and gait training.    Follow up as needed.   Call with any questions or concerns.

## 2024-02-07 ENCOUNTER — APPOINTMENT (OUTPATIENT)
Dept: PHYSICAL THERAPY | Facility: CLINIC | Age: 59
End: 2024-02-07
Payer: OTHER MISCELLANEOUS

## 2024-02-07 ENCOUNTER — OFFICE VISIT (OUTPATIENT)
Dept: NEUROSURGERY | Facility: CLINIC | Age: 59
End: 2024-02-07
Payer: OTHER MISCELLANEOUS

## 2024-02-07 VITALS
HEART RATE: 64 BPM | TEMPERATURE: 98.7 F | SYSTOLIC BLOOD PRESSURE: 140 MMHG | RESPIRATION RATE: 20 BRPM | BODY MASS INDEX: 38.92 KG/M2 | OXYGEN SATURATION: 99 % | HEIGHT: 67 IN | DIASTOLIC BLOOD PRESSURE: 90 MMHG | WEIGHT: 248 LBS

## 2024-02-07 DIAGNOSIS — Z98.890 STATUS POST SURGERY: Primary | ICD-10-CM

## 2024-02-07 DIAGNOSIS — G89.4 CHRONIC PAIN SYNDROME: ICD-10-CM

## 2024-02-07 DIAGNOSIS — Z98.890 POST-OPERATIVE STATE: ICD-10-CM

## 2024-02-07 DIAGNOSIS — Z98.1 S/P CERVICAL SPINAL FUSION: ICD-10-CM

## 2024-02-07 PROCEDURE — 99214 OFFICE O/P EST MOD 30 MIN: CPT | Performed by: PHYSICIAN ASSISTANT

## 2024-02-07 NOTE — PROGRESS NOTES
Neurosurgery Office Note  Efra Garcia Jr. 58 y.o. male MRN: [id number]      Assessment/Plan     S/P cervical spinal fusion  6 month follow up s/p C3-T1 PCDF by Dr. uPrdy 6/19/23  H/o C5-7 ACDF (Dr. Purdy, 2011) with resolution of pre operative symptoms  Re-presented October 2022 with gait difficulty/imbalance, difficulty with fine motor skills over the past year. Constant pain BUE.  mJOA 13 preop  Post op symptoms improving. Continued bilateral hand N/T middle finger to mid forearm, hand clumsiness, LLE weakness, balance difficulty    Imagining  XR cervical 2/5/24: Anterior and posterior spinal fusion hardware without evidence of complication     Plan  Imaging reviewed with patient and Dr. Purdy. Stable hardware.   He has not noticed a significant improvement in arm weakness, numbness/clumsiness or with his left leg. Still having neck and shoulder pain.  Discussed that it may take up to 12-18 months to see improvement post surgery.   Reiterated that the purpose of the surgery was to prevent worsening myelopathy and catastrophic injury to spinal cord with hyperextension, and improvement in symptoms was not guaranteed.  With his current symptoms, it is less likely to be related to his cervical spine, but possibly from his shoulders, myofacial pain, possible PMR or other rheumatologic disorder. Previously had +RF several years ago.   No additional neurosurgery intervention recommended.   Recommend following up with his PCP for possible shoulder evaluation as well as rheumatologic work up. May need to see Rheumatology or Orthopedics depending on results.  Referral placed for PT for gentle ROM and stretching as well as core/back strengthening and gait training.    Follow up as needed.   Call with any questions or concerns.      Diagnoses and all orders for this visit:    Status post surgery  -     Ambulatory referral to Physical Therapy; Future    Post-operative state  -     Ambulatory referral to Physical Therapy;  Future    Chronic pain syndrome    S/P cervical spinal fusion          I have spent a total time of 40 minutes on 02/07/24 in caring for this patient including Diagnostic results, Instructions for management, Patient and family education, Impressions, Counseling / Coordination of care, Documenting in the medical record, Reviewing / ordering tests, medicine, procedures  , Obtaining or reviewing history  , and Communicating with other healthcare professionals .      CHIEF COMPLAINT    Chief Complaint   Patient presents with    Follow-up       HISTORY    History of Present Illness     58 y.o. year old male     58-year-old gentleman seen for 6-month follow-up status post C3-T1 P CDF by Dr. Purdy 6/19/23. He sustained a work injury in 2005 while working for [employer].  Ultimately underwent C5-7 ACDF in 2011 for neck pain, right arm weakness and numbness by Dr. Purdy.  He did well postoperatively with near complete reduction in symptoms. He was doing well until approximately 1-2 years ago when he developed neck pain and difficulty typing. There was pain in his bilateral upper extremities in no specific distribution with associated numbness and tingling. There was also numbness in his hands, especially the 4th/5th fingers.  Also having significant difficulty with ambulation and balance, especially with the left leg.    He continues to note neck pain and shoulder pain bilaterally since surgery, worse on the left. The neck pain is constant and he has limited ROM more so looking to the right.  Complains that his neck muscles feel very fatigued.  He is doing physical therapy including stim, massage, heat which helps at the time, but recurs quickly the next day.  Still noting weakness in his arms and hands, also still dropping things which has not really improved.  Describes a feeling of tightness in his hands.  The right fourth, fifth fingers are still numb, whereas the left fourth and fifth fingers are less numb than  they were.  Right-hand-dominant.  He also notes that it hurts when he raises his arms.  He underwent trigger point injections through pain management, without significant relief.  The left leg weakness and numbness continues.        See Discussion    REVIEW OF SYSTEMS    Review of Systems   Constitutional: Negative.    HENT: Negative.     Eyes: Negative.    Respiratory: Negative.     Cardiovascular: Negative.    Gastrointestinal: Negative.    Endocrine: Negative.    Genitourinary:  Positive for frequency and urgency.   Musculoskeletal:  Positive for back pain, gait problem (unsteady balance), myalgias (neck and upper back muscle tightness and spasms), neck pain (not pain achey at times) and neck stiffness.        1 fall down steps about 8 weeks ago    H/O ACDF    ov 9/9/22 Other spondylosis with myelopathy, cervical region  constant aching shooting down into base of neck then shooting  over to left shoulder and arms associated with intermittent like electric shock depending on how he turns head or moves neck to left or right.    xray done 9/9/22   Skin: Negative.    Allergic/Immunologic: Negative.    Neurological:  Positive for weakness (left leg feels weak  feels weak), light-headedness (with low sugars), numbness (arms and hands/fingers pinky and ring fingers both hand  more on right hand) and headaches. Negative for dizziness (bensing over).        Left leg went dead twice in the past month   Hematological: Negative.    Psychiatric/Behavioral:  Positive for sleep disturbance (takes meds for sleeping). Negative for dysphoric mood. The patient is nervous/anxious.    All other systems reviewed and are negative.      ROS obtained by MA. Reviewed. See HPI.     Meds/Allergies     Current Outpatient Medications   Medication Sig Dispense Refill    acetaminophen (TYLENOL) 325 mg tablet Take 3 tablets (975 mg total) by mouth every 8 (eight) hours 40 tablet 0    ASCORBIC ACID PO       benzonatate (TESSALON PERLES) 100  mg capsule Take 100 mg by mouth Three times daily as needed      Cholecalciferol 25 MCG (1000 UT) capsule TAKE ONE TABLET BY MOUTH EVERY DAY (FOR LOW VITAMIN D)      Cyanocobalamin (B-12 PO) Take by mouth      cyclobenzaprine (FLEXERIL) 5 mg tablet Take 1 tablet (5 mg total) by mouth 3 (three) times a day as needed for muscle spasms 60 tablet 0    dextrose 1 g CHEW daily as needed for low blood sugar      Empagliflozin 25 MG TABS Take 25 mg by mouth every morning ---- aka  Jardiance      famotidine (PEPCID) 20 mg tablet Take 20 mg by mouth daily at bedtime      gabapentin (NEURONTIN) 800 mg tablet TAKE 1 TABLET (800 MG TOTAL) BY MOUTH 3 (THREE) TIMES A DAY 90 tablet 3    lisinopril (ZESTRIL) 10 mg tablet Take 10 mg by mouth daily      metFORMIN (GLUMETZA) 1000 MG (MOD) 24 hr tablet Take 1,000 mg by mouth 2 (two) times a day with meals      pantoprazole (PROTONIX) 20 mg tablet Take 20 mg by mouth daily      TRAZODONE HCL PO Take 75 mg by mouth daily at bedtime        bisacodyl (DULCOLAX) 10 mg suppository Insert 1 suppository (10 mg total) into the rectum daily as needed for constipation for up to 3 days 3 suppository 0    polyethylene glycol (MIRALAX) 17 g packet Take 17 g by mouth daily as needed (PRN per day) (Patient not taking: Reported on 7/6/2023) 100 g 0     No current facility-administered medications for this visit.       Allergies   Allergen Reactions    Metoprolol Other (See Comments)     Too low of a heart rate    Penicillin V Shortness Of Breath    Penicillins Shortness Of Breath and Rash     rash       PAST HISTORY    Past Medical History:   Diagnosis Date    Anxiety     Arthritis     Bilateral occipital neuralgia     Cervical post-laminectomy syndrome     Cervical radiculopathy     Cervical spine disease     Cervical spondylosis with myelopathy     Chronic lumbar pain     Chronic pain syndrome     Chronic thoracic spine pain     Cubital tunnel syndrome of both upper extremities 09/10/2022     "Degenerative cervical spinal stenosis     Dental crowns present     Depression     Diabetes (HCC)     type 2    Exercise involving walking     daily 5-10 minutes    Failed neck syndrome     Fatty liver     Full dentures     upper    GERD (gastroesophageal reflux disease)     History of COVID-19 2021    per pt admitted to the [facility]--    Hyperlipidemia     Hypertension     Irritable bowel syndrome     Motion sickness     Muscle weakness     \"arms and legs\"    Myofascial muscle pain     Neck stiffness     plate implanted-limited ROM    Polyarthralgia     Tinnitus     Wears glasses        Past Surgical History:   Procedure Laterality Date    CERVICAL DISC SURGERY      CERVICAL FUSION  2011    ACDF with Dr. Purdy    COLONOSCOPY      NM ARTHRD PST/PSTLAT TQ 1NTRSPC CRV BELW C2 SEGMENT Bilateral 6/19/2023    Procedure: C3-T1 posterior decompression with instrumented fixation fusion (impulse monitoring);  Surgeon: Kelton Purdy MD;  Location: [facility];  Service: Neurosurgery    TOTAL SHOULDER REPLACEMENT Right     Reversal       Social History     Tobacco Use    Smoking status: Never    Smokeless tobacco: Never   Vaping Use    Vaping status: Never Used   Substance Use Topics    Alcohol use: Yes     Alcohol/week: 1.0 standard drink of alcohol     Types: 1 Cans of beer per week     Comment: occasional, weekly    Drug use: No       Family History   Problem Relation Age of Onset    Stroke Mother     Lung cancer Father          Above history personally reviewed.       EXAM    Vitals:Blood pressure 140/90, pulse 64, temperature 98.7 °F (37.1 °C), temperature source Temporal, resp. rate 20, height 5' 7\" (1.702 m), weight 112 kg (248 lb), SpO2 99%.,Body mass index is 38.84 kg/m².     Physical Exam  Vitals reviewed.   Constitutional:       General: He is awake.      Appearance: Normal appearance.   HENT:      Head: Normocephalic and atraumatic.   Eyes:      Conjunctiva/sclera: Conjunctivae normal.   Neck:      Comments: " Healed neck incision  Limited ROM  Midline neck and trapezius TTP  Shoulder TTP and pain with ROM  Cardiovascular:      Rate and Rhythm: Normal rate.   Pulmonary:      Effort: Pulmonary effort is normal.   Skin:     General: Skin is warm and dry.   Neurological:      Mental Status: He is alert and oriented to person, place, and time.      Gait: Gait is intact.      Deep Tendon Reflexes:      Reflex Scores:       Bicep reflexes are 2+ on the right side and 2+ on the left side.       Brachioradialis reflexes are 2+ on the right side and 2+ on the left side.       Patellar reflexes are 2+ on the right side and 2+ on the left side.  Psychiatric:         Attention and Perception: Attention and perception normal.         Mood and Affect: Mood and affect normal.         Speech: Speech normal.         Behavior: Behavior normal. Behavior is cooperative.         Thought Content: Thought content normal.         Cognition and Memory: Cognition and memory normal.         Judgment: Judgment normal.         Neurologic Exam     Mental Status   Oriented to person, place, and time.   Follows 2 step commands.   Attention: normal. Concentration: normal.   Speech: speech is normal   Level of consciousness: alert  Knowledge: good.   Normal comprehension.     Cranial Nerves     CN XI   Right trapezius strength: normal  Left trapezius strength: normal    Motor Exam   Muscle bulk: normal  Overall muscle tone: normal  BUE - deltoid/triceps/biceps/IO 4/5, WF/WE 5/5,  4+/5  RLE - 5/5  LLE - HF 3/5, otherwise 4/5     Sensory Exam   Right arm light touch: normal  Left arm light touch: diminished throughout.  Right leg light touch: normal  Left leg light touch: diminished throughout.    Gait, Coordination, and Reflexes     Gait  Gait: normal    Tremor   Resting tremor: absent  Intention tremor: absent  Action tremor: absent    Reflexes   Right brachioradialis: 2+  Left brachioradialis: 2+  Right biceps: 2+  Left biceps: 2+  Right patellar:  2+  Left patellar: 2+  Right Khan: absent  Left Khan: absent  Right ankle clonus: absent  Left ankle clonus: absent        MEDICAL DECISION MAKING    Imaging Studies:     XR spine cervical 2 or 3 vw injury    Result Date: 2/5/2024  Narrative: CERVICAL SPINE INDICATION:   Fusion of spine, cervical region. Other spondylosis with myelopathy, cervical region. COMPARISON:  Comparison made with previous examination(s) dated (DX) 31-Jul-2023,(DX) 22-Jun-2023,(DX) 19-Jun-2023,(RF) 19-Jun-2023,(DX) 09-Jun-2023. VIEWS:  XR SPINE CERVICAL 2 OR 3 VW INJURY Images: 3 FINDINGS: The patient is status post posterior spinal fusion extending from C3-T1 and ACDF from C5-C7. The visible hardware appears intact. No acute fracture or subluxation. Anatomic alignment is maintained. The C3-C4, C4-C5, C5-C6, and C6-C7 intervertebral disc spaces are narrowed. Normal prevertebral soft tissues.  Clear lung apices.     Impression: Anterior and posterior spinal fusion hardware without evidence of complication. Electronically signed: 02/05/2024 06:26 PM Delmy Calloway MD      I have personally reviewed pertinent reports.   and I have personally reviewed pertinent films in PACS

## 2024-02-09 ENCOUNTER — OFFICE VISIT (OUTPATIENT)
Dept: PHYSICAL THERAPY | Facility: CLINIC | Age: 59
End: 2024-02-09
Payer: OTHER MISCELLANEOUS

## 2024-02-09 DIAGNOSIS — M43.22 FUSION OF SPINE OF CERVICAL REGION: Primary | ICD-10-CM

## 2024-02-09 DIAGNOSIS — M47.12 OTHER SPONDYLOSIS WITH MYELOPATHY, CERVICAL REGION: ICD-10-CM

## 2024-02-09 PROCEDURE — 97014 ELECTRIC STIMULATION THERAPY: CPT

## 2024-02-09 PROCEDURE — 97140 MANUAL THERAPY 1/> REGIONS: CPT

## 2024-02-09 PROCEDURE — 97112 NEUROMUSCULAR REEDUCATION: CPT

## 2024-02-09 PROCEDURE — 97110 THERAPEUTIC EXERCISES: CPT

## 2024-02-09 NOTE — PROGRESS NOTES
"Daily Note     Today's date: 2024  Patient name: Efra Garcia Jr.  : 1965  MRN: [id number]  Referring provider: Mimi Manuel PA-C  Dx: No diagnosis found.               Subjective: Stan reports having recent f/u with surgeon and wsa told it could take 18 months to feel less pain in c-spine. He ambulates into therapy with improvement in upright posture.       Objective: See treatment diary below      Assessment: Improvement in soft tissue quality following STM to sub occipitals, christopher UT, cerv paraspinals. Added nustep this visit to improve UE endurance with good tolerance. Pt denied any increases in pain with exercises performed. Pt would continue to benefit from skilled PT.       Plan: Continue with current POC to address pt deficits.      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 Fusion (23)       Manuals 24   STM to sub occipital to mid thoracic 23' 15' 20' 20' 25'   AROM c rotation w/ manual c2 block   10\" x12 bilat     Bilateral ulnar nerve mobilization                Neuro Re-Ed         LTP/ MTP Blue 3x10 ea  Blue 3x10 ea Blue 3x12 ea Blue 3x12 ea Held    Cervical retraction in available ROM 5\" x20 5\" x20 5\" x20 5\" x20 5\" x20   scap retract/ ER in doorframe Held  Red 3x10 Red 3x10 Red 3x10 Held    Standing hip abd and ext        BOSU mini lunges        Heel raises for strength and balance        Wall ball squats                                                Ther Ex        Seated thoracic extension AROM mobs w/ roll and board 5\" x20 2 lvls 5\" x20 2 lvls 5\" 20x 2 levels 5\" x20 2levels  5\" x20 2 levels    Gentle UT side bend 10\"x12 christopher  10\"x12  christopher  10\" x12 B/L 10\"x12 christopher  10\"x12 christopher    Cerv nod  10\"x12 10\"x12 10\"x12 10\"x12 10\"x12   Nustep   12' lvl4                       Pt Ed/ HEP   Objective measures 8'             Ther Activity                        Gait Training                        Modalities         IFC w/MHP 15' post tx  IFC w/MHP 15' post tx  IFC w/ MHP 15' post tx " IFC w/MHP 15' post tx  IFC w/MHP 15' post tx

## 2024-02-20 ENCOUNTER — TELEPHONE (OUTPATIENT)
Age: 59
End: 2024-02-20

## 2024-02-20 DIAGNOSIS — M79.18 MYOFASCIAL PAIN SYNDROME: Primary | ICD-10-CM

## 2024-02-20 NOTE — TELEPHONE ENCOUNTER
Caller: Efra    Doctor: dr wheeler    Reason for call: pt would chris to reschedule another procedure    Call back#: [phone]

## 2024-02-20 NOTE — TELEPHONE ENCOUNTER
S/W pt who states he is having continued neck and shoulder pain after surgery on his neck  States had TPI in early October that were not effective, but he wanted to try another round  States he saw a study from [place] on pt's that had new neck and shoulder pain after the same surgery that he had.  Pt states he did not have this kind of pain prior to the surgery.  Pain is concentrated at the base of his neck, between his shoulder blades which then radiates into his shoulders and up into neck.  Has tried everything to help pain  Ice, heat, PT, etc.    Please advise next step  Pt did not have suggested OV after prior TPI was not helpful  Please see pt Advice message dated 10/11

## 2024-02-27 ENCOUNTER — TELEPHONE (OUTPATIENT)
Age: 59
End: 2024-02-27

## 2024-02-28 ENCOUNTER — TELEPHONE (OUTPATIENT)
Dept: NEUROSURGERY | Facility: CLINIC | Age: 59
End: 2024-02-28

## 2024-02-28 NOTE — TELEPHONE ENCOUNTER
Patient phoned the nurse line and left a message stating he had some information he wanted to share with Dr. Purdy as well as some questions.  Patient phoned back and stated that there is paperwork for his work coming in the next day or so for us to fill out.  Patient also wanted us to know he was going for pain management as well as PT still.  Patient discussed the challenges of his recovery and was appreciative for the conversation.

## 2024-03-01 ENCOUNTER — PROCEDURE VISIT (OUTPATIENT)
Dept: PAIN MEDICINE | Facility: CLINIC | Age: 59
End: 2024-03-01
Payer: OTHER MISCELLANEOUS

## 2024-03-01 VITALS
BODY MASS INDEX: 38.3 KG/M2 | SYSTOLIC BLOOD PRESSURE: 132 MMHG | WEIGHT: 244 LBS | HEART RATE: 97 BPM | HEIGHT: 67 IN | DIASTOLIC BLOOD PRESSURE: 85 MMHG

## 2024-03-01 DIAGNOSIS — M79.18 MYOFASCIAL PAIN SYNDROME: ICD-10-CM

## 2024-03-01 PROCEDURE — 20553 NJX 1/MLT TRIGGER POINTS 3/>: CPT | Performed by: STUDENT IN AN ORGANIZED HEALTH CARE EDUCATION/TRAINING PROGRAM

## 2024-03-01 PROCEDURE — 76942 ECHO GUIDE FOR BIOPSY: CPT | Performed by: STUDENT IN AN ORGANIZED HEALTH CARE EDUCATION/TRAINING PROGRAM

## 2024-03-01 RX ORDER — METHYLPREDNISOLONE ACETATE 40 MG/ML
40 INJECTION, SUSPENSION INTRA-ARTICULAR; INTRALESIONAL; INTRAMUSCULAR; SOFT TISSUE ONCE
Status: COMPLETED | OUTPATIENT
Start: 2024-03-01 | End: 2024-03-01

## 2024-03-01 RX ORDER — BUPIVACAINE HYDROCHLORIDE 2.5 MG/ML
6 INJECTION, SOLUTION EPIDURAL; INFILTRATION; INTRACAUDAL ONCE
Status: COMPLETED | OUTPATIENT
Start: 2024-03-01 | End: 2024-03-01

## 2024-03-01 RX ADMIN — METHYLPREDNISOLONE ACETATE 40 MG: 40 INJECTION, SUSPENSION INTRA-ARTICULAR; INTRALESIONAL; INTRAMUSCULAR; SOFT TISSUE at 13:40

## 2024-03-01 RX ADMIN — BUPIVACAINE HYDROCHLORIDE 6 ML: 2.5 INJECTION, SOLUTION EPIDURAL; INFILTRATION; INTRACAUDAL at 13:39

## 2024-03-01 NOTE — PROGRESS NOTES
" Universal Protocol:  Consent: Verbal consent obtained. Written consent obtained.  Risks and benefits: risks, benefits and alternatives were discussed  Consent given by: patient  Time out: Immediately prior to procedure a \"time out\" was called to verify the correct patient, procedure, equipment, support staff and site/side marked as required.  Timeout called at: 3/1/2024 1:20 PM.  Patient understanding: patient states understanding of the procedure being performed  Patient consent: the patient's understanding of the procedure matches consent given  Procedure consent: procedure consent matches procedure scheduled  Relevant documents: relevant documents present and verified  Test results: test results available and properly labeled  Site marked: the operative site was marked  Radiology Images displayed and confirmed. If images not available, report reviewed: imaging studies available  Required items: required blood products, implants, devices, and special equipment available  Patient identity confirmed: verbally with patient  Supporting Documentation  Indications: pain   Procedure Details  Location(s):  Additional procedure details: PROCEDURE NOTE    PATIENT NAME:  Efra Garcia Jr.    MEDICAL RECORD NUMBER:  [id number]    YOB: 1965    DATE OF PROCEDURE:  03/01/24    PROCEDURE:  Trigger point injection x 7 with local anesthetic and steroid in the bilateral trapezius and rhomboid muscle groups under ultrasound guidance.   ATTENDING PHYSICIAN:  Napoleon Bales M.D.  PREPROCEDURE DIAGNOSIS:  Myofascial pain with identifiable trigger points.  POSTPROCEDURE DIAGNOSIS:  Myofascial pain with identifiable trigger points.  ANESTHESIA:  Local  ESTIMATED BLOOD LOSS:  Minimal  COMPLICATIONS: None  CONSENT:  Today's procedure, its potential benefits as well as its risks and potential side effects were reviewed.  Discussed risks of the procedure include bleeding, infection, nerve irritation or damage, reactions to " the medications, failure of the pain to improve and exacerbation of the pain were explained to the patient, who verbalized understanding and who wished to proceed.  Informed consent was signed.  DESCRIPTION OF THE PROCEDURE:  After informed consent was obtained, the patient was placed in the seated position. 7 trigger points were identified via palpation and marked with a surgical skin marker.  The skin was prepped with antiseptic in the usual sterile fashion.  Strict aseptic technique was utilized throughout the procedure.  Using ultrasound guidance a 25 gauge, 2inch needle was then advanced into each identified trigger point.  Care was taken to visualize the entirety of the needle throughout the injection. An injectate consisting of 6 ml of 0.25% marcaine with 1 mL of 40mg/mL depo-medrol was slowly injected in divided doses after negative aspiration.  The needle was removed with tip intact.  The patient tolerated the procedure and hemostasis was maintained.  There were no apparent paresthesias or complications.  The skin was wiped clean, and a band-aid was placed as appropriate.  The patient was monitored for an appropriate period of time following the procedure and remained hemodynamically stable and neurovascularly intact.  The patient was ultimately discharged to home with supervision in good condition and instructed to call the office to report the response.

## 2024-03-05 ENCOUNTER — APPOINTMENT (OUTPATIENT)
Dept: LAB | Facility: CLINIC | Age: 59
End: 2024-03-05
Payer: OTHER MISCELLANEOUS

## 2024-03-05 ENCOUNTER — OFFICE VISIT (OUTPATIENT)
Dept: FAMILY MEDICINE CLINIC | Facility: CLINIC | Age: 59
End: 2024-03-05
Payer: OTHER MISCELLANEOUS

## 2024-03-05 ENCOUNTER — APPOINTMENT (OUTPATIENT)
Dept: RADIOLOGY | Facility: CLINIC | Age: 59
End: 2024-03-05
Payer: OTHER MISCELLANEOUS

## 2024-03-05 VITALS
HEART RATE: 83 BPM | OXYGEN SATURATION: 98 % | SYSTOLIC BLOOD PRESSURE: 136 MMHG | BODY MASS INDEX: 37.76 KG/M2 | DIASTOLIC BLOOD PRESSURE: 84 MMHG | WEIGHT: 240.6 LBS | HEIGHT: 67 IN | TEMPERATURE: 97.8 F

## 2024-03-05 DIAGNOSIS — G89.4 CHRONIC PAIN SYNDROME: ICD-10-CM

## 2024-03-05 DIAGNOSIS — M79.10 MYALGIA: ICD-10-CM

## 2024-03-05 DIAGNOSIS — M54.12 CERVICAL RADICULOPATHY: ICD-10-CM

## 2024-03-05 DIAGNOSIS — M25.512 ACUTE PAIN OF BOTH SHOULDERS: ICD-10-CM

## 2024-03-05 DIAGNOSIS — M25.511 ACUTE PAIN OF BOTH SHOULDERS: ICD-10-CM

## 2024-03-05 DIAGNOSIS — M25.50 ARTHRALGIA, UNSPECIFIED JOINT: ICD-10-CM

## 2024-03-05 DIAGNOSIS — M96.1 CERVICAL POST-LAMINECTOMY SYNDROME: Primary | ICD-10-CM

## 2024-03-05 DIAGNOSIS — Z98.1 S/P CERVICAL SPINAL FUSION: ICD-10-CM

## 2024-03-05 LAB
ANA SER QL IA: NEGATIVE
CK SERPL-CCNC: 151 U/L (ref 39–308)
CRP SERPL QL: 3.2 MG/L

## 2024-03-05 PROCEDURE — 82550 ASSAY OF CK (CPK): CPT

## 2024-03-05 PROCEDURE — 36415 COLL VENOUS BLD VENIPUNCTURE: CPT

## 2024-03-05 PROCEDURE — 73030 X-RAY EXAM OF SHOULDER: CPT

## 2024-03-05 PROCEDURE — 86140 C-REACTIVE PROTEIN: CPT

## 2024-03-05 PROCEDURE — 86038 ANTINUCLEAR ANTIBODIES: CPT

## 2024-03-05 PROCEDURE — 86430 RHEUMATOID FACTOR TEST QUAL: CPT

## 2024-03-05 PROCEDURE — 99214 OFFICE O/P EST MOD 30 MIN: CPT | Performed by: FAMILY MEDICINE

## 2024-03-05 RX ORDER — DULOXETIN HYDROCHLORIDE 30 MG/1
30 CAPSULE, DELAYED RELEASE ORAL DAILY
Qty: 30 CAPSULE | Refills: 3 | Status: SHIPPED | OUTPATIENT
Start: 2024-03-05

## 2024-03-05 RX ORDER — IBUPROFEN 800 MG/1
800 TABLET ORAL 2 TIMES DAILY
Qty: 60 TABLET | Refills: 3 | Status: SHIPPED | OUTPATIENT
Start: 2024-03-05

## 2024-03-05 NOTE — PROGRESS NOTES
Name: Efra Garcia Jr.      : 1965      MRN: [id number]  Encounter Provider: Ulises Truong MD  Encounter Date: 3/5/2024   Encounter department: [facility]    Assessment & Plan     1. Cervical post-laminectomy syndrome  After discussing risks and benefits of medication along with side effects will start the following:   -     ibuprofen (MOTRIN) 800 mg tablet; Take 1 tablet (800 mg total) by mouth 2 (two) times a day  -     DULoxetine (CYMBALTA) 30 mg delayed release capsule; Take 1 capsule (30 mg total) by mouth daily    2. Arthralgia, unspecified joint  -     PHILIP Screen w/ Reflex to Titer/Pattern; Future  -     Rheumatoid Arthritis Factor; Future  -     C-reactive protein; Future  -     CK; Future  -     TSH, 3rd generation with Free T4 reflex; Future; Expected date: 2024    3. Myalgia  -     PHILIP Screen w/ Reflex to Titer/Pattern; Future  -     Rheumatoid Arthritis Factor; Future  -     C-reactive protein; Future  -     CK; Future  -     TSH, 3rd generation with Free T4 reflex; Future; Expected date: 2024    4. Cervical radiculopathy  -     ibuprofen (MOTRIN) 800 mg tablet; Take 1 tablet (800 mg total) by mouth 2 (two) times a day  -     DULoxetine (CYMBALTA) 30 mg delayed release capsule; Take 1 capsule (30 mg total) by mouth daily    5. Acute pain of both shoulders  -     Ambulatory Referral to Sports Medicine; Future  -     XR shoulder 2+ vw left; Future; Expected date: 2024  -     XR shoulder 2+ vw right; Future; Expected date: 2024    6. S/P cervical spinal fusion  7. Chronic pain syndrome  Continue pain medications  F/U with PT    Follow up in 1 month         Subjective     Patient is here for a F/U  This is a worker's compensation case.     Summary of injury: He sustained a work injury in  while working for [employer] when he fell backwards in the snow while carrying some equipment. He suffered neck pain for many years. He underwent C5-7 ACDF  in 2011 for neck pain, right arm weakness and numbness.  He did very well postoperatively with near complete reduction in symptoms.  He was doing very well until approximately 1 year ago.  He was working at a desk and started noticing worsening neck pain and difficulty typing.  He described a constant pain in his bilateral upper extremities in no specific distribution with associated numbness and tingling.  He could feel his hands very well.  He had difficulty with range of motion in his neck.  When he could move his neck, he felt electric shock sensations in his arms.  He was also having significant difficulty with ambulation and balance.   He had a C3-T1 PCDF by Dr Purdy, neurosurgeon on 06/19/2023.   Unfortunately he continues to have neck pain ad stiffness. Saw Dr. Bales pain management 10/04 and had neck injections done which did not improve his symptoms. Saw Dr Purdy Neurosurgery on 02/07 who recommended more time to heal as it is less than 1 year since surgery and per note it takes 1-1.5 years to heal. He recommended no further surgical interventions and continue PT.  He has been developing new B/L shoulder pain left greater than right. Has a Hx og right shoulder surgery.  Has daily pain sharp and dull at times. Also has limited ROM of shoulder B/l.  Had a positive RF done severe years ago.         Review of Systems   Constitutional:  Negative for activity change, appetite change, fatigue and fever.   HENT:  Negative for congestion and ear discharge.    Respiratory:  Negative for cough and shortness of breath.    Cardiovascular:  Negative for chest pain and palpitations.   Gastrointestinal:  Negative for diarrhea and nausea.   Musculoskeletal:  Positive for arthralgias, myalgias, neck pain and neck stiffness. Negative for back pain.   Skin:  Negative for color change and rash.   Neurological:  Negative for dizziness and headaches.   Psychiatric/Behavioral:  Negative for agitation and behavioral problems.   "      Past Medical History:   Diagnosis Date    Anxiety     Arthritis     Bilateral occipital neuralgia     Cervical post-laminectomy syndrome     Cervical radiculopathy     Cervical spine disease     Cervical spondylosis with myelopathy     Chronic lumbar pain     Chronic pain syndrome     Chronic thoracic spine pain     Cubital tunnel syndrome of both upper extremities 09/10/2022    Degenerative cervical spinal stenosis     Dental crowns present     Depression     Diabetes (HCC)     type 2    Exercise involving walking     daily 5-10 minutes    Failed neck syndrome     Fatty liver     Full dentures     upper    GERD (gastroesophageal reflux disease)     History of COVID-19 2021    per pt admitted to the [facility]--    Hyperlipidemia     Hypertension     Irritable bowel syndrome     Motion sickness     Muscle weakness     \"arms and legs\"    Myofascial muscle pain     Neck stiffness     plate implanted-limited ROM    Polyarthralgia     Tinnitus     Wears glasses      Past Surgical History:   Procedure Laterality Date    CERVICAL DISC SURGERY      CERVICAL FUSION  2011    ACDF with Dr. Purdy    COLONOSCOPY      FL ARTHRD PST/PSTLAT TQ 1NTRSPC CRV BELW C2 SEGMENT Bilateral 6/19/2023    Procedure: C3-T1 posterior decompression with instrumented fixation fusion (impulse monitoring);  Surgeon: Kelton Purdy MD;  Location: [facility];  Service: Neurosurgery    TOTAL SHOULDER REPLACEMENT Right     Reversal     Family History   Problem Relation Age of Onset    Stroke Mother     Lung cancer Father      Social History     Socioeconomic History    Marital status: /Civil Union     Spouse name: None    Number of children: None    Years of education: None    Highest education level: None   Occupational History    None   Tobacco Use    Smoking status: Never    Smokeless tobacco: Never   Vaping Use    Vaping status: Never Used   Substance and Sexual Activity    Alcohol use: Yes     Alcohol/week: 1.0 standard drink of " alcohol     Types: 1 Cans of beer per week     Comment: occasional, weekly    Drug use: No    Sexual activity: None     Comment: defer   Other Topics Concern    None   Social History Narrative    None     Social Determinants of Health     Financial Resource Strain: Not on file   Food Insecurity: No Food Insecurity (6/21/2023)    Hunger Vital Sign     Worried About Running Out of Food in the Last Year: Never true     Ran Out of Food in the Last Year: Never true   Transportation Needs: No Transportation Needs (6/21/2023)    PRAPARE - Transportation     Lack of Transportation (Medical): No     Lack of Transportation (Non-Medical): No   Physical Activity: Not on file   Stress: Not on file   Social Connections: Not on file   Intimate Partner Violence: Not on file   Housing Stability: Low Risk  (6/21/2023)    Housing Stability Vital Sign     Unable to Pay for Housing in the Last Year: No     Number of Places Lived in the Last Year: 1     Unstable Housing in the Last Year: No     Current Outpatient Medications on File Prior to Visit   Medication Sig    acetaminophen (TYLENOL) 325 mg tablet Take 3 tablets (975 mg total) by mouth every 8 (eight) hours    ASCORBIC ACID PO     benzonatate (TESSALON PERLES) 100 mg capsule Take 100 mg by mouth Three times daily as needed    Cholecalciferol 25 MCG (1000 UT) capsule TAKE ONE TABLET BY MOUTH EVERY DAY (FOR LOW VITAMIN D)    Cyanocobalamin (B-12 PO) Take by mouth    cyclobenzaprine (FLEXERIL) 5 mg tablet Take 1 tablet (5 mg total) by mouth 3 (three) times a day as needed for muscle spasms    dextrose 1 g CHEW daily as needed for low blood sugar    Empagliflozin 25 MG TABS Take 25 mg by mouth every morning ---- aka  Jardiance    famotidine (PEPCID) 20 mg tablet Take 20 mg by mouth daily at bedtime    gabapentin (NEURONTIN) 800 mg tablet TAKE 1 TABLET (800 MG TOTAL) BY MOUTH 3 (THREE) TIMES A DAY    lisinopril (ZESTRIL) 10 mg tablet Take 10 mg by mouth daily    metFORMIN (GLUMETZA)  "1000 MG (MOD) 24 hr tablet Take 1,000 mg by mouth 2 (two) times a day with meals    pantoprazole (PROTONIX) 20 mg tablet Take 20 mg by mouth daily    TRAZODONE HCL PO Take 75 mg by mouth daily at bedtime      bisacodyl (DULCOLAX) 10 mg suppository Insert 1 suppository (10 mg total) into the rectum daily as needed for constipation for up to 3 days    polyethylene glycol (MIRALAX) 17 g packet Take 17 g by mouth daily as needed (PRN per day) (Patient not taking: Reported on 7/6/2023)     Allergies   Allergen Reactions    Metoprolol Other (See Comments)     Too low of a heart rate    Penicillin V Shortness Of Breath    Penicillins Shortness Of Breath and Rash     rash     Immunization History   Administered Date(s) Administered    COVID-19 MODERNA VACC 0.5 ML IM 01/04/2021, 02/02/2021, 11/26/2021    COVID-19 Moderna Vac BIVALENT 12 Yr+ IM 0.5 ML 09/23/2022    COVID-19 Moderna mRNA Vaccine 12 Yr+ 50 mcg/0.5 mL (Spikevax) 11/01/2023    INFLUENZA 09/28/2009, 01/20/2011, 11/01/2019, 10/19/2020, 12/01/2021, 09/22/2023    Pneumococcal Polysaccharide PPV23 05/30/2019    Tdap 07/31/2015       Objective     /84 (BP Location: Left arm, Patient Position: Sitting)   Pulse 83   Temp 97.8 °F (36.6 °C) (Temporal)   Ht 5' 7\" (1.702 m)   Wt 109 kg (240 lb 9.6 oz)   SpO2 98%   BMI 37.68 kg/m²     Physical Exam  Constitutional:       General: He is not in acute distress.     Appearance: He is well-developed. He is not diaphoretic.   Eyes:      General: No scleral icterus.     Pupils: Pupils are equal, round, and reactive to light.   Cardiovascular:      Rate and Rhythm: Normal rate and regular rhythm.      Heart sounds: Normal heart sounds. No murmur heard.  Pulmonary:      Effort: Pulmonary effort is normal. No respiratory distress.      Breath sounds: Normal breath sounds. No wheezing.   Abdominal:      General: Bowel sounds are normal. There is no distension.      Palpations: Abdomen is soft.      Tenderness: There is no " abdominal tenderness.   Musculoskeletal:         General: Tenderness present.      Comments: Tenderness of lateral rotation of cervical spine  Limited ROM of left arm unable to lift his right arm above his head.   Skin:     General: Skin is warm and dry.      Findings: No rash.   Neurological:      Mental Status: He is alert and oriented to person, place, and time.       Ulises Truong MD

## 2024-03-06 ENCOUNTER — OFFICE VISIT (OUTPATIENT)
Dept: PHYSICAL THERAPY | Facility: CLINIC | Age: 59
End: 2024-03-06
Payer: OTHER MISCELLANEOUS

## 2024-03-06 DIAGNOSIS — M47.12 OTHER SPONDYLOSIS WITH MYELOPATHY, CERVICAL REGION: ICD-10-CM

## 2024-03-06 DIAGNOSIS — M43.22 FUSION OF SPINE OF CERVICAL REGION: Primary | ICD-10-CM

## 2024-03-06 LAB — RHEUMATOID FACT SER QL LA: NEGATIVE

## 2024-03-06 PROCEDURE — 97140 MANUAL THERAPY 1/> REGIONS: CPT | Performed by: PHYSICAL THERAPIST

## 2024-03-06 PROCEDURE — 97112 NEUROMUSCULAR REEDUCATION: CPT | Performed by: PHYSICAL THERAPIST

## 2024-03-06 PROCEDURE — 97164 PT RE-EVAL EST PLAN CARE: CPT | Performed by: PHYSICAL THERAPIST

## 2024-03-06 PROCEDURE — 97110 THERAPEUTIC EXERCISES: CPT | Performed by: PHYSICAL THERAPIST

## 2024-03-06 PROCEDURE — 97014 ELECTRIC STIMULATION THERAPY: CPT | Performed by: PHYSICAL THERAPIST

## 2024-03-06 NOTE — LETTER
2024    Mimi Manuel PA-C    Patient: Efra Garcia Jr.   YOB: 1965   Date of Visit: 3/6/2024     Encounter Diagnosis     ICD-10-CM    1. 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 Other spondylosis with myelopathy, cervical region  M47.12           Dear Dr. Manuel:    Thank you for your recent referral of Efra Garcia Jr.. Please review the attached evaluation summary from Efra's recent visit.     Please verify that you agree with the plan of care by signing the attached order.     If you have any questions or concerns, please do not hesitate to call.     I sincerely appreciate the opportunity to share in the care of one of your patients and hope to have another opportunity to work with you in the near future.       Sincerely,    Jasno Kumar, PT      Referring Provider:      I certify that I have read the below Plan of Care and certify the need for these services furnished under this plan of treatment while under my care.                    Mimi Manuel PA-C  [address] [place] [id number]  Via In Basket          PT Re-Evaluation        Today's date: 3/6/2024  Patient name: Efra Garcia Jr.  : 1965  MRN: [id number]  Referring provider: Mimi Manuel PA-C  Dx:   Encounter Diagnosis     ICD-10-CM    1. 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 Other spondylosis with myelopathy, cervical region  M47.12                      Assessment  Assessment details: Pt presents with significant soft tissue and mobility restrictions in his neck and upper body.  He has continued ulnar nerve symptoms bilaterally, as well as some weakness in left leg which is leading to minor balance issues.    23:  Pt has shown some improvements in mobility and some increase in strength but has a long way to go for return to reasonable function.    24:  Pt still in a great deal of pain with neck and shoulders.  Still needs to further progress  strength with care to no fully exacerbate pain symptoms.    3-6-24:  Pt presents with similar signs and symptoms as previous assessment.  Left shoulder and upper postural muscles are deconditioned.  Impairments: abnormal or restricted ROM, activity intolerance, impaired balance, impaired physical strength, lacks appropriate home exercise program, pain with function and poor posture     Symptom irritability: moderateUnderstanding of Dx/Px/POC: good   Prognosis: fair    Goals  ST) Pt. Will be able to sleep through the night without being awoken with pain and with minimal to no pain upon waking in 6 weeks. -SOME IMPROVEMENT  2) Pt. Will have elimination of headaches in 6 weeks. -SOME IMPROVEMENT   3)  Pt.'s radicular symptoms will be centralized to neck in 6 weeks. -INCONSISTENT IMPROVEMENT  LT) Pt. Will be able to complete all chores at home without pain or limitations in 12 weeks. -LITTLE IMPROVEMENT  2)  Pt. Will be able to to sleep a full night without limitations -SOME IMPROVEMENT    Plan  Patient would benefit from: PT eval and skilled physical therapy  Planned modality interventions: thermotherapy: hydrocollator packs, cryotherapy and unattended electrical stimulation  Planned therapy interventions: manual therapy, neuromuscular re-education, patient education, self care, therapeutic activities, therapeutic exercise and home exercise program  Frequency: 2x week  Duration in weeks: 8  Treatment plan discussed with: patient        Subjective Evaluation    History of Present Illness  Date of onset: 2005  Date of surgery: 2023  Mechanism of injury: surgery  Mechanism of injury: c3-t1 decompression/ fusion after years of being denied surgical requests.  Pt reports headaches, stiffness and weakness/ strain in neck.  He still has same amount of (ulnar) nerve symptoms as prior to surgery.  He also reports left leg weakness and stability.    23:  Pt reports being a little looser and able to turn  his head better but still in a fair amount of discomfort.    24: Pt reports almost going to the ER yesterday due to pain and spasms in c-spine specifically christopher UT, cerv paraspinals, suboccipitals. He reports numbness in L LE. He reports pain in L shoulder and reports not being able to sleep comfortable during the night time due to pain/discomfort.     3-6-24:  Pt still reporting similar neck issues but now bilateral shoulder pain and weakness has worsened.          Recurrent probem    Quality of life: fair    Patient Goals  Patient goal: Gain maximum movement from his neck.  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      Objective     Postural Observations  Seated posture: poor  Standing posture: poor    Additional Postural Observation Details  Moderate forward head and rounded shoulders    Palpation   Left   No palpable tenderness to the biceps.   Hypertonic in the cervical paraspinals, levator scapulae, middle trapezius, pectoralis minor, rhomboids, scalenes, sternocleidomastoid, suboccipitals and upper trapezius.   Tenderness of the biceps, cervical paraspinals, levator scapulae, middle trapezius, pectoralis minor, rhomboids, scalenes, sternocleidomastoid, suboccipitals and upper trapezius.   Trigger point to levator scapulae, pectoralis minor, suboccipitals and upper trapezius.     Right   Hypertonic in the cervical paraspinals, levator scapulae, middle trapezius, pectoralis minor, rhomboids, scalenes, sternocleidomastoid, suboccipitals and upper trapezius.   Tenderness of the cervical paraspinals, levator scapulae, middle trapezius, pectoralis minor, rhomboids, scalenes, sternocleidomastoid, suboccipitals and upper trapezius.   Trigger point to levator scapulae, pectoralis minor,  "suboccipitals and upper trapezius.     Tenderness     Left Shoulder   Tenderness in the biceps tendon (proximal), bicipital groove and coracoid process.     Additional Tenderness Details  TTP in left anterior GH joint line.    Active Range of Motion   Cervical/Thoracic Spine       Cervical  Subcranial protraction:  WFL   Subcranial retraction:  with pain   Restriction level: maximal  Flexion:  Restriction level: moderate  Extension:  with pain Restriction level: maximal  Left lateral flexion:  Restriction level: maximal  Right lateral flexion:  Restriction level maximal  Left rotation:  with pain Restriction level: moderate  Right rotation:  with pain Restriction level: moderate    Strength/Myotome Testing   Cervical Spine     Left   Neck lateral flexion (C3): 3+    Right   Neck lateral flexion (C3): 3+    Left Shoulder     Planes of Motion   Abduction: 4-     Right Shoulder     Planes of Motion   Abduction: 4-     Left Elbow   Flexion: 3+  Extension: 4    Right Elbow   Flexion: 4  Extension: 4    Left Wrist/Hand   Wrist extension: 3+  Wrist flexion: 3+  Thumb extension: 3    Right Wrist/Hand   Wrist extension: 3+  Wrist flexion: 3  Thumb extension: 3          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 Fusion (6/19/23)       Manuals 1-31-24 2-9-24 3-6-24 1-26-24 1-29-24   STM to sub occipital to mid thoracic 23' 15' 20' 20' 25'   AROM c rotation w/ manual c2 block   10\" x12 bilat     Bilateral ulnar nerve mobilization                Neuro Re-Ed         LTP/ MTP Blue 3x10 ea  Blue 3x10 ea Blue 3x12 ea Blue 3x12 ea Held    Cervical retraction in available ROM 5\" x20 5\" x20 5\" x20 5\" x20 5\" x20   scap retract/ ER in doorframe Held  Red 3x10 Red 3x10 Red 3x10 Held    Standing hip abd and ext        BOSU mini lunges        Heel raises for strength and balance        Wall ball squats                                                Ther Ex        Seated thoracic extension AROM mobs w/ roll and board 5\" x20 2 lvls 5\" x20 2 lvls 5\" 20x 2 levels 5\" " "x20 2levels  5\" x20 2 levels    Gentle UT side bend 10\"x12 christopher  10\"x12  christopher  10\" x12 B/L 10\"x12 christopher  10\"x12 christopher    Cerv nod  10\"x12 10\"x12 10\"x12 10\"x12 10\"x12   Nustep   12' lvl4                       Pt Ed/ HEP   Objective measures 8'             Ther Activity                        Gait Training                        Modalities         IFC w/MHP 15' post tx  IFC w/MHP 15' post tx  IFC w/ MHP 15' post tx IFC w/MHP 15' post tx  IFC w/MHP 15' post tx                                                 "

## 2024-03-06 NOTE — PROGRESS NOTES
PT Re-Evaluation        Today's date: 3/6/2024  Patient name: Efra Garcia Jr.  : 1965  MRN: [id number]  Referring provider: Mimi Manuel PA-C  Dx:   Encounter Diagnosis     ICD-10-CM    1. 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 Other spondylosis with myelopathy, cervical region  M47.12                      Assessment  Assessment details: Pt presents with significant soft tissue and mobility restrictions in his neck and upper body.  He has continued ulnar nerve symptoms bilaterally, as well as some weakness in left leg which is leading to minor balance issues.    23:  Pt has shown some improvements in mobility and some increase in strength but has a long way to go for return to reasonable function.    24:  Pt still in a great deal of pain with neck and shoulders.  Still needs to further progress strength with care to no fully exacerbate pain symptoms.    3-6-24:  Pt presents with similar signs and symptoms as previous assessment.  Left shoulder and upper postural muscles are deconditioned.  Impairments: abnormal or restricted ROM, activity intolerance, impaired balance, impaired physical strength, lacks appropriate home exercise program, pain with function and poor posture     Symptom irritability: moderateUnderstanding of Dx/Px/POC: good   Prognosis: fair    Goals  ST) Pt. Will be able to sleep through the night without being awoken with pain and with minimal to no pain upon waking in 6 weeks. -SOME IMPROVEMENT  2) Pt. Will have elimination of headaches in 6 weeks. -SOME IMPROVEMENT   3)  Pt.'s radicular symptoms will be centralized to neck in 6 weeks. -INCONSISTENT IMPROVEMENT  LT) Pt. Will be able to complete all chores at home without pain or limitations in 12 weeks. -LITTLE IMPROVEMENT  2)  Pt. Will be able to to sleep a full night without limitations -SOME IMPROVEMENT    Plan  Patient would benefit from: PT eval and skilled physical therapy  Planned modality  interventions: thermotherapy: hydrocollator packs, cryotherapy and unattended electrical stimulation  Planned therapy interventions: manual therapy, neuromuscular re-education, patient education, self care, therapeutic activities, therapeutic exercise and home exercise program  Frequency: 2x week  Duration in weeks: 8  Treatment plan discussed with: patient        Subjective Evaluation    History of Present Illness  Date of onset: 2005  Date of surgery: 2023  Mechanism of injury: surgery  Mechanism of injury: c3-t1 decompression/ fusion after years of being denied surgical requests.  Pt reports headaches, stiffness and weakness/ strain in neck.  He still has same amount of (ulnar) nerve symptoms as prior to surgery.  He also reports left leg weakness and stability.    23:  Pt reports being a little looser and able to turn his head better but still in a fair amount of discomfort.    24: Pt reports almost going to the ER yesterday due to pain and spasms in c-spine specifically christopher UT, cerv paraspinals, suboccipitals. He reports numbness in L LE. He reports pain in L shoulder and reports not being able to sleep comfortable during the night time due to pain/discomfort.     3-6-24:  Pt still reporting similar neck issues but now bilateral shoulder pain and weakness has worsened.          Recurrent probem    Quality of life: fair    Patient Goals  Patient goal: Gain maximum movement from his neck.  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      Objective     Postural Observations  Seated posture: poor  Standing posture: poor    Additional Postural Observation Details  Moderate forward head and rounded shoulders    Palpation   Left   No palpable tenderness to the biceps.    Hypertonic in the cervical paraspinals, levator scapulae, middle trapezius, pectoralis minor, rhomboids, scalenes, sternocleidomastoid, suboccipitals and upper trapezius.   Tenderness of the biceps, cervical paraspinals, levator scapulae, middle trapezius, pectoralis minor, rhomboids, scalenes, sternocleidomastoid, suboccipitals and upper trapezius.   Trigger point to levator scapulae, pectoralis minor, suboccipitals and upper trapezius.     Right   Hypertonic in the cervical paraspinals, levator scapulae, middle trapezius, pectoralis minor, rhomboids, scalenes, sternocleidomastoid, suboccipitals and upper trapezius.   Tenderness of the cervical paraspinals, levator scapulae, middle trapezius, pectoralis minor, rhomboids, scalenes, sternocleidomastoid, suboccipitals and upper trapezius.   Trigger point to levator scapulae, pectoralis minor, suboccipitals and upper trapezius.     Tenderness     Left Shoulder   Tenderness in the biceps tendon (proximal), bicipital groove and coracoid process.     Additional Tenderness Details  TTP in left anterior GH joint line.    Active Range of Motion   Cervical/Thoracic Spine       Cervical  Subcranial protraction:  WFL   Subcranial retraction:  with pain   Restriction level: maximal  Flexion:  Restriction level: moderate  Extension:  with pain Restriction level: maximal  Left lateral flexion:  Restriction level: maximal  Right lateral flexion:  Restriction level maximal  Left rotation:  with pain Restriction level: moderate  Right rotation:  with pain Restriction level: moderate    Strength/Myotome Testing   Cervical Spine     Left   Neck lateral flexion (C3): 3+    Right   Neck lateral flexion (C3): 3+    Left Shoulder     Planes of Motion   Abduction: 4-     Right Shoulder     Planes of Motion   Abduction: 4-     Left Elbow   Flexion: 3+  Extension: 4    Right Elbow   Flexion: 4  Extension: 4    Left Wrist/Hand   Wrist extension: 3+  Wrist flexion: 3+  Thumb extension:  "3    Right Wrist/Hand   Wrist extension: 3+  Wrist flexion: 3  Thumb extension: 3          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 Fusion (6/19/23)       Manuals 1-31-24 2-9-24 3-6-24 1-26-24 1-29-24   STM to sub occipital to mid thoracic 23' 15' 20' 20' 25'   AROM c rotation w/ manual c2 block   10\" x12 bilat     Bilateral ulnar nerve mobilization                Neuro Re-Ed         LTP/ MTP Blue 3x10 ea  Blue 3x10 ea Blue 3x12 ea Blue 3x12 ea Held    Cervical retraction in available ROM 5\" x20 5\" x20 5\" x20 5\" x20 5\" x20   scap retract/ ER in doorframe Held  Red 3x10 Red 3x10 Red 3x10 Held    Standing hip abd and ext        BOSU mini lunges        Heel raises for strength and balance        Wall ball squats                                                Ther Ex        Seated thoracic extension AROM mobs w/ roll and board 5\" x20 2 lvls 5\" x20 2 lvls 5\" 20x 2 levels 5\" x20 2levels  5\" x20 2 levels    Gentle UT side bend 10\"x12 christopher  10\"x12  christopher  10\" x12 B/L 10\"x12 christopher  10\"x12 christopher    Cerv nod  10\"x12 10\"x12 10\"x12 10\"x12 10\"x12   Nustep   12' lvl4                       Pt Ed/ HEP   Objective measures 8'             Ther Activity                        Gait Training                        Modalities         IFC w/MHP 15' post tx  IFC w/MHP 15' post tx  IFC w/ MHP 15' post tx IFC w/MHP 15' post tx  IFC w/MHP 15' post tx                                 "

## 2024-03-08 ENCOUNTER — OFFICE VISIT (OUTPATIENT)
Dept: PHYSICAL THERAPY | Facility: CLINIC | Age: 59
End: 2024-03-08
Payer: OTHER MISCELLANEOUS

## 2024-03-08 DIAGNOSIS — M47.12 OTHER SPONDYLOSIS WITH MYELOPATHY, CERVICAL REGION: ICD-10-CM

## 2024-03-08 DIAGNOSIS — M43.22 FUSION OF SPINE OF CERVICAL REGION: Primary | ICD-10-CM

## 2024-03-08 PROCEDURE — 97014 ELECTRIC STIMULATION THERAPY: CPT

## 2024-03-08 PROCEDURE — 97110 THERAPEUTIC EXERCISES: CPT

## 2024-03-08 PROCEDURE — 97010 HOT OR COLD PACKS THERAPY: CPT

## 2024-03-08 PROCEDURE — 97140 MANUAL THERAPY 1/> REGIONS: CPT

## 2024-03-08 NOTE — PROGRESS NOTES
"Daily Note     Today's date: 3/8/2024  Patient name: Efra Garcia Jr.  : 1965  MRN: [id number]  Referring provider: Mimi Manuel PA-C  Dx:   Encounter Diagnosis     ICD-10-CM    1. 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 Other spondylosis with myelopathy, cervical region  M47.12                      Subjective: Stan reports christopher shoulders are painful. He reports performing cervical ROM at home and feels less edema present.       Objective: See treatment diary below      Assessment: Improvement in soft tissue quality following STM to christopher UT, scap retractors, cerv paraspinals following STM. Pt would continue to benefit from skilled PT> Pt deferred UT stretch due to pain. Visual and VC to ensure correct exercise technique with postural exercises. Progress as able.       Plan: Continue with current POC to address pt deficits.         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 Fusion (23)       Manuals 1-31-24 2-9-24 3-6-24 3-8-24 1-29-24   STM to sub occipital to mid thoracic 23' 15' 20' 25' 25'   AROM c rotation w/ manual c2 block   10\" x12 bilat     Bilateral ulnar nerve mobilization                Neuro Re-Ed         LTP/ MTP Blue 3x10 ea  Blue 3x10 ea Blue 3x12 ea Deferred  Held    Cervical retraction in available ROM 5\" x20 5\" x20 5\" x20 5\" x20 5\" x20   scap retract/ ER in doorframe Held  Red 3x10 Red 3x10  Held    Standing hip abd and ext        BOSU mini lunges        Heel raises for strength and balance        Wall ball squats                                                Ther Ex        Seated thoracic extension AROM mobs w/ roll and board 5\" x20 2 lvls 5\" x20 2 lvls 5\" 20x 2 levels 5\" x20 2 levels  5\" x20 2 levels    Gentle UT side bend 10\"x12 christopher  10\"x12  christopher  10\" x12 B/L Deferred  10\"x12 christopher    Cerv nod  10\"x12 10\"x12 10\"x12 10\"x12 10\"x12   Nustep   12' lvl4       Scap retraction    5\" x20            Pt Ed/ HEP   Objective measures 8'             Ther Activity                        Gait Training      "                   Modalities         IFC w/MHP 15' post tx  IFC w/MHP 15' post tx  IFC w/ MHP 15' post tx IFC w/ MHP 15' post txsc IFC w/MHP 15' post tx

## 2024-03-11 ENCOUNTER — OFFICE VISIT (OUTPATIENT)
Dept: PHYSICAL THERAPY | Facility: CLINIC | Age: 59
End: 2024-03-11
Payer: OTHER MISCELLANEOUS

## 2024-03-11 DIAGNOSIS — M43.22 FUSION OF SPINE OF CERVICAL REGION: Primary | ICD-10-CM

## 2024-03-11 DIAGNOSIS — M47.12 OTHER SPONDYLOSIS WITH MYELOPATHY, CERVICAL REGION: ICD-10-CM

## 2024-03-11 PROCEDURE — 97110 THERAPEUTIC EXERCISES: CPT | Performed by: PHYSICAL THERAPIST

## 2024-03-11 PROCEDURE — 97014 ELECTRIC STIMULATION THERAPY: CPT | Performed by: PHYSICAL THERAPIST

## 2024-03-11 NOTE — PROGRESS NOTES
"Daily Note     Today's date: 3/11/2024  Patient name: Efra Garcia Jr.  : 1965  MRN: [id number]  Referring provider: Mimi Manuel PA-C  Dx:   Encounter Diagnosis     ICD-10-CM    1. 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 Other spondylosis with myelopathy, cervical region  M47.12                      Subjective: Pt reports being very stiff and having a good amount of shoulder pain      Objective: See treatment diary below      Assessment: Tolerated treatment well. Patient would benefit from continued PT  Progression attempts should be made      Plan: Continue per plan of care.  Progress treatment as tolerated.       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 Fusion (23)       Manuals 1-31-24 2-9-24 3-6-24 3-8-24 3-11-24   STM to sub occipital to mid thoracic 23' 15' 20' 25' 25'   AROM c rotation w/ manual c2 block   10\" x12 bilat     Bilateral ulnar nerve mobilization                Neuro Re-Ed         LTP/ MTP Blue 3x10 ea  Blue 3x10 ea Blue 3x12 ea Deferred  Held    Cervical retraction in available ROM 5\" x20 5\" x20 5\" x20 5\" x20 5\" x20   scap retract/ ER in doorframe Held  Red 3x10 Red 3x10  Held    Standing hip abd and ext        BOSU mini lunges        Heel raises for strength and balance        Wall ball squats                                                Ther Ex        Seated thoracic extension AROM mobs w/ roll and board 5\" x20 2 lvls 5\" x20 2 lvls 5\" 20x 2 levels 5\" x20 2 levels  5\" x20 2 levels    Gentle UT side bend 10\"x12 christopher  10\"x12  christopher  10\" x12 B/L Deferred  10\"x12 christopher    Cerv nod  10\"x12 10\"x12 10\"x12 10\"x12 10\"x12   Nustep   12' lvl4       Scap retraction    5\" x20            Pt Ed/ HEP   Objective measures 8'             Ther Activity                        Gait Training                        Modalities         IFC w/MHP 15' post tx  IFC w/MHP 15' post tx  IFC w/ MHP 15' post tx IFC w/ MHP 15' post txsc IFC w/MHP 15' post tx                          "

## 2024-03-15 ENCOUNTER — OFFICE VISIT (OUTPATIENT)
Dept: PHYSICAL THERAPY | Facility: CLINIC | Age: 59
End: 2024-03-15
Payer: OTHER MISCELLANEOUS

## 2024-03-15 DIAGNOSIS — M43.22 FUSION OF SPINE OF CERVICAL REGION: Primary | ICD-10-CM

## 2024-03-15 DIAGNOSIS — M47.12 OTHER SPONDYLOSIS WITH MYELOPATHY, CERVICAL REGION: ICD-10-CM

## 2024-03-15 PROCEDURE — 97014 ELECTRIC STIMULATION THERAPY: CPT | Performed by: PHYSICAL THERAPIST

## 2024-03-15 PROCEDURE — 97140 MANUAL THERAPY 1/> REGIONS: CPT | Performed by: PHYSICAL THERAPIST

## 2024-03-15 PROCEDURE — 97110 THERAPEUTIC EXERCISES: CPT | Performed by: PHYSICAL THERAPIST

## 2024-03-15 PROCEDURE — 97112 NEUROMUSCULAR REEDUCATION: CPT | Performed by: PHYSICAL THERAPIST

## 2024-03-15 NOTE — PROGRESS NOTES
"Daily Note     Today's date: 3/15/2024  Patient name: Efra Garcia Jr.  : 1965  MRN: [id number]  Referring provider: Mimi Manuel PA-C  Dx:   Encounter Diagnosis     ICD-10-CM    1. 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 Other spondylosis with myelopathy, cervical region  M47.12                      Subjective: no new complaints      Objective: See treatment diary below      Assessment: Tolerated treatment well. Patient would benefit from continued PT      Plan: Progress treatment as tolerated.       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 Fusion (23)       Manuals 3-15-24-24 -9-24 3-6-24 3-8-24 3-11-24   STM to sub occipital to mid thoracic 15' 15' 20' 25' 25'   AROM c rotation w/ manual c2 block   10\" x12 bilat     Bilateral ulnar nerve mobilization                Neuro Re-Ed         LTP/ MTP Blue 3x10 ea  Blue 3x10 ea Blue 3x12 ea Deferred  Held    Cervical retraction in available ROM 5\" x20 5\" x20 5\" x20 5\" x20 5\" x20   scap retract/ ER in doorframe Red 3x10 Red 3x10 Red 3x10  Held    Standing hip abd and ext        BOSU mini lunges        Heel raises for strength and balance        Wall ball squats                                                Ther Ex        Seated thoracic extension AROM mobs w/ roll and board 5\" x20 2 lvls 5\" x20 2 lvls 5\" 20x 2 levels 5\" x20 2 levels  5\" x20 2 levels    Gentle UT side bend 10\"x12 christopher  10\"x12  christopher  10\" x12 B/L Deferred  10\"x12 christopher    Cerv nod  10\"x12 10\"x12 10\"x12 10\"x12 10\"x12   Nustep   12' lvl4       Scap retraction 5\" x20   5\" x20            Pt Ed/ HEP   Objective measures 8'             Ther Activity                        Gait Training                        Modalities         IFC w/MHP 15' post tx  IFC w/MHP 15' post tx  IFC w/ MHP 15' post tx IFC w/ MHP 15' post txsc IFC w/MHP 15' post tx                            "

## 2024-03-18 ENCOUNTER — OFFICE VISIT (OUTPATIENT)
Dept: PHYSICAL THERAPY | Facility: CLINIC | Age: 59
End: 2024-03-18
Payer: OTHER MISCELLANEOUS

## 2024-03-18 DIAGNOSIS — M47.12 OTHER SPONDYLOSIS WITH MYELOPATHY, CERVICAL REGION: ICD-10-CM

## 2024-03-18 DIAGNOSIS — M43.22 FUSION OF SPINE OF CERVICAL REGION: Primary | ICD-10-CM

## 2024-03-18 PROCEDURE — 97112 NEUROMUSCULAR REEDUCATION: CPT

## 2024-03-18 PROCEDURE — 97014 ELECTRIC STIMULATION THERAPY: CPT

## 2024-03-18 PROCEDURE — 97140 MANUAL THERAPY 1/> REGIONS: CPT

## 2024-03-18 NOTE — PROGRESS NOTES
"Daily Note     Today's date: 3/18/2024  Patient name: Efra Garcia Jr.  : 1965  MRN: [id number]  Referring provider: Mimi Manuel PA-C  Dx:   Encounter Diagnosis     ICD-10-CM    1. 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 Other spondylosis with myelopathy, cervical region  M47.12           Start Time: 1115  Stop Time: 1255  Total time in clinic (min): 100 minutes    Subjective: Stan reports feeling less pain in neck today.       Objective: See treatment diary below      Assessment: Improvement in soft tissue quality to christopher UT, cerv paraspinals, suboccipitals, and scap retractors. Visual and VC to ensure correct exercise technique. Denied any increases in pain with exercises performed. Progress as able.       Plan: Continue with current POC to address pt deficits.      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 Fusion (23)       Manuals 3-15-24-24 3-18-24 3-24 3-8-24 324   STM to sub occipital to mid thoracic 15' 15' 20' 25' 25'   AROM c rotation w/ manual c2 block   10\" x12 bilat     Bilateral ulnar nerve mobilization                Neuro Re-Ed         LTP/ MTP Blue 3x10 ea  Blue 3x10 ea Blue 3x12 ea Deferred  Held    Cervical retraction in available ROM 5\" x20 5\" x20 5\" x20 5\" x20 5\" x20   scap retract/ ER in doorframe Red 3x10 Red 3x10 Red 3x10  Held    Standing hip abd and ext        BOSU mini lunges        Heel raises for strength and balance        Wall ball squats                                                Ther Ex        Seated thoracic extension AROM mobs w/ roll and board 5\" x20 2 lvls 5\" x20 2 lvls 5\" 20x 2 levels 5\" x20 2 levels  5\" x20 2 levels    Gentle UT side bend 10\"x12 christopher  Resume NV  10\" x12 B/L Deferred  10\"x12 christopher    Cerv nod  10\"x12 10\"x12 10\"x12 10\"x12 10\"x12   Nustep         Scap retraction 5\" x20 5\" x20  5\" x20            Pt Ed/ HEP   Objective measures 8'             Ther Activity                        Gait Training                        Modalities         IFC w/MHP 15' " post tx  IFC w/MHP 15' post tx  IFC w/ MHP 15' post tx IFC w/ MHP 15' post txsc IFC w/MHP 15' post tx

## 2024-03-20 ENCOUNTER — OFFICE VISIT (OUTPATIENT)
Dept: PHYSICAL THERAPY | Facility: CLINIC | Age: 59
End: 2024-03-20
Payer: OTHER MISCELLANEOUS

## 2024-03-20 DIAGNOSIS — M47.12 OTHER SPONDYLOSIS WITH MYELOPATHY, CERVICAL REGION: ICD-10-CM

## 2024-03-20 DIAGNOSIS — M43.22 FUSION OF SPINE OF CERVICAL REGION: Primary | ICD-10-CM

## 2024-03-20 PROCEDURE — 97140 MANUAL THERAPY 1/> REGIONS: CPT

## 2024-03-20 PROCEDURE — 97110 THERAPEUTIC EXERCISES: CPT

## 2024-03-20 PROCEDURE — 97112 NEUROMUSCULAR REEDUCATION: CPT

## 2024-03-20 PROCEDURE — 97014 ELECTRIC STIMULATION THERAPY: CPT

## 2024-03-20 NOTE — PROGRESS NOTES
"Daily Note     Today's date: 3/20/2024  Patient name: Efra Garcia Jr.  : 1965  MRN: [id number]  Referring provider: Mimi Manuel PA-C  Dx:   Encounter Diagnosis     ICD-10-CM    1. 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 Other spondylosis with myelopathy, cervical region  M47.12                      Subjective: Stan reports doing better today in reference to c-spine and christopher shoulders.       Objective: See treatment diary below      Assessment: Tactile cues and visual cues to avoid anterior tilt of christopher scap during LTP and MTP exercises. Increased ROM present with scap retraction with christopher ER exercise, an improvement from previous sessions. Improvement in soft tissue quality following STM to christopher UT, cerv paraspinals, suboccipitals, scap retractors. Progress as able.       Plan: Continue with current POC to address pt deficits.      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 Fusion (23)       Manuals 3-15-24-24 3-24 3-20-24 3-24 324   STM to sub occipital to mid thoracic 15' 15' 15' 25' 25'   AROM c rotation w/ manual c2 block        Bilateral ulnar nerve mobilization                Neuro Re-Ed         LTP/ MTP Blue 3x10 ea  Blue 3x10 ea Blue 3x10 ea Deferred  Held    Cervical retraction in available ROM 5\" x20 5\" x20 5\" x20 5\" x20 5\" x20   scap retract/ ER in doorframe Red 3x10 Red 3x10 Red 3x10  Held    Standing hip abd and ext        BOSU mini lunges        Heel raises for strength and balance        Wall ball squats                                                Ther Ex        Seated thoracic extension AROM mobs w/ roll and board 5\" x20 2 lvls 5\" x20 2 lvls 5\" x20 2lvls  5\" x20 2 levels  5\" x20 2 levels    Gentle UT side bend 10\"x12 christopher  Resume NV   Deferred  10\"x12 christopher    Cerv nod  10\"x12 10\"x12 10\"x12 10\"x12 10\"x12   Nustep         Scap retraction 5\" x20 5\" x20 5\" x20 5\" x20            Pt Ed/ HEP                Ther Activity                        Gait Training                        Modalities   "       IFC w/MHP 15' post tx  IFC w/MHP 15' post tx  IFC w/MHP 15' post tx  IFC w/ MHP 15' post txsc IFC w/MHP 15' post tx

## 2024-03-22 ENCOUNTER — APPOINTMENT (OUTPATIENT)
Dept: PHYSICAL THERAPY | Facility: CLINIC | Age: 59
End: 2024-03-22
Payer: OTHER MISCELLANEOUS

## 2024-03-25 ENCOUNTER — OFFICE VISIT (OUTPATIENT)
Dept: PHYSICAL THERAPY | Facility: CLINIC | Age: 59
End: 2024-03-25
Payer: OTHER MISCELLANEOUS

## 2024-03-25 DIAGNOSIS — M43.22 FUSION OF SPINE OF CERVICAL REGION: Primary | ICD-10-CM

## 2024-03-25 DIAGNOSIS — M47.12 OTHER SPONDYLOSIS WITH MYELOPATHY, CERVICAL REGION: ICD-10-CM

## 2024-03-25 PROCEDURE — 97140 MANUAL THERAPY 1/> REGIONS: CPT

## 2024-03-25 PROCEDURE — 97014 ELECTRIC STIMULATION THERAPY: CPT

## 2024-03-25 PROCEDURE — 97112 NEUROMUSCULAR REEDUCATION: CPT

## 2024-03-25 PROCEDURE — 97110 THERAPEUTIC EXERCISES: CPT

## 2024-03-25 NOTE — PROGRESS NOTES
"Daily Note     Today's date: 3/25/2024  Patient name: Efra Garcia Jr.  : 1965  MRN: [id number]  Referring provider: Mimi Manuel PA-C  Dx:   Encounter Diagnosis     ICD-10-CM    1. 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 Other spondylosis with myelopathy, cervical region  M47.12                      Subjective: Stan reports overall feeling \"better\" in reference to c-spine and christopher shoulders. He reports less pain and is able to do more but reports more pain to R cerv paraspinals over the weekend.      Objective: See treatment diary below      Assessment: Improvement in soft tissue quality following STM to christopher UT, cerv paraspinals, and subocciptials. Denied any increases in pain with exercises performed. Pt would continue to benefit from skilled PT.       Plan: Continue with current POC to address pt deficits.      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 Fusion (23)       Manuals 3-15-24-24 3-18-24 3-20-24 3-25-24 3-11-24   STM to sub occipital to mid thoracic 15' 15' 15' 15' 25'   AROM c rotation w/ manual c2 block        Bilateral ulnar nerve mobilization                Neuro Re-Ed         LTP/ MTP Blue 3x10 ea  Blue 3x10 ea Blue 3x10 ea Blue 3x10 ea Held    Cervical retraction in available ROM 5\" x20 5\" x20 5\" x20 5\" x20 5\" x20   scap retract/ ER in doorframe Red 3x10 Red 3x10 Red 3x10 Red 3x10 Held    Standing hip abd and ext        BOSU mini lunges        Heel raises for strength and balance        Wall ball squats    Squats 2x10    Wall push ups     2x10                                    Ther Ex        Seated thoracic extension AROM mobs w/ roll and board 5\" x20 2 lvls 5\" x20 2 lvls 5\" x20 2lvls  5\" x20 2lvls  5\" x20 2 levels    Gentle UT side bend 10\"x12 christopher  Resume NV    10\"x12 christopher    Cerv nod  10\"x12 10\"x12 10\"x12 10\"x12 10\"x12   Nustep         Scap retraction 5\" x20 5\" x20 5\" x20 5\" x20            Pt Ed/ HEP                Ther Activity                        Gait Training                      "   Modalities         IFC w/MHP 15' post tx  IFC w/MHP 15' post tx  IFC w/MHP 15' post tx  IFC w/MHP 15' post tx  IFC w/MHP 15' post tx

## 2024-03-27 ENCOUNTER — OFFICE VISIT (OUTPATIENT)
Dept: PHYSICAL THERAPY | Facility: CLINIC | Age: 59
End: 2024-03-27
Payer: OTHER MISCELLANEOUS

## 2024-03-27 DIAGNOSIS — M43.22 FUSION OF SPINE OF CERVICAL REGION: Primary | ICD-10-CM

## 2024-03-27 DIAGNOSIS — M47.12 OTHER SPONDYLOSIS WITH MYELOPATHY, CERVICAL REGION: ICD-10-CM

## 2024-03-27 PROCEDURE — 97014 ELECTRIC STIMULATION THERAPY: CPT | Performed by: PHYSICAL THERAPIST

## 2024-03-27 PROCEDURE — 97110 THERAPEUTIC EXERCISES: CPT | Performed by: PHYSICAL THERAPIST

## 2024-03-27 PROCEDURE — 97140 MANUAL THERAPY 1/> REGIONS: CPT | Performed by: PHYSICAL THERAPIST

## 2024-03-27 PROCEDURE — 97112 NEUROMUSCULAR REEDUCATION: CPT | Performed by: PHYSICAL THERAPIST

## 2024-03-27 NOTE — PROGRESS NOTES
"Daily Note     Today's date: 3/27/2024  Patient name: Efra Garcia Jr.  : 1965  MRN: [id number]  Referring provider: Mimi Manuel PA-C  Dx:   Encounter Diagnosis     ICD-10-CM    1. 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 Other spondylosis with myelopathy, cervical region  M47.12                      Subjective: pt reports his is moving and feeling a little better.      Objective: See treatment diary below      Assessment: Tolerated treatment well. Patient would benefit from continued PT      Plan: Progress treatment as tolerated.       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 Fusion (23)       Manuals 3-15-24-24 3-18-24 3-20-24 3-25-24 3-27-24   STM to sub occipital to mid thoracic 15' 15' 15' 15   AROM c rotation w/ manual c2 block        Bilateral ulnar nerve mobilization                Neuro Re-Ed         LTP/ MTP Blue 3x10 ea  Blue 3x10 ea Blue 3x10 ea Blue 3x10 ea Held    Cervical retraction in available ROM 5\" x20 5\" x20 5\" x20 5\" x20 5\" x20   scap retract/ ER in doorframe Red 3x10 Red 3x10 Red 3x10 Red 3x10 Held    Standing hip abd and ext        BOSU mini lunges        Heel raises for strength and balance        Wall ball squats    Squats 2x10 Squats 2x10   Wall push ups     2x10 2x10                                   Ther Ex        Seated thoracic extension AROM mobs w/ roll and board 5\" x20 2 lvls 5\" x20 2 lvls 5\" x20 2lvls  5\" x20 2lvls  5\" x20 2 levels    Gentle UT side bend 10\"x12 christopher  Resume NV    10\"x12 christopher    Cerv nod  10\"x12 10\"x12 10\"x12 10\"x12 10\"x12   Nustep         Scap retraction 5\" x20 5\" x20 5\" x20 5\" x20 5\" x20           Pt Ed/ HEP                Ther Activity                        Gait Training                        Modalities         IFC w/MHP 15' post tx  IFC w/MHP 15' post tx  IFC w/MHP 15' post tx  IFC w/MHP 15' post tx  IFC w/MHP 15' post tx                                    "

## 2024-04-03 ENCOUNTER — APPOINTMENT (OUTPATIENT)
Dept: PHYSICAL THERAPY | Facility: CLINIC | Age: 59
End: 2024-04-03
Payer: OTHER MISCELLANEOUS

## 2024-04-05 ENCOUNTER — OFFICE VISIT (OUTPATIENT)
Dept: PHYSICAL THERAPY | Facility: CLINIC | Age: 59
End: 2024-04-05
Payer: OTHER MISCELLANEOUS

## 2024-04-05 DIAGNOSIS — M47.12 OTHER SPONDYLOSIS WITH MYELOPATHY, CERVICAL REGION: ICD-10-CM

## 2024-04-05 DIAGNOSIS — M43.22 FUSION OF SPINE OF CERVICAL REGION: Primary | ICD-10-CM

## 2024-04-05 PROCEDURE — 97110 THERAPEUTIC EXERCISES: CPT | Performed by: PHYSICAL THERAPIST

## 2024-04-05 PROCEDURE — 97140 MANUAL THERAPY 1/> REGIONS: CPT | Performed by: PHYSICAL THERAPIST

## 2024-04-05 PROCEDURE — 97014 ELECTRIC STIMULATION THERAPY: CPT | Performed by: PHYSICAL THERAPIST

## 2024-04-05 NOTE — PROGRESS NOTES
"Daily Note     Today's date: 2024  Patient name: Efra Garcia Jr.  : 1965  MRN: [id number]  Referring provider: Mimi Manuel PA-C  Dx:   Encounter Diagnosis     ICD-10-CM    1. 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 Other spondylosis with myelopathy, cervical region  M47.12                      Subjective: Pt reports his right shoulder      Objective: See treatment diary below      Assessment: Tolerated treatment well. Patient did very well with leg activities today.      Plan: continue with current plan of progressing functional leg strengthening.     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 Fusion (23)       Manuals 4--24 3-18-24 3-20-24 3-25-24 3-27-24   STM to sub occipital to mid thoracic 15' 15' 15' 15'   AROM c rotation w/ manual c2 block        Bilateral ulnar nerve mobilization                Neuro Re-Ed         LTP/ MTP   Blue 3x10 ea Blue 3x10 ea Blue 3x10 ea Held    Cervical retraction in available ROM 5\" x20 5\" x20 5\" x20 5\" x20 5\" x20   scap retract/ ER in doorframe  Red 3x10 Red 3x10 Red 3x10 Held    Standing hip abd and ext        BOSU mini lunges        Heel raises for strength and balance        Wall ball squats    Squats 2x10 Squats 2x10   Wall push ups     2x10 2x10                                   Ther Ex        Seated thoracic extension AROM mobs w/ roll and board 5\" x20 2 lvls 5\" x20 2 lvls 5\" x20 2lvls  5\" x20 2lvls  5\" x20 2 levels    Gentle UT side bend 10\"x12 christopher  Resume NV    10\"x12 christopher    Cerv nod  10\"x12 10\"x12 10\"x12 10\"x12 10\"x12   Nustep         Scap retraction 5\" x20 5\" x20 5\" x20 5\" x20 5\" x20   SRC L3 hills 10'       Knee extension 55# 3x10       Knee curls 55# 3x10                                       Pt Ed/ HEP                Ther Activity                        Gait Training                        Modalities         IFC w/MHP 15' post tx  IFC w/MHP 15' post tx  IFC w/MHP 15' post tx  IFC w/MHP 15' post tx  IFC w/MHP 15' post tx                 "

## 2024-04-08 ENCOUNTER — OFFICE VISIT (OUTPATIENT)
Dept: PHYSICAL THERAPY | Facility: CLINIC | Age: 59
End: 2024-04-08
Payer: OTHER MISCELLANEOUS

## 2024-04-08 DIAGNOSIS — M43.22 FUSION OF SPINE OF CERVICAL REGION: Primary | ICD-10-CM

## 2024-04-08 DIAGNOSIS — M47.12 OTHER SPONDYLOSIS WITH MYELOPATHY, CERVICAL REGION: ICD-10-CM

## 2024-04-08 PROCEDURE — 97140 MANUAL THERAPY 1/> REGIONS: CPT | Performed by: PHYSICAL THERAPIST

## 2024-04-08 PROCEDURE — 97110 THERAPEUTIC EXERCISES: CPT | Performed by: PHYSICAL THERAPIST

## 2024-04-08 PROCEDURE — 97112 NEUROMUSCULAR REEDUCATION: CPT | Performed by: PHYSICAL THERAPIST

## 2024-04-08 NOTE — PROGRESS NOTES
"Daily Note     Today's date: 2024  Patient name: Efra Garcia Jr.  : 1965  MRN: [id number]  Referring provider: Mimi Manuel PA-C  Dx:   Encounter Diagnosis     ICD-10-CM    1. 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 Other spondylosis with myelopathy, cervical region  M47.12                      Subjective: Pt reports his shoulder is starting to feel better      Objective: See treatment diary below      Assessment: Tolerated treatment well. Patient demonstrated fatigue post treatment and exhibited good technique with therapeutic exercises      Plan: Continue per plan of care.  Progress treatment as tolerated.       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 Fusion (23)       Manuals 424 4-8-24 3-20-24 3-25-24 3-27-24   STM to sub occipital to mid thoracic 15' 15' 15' 15'   AROM c rotation w/ manual c2 block        Bilateral ulnar nerve mobilization                Neuro Re-Ed         LTP/ MTP   Blue 3x10 ea Blue 3x10 ea Blue 3x10 ea Held    Cervical retraction in available ROM 5\" x20 5\" x20 5\" x20 5\" x20 5\" x20   scap retract/ ER in doorframe  Red 3x10 Red 3x10 Red 3x10 Held    Standing hip abd and ext        BOSU mini lunges        Heel raises for strength and balance        Wall ball squats  3x10  Squats 2x10 Squats 2x10   Wall push ups   2x10  2x10 2x10                                   Ther Ex        Seated thoracic extension AROM mobs w/ roll and board 5\" x20 2 lvls 5\" x20 2 lvls 5\" x20 2lvls  5\" x20 2lvls  5\" x20 2 levels    Gentle UT side bend 10\"x12 christopher  Resume NV    10\"x12 christopher    Cerv nod  10\"x12 10\"x12 10\"x12 10\"x12 10\"x12   Nustep         Scap retraction 5\" x20 5\" x20 5\" x20 5\" x20 5\" x20   SRC L3 hills 10' L3 Random-10'      Knee extension 55# 3x10 55# 3x10      Knee curls 55# 3x10 55# 3x10                                      Pt Ed/ HEP                Ther Activity                        Gait Training                        Modalities         IFC w/MHP 15' post tx  IFC w/MHP 15' post " tx  IFC w/MHP 15' post tx  IFC w/MHP 15' post tx  IFC w/MHP 15' post tx

## 2024-04-10 ENCOUNTER — APPOINTMENT (OUTPATIENT)
Dept: PHYSICAL THERAPY | Facility: CLINIC | Age: 59
End: 2024-04-10
Payer: OTHER MISCELLANEOUS

## 2024-04-12 ENCOUNTER — OFFICE VISIT (OUTPATIENT)
Dept: PHYSICAL THERAPY | Facility: CLINIC | Age: 59
End: 2024-04-12
Payer: OTHER MISCELLANEOUS

## 2024-04-12 DIAGNOSIS — M47.12 OTHER SPONDYLOSIS WITH MYELOPATHY, CERVICAL REGION: ICD-10-CM

## 2024-04-12 DIAGNOSIS — M43.22 FUSION OF SPINE OF CERVICAL REGION: Primary | ICD-10-CM

## 2024-04-12 PROCEDURE — 97112 NEUROMUSCULAR REEDUCATION: CPT

## 2024-04-12 PROCEDURE — 97014 ELECTRIC STIMULATION THERAPY: CPT

## 2024-04-12 PROCEDURE — 97140 MANUAL THERAPY 1/> REGIONS: CPT

## 2024-04-12 PROCEDURE — 97110 THERAPEUTIC EXERCISES: CPT

## 2024-04-12 NOTE — PROGRESS NOTES
"Daily Note     Today's date: 2024  Patient name: Efra Garcia Jr.  : 1965  MRN: [id number]  Referring provider: Mimi Manuel PA-C  Dx:   Encounter Diagnosis     ICD-10-CM    1. 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 Other spondylosis with myelopathy, cervical region  M47.12                      Subjective: Stna reports having more pain in c-spine today. He reports LE muscle soreness following last session.       Objective: See treatment diary below      Assessment: Visual and VC to ensure correct exercise technique. Did not complete full program due to fatigue. Tolerated exercises well; denied any increases in pain. Improvement in soft tissue quality following STM to christopher UT, cerv paraspinals, sub occipitals. Resume full program if able NV.       Plan: Continue with current POC to address pt deficits.      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 Fusion (23)       Manuals 4--24 24 24 3-25-24 3-27-24   STM to sub occipital to mid thoracic 15' 15' 15' 15' 25'   AROM c rotation w/ manual c2 block        Bilateral ulnar nerve mobilization                Neuro Re-Ed         LTP/ MTP   Blue 3x10 ea Blue 3x10 ea Blue 3x10 ea Held    Cervical retraction in available ROM 5\" x20 5\" x20 5\" x20 5\" x20 5\" x20   scap retract/ ER in doorframe  Red 3x10 Grn 3x10 Red 3x10 Held    Standing hip abd and ext        BOSU mini lunges        Heel raises for strength and balance        Wall ball squats  3x10 NV Squats 2x10 Squats 2x10   Wall push ups   2x10 NV 2x10 2x10                                   Ther Ex        Seated thoracic extension AROM mobs w/ roll and board 5\" x20 2 lvls 5\" x20 2 lvls 5\" x20 2 lvls 5\" x20 2lvls  5\" x20 2 levels    Gentle UT side bend 10\"x12 christopher  Resume NV    10\"x12 christopher    Cerv nod  10\"x12 10\"x12 10\"x12 10\"x12 10\"x12   Nustep         Scap retraction 5\" x20 5\" x20 5\" x20 5\" x20 5\" x20   SRC L3 hills 10' L3 Random-10' L3 random 10'     Knee extension 55# 3x10 55# 3x10 55# 3x10     Knee " salvador 55# 3x10 55# 3x10 NV                                      Pt Ed/ HEP                Ther Activity                        Gait Training                        Modalities         IFC w/MHP 15' post tx  IFC w/MHP 15' post tx  IFC w/MHP 15' post tx  IFC w/MHP 15' post tx  IFC w/MHP 15' post tx

## 2024-04-15 ENCOUNTER — OFFICE VISIT (OUTPATIENT)
Dept: PHYSICAL THERAPY | Facility: CLINIC | Age: 59
End: 2024-04-15
Payer: OTHER MISCELLANEOUS

## 2024-04-15 DIAGNOSIS — M47.12 OTHER SPONDYLOSIS WITH MYELOPATHY, CERVICAL REGION: ICD-10-CM

## 2024-04-15 DIAGNOSIS — M43.22 FUSION OF SPINE OF CERVICAL REGION: Primary | ICD-10-CM

## 2024-04-15 PROCEDURE — 97014 ELECTRIC STIMULATION THERAPY: CPT

## 2024-04-15 PROCEDURE — 97140 MANUAL THERAPY 1/> REGIONS: CPT

## 2024-04-15 PROCEDURE — 97110 THERAPEUTIC EXERCISES: CPT

## 2024-04-15 PROCEDURE — 97112 NEUROMUSCULAR REEDUCATION: CPT

## 2024-04-15 NOTE — PROGRESS NOTES
"Daily Note     Today's date: 4/15/2024  Patient name: Efra Garcia Jr.  : 1965  MRN: [id number]  Referring provider: Mimi Manuel PA-C  Dx:   Encounter Diagnosis     ICD-10-CM    1. 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 Other spondylosis with myelopathy, cervical region  M47.12                      Subjective: Stan reports increased R c-spine pain and christopher UE pain from raking. He was reading studies that after spinal decompression surgery you can have new symptoms in UE which he feels he is experiencing.       Objective: See treatment diary below      Assessment: Visual and VC to ensure correct exercise technique specifically to ensure posterior tilt of christopher scap. Improvement in soft tissue quality following STM to christopher upper traps, cerv paraspinals, suboccipitals. Tolerant of exercises reported denying any increases in pain. Progress as able.      Plan: Continue with current POC to address pt deficits.      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 Fusion (23)       Manuals 4--24 24 4-12-24 4-15-24 3-27-24   STM to sub occipital to mid thoracic 15' 15' 15' 15' 25'   AROM c rotation w/ manual c2 block        Bilateral ulnar nerve mobilization                Neuro Re-Ed         LTP/ MTP   Blue 3x10 ea Blue 3x10 ea Blue 3x10 ea Held    Cervical retraction in available ROM 5\" x20 5\" x20 5\" x20 5\" x20 5\" x20   scap retract/ ER in doorframe  Red 3x10 Grn 3x10 Grn 3x10 Held    Standing hip abd and ext        BOSU mini lunges        Heel raises for strength and balance        Wall ball squats  3x10 NV NV Squats 2x10   Wall push ups   2x10 NV NV 2x10                                   Ther Ex        Seated thoracic extension AROM mobs w/ roll and board 5\" x20 2 lvls 5\" x20 2 lvls 5\" x20 2 lvls 5\" x20 2 lvls 5\" x20 2 levels    Gentle UT side bend 10\"x12 christopher  Resume NV    10\"x12 christopher    Cerv nod  10\"x12 10\"x12 10\"x12 10\"x12 10\"x12   Nustep         Scap retraction 5\" x20 5\" x20 5\" x20 5\" x20  5\" x20   SRC L3 hills " 10' L3 Random-10' L3 random 10' L3 random 10'    Knee extension 55# 3x10 55# 3x10 55# 3x10 55# 3x10    Knee curls 55# 3x10 55# 3x10 NV  55# 3x10                                    Pt Ed/ HEP                Ther Activity                        Gait Training                        Modalities         IFC w/MHP 15' post tx  IFC w/MHP 15' post tx  IFC w/MHP 15' post tx  IFC w/MHP 15' post tx  IFC w/MHP 15' post tx

## 2024-04-17 ENCOUNTER — APPOINTMENT (OUTPATIENT)
Dept: PHYSICAL THERAPY | Facility: CLINIC | Age: 59
End: 2024-04-17
Payer: OTHER MISCELLANEOUS

## 2024-04-19 ENCOUNTER — OFFICE VISIT (OUTPATIENT)
Dept: PHYSICAL THERAPY | Facility: CLINIC | Age: 59
End: 2024-04-19
Payer: OTHER MISCELLANEOUS

## 2024-04-19 DIAGNOSIS — M47.12 OTHER SPONDYLOSIS WITH MYELOPATHY, CERVICAL REGION: ICD-10-CM

## 2024-04-19 DIAGNOSIS — M43.22 FUSION OF SPINE OF CERVICAL REGION: Primary | ICD-10-CM

## 2024-04-19 PROCEDURE — 97014 ELECTRIC STIMULATION THERAPY: CPT

## 2024-04-19 PROCEDURE — 97140 MANUAL THERAPY 1/> REGIONS: CPT

## 2024-04-19 NOTE — PROGRESS NOTES
"Daily Note     Today's date: 2024  Patient name: Efra Garcia Jr.  : 1965  MRN: [id number]  Referring provider: Mimi Manuel PA-C  Dx:   Encounter Diagnosis     ICD-10-CM    1. 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 Other spondylosis with myelopathy, cervical region  M47.12                      Subjective:  I've had a bad week.  The neck and arms are both really hurting.        Objective: See treatment diary below      Assessment: Tolerated treatment fair.  Reports increased pain c-spine as well as BUE and LLE.  Reports worsening symptoms since fusion.  No TE today due to elevated pain.  Felt better with manual therapy.   Patient would benefit from continued PT      Plan: Continue per plan of care.      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 Fusion (23)       Manuals 4-5-24 4-8-24 24 4-15-24 24   STM to sub occipital to mid thoracic 15' 15' 15' 15' 25'   AROM c rotation w/ manual c2 block        Bilateral ulnar nerve mobilization                Neuro Re-Ed         LTP/ MTP   Blue 3x10 ea Blue 3x10 ea Blue 3x10 ea    Cervical retraction in available ROM 5\" x20 5\" x20 5\" x20 5\" x20    scap retract/ ER in doorframe  Red 3x10 Grn 3x10 Grn 3x10    Standing hip abd and ext        BOSU mini lunges        Heel raises for strength and balance        Wall ball squats  3x10 NV NV    Wall push ups   2x10 NV NV                                    Ther Ex        Seated thoracic extension AROM mobs w/ roll and board 5\" x20 2 lvls 5\" x20 2 lvls 5\" x20 2 lvls 5\" x20 2 lvls    Gentle UT side bend 10\"x12 christopher  Resume NV       Cerv nod  10\"x12 10\"x12 10\"x12 10\"x12    Nustep         Scap retraction 5\" x20 5\" x20 5\" x20 5\" x20     SRC L3 hills 10' L3 Random-10' L3 random 10' L3 random 10'    Knee extension 55# 3x10 55# 3x10 55# 3x10 55# 3x10    Knee curls 55# 3x10 55# 3x10 NV  55# 3x10                                    Pt Ed/ HEP                Ther Activity                        Gait Training                 "        Modalities         IFC w/MHP 15' post tx  IFC w/MHP 15' post tx  IFC w/MHP 15' post tx  IFC w/MHP 15' post tx  IFC w/MHP 15' post tx         MHP 10'  start

## 2024-04-22 ENCOUNTER — OFFICE VISIT (OUTPATIENT)
Dept: PHYSICAL THERAPY | Facility: CLINIC | Age: 59
End: 2024-04-22
Payer: OTHER MISCELLANEOUS

## 2024-04-22 DIAGNOSIS — M47.12 OTHER SPONDYLOSIS WITH MYELOPATHY, CERVICAL REGION: ICD-10-CM

## 2024-04-22 DIAGNOSIS — M43.22 FUSION OF SPINE OF CERVICAL REGION: Primary | ICD-10-CM

## 2024-04-22 PROCEDURE — 97014 ELECTRIC STIMULATION THERAPY: CPT | Performed by: PHYSICAL THERAPIST

## 2024-04-22 PROCEDURE — 97112 NEUROMUSCULAR REEDUCATION: CPT | Performed by: PHYSICAL THERAPIST

## 2024-04-22 PROCEDURE — 97140 MANUAL THERAPY 1/> REGIONS: CPT | Performed by: PHYSICAL THERAPIST

## 2024-04-22 PROCEDURE — 97110 THERAPEUTIC EXERCISES: CPT | Performed by: PHYSICAL THERAPIST

## 2024-04-22 NOTE — PROGRESS NOTES
"Daily Note     Today's date: 2024  Patient name: Efra aGrcia Jr.  : 1965  MRN: [id number]  Referring provider: Mimi Manuel PA-C  Dx:   Encounter Diagnosis     ICD-10-CM    1. 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 Other spondylosis with myelopathy, cervical region  M47.12                      Subjective: Pt reports having a little bit of a tough weekend regarding soreness and pain      Objective: See treatment diary below      Assessment: Tolerated treatment well. Patient would benefit from continued PT      Plan: Progress treatment as tolerated.       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 Fusion (23)       Manuals 4-22-24 4-8-24 4-12-24 4-15-24 4-19-24   STM to sub occipital to mid thoracic 15' 15' 15' 15'   AROM c rotation w/ manual c2 block        Bilateral ulnar nerve mobilization                Neuro Re-Ed         LTP/ MTP  Blue 3x10 each Blue 3x10 ea Blue 3x10 ea Blue 3x10 ea    Cervical retraction in available ROM 5\" x20 5\" x20 5\" x20 5\" x20    scap retract/ ER in doorframe Green 3x10  Red 3x10 Grn 3x10 Grn 3x10    Standing hip abd and ext        BOSU mini lunges        Heel raises for strength and balance        Wall ball squats 30x 3x10 NV NV    Wall push ups   2x10 NV NV                                    Ther Ex        Seated thoracic extension AROM mobs w/ roll and board 5\" x20 2 lvls 5\" x20 2 lvls 5\" x20 2 lvls 5\" x20 2 lvls    Gentle UT side bend 10\"x12 christopher  Resume NV       Cerv nod  10\"x12 10\"x12 10\"x12 10\"x12    Nustep         Scap retraction 5\" x20 5\" x20 5\" x20 5\" x20     SRC L3 hills 10' L3 Random-10' L3 random 10' L3 random 10'    Knee extension 55# 3x10 55# 3x10 55# 3x10 55# 3x10    Knee curls 55# 3x10 55# 3x10 NV  55# 3x10                                    Pt Ed/ HEP                Ther Activity                        Gait Training                        Modalities         IFC w/MHP 15' post tx  IFC w/MHP 15' post tx  IFC w/MHP 15' post tx  IFC w/MHP 15' post tx  IFC " w/MHP 15' post tx         MHP 10'  start

## 2024-04-24 ENCOUNTER — OFFICE VISIT (OUTPATIENT)
Dept: PHYSICAL THERAPY | Facility: CLINIC | Age: 59
End: 2024-04-24
Payer: OTHER MISCELLANEOUS

## 2024-04-24 DIAGNOSIS — M43.22 FUSION OF SPINE OF CERVICAL REGION: Primary | ICD-10-CM

## 2024-04-24 DIAGNOSIS — M47.12 OTHER SPONDYLOSIS WITH MYELOPATHY, CERVICAL REGION: ICD-10-CM

## 2024-04-24 PROCEDURE — 97014 ELECTRIC STIMULATION THERAPY: CPT | Performed by: PHYSICAL THERAPIST

## 2024-04-24 PROCEDURE — 97110 THERAPEUTIC EXERCISES: CPT | Performed by: PHYSICAL THERAPIST

## 2024-04-24 PROCEDURE — 97140 MANUAL THERAPY 1/> REGIONS: CPT | Performed by: PHYSICAL THERAPIST

## 2024-04-24 NOTE — PROGRESS NOTES
"Daily Note     Today's date: 2024  Patient name: Efra Garcia Jr.  : 1965  MRN: [id number]  Referring provider: Mimi Manuel PA-C  Dx:   Encounter Diagnosis     ICD-10-CM    1. 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 Other spondylosis with myelopathy, cervical region  M47.12                      Subjective: Pt reports his neck and shoulders are feeling a little better      Objective: See treatment diary below      Assessment: Tolerated treatment well. Patient demonstrated fatigue post treatment, exhibited good technique with therapeutic exercises, and would benefit from continued PT      Plan: Progress treatment as tolerated.       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 Fusion (23)       Manuals 4-22-24 4-24-24 4-12-24 4-15-24 4-19-24   STM to sub occipital to mid thoracic 15' 15' 15' 15'   AROM c rotation w/ manual c2 block        Bilateral ulnar nerve mobilization                Neuro Re-Ed         LTP/ MTP  Blue 3x10 each Blue 3x10 ea Blue 3x10 ea Blue 3x10 ea    Cervical retraction in available ROM 5\" x20 5\" x20 5\" x20 5\" x20    scap retract/ ER in doorframe Green 3x10  Blue 3x10 Grn 3x10 Grn 3x10    Standing hip abd and ext        BOSU mini lunges        Heel raises for strength and balance        Wall ball squats 30x 3x10 NV NV    Wall push ups   2x10 NV NV                                    Ther Ex        Seated thoracic extension AROM mobs w/ roll and board 5\" x20 2 lvls 5\" x20 2 lvls 5\" x20 2 lvls 5\" x20 2 lvls    Gentle UT side bend 10\"x12 christopher  Resume NV       Cerv nod  10\"x12 10\"x12 10\"x12 10\"x12    Nustep         Scap retraction 5\" x20 5\" x20 5\" x20 5\" x20     SRC L3 hills 10' L3 Random-10' L3 random 10' L3 random 10'    Knee extension 55# 3x10 55# 3x10 55# 3x10 55# 3x10    Knee curls 55# 3x10 55# 3x10 NV  55# 3x10                                    Pt Ed/ HEP                Ther Activity                        Gait Training                        Modalities         IFC w/MHP 15' " post tx  IFC w/MHP 15' post tx  IFC w/MHP 15' post tx  IFC w/MHP 15' post tx  IFC w/MHP 15' post tx         MHP 10'  start

## 2024-04-26 ENCOUNTER — OFFICE VISIT (OUTPATIENT)
Dept: PHYSICAL THERAPY | Facility: CLINIC | Age: 59
End: 2024-04-26
Payer: OTHER MISCELLANEOUS

## 2024-04-26 DIAGNOSIS — M47.12 OTHER SPONDYLOSIS WITH MYELOPATHY, CERVICAL REGION: ICD-10-CM

## 2024-04-26 DIAGNOSIS — M43.22 FUSION OF SPINE OF CERVICAL REGION: Primary | ICD-10-CM

## 2024-04-26 PROCEDURE — 97140 MANUAL THERAPY 1/> REGIONS: CPT

## 2024-04-26 PROCEDURE — 97014 ELECTRIC STIMULATION THERAPY: CPT

## 2024-04-26 PROCEDURE — 97112 NEUROMUSCULAR REEDUCATION: CPT

## 2024-04-26 PROCEDURE — 97110 THERAPEUTIC EXERCISES: CPT

## 2024-04-26 NOTE — PROGRESS NOTES
"Daily Note     Today's date: 2024  Patient name: Efra Garcia Jr.  : 1965  MRN: [id number]  Referring provider: Mimi Manuel PA-C  Dx:   Encounter Diagnosis     ICD-10-CM    1. 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 Other spondylosis with myelopathy, cervical region  M47.12                      Subjective: Stan reports having muscle spasms in neck and R shoulder after raking yesterday.       Objective: See treatment diary below      Assessment: Improvement in soft tissue quality/tone following STM to christopher UT, cerv paraspinals, scap retractors, and sub occipitals. Visual and VC to ensure correct exercise technique. Progress as able.       Plan: Continue with current POC to address pt deficits.      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 Fusion (23)       Manuals 4-22-24 4-24-24 4-26-24 4-15-24 4-19-24   STM to sub occipital to mid thoracic 15' 15' 15' 15' 25'   AROM c rotation w/ manual c2 block        Bilateral ulnar nerve mobilization                Neuro Re-Ed         LTP/ MTP  Blue 3x10 each Blue 3x10 ea Blue 3x10 ea Blue 3x10 ea    Cervical retraction in available ROM 5\" x20 5\" x20 5\" x20 5\" x20    scap retract/ ER in doorframe Green 3x10  Blue 3x10 Green 3x10 Grn 3x10    Standing hip abd and ext        BOSU mini lunges        Heel raises for strength and balance        Wall ball squats 30x 3x10  NV    Wall push ups   2x10  NV                                    Ther Ex        Seated thoracic extension AROM mobs w/ roll and board 5\" x20 2 lvls 5\" x20 2 lvls 5\" x20 2lvls  5\" x20 2 lvls    Gentle UT side bend 10\"x12 christopher  Resume NV       Cerv nod  10\"x12 10\"x12 10\"x12 10\"x12    Nustep         Scap retraction 5\" x20 5\" x20 5\" x20 5\" x20     SRC L3 hills 10' L3 Random-10' L3 random-10'  L3 random 10'    Knee extension 55# 3x10 55# 3x10 55# 3x10 55# 3x10    Knee curls 55# 3x10 55# 3x10 55# 3x10 55# 3x10                                    Pt Ed/ HEP                Ther Activity                      "   Gait Training                        Modalities         IFC w/MHP 15' post tx  IFC w/MHP 15' post tx  IFC w/MHP 15' post tx  IFC w/MHP 15' post tx  IFC w/MHP 15' post tx         MHP 10'  start

## 2024-05-01 ENCOUNTER — APPOINTMENT (OUTPATIENT)
Dept: PHYSICAL THERAPY | Facility: CLINIC | Age: 59
End: 2024-05-01
Payer: OTHER MISCELLANEOUS

## 2024-05-03 ENCOUNTER — OFFICE VISIT (OUTPATIENT)
Dept: PHYSICAL THERAPY | Facility: CLINIC | Age: 59
End: 2024-05-03
Payer: OTHER MISCELLANEOUS

## 2024-05-03 DIAGNOSIS — M47.12 OTHER SPONDYLOSIS WITH MYELOPATHY, CERVICAL REGION: ICD-10-CM

## 2024-05-03 DIAGNOSIS — M43.22 FUSION OF SPINE OF CERVICAL REGION: Primary | ICD-10-CM

## 2024-05-03 PROCEDURE — 97140 MANUAL THERAPY 1/> REGIONS: CPT

## 2024-05-03 PROCEDURE — 97110 THERAPEUTIC EXERCISES: CPT

## 2024-05-03 PROCEDURE — 97112 NEUROMUSCULAR REEDUCATION: CPT

## 2024-05-03 PROCEDURE — 97014 ELECTRIC STIMULATION THERAPY: CPT

## 2024-05-03 NOTE — PROGRESS NOTES
"Daily Note     Today's date: 5/3/2024  Patient name: Efra Garcia Jr.  : 1965  MRN: [id number]  Referring provider: Mimi Manuel PA-C  Dx:   Encounter Diagnosis     ICD-10-CM    1. 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 Other spondylosis with myelopathy, cervical region  M47.12                      Subjective: Stan reports feeling sore in c-spine and LE after putting a floor in over the weekend.       Objective: See treatment diary below      Assessment: Visual and VC to ensure correct exercise technique. Improvement in soft tissue quality following STM to christopher UT, cerv paraspinals, and scap retractors. Soreness in quads following program but denied any increases in pain.       Plan: Continue with current POC to address pt deficits.      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 Fusion (23)       Manuals 4-22-24 4-24-24 4-26-24 5-3-24 4-19-24   STM to sub occipital to mid thoracic 15' 15' 15' 15' 25'   AROM c rotation w/ manual c2 block        Bilateral ulnar nerve mobilization                Neuro Re-Ed         LTP/ MTP  Blue 3x10 each Blue 3x10 ea Blue 3x10 ea Blue 3x10 ea    Cervical retraction in available ROM 5\" x20 5\" x20 5\" x20 5\" x20    scap retract/ ER in doorframe Green 3x10  Blue 3x10 Green 3x10 Red 3x10    Standing hip abd and ext        BOSU mini lunges        Heel raises for strength and balance        Wall ball squats 30x 3x10      Wall push ups   2x10                                      Ther Ex        Seated thoracic extension AROM mobs w/ roll and board 5\" x20 2 lvls 5\" x20 2 lvls 5\" x20 2lvls  5\" x20 2lvls    Gentle UT side bend 10\"x12 christopher  Resume NV       Cerv nod  10\"x12 10\"x12 10\"x12 10\"x12    Nustep         Scap retraction 5\" x20 5\" x20 5\" x20 5\" x20    SRC L3 hills 10' L3 Random-10' L3 random-10'  L3 random -10'     Knee extension 55# 3x10 55# 3x10 55# 3x10 55# 3x10    Knee curls 55# 3x10 55# 3x10 55# 3x10 55# 3x10                                    Pt Ed/ HEP                Ther " Activity                        Gait Training                        Modalities         IFC w/MHP 15' post tx  IFC w/MHP 15' post tx  IFC w/MHP 15' post tx  IFC w/MHP 15' post tx  IFC w/MHP 15' post tx         MHP 10'  start

## 2024-05-06 ENCOUNTER — OFFICE VISIT (OUTPATIENT)
Dept: PHYSICAL THERAPY | Facility: CLINIC | Age: 59
End: 2024-05-06
Payer: OTHER MISCELLANEOUS

## 2024-05-06 ENCOUNTER — OFFICE VISIT (OUTPATIENT)
Dept: FAMILY MEDICINE CLINIC | Facility: CLINIC | Age: 59
End: 2024-05-06
Payer: OTHER MISCELLANEOUS

## 2024-05-06 VITALS
HEIGHT: 67 IN | WEIGHT: 238 LBS | SYSTOLIC BLOOD PRESSURE: 110 MMHG | HEART RATE: 99 BPM | DIASTOLIC BLOOD PRESSURE: 74 MMHG | OXYGEN SATURATION: 96 % | BODY MASS INDEX: 37.35 KG/M2 | TEMPERATURE: 98 F

## 2024-05-06 DIAGNOSIS — Z98.1 S/P CERVICAL SPINAL FUSION: Primary | ICD-10-CM

## 2024-05-06 DIAGNOSIS — M54.12 CERVICAL RADICULOPATHY: ICD-10-CM

## 2024-05-06 DIAGNOSIS — M43.22 FUSION OF SPINE OF CERVICAL REGION: Primary | ICD-10-CM

## 2024-05-06 DIAGNOSIS — M96.1 CERVICAL POST-LAMINECTOMY SYNDROME: ICD-10-CM

## 2024-05-06 DIAGNOSIS — M47.12 OTHER SPONDYLOSIS WITH MYELOPATHY, CERVICAL REGION: ICD-10-CM

## 2024-05-06 PROCEDURE — 97140 MANUAL THERAPY 1/> REGIONS: CPT | Performed by: PHYSICAL THERAPIST

## 2024-05-06 PROCEDURE — 96372 THER/PROPH/DIAG INJ SC/IM: CPT | Performed by: FAMILY MEDICINE

## 2024-05-06 PROCEDURE — 97014 ELECTRIC STIMULATION THERAPY: CPT | Performed by: PHYSICAL THERAPIST

## 2024-05-06 PROCEDURE — 97110 THERAPEUTIC EXERCISES: CPT | Performed by: PHYSICAL THERAPIST

## 2024-05-06 PROCEDURE — 99214 OFFICE O/P EST MOD 30 MIN: CPT | Performed by: FAMILY MEDICINE

## 2024-05-06 RX ORDER — KETOROLAC TROMETHAMINE 30 MG/ML
30 INJECTION, SOLUTION INTRAMUSCULAR; INTRAVENOUS ONCE
Status: COMPLETED | OUTPATIENT
Start: 2024-05-06 | End: 2024-05-06

## 2024-05-06 RX ORDER — DULOXETIN HYDROCHLORIDE 60 MG/1
60 CAPSULE, DELAYED RELEASE ORAL DAILY
Qty: 30 CAPSULE | Refills: 2 | Status: SHIPPED | OUTPATIENT
Start: 2024-05-06

## 2024-05-06 RX ORDER — LOSARTAN POTASSIUM 50 MG/1
25 TABLET ORAL
COMMUNITY
Start: 2024-03-22

## 2024-05-06 RX ADMIN — KETOROLAC TROMETHAMINE 30 MG: 30 INJECTION, SOLUTION INTRAMUSCULAR; INTRAVENOUS at 16:11

## 2024-05-06 NOTE — PROGRESS NOTES
Name: Efra Garcia Jr.      : 1965      MRN: [id number]  Encounter Provider: Ulises Truong MD  Encounter Date: 2024   Encounter department: [facility]    Assessment & Plan     1. S/P cervical spinal fusion    2. Cervical radiculopathy  After discussing risks and benefits of medication along with side effects, he was counseled in regards to serotonin syndrome will start the following:   -     DULoxetine (CYMBALTA) 60 mg delayed release capsule; Take 1 capsule (60 mg total) by mouth daily  -     ketorolac (TORADOL) injection 30 mg  -     Ambulatory referral to Spine & Pain Management; Future  -     MRI cervical spine wo contrast; Future; Expected date: 2024    3. Cervical post-laminectomy syndrome  -     DULoxetine (CYMBALTA) 60 mg delayed release capsule; Take 1 capsule (60 mg total) by mouth daily  -     Ambulatory referral to Spine & Pain Management; Future  -     MRI cervical spine wo contrast; Future; Expected date: 2024    F/U in 1 month       Subjective     Patient is here for a F/U  This is a worker's compensation case.     Summary of injury: He sustained a work injury in  while working for [employer] when he fell backwards in the snow while carrying some equipment. He suffered neck pain for many years. He underwent C5-7 ACDF in  for neck pain, right arm weakness and numbness.  He did very well postoperatively with near complete reduction in symptoms.  He was doing very well until approximately 1 year ago.  He was working at a desk and started noticing worsening neck pain and difficulty typing.  He described a constant pain in his bilateral upper extremities in no specific distribution with associated numbness and tingling.  He could feel his hands very well.  He had difficulty with range of motion in his neck.  When he could move his neck, he felt electric shock sensations in his arms.  He was also having significant difficulty with ambulation and  balance.   He had a C3-T1 PCDF by Dr Purdy, neurosurgeon on 06/19/2023.   Unfortunately he continues to have neck pain ad stiffness. Saw Dr. Bales pain management 10/04 and had neck injections done which did not improve his symptoms. Saw Dr Purdy Neurosurgery on 02/07 who recommended more time to heal as it is less than 1 year since surgery and per note it takes 1-1.5 years to heal. He recommended no further surgical interventions and continue PT.    Since his last appt he has been doing PT regularly which has only been partially helping. He has very sharp pain that feels like an ice pick on the right side of his surgical scar. He has constant daily symptoms. He is taking Cymbalta, ibuprofen and gabapentin which are only providing partial relief of his symptoms.        Review of Systems   Constitutional:  Negative for chills and fever.   HENT:  Negative for ear pain and sore throat.    Eyes:  Negative for pain and visual disturbance.   Respiratory:  Negative for cough and shortness of breath.    Cardiovascular:  Negative for chest pain and palpitations.   Gastrointestinal:  Negative for abdominal pain and vomiting.   Genitourinary:  Negative for dysuria and hematuria.   Musculoskeletal:  Positive for arthralgias, neck pain and neck stiffness. Negative for back pain.   Skin:  Negative for color change and rash.   Neurological:  Negative for seizures and syncope.   All other systems reviewed and are negative.      Past Medical History:   Diagnosis Date    Anxiety     Arthritis     Bilateral occipital neuralgia     Cervical post-laminectomy syndrome     Cervical radiculopathy     Cervical spine disease     Cervical spondylosis with myelopathy     Chronic lumbar pain     Chronic pain syndrome     Chronic thoracic spine pain     Cubital tunnel syndrome of both upper extremities 09/10/2022    Degenerative cervical spinal stenosis     Dental crowns present     Depression     Diabetes (HCC)     type 2    Exercise involving  "walking     daily 5-10 minutes    Failed neck syndrome     Fatty liver     Full dentures     upper    GERD (gastroesophageal reflux disease)     History of COVID-19 2021    per pt admitted to the [facility]--    Hyperlipidemia     Hypertension     Irritable bowel syndrome     Motion sickness     Muscle weakness     \"arms and legs\"    Myofascial muscle pain     Neck stiffness     plate implanted-limited ROM    Polyarthralgia     Tinnitus     Wears glasses      Past Surgical History:   Procedure Laterality Date    CERVICAL DISC SURGERY      CERVICAL FUSION  2011    ACDF with Dr. Purdy    COLONOSCOPY      MD ARTHRD PST/PSTLAT TQ 1NTRSPC CRV BELW C2 SEGMENT Bilateral 6/19/2023    Procedure: C3-T1 posterior decompression with instrumented fixation fusion (impulse monitoring);  Surgeon: Kelton Purdy MD;  Location: [facility];  Service: Neurosurgery    TOTAL SHOULDER REPLACEMENT Right     Reversal     Family History   Problem Relation Age of Onset    Stroke Mother     Lung cancer Father      Social History     Socioeconomic History    Marital status: /Civil Union     Spouse name: None    Number of children: None    Years of education: None    Highest education level: None   Occupational History    None   Tobacco Use    Smoking status: Never    Smokeless tobacco: Never   Vaping Use    Vaping status: Never Used   Substance and Sexual Activity    Alcohol use: Yes     Alcohol/week: 1.0 standard drink of alcohol     Types: 1 Cans of beer per week     Comment: occasional, weekly    Drug use: No    Sexual activity: None     Comment: defer   Other Topics Concern    None   Social History Narrative    None     Social Determinants of Health     Financial Resource Strain: Not on file   Food Insecurity: No Food Insecurity (6/21/2023)    Hunger Vital Sign     Worried About Running Out of Food in the Last Year: Never true     Ran Out of Food in the Last Year: Never true   Transportation Needs: No Transportation Needs (6/21/2023) "    PRAPARE - Transportation     Lack of Transportation (Medical): No     Lack of Transportation (Non-Medical): No   Physical Activity: Not on file   Stress: Not on file   Social Connections: Not on file   Intimate Partner Violence: Not on file   Housing Stability: Low Risk  (6/21/2023)    Housing Stability Vital Sign     Unable to Pay for Housing in the Last Year: No     Number of Places Lived in the Last Year: 1     Unstable Housing in the Last Year: No     Current Outpatient Medications on File Prior to Visit   Medication Sig    losartan (COZAAR) 50 mg tablet 25 mg    semaglutide, 0.25 or 0.5 mg/dose, (Ozempic) 2 mg/3 mL injection pen INJECT 0.5MG SUBCUTANEOUSLY ONCE WEEKLY FOR DIABETES    acetaminophen (TYLENOL) 325 mg tablet Take 3 tablets (975 mg total) by mouth every 8 (eight) hours    ASCORBIC ACID PO     benzonatate (TESSALON PERLES) 100 mg capsule Take 100 mg by mouth Three times daily as needed    bisacodyl (DULCOLAX) 10 mg suppository Insert 1 suppository (10 mg total) into the rectum daily as needed for constipation for up to 3 days    Cholecalciferol 25 MCG (1000 UT) capsule TAKE ONE TABLET BY MOUTH EVERY DAY (FOR LOW VITAMIN D)    Cyanocobalamin (B-12 PO) Take by mouth    cyclobenzaprine (FLEXERIL) 5 mg tablet Take 1 tablet (5 mg total) by mouth 3 (three) times a day as needed for muscle spasms    dextrose 1 g CHEW daily as needed for low blood sugar    Empagliflozin 25 MG TABS Take 25 mg by mouth every morning ---- aka  Jardiance    famotidine (PEPCID) 20 mg tablet Take 20 mg by mouth daily at bedtime    gabapentin (NEURONTIN) 800 mg tablet TAKE 1 TABLET (800 MG TOTAL) BY MOUTH 3 (THREE) TIMES A DAY    ibuprofen (MOTRIN) 800 mg tablet Take 1 tablet (800 mg total) by mouth 2 (two) times a day    lisinopril (ZESTRIL) 10 mg tablet Take 10 mg by mouth daily    metFORMIN (GLUMETZA) 1000 MG (MOD) 24 hr tablet Take 1,000 mg by mouth 2 (two) times a day with meals    pantoprazole (PROTONIX) 20 mg tablet Take  "20 mg by mouth daily    polyethylene glycol (MIRALAX) 17 g packet Take 17 g by mouth daily as needed (PRN per day) (Patient not taking: Reported on 7/6/2023)    TRAZODONE HCL PO Take 75 mg by mouth daily at bedtime      [DISCONTINUED] DULoxetine (CYMBALTA) 30 mg delayed release capsule Take 1 capsule (30 mg total) by mouth daily     Allergies   Allergen Reactions    Metoprolol Other (See Comments)     Too low of a heart rate    Penicillin V Shortness Of Breath    Penicillins Shortness Of Breath and Rash     rash     Immunization History   Administered Date(s) Administered    COVID-19 MODERNA VACC 0.5 ML IM 01/04/2021, 02/02/2021, 11/26/2021    COVID-19 Moderna Vac BIVALENT 12 Yr+ IM 0.5 ML 09/23/2022    COVID-19 Moderna mRNA Vaccine 12 Yr+ 50 mcg/0.5 mL (Spikevax) 11/01/2023    INFLUENZA 09/28/2009, 01/20/2011, 11/01/2019, 10/19/2020, 12/01/2021, 09/22/2023    Pneumococcal Polysaccharide PPV23 05/30/2019    Tdap 07/31/2015       Objective     /74 (BP Location: Left arm, Patient Position: Sitting, Cuff Size: Large)   Pulse 99   Temp 98 °F (36.7 °C)   Ht 5' 7\" (1.702 m)   Wt 108 kg (238 lb)   SpO2 96%   BMI 37.28 kg/m²     Physical Exam  Constitutional:       General: He is not in acute distress.     Appearance: He is well-developed. He is not diaphoretic.   Eyes:      General: No scleral icterus.     Pupils: Pupils are equal, round, and reactive to light.   Cardiovascular:      Rate and Rhythm: Normal rate and regular rhythm.      Heart sounds: Normal heart sounds. No murmur heard.  Pulmonary:      Effort: Pulmonary effort is normal. No respiratory distress.      Breath sounds: Normal breath sounds. No wheezing.   Abdominal:      General: Bowel sounds are normal. There is no distension.      Palpations: Abdomen is soft.      Tenderness: There is no abdominal tenderness.   Musculoskeletal:         General: Tenderness present.      Comments: Sharp pain described to the right of surgical scar.  Limited ROM " on lateral rotation and flexion and extension of cervical spine.   Skin:     General: Skin is warm and dry.      Findings: No rash.   Neurological:      Mental Status: He is alert and oriented to person, place, and time.       Ulises Truong MD

## 2024-05-06 NOTE — PROGRESS NOTES
"Daily Note     Today's date: 2024  Patient name: Efra Garcia Jr.  : 1965  MRN: [id number]  Referring provider: Mimi Manuel PA-C  Dx:   Encounter Diagnosis     ICD-10-CM    1. 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 Other spondylosis with myelopathy, cervical region  M47.12                      Subjective: Pt reports having the right side of his neck is hurting him and he is not sure why.      Objective: See treatment diary below      Assessment: Tolerated treatment fair. Patient has a moderate soft tissue restriction in his right scalene area today as well as his traditional areas in lower cervical spine and scapular area.      Plan: Continue per plan of care.      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 Fusion (23)       Manuals 24 4-24-24 4-26-24 5-3-24 5-6-24   STM to sub occipital to mid thoracic 15' 15' 15' 15' 25'   AROM c rotation w/ manual c2 block        Bilateral ulnar nerve mobilization                Neuro Re-Ed         LTP/ MTP  Blue 3x10 each Blue 3x10 ea Blue 3x10 ea Blue 3x10 ea Black 3x10   Cervical retraction in available ROM 5\" x20 5\" x20 5\" x20 5\" x20 5\" x20   scap retract/ ER in doorframe Green 3x10  Blue 3x10 Green 3x10 Red 3x10 Green 3x10   Standing hip abd and ext        BOSU mini lunges        Heel raises for strength and balance        Wall ball squats 30x 3x10      Wall push ups   2x10                                      Ther Ex        Seated thoracic extension AROM mobs w/ roll and board 5\" x20 2 lvls 5\" x20 2 lvls 5\" x20 2lvls  5\" x20 2lvls 5\" x15 all levels   Gentle UT side bend 10\"x12 christopher  Resume NV       Cerv nod  10\"x12 10\"x12 10\"x12 10\"x12 10\" x12   Nustep         Scap retraction 5\" x20 5\" x20 5\" x20 5\" x20 W/ roll 5\" x15   SRC L3 hills 10' L3 Random-10' L3 random-10'  L3 random -10'  L3 10'   Knee extension 55# 3x10 55# 3x10 55# 3x10 55# 3x10 55# 3x10   Knee curls 55# 3x10 55# 3x10 55# 3x10 55# 3x10 55# 3x10                                   Pt Ed/ HEP      "           Ther Activity                        Gait Training                        Modalities         IFC w/MHP 15' post tx  IFC w/MHP 15' post tx  IFC w/MHP 15' post tx  IFC w/MHP 15' post tx  IFC w/MHP 15' post tx         MHP 10'  start

## 2024-05-08 ENCOUNTER — OFFICE VISIT (OUTPATIENT)
Dept: PHYSICAL THERAPY | Facility: CLINIC | Age: 59
End: 2024-05-08
Payer: OTHER MISCELLANEOUS

## 2024-05-08 DIAGNOSIS — M47.12 OTHER SPONDYLOSIS WITH MYELOPATHY, CERVICAL REGION: ICD-10-CM

## 2024-05-08 DIAGNOSIS — M43.22 FUSION OF SPINE OF CERVICAL REGION: Primary | ICD-10-CM

## 2024-05-08 PROCEDURE — 97112 NEUROMUSCULAR REEDUCATION: CPT | Performed by: PHYSICAL THERAPIST

## 2024-05-08 PROCEDURE — 97110 THERAPEUTIC EXERCISES: CPT | Performed by: PHYSICAL THERAPIST

## 2024-05-08 PROCEDURE — 97140 MANUAL THERAPY 1/> REGIONS: CPT | Performed by: PHYSICAL THERAPIST

## 2024-05-08 PROCEDURE — 97014 ELECTRIC STIMULATION THERAPY: CPT | Performed by: PHYSICAL THERAPIST

## 2024-05-08 NOTE — PROGRESS NOTES
"Daily Note     Today's date: 2024  Patient name: Efra Garcia Jr.  : 1965  MRN: [id number]  Referring provider: Mimi Manuel PA-C  Dx:   Encounter Diagnosis     ICD-10-CM    1. 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 Other spondylosis with myelopathy, cervical region  M47.12                      Subjective: Stan reports Dr. Truong is sending him for an MRI of his neck due to the continued pain he has in his neck.   He also states his shoulders are starting to feel better.      Objective: See treatment diary below      Assessment: Visual and VC to ensure correct exercise technique. Manuals performed by primary therapist JH with improvement in christopher UT, scap retractors, cerv paraspinals, and suboccipitals following. Pt would continue to benefit from skilled PT.       Plan: Continue with current POC to address pt deficits.      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 Fusion (23)       Manuals 5-8-24 --24 -24 5-3-24 5--24   STM to sub occipital to mid thoracic 15' 15' 15' 15' 25'   AROM c rotation w/ manual c2 block        Bilateral ulnar nerve mobilization                Neuro Re-Ed         LTP/ MTP  Blue 3x10 each Blue 3x10 ea Blue 3x10 ea Blue 3x10 ea Black 3x10   Cervical retraction in available ROM 5\" x20 5\" x20 5\" x20 5\" x20 5\" x20   scap retract/ ER in doorframe Blue 3x10  Blue 3x10 Green 3x10 Red 3x10 Green 3x10   Standing hip abd and ext        BOSU mini lunges        Heel raises for strength and balance        Wall ball squats  3x10      Wall push ups   2x10                                      Ther Ex        Seated thoracic extension AROM mobs w/ roll and board 5\" x20 2 lvls 5\" x20 2 lvls 5\" x20 2lvls  5\" x20 2lvls 5\" x15 all levels   Gentle UT side bend 10\"x12 christopher  Resume NV       Cerv nod  10\"x12 10\"x12 10\"x12 10\"x12 10\" x12   Nustep         Scap retraction 5\" x20 5\" x20 5\" x20 5\" x20 W/ roll 5\" x15   SRC L3 hills 10' L3 Random-10' L3 random-10'  L3 random -10'  L3 10'   Knee extension " 55# 3x10 55# 3x10 55# 3x10 55# 3x10 55# 3x10   Knee curls 55# 3x10 55# 3x10 55# 3x10 55# 3x10 55# 3x10                                   Pt Ed/ HEP                Ther Activity                        Gait Training                        Modalities         IFC w/MHP 15' post tx  IFC w/MHP 15' post tx  IFC w/MHP 15' post tx  IFC w/MHP 15' post tx  IFC w/MHP 15' post tx         MHP 10'  start

## 2024-05-10 ENCOUNTER — OFFICE VISIT (OUTPATIENT)
Dept: PHYSICAL THERAPY | Facility: CLINIC | Age: 59
End: 2024-05-10
Payer: OTHER MISCELLANEOUS

## 2024-05-10 DIAGNOSIS — M47.12 OTHER SPONDYLOSIS WITH MYELOPATHY, CERVICAL REGION: ICD-10-CM

## 2024-05-10 DIAGNOSIS — M43.22 FUSION OF SPINE OF CERVICAL REGION: Primary | ICD-10-CM

## 2024-05-10 PROCEDURE — 97014 ELECTRIC STIMULATION THERAPY: CPT | Performed by: PHYSICAL THERAPIST

## 2024-05-10 NOTE — PROGRESS NOTES
"Daily Note     Today's date: 5/10/2024  Patient name: Efra Garcia Jr.  : 1965  MRN: [id number]  Referring provider: Mimi Manuel PA-C  Dx:   Encounter Diagnosis     ICD-10-CM    1. 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 Other spondylosis with myelopathy, cervical region  M47.12                      Subjective: Pt reports his neck is moving more but having some acute discomfort in right neck.      Objective: See treatment diary below      Assessment: Tolerated treatment well. Patient having improvements in strength and shoulder mobility as well.      Plan: Continue per plan of care.  Progress treatment as tolerated.       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 Fusion (23)       Manuals 5-8-24 5-10-24 4-26-24 5-3-24 5-6-24   STM to sub occipital to mid thoracic 15' 15' 15' 15' 25'   AROM c rotation w/ manual c2 block        Bilateral ulnar nerve mobilization                Neuro Re-Ed         LTP/ MTP  Blue 3x10 each Blue 3x10 ea Blue 3x10 ea Blue 3x10 ea Black 3x10   Cervical retraction in available ROM 5\" x20 5\" x20 5\" x20 5\" x20 5\" x20   scap retract/ ER in doorframe Blue 3x10  Blue 3x10 Green 3x10 Red 3x10 Green 3x10   Standing hip abd and ext        BOSU mini lunges        Heel raises for strength and balance        Wall ball squats  3x10      Wall push ups   2x10                                      Ther Ex        Seated thoracic extension AROM mobs w/ roll and board 5\" x20 2 lvls 5\" x20 2 lvls 5\" x20 2lvls  5\" x20 2lvls 5\" x15 all levels   Gentle UT side bend 10\"x12 christopher  Resume NV       Cerv nod  10\"x12 10\"x12 10\"x12 10\"x12 10\" x12   Nustep         Scap retraction 5\" x20 5\" x20 5\" x20 5\" x20 W/ roll 5\" x15   SRC L3 hills 10' L3 Random-10' L3 random-10'  L3 random -10'  L3 10'   Knee extension 55# 3x10 55# 3x10 55# 3x10 55# 3x10 55# 3x10   Knee curls 55# 3x10 55# 3x10 55# 3x10 55# 3x10 55# 3x10                                   Pt Ed/ HEP                Ther Activity                        Gait " Training                        Modalities         IFC w/MHP 15' post tx  IFC w/MHP 15' post tx  IFC w/MHP 15' post tx  IFC w/MHP 15' post tx  IFC w/MHP 15' post tx         MHP 10'  start

## 2024-05-15 ENCOUNTER — EVALUATION (OUTPATIENT)
Dept: PHYSICAL THERAPY | Facility: CLINIC | Age: 59
End: 2024-05-15
Payer: OTHER MISCELLANEOUS

## 2024-05-15 DIAGNOSIS — M43.22 FUSION OF SPINE OF CERVICAL REGION: Primary | ICD-10-CM

## 2024-05-15 DIAGNOSIS — M47.12 OTHER SPONDYLOSIS WITH MYELOPATHY, CERVICAL REGION: ICD-10-CM

## 2024-05-15 PROCEDURE — 97110 THERAPEUTIC EXERCISES: CPT

## 2024-05-15 PROCEDURE — 97140 MANUAL THERAPY 1/> REGIONS: CPT

## 2024-05-15 PROCEDURE — 97112 NEUROMUSCULAR REEDUCATION: CPT

## 2024-05-15 PROCEDURE — 97014 ELECTRIC STIMULATION THERAPY: CPT

## 2024-05-15 NOTE — PROGRESS NOTES
PT Re-Evaluation  Collection of subjective/objective data performed by Miguelina Russo PTA; Assessment, POC, and Goal attainment performed by Alfie Kumar DPT      Today's date: 5-15-24  Patient name: Efra Garcia Jr.  : 1965  MRN: [id number]  Referring provider: Mimi Manuel PA-C  Dx:   Encounter Diagnosis     ICD-10-CM    1. 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 Other spondylosis with myelopathy, cervical region  M47.12                      Assessment  Assessment details: Pt presents with significant soft tissue and mobility restrictions in his neck and upper body.  He has continued ulnar nerve symptoms bilaterally, as well as some weakness in left leg which is leading to minor balance issues.    23:  Pt has shown some improvements in mobility and some increase in strength but has a long way to go for return to reasonable function.    24:  Pt still in a great deal of pain with neck and shoulders.  Still needs to further progress strength with care to no fully exacerbate pain symptoms.    3-6-24:  Pt presents with similar signs and symptoms as previous assessment.  Left shoulder and upper postural muscles are deconditioned.  Impairments: abnormal or restricted ROM, activity intolerance, impaired balance, impaired physical strength, lacks appropriate home exercise program, pain with function and poor posture     5-15-24:   Pt still being limited by significant pain in right mid cervical spine.  He is getting scheduled to have an MRI to help assess this.  His shoulders are both feeling better and he has started to progress in strengthening activities in PT and more activities at home.    Symptom irritability: moderateUnderstanding of Dx/Px/POC: good   Prognosis: fair    Goals  ST) Pt. Will be able to sleep through the night without being awoken with pain and with minimal to no pain upon waking in 6 weeks. -SOME IMPROVEMENT  2) Pt. Will have elimination of headaches in 6 weeks.  -SOME IMPROVEMENT   3)  Pt.'s radicular symptoms will be centralized to neck in 6 weeks. -INCONSISTENT IMPROVEMENT  LT) Pt. Will be able to complete all chores at home without pain or limitations in 12 weeks. -LITTLE IMPROVEMENT  2)  Pt. Will be able to to sleep a full night without limitations -SOME IMPROVEMENT    Plan  Patient would benefit from: PT eval and skilled physical therapy  Planned modality interventions: thermotherapy: hydrocollator packs, cryotherapy and unattended electrical stimulation  Planned therapy interventions: manual therapy, neuromuscular re-education, patient education, self care, therapeutic activities, therapeutic exercise and home exercise program  Frequency: 2x week  Duration in weeks: 8  Treatment plan discussed with: patient        Subjective Evaluation    History of Present Illness  Date of onset: 2005  Date of surgery: 2023  Mechanism of injury: surgery  Mechanism of injury: c3-t1 decompression/ fusion after years of being denied surgical requests.  Pt reports headaches, stiffness and weakness/ strain in neck.  He still has same amount of (ulnar) nerve symptoms as prior to surgery.  He also reports left leg weakness and stability.    23:  Pt reports being a little looser and able to turn his head better but still in a fair amount of discomfort.    24: Pt reports almost going to the ER yesterday due to pain and spasms in c-spine specifically christopher UT, cerv paraspinals, suboccipitals. He reports numbness in L LE. He reports pain in L shoulder and reports not being able to sleep comfortable during the night time due to pain/discomfort.     3-6-24:  Pt still reporting similar neck issues but now bilateral shoulder pain and weakness has worsened.    5-15-24: Stan reports 70% improvement since starting skilled PT following multi-level fusion. He reports by the end of the day he feels he needs to wear a neck brace due to extreme soreness to cervical muscles. He reports  most pain to R aspect of c-spine and feels pain to christopher UE volar surface. He is able to do more around the house but is still limited by pain and soreness. He reports decreased motor coordination using R UE with weighted objects specifically bringing a coffee cup to mouth. He will be getting MRI on c-spine next week.           Recurrent probem    Quality of life: fair    Patient Goals  Patient goal: Gain maximum movement from his neck.  Pain  Current pain ratin  At best pain rating: 3  At worst pain rating: 10  Location: anterior and posterior neck pain  Progression: no change      Diagnostic Tests    FCE comments: Pt reports being listed as temporarily totally disabled by the [facility] government.      Objective     Postural Observations  Seated posture: poor  Standing posture: poor    Additional Postural Observation Details  Moderate forward head and rounded shoulders    Palpation   Left   No palpable tenderness to the biceps.   Hypertonic in the cervical paraspinals, levator scapulae, middle trapezius, pectoralis minor, rhomboids, scalenes, sternocleidomastoid, suboccipitals and upper trapezius.   Tenderness of the biceps, cervical paraspinals, levator scapulae, middle trapezius, pectoralis minor, rhomboids, scalenes, sternocleidomastoid, suboccipitals and upper trapezius.   Trigger point to levator scapulae, pectoralis minor, suboccipitals and upper trapezius.     Right   Hypertonic in the cervical paraspinals, levator scapulae, middle trapezius, pectoralis minor, rhomboids, scalenes, sternocleidomastoid, suboccipitals and upper trapezius.   Tenderness of the cervical paraspinals, levator scapulae, middle trapezius, pectoralis minor, rhomboids, scalenes, sternocleidomastoid, suboccipitals and upper trapezius.   Trigger point to levator scapulae, pectoralis minor, suboccipitals and upper trapezius.     Tenderness     Left Shoulder   Tenderness in the biceps tendon (proximal), bicipital groove and coracoid  "process.     Additional Tenderness Details  TTP in left anterior GH joint line.    Active Range of Motion   Cervical/Thoracic Spine       Cervical  Subcranial protraction:  WFL   Subcranial retraction:  with pain   Restriction level: maximal  Flexion:  Restriction level: moderate  Extension:  with pain Restriction level: maximal  Left lateral flexion:  Restriction level: maximal  Right lateral flexion:  Restriction level maximal  Left rotation:  with pain Restriction level: moderate  Right rotation:  with pain Restriction level: moderate    Strength/Myotome Testing   Cervical Spine     Left   Neck lateral flexion (C3): 3+    Right   Neck lateral flexion (C3): 3+    Left Shoulder     Planes of Motion   Abduction: 4    Right Shoulder     Planes of Motion   Abduction: 4-     Left Elbow   Flexion: 4  Extension: 4    Right Elbow   Flexion: 4  Extension: 4    Left Wrist/Hand   Wrist extension: 4  Wrist flexion: 4-  Thumb extension: 3    Right Wrist/Hand   Wrist extension: 4  Wrist flexion: 4-  Thumb extension: 3         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 Fusion (6/19/23)       Manuals 5-8-24 5-10-24 5-15-24 5-3-24 5-6-24   STM to sub occipital to mid thoracic 15' 15' 15' 15' 25'   AROM c rotation w/ manual c2 block        Bilateral ulnar nerve mobilization                Neuro Re-Ed         LTP/ MTP  Blue 3x10 each Blue 3x10 ea Black 3x10 ea Blue 3x10 ea Black 3x10   Cervical retraction in available ROM 5\" x20 5\" x20 5\" x20 5\" x20 5\" x20   scap retract/ ER in doorframe Blue 3x10  Blue 3x10 Pt deferred  Red 3x10 Green 3x10   Standing hip abd and ext        BOSU mini lunges        Heel raises for strength and balance        Wall ball squats  3x10 Not today      Wall push ups   2x10 Not today                                      Ther Ex        Seated thoracic extension AROM mobs w/ roll and board 5\" x20 2 lvls 5\" x20 2 lvls 5\" x20 2 lvls  5\" x20 2lvls 5\" x15 all levels           Gentle UT side bend 10\"x12 christopher  Resume NV       Cerv nod  " "10\"x12 10\"x12 10\"x12 10\"x12 10\" x12   Nustep         Scap retraction 5\" x20 5\" x20 5\" x20 5\" x20 W/ roll 5\" x15   SRC L3 hills 10' L3 Random-10' L3 fat burn 10' L3 random -10'  L3 10'   Knee extension 55# 3x10 55# 3x10 55# 3x10 55# 3x10 55# 3x10   Knee curls 55# 3x10 55# 3x10 55# 3x10 55# 3x10 55# 3x10                                   Pt Ed/ HEP                Ther Activity                        Gait Training                        Modalities         IFC w/MHP 15' post tx  IFC w/MHP 15' post tx  IFC w/MHP 15' post tx  IFC w/MHP 15' post tx  IFC w/MHP 15' post tx         MHP 10'  start                                     "

## 2024-05-16 DIAGNOSIS — M54.12 CERVICAL RADICULOPATHY: ICD-10-CM

## 2024-05-17 ENCOUNTER — APPOINTMENT (OUTPATIENT)
Dept: PHYSICAL THERAPY | Facility: CLINIC | Age: 59
End: 2024-05-17
Payer: OTHER MISCELLANEOUS

## 2024-05-17 RX ORDER — GABAPENTIN 800 MG/1
800 TABLET ORAL 3 TIMES DAILY
Qty: 90 TABLET | Refills: 1 | Status: SHIPPED | OUTPATIENT
Start: 2024-05-17

## 2024-05-19 ENCOUNTER — HOSPITAL ENCOUNTER (OUTPATIENT)
Dept: MRI IMAGING | Facility: HOSPITAL | Age: 59
Discharge: HOME | End: 2024-05-19
Attending: FAMILY MEDICINE
Payer: OTHER MISCELLANEOUS

## 2024-05-19 DIAGNOSIS — M96.1 CERVICAL POST-LAMINECTOMY SYNDROME: ICD-10-CM

## 2024-05-19 DIAGNOSIS — M54.12 CERVICAL RADICULOPATHY: ICD-10-CM

## 2024-05-19 PROCEDURE — 72141 MRI NECK SPINE W/O DYE: CPT

## 2024-05-20 ENCOUNTER — OFFICE VISIT (OUTPATIENT)
Dept: PHYSICAL THERAPY | Facility: CLINIC | Age: 59
End: 2024-05-20
Payer: OTHER MISCELLANEOUS

## 2024-05-20 DIAGNOSIS — M43.22 FUSION OF SPINE OF CERVICAL REGION: Primary | ICD-10-CM

## 2024-05-20 DIAGNOSIS — M47.12 OTHER SPONDYLOSIS WITH MYELOPATHY, CERVICAL REGION: ICD-10-CM

## 2024-05-20 PROCEDURE — 97014 ELECTRIC STIMULATION THERAPY: CPT | Performed by: PHYSICAL THERAPIST

## 2024-05-20 PROCEDURE — 97110 THERAPEUTIC EXERCISES: CPT | Performed by: PHYSICAL THERAPIST

## 2024-05-20 PROCEDURE — 97140 MANUAL THERAPY 1/> REGIONS: CPT | Performed by: PHYSICAL THERAPIST

## 2024-05-20 NOTE — PROGRESS NOTES
"Daily Note     Today's date: 2024  Patient name: Efra Garcia Jr.  : 1965  MRN: [id number]  Referring provider: Mimi Manuel PA-C  Dx:   Encounter Diagnosis     ICD-10-CM    1. 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 Other spondylosis with myelopathy, cervical region  M47.12                      Subjective: Pt reports he is achy everywhere and his neck is stiff but his shoulders are feeling much better.      Objective: See treatment diary below      Assessment: Tolerated treatment well. Patient exhibited good technique with therapeutic exercises and would benefit from continued PT      Plan: Progress treatment as tolerated.       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 Fusion (23)       Manuals 5-8-24 5-10-24 5-20-24-24 5-3-24 5-6-24   STM to sub occipital to mid thoracic 15' 15' 15' 15' 25'   AROM c rotation w/ manual c2 block        Bilateral ulnar nerve mobilization                Neuro Re-Ed         LTP/ MTP  Blue 3x10 each Blue 3x10 ea Blue 3x10 ea Blue 3x10 ea Black 3x10   Cervical retraction in available ROM 5\" x20 5\" x20 5\" x20 5\" x20 5\" x20   scap retract/ ER in doorframe Blue 3x10  Blue 3x10 Green 3x10 Red 3x10 Green 3x10   Standing hip abd and ext        BOSU mini lunges        Heel raises for strength and balance        Wall ball squats  3x10      Wall push ups   2x10                                      Ther Ex        Seated thoracic extension AROM mobs w/ roll and board 5\" x20 2 lvls 5\" x20 2 lvls 5\" x20 2lvls  5\" x20 2lvls 5\" x15 all levels   Gentle UT side bend 10\"x12 christopher  Resume NV       Cerv nod  10\"x12 10\"x12 10\"x12 10\"x12 10\" x12   Nustep         Scap retraction 5\" x20 5\" x20 5\" x20 5\" x20 W/ roll 5\" x15   SRC L3 hills 10' L3 Random-10' L3 random-10'  L3 random -10'  L3 10'   Knee extension 55# 3x10 55# 3x10 55# 3x10 55# 3x10 55# 3x10   Knee curls 55# 3x10 55# 3x10 55# 3x10 55# 3x10 55# 3x10                                   Pt Ed/ HEP                Ther Activity                 "        Gait Training                        Modalities         IFC w/MHP 15' post tx  IFC w/MHP 15' post tx  IFC w/MHP 15' post tx  IFC w/MHP 15' post tx  IFC w/MHP 15' post tx         MHP 10'  start

## 2024-05-22 ENCOUNTER — APPOINTMENT (OUTPATIENT)
Dept: PHYSICAL THERAPY | Facility: CLINIC | Age: 59
End: 2024-05-22
Payer: OTHER MISCELLANEOUS

## 2024-05-24 ENCOUNTER — OFFICE VISIT (OUTPATIENT)
Dept: PHYSICAL THERAPY | Facility: CLINIC | Age: 59
End: 2024-05-24
Payer: OTHER MISCELLANEOUS

## 2024-05-24 DIAGNOSIS — M47.12 OTHER SPONDYLOSIS WITH MYELOPATHY, CERVICAL REGION: ICD-10-CM

## 2024-05-24 DIAGNOSIS — M43.22 FUSION OF SPINE OF CERVICAL REGION: Primary | ICD-10-CM

## 2024-05-24 PROCEDURE — 97112 NEUROMUSCULAR REEDUCATION: CPT | Performed by: PHYSICAL THERAPIST

## 2024-05-24 PROCEDURE — 97140 MANUAL THERAPY 1/> REGIONS: CPT | Performed by: PHYSICAL THERAPIST

## 2024-05-24 PROCEDURE — 97014 ELECTRIC STIMULATION THERAPY: CPT | Performed by: PHYSICAL THERAPIST

## 2024-05-24 PROCEDURE — 97110 THERAPEUTIC EXERCISES: CPT | Performed by: PHYSICAL THERAPIST

## 2024-05-24 NOTE — PROGRESS NOTES
"Daily Note     Today's date: 2024  Patient name: Efra Garcia Jr.  : 1965  MRN: [id number]  Referring provider: Mimi Manuel PA-C  Dx:   Encounter Diagnosis     ICD-10-CM    1. 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 Other spondylosis with myelopathy, cervical region  M47.12                      Subjective:  Pt reports his arms are getting stronger but neck is still sore.      Objective: See treatment diary below      Assessment: Tolerated treatment well. Patient is getting stronger and able to do more.      Plan: Continue per plan of care.  Progress treatment as tolerated.       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 Fusion (23)       Manuals 5-8-24 5-10-24 5-24-24 5-3-24 5-6-24   STM to sub occipital to mid thoracic 15' 15' 15' 15' 25'   AROM c rotation w/ manual c2 block        Bilateral ulnar nerve mobilization                Neuro Re-Ed         LTP/ MTP  Blue 3x10 each Blue 3x10 ea Blue 3x10 ea Blue 3x10 ea Black 3x10   Cervical retraction in available ROM 5\" x20 5\" x20 5\" x20 5\" x20 5\" x20   scap retract/ ER in doorframe Blue 3x10  Blue 3x10 Green 3x10 Red 3x10 Green 3x10   Standing hip abd and ext        BOSU mini lunges        Heel raises for strength and balance        Wall ball squats  3x10 3x10     Wall push ups   2x10 2x10                                     Ther Ex        Seated thoracic extension AROM mobs w/ roll and board 5\" x20 2 lvls 5\" x20 2 lvls 5\" x20 2lvls  5\" x20 2lvls 5\" x15 all levels   Gentle UT side bend 10\"x12 christopher  Resume NV       Cerv nod  10\"x12 10\"x12 10\"x12 10\"x12 10\" x12   Nustep         Scap retraction 5\" x20 5\" x20 5\" x20 5\" x20 W/ roll 5\" x15   SRC L3 hills 10' L3 Random-10' L3 random-10'  L3 random -10'  L3 10'   Knee extension 55# 3x10 55# 3x10 55# 3x10 55# 3x10 55# 3x10   Knee curls 55# 3x10 55# 3x10 55# 3x10 55# 3x10 55# 3x10                                   Pt Ed/ HEP                Ther Activity                        Gait Training                      "   Modalities         IFC w/MHP 15' post tx  IFC w/MHP 15' post tx  IFC w/MHP 15' post tx  IFC w/MHP 15' post tx  IFC w/MHP 15' post tx         MHP 10'  start

## 2024-05-31 ENCOUNTER — OFFICE VISIT (OUTPATIENT)
Dept: PHYSICAL THERAPY | Facility: CLINIC | Age: 59
End: 2024-05-31
Payer: OTHER MISCELLANEOUS

## 2024-05-31 DIAGNOSIS — M47.12 OTHER SPONDYLOSIS WITH MYELOPATHY, CERVICAL REGION: ICD-10-CM

## 2024-05-31 DIAGNOSIS — M43.22 FUSION OF SPINE OF CERVICAL REGION: Primary | ICD-10-CM

## 2024-05-31 PROCEDURE — 97014 ELECTRIC STIMULATION THERAPY: CPT

## 2024-05-31 PROCEDURE — 97140 MANUAL THERAPY 1/> REGIONS: CPT

## 2024-05-31 PROCEDURE — 97110 THERAPEUTIC EXERCISES: CPT

## 2024-05-31 PROCEDURE — 97112 NEUROMUSCULAR REEDUCATION: CPT

## 2024-05-31 NOTE — PROGRESS NOTES
"Daily Note     Today's date: 2024  Patient name: Efra Garcia Jr.  : 1965  MRN: [id number]  Referring provider: Mimi Manuel PA-C  Dx:   Encounter Diagnosis     ICD-10-CM    1. 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 Other spondylosis with myelopathy, cervical region  M47.12                      Subjective: Stan reports soreness to c-spine.       Objective: See treatment diary below      Assessment: Visual and VC to ensure correct exercise technique. Improvement in soft tissue quality following STM to christopher UT, cerv paraspinals, suboccipitals. Denied any increases in pain with exercises performed. Appropriate muscle fatigue following strengthening exercises. Pt would continue to benefit from skilled PT.       Plan: Continue with current POC to address pt deficits.      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 Fusion (23)       Manuals -8-24 5-10-24 -24 -24 5--24   STM to sub occipital to mid thoracic 15' 15' 15' 15' 25'   AROM c rotation w/ manual c2 block        Bilateral ulnar nerve mobilization                Neuro Re-Ed         LTP/ MTP  Blue 3x10 each Blue 3x10 ea Blue 3x10 ea Black 3x10 ea Black 3x10   Cervical retraction in available ROM 5\" x20 5\" x20 5\" x20 5\" x20 5\" x20   scap retract/ ER in doorframe Blue 3x10  Blue 3x10 Green 3x10 Blue 3x10 Green 3x10   Standing hip abd and ext        BOSU mini lunges        Heel raises for strength and balance        Wall ball squats  3x10 3x10     Wall push ups   2x10 2x10                                     Ther Ex        Seated thoracic extension AROM mobs w/ roll and board 5\" x20 2 lvls 5\" x20 2 lvls 5\" x20 2lvls  5\" x20 2 lvls  5\" x15 all levels   Gentle UT side bend 10\"x12 christopher  Resume NV       Cerv nod  10\"x12 10\"x12 10\"x12 10\"x12 10\" x12   Nustep         Scap retraction 5\" x20 5\" x20 5\" x20 5\" x20 W/ roll 5\" x15   SRC L3 hills 10' L3 Random-10' L3 random-10'  L3-random 10' L3 10'   Knee extension 55# 3x10 55# 3x10 55# 3x10 55# 3x10 55# 3x10 "   Knee curls 55# 3x10 55# 3x10 55# 3x10 55# 3x10 55# 3x10                                   Pt Ed/ HEP                Ther Activity                        Gait Training                        Modalities         IFC w/MHP 15' post tx  IFC w/MHP 15' post tx  IFC w/MHP 15' post tx  IFC w/MHP 15' post tx  IFC w/MHP 15' post tx         MHP 10'  start

## 2024-06-03 ENCOUNTER — OFFICE VISIT (OUTPATIENT)
Dept: PHYSICAL THERAPY | Facility: CLINIC | Age: 59
End: 2024-06-03
Payer: OTHER MISCELLANEOUS

## 2024-06-03 DIAGNOSIS — M47.12 OTHER SPONDYLOSIS WITH MYELOPATHY, CERVICAL REGION: ICD-10-CM

## 2024-06-03 DIAGNOSIS — M43.22 FUSION OF SPINE OF CERVICAL REGION: Primary | ICD-10-CM

## 2024-06-03 PROCEDURE — 97110 THERAPEUTIC EXERCISES: CPT | Performed by: PHYSICAL THERAPIST

## 2024-06-03 PROCEDURE — 97140 MANUAL THERAPY 1/> REGIONS: CPT | Performed by: PHYSICAL THERAPIST

## 2024-06-03 PROCEDURE — 97014 ELECTRIC STIMULATION THERAPY: CPT | Performed by: PHYSICAL THERAPIST

## 2024-06-03 PROCEDURE — 97112 NEUROMUSCULAR REEDUCATION: CPT | Performed by: PHYSICAL THERAPIST

## 2024-06-03 NOTE — PROGRESS NOTES
"Daily Note     Today's date: 6/3/2024  Patient name: Efra Garcia Jr.  : 1965  MRN: [id number]  Referring provider: Mimi Manuel PA-C  Dx:   Encounter Diagnosis     ICD-10-CM    1. 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 Other spondylosis with myelopathy, cervical region  M47.12                      Subjective: Pt reports his neck remains sore.  He is awaiting the results of the cervical MRI he had.  Reports having his left leg give out on him periodically for about a week now.      Objective: See treatment diary below.  No abnormal reflexes present      Assessment: Tolerated treatment well. Patient had to leave session early due to another MD appointment.      Plan: Continue per plan of care.  Progress treatment as tolerated.       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 Fusion (23)       Manuals 5-8-24 5-10-24 -24 -24 6-3-24   STM to sub occipital to mid thoracic 15' 15' 15' 15' 25'   AROM c rotation w/ manual c2 block        Bilateral ulnar nerve mobilization                Neuro Re-Ed         LTP/ MTP  Blue 3x10 each Blue 3x10 ea Blue 3x10 ea Black 3x10 ea Defer-time constraints   Cervical retraction in available ROM 5\" x20 5\" x20 5\" x20 5\" x20 5\" x20   scap retract/ ER in doorframe Blue 3x10  Blue 3x10 Green 3x10 Blue 3x10 defer   Standing hip abd and ext        BOSU mini lunges        Heel raises for strength and balance        Wall ball squats  3x10 3x10     Wall push ups   2x10 2x10                                     Ther Ex        Seated thoracic extension AROM mobs w/ roll and board 5\" x20 2 lvls 5\" x20 2 lvls 5\" x20 2lvls  5\" x20 2 lvls  5\" x15 all levels   Gentle UT side bend 10\"x12 christopher  Resume NV       Cerv nod  10\"x12 10\"x12 10\"x12 10\"x12 10\" x12   Nustep         Scap retraction 5\" x20 5\" x20 5\" x20 5\" x20 W/ roll 5\" x15   SRC L3 hills 10' L3 Random-10' L3 random-10'  L3-random 10' Defer-time   Knee extension 55# 3x10 55# 3x10 55# 3x10 55# 3x10 55# 3x10   Knee curls 55# 3x10 55# " 3x10 55# 3x10 55# 3x10 55# 3x10                                   Pt Ed/ HEP                Ther Activity                        Gait Training                        Modalities         IFC w/MHP 15' post tx  IFC w/MHP 15' post tx  IFC w/MHP 15' post tx  IFC w/MHP 15' post tx  IFC w/MHP 15' post tx         MHP 10'  start

## 2024-06-03 NOTE — PROGRESS NOTES
Pain Medicine Follow-Up Note    Assessment:  1. Left leg weakness    2. Cervical radiculopathy    3. Cervical post-laminectomy syndrome    4. Lumbar radiculopathy    5. Chronic bilateral low back pain with left-sided sciatica    6. Myofascial pain syndrome        Plan:  Orders Placed This Encounter   Procedures    MRI lumbar spine wo contrast     Standing Status:   Future     Standing Expiration Date:   6/4/2028     Scheduling Instructions:      There is no preparation for this test. Please leave your jewelry and valuables at home, wedding rings are the exception. All patients will be required to change into a hospital gown and pants.  Street clothes are not permitted in the MRI.  Magnetic nail polish must be removed prior to arrival for your test. Please bring your insurance cards, a form of photo ID and a list of your medications with you. Arrive 15 minutes prior to your appointment time in order to register. Please bring any prior CT or MRI studies of this area that were not performed at a [facility].            To schedule this appointment, please contact Central Scheduling at [phone].            Prior to your appointment, please make sure you complete the MRI Screening Form when you e-Check in for your appointment. This will be available starting 7 days before your appointment in [employer]. You may receive an e-mail with an activation code if you do not have a [employer] account. If you do not have access to a device, we will complete your screening at your appointment.     Order Specific Question:   What is the patient's sedation requirement?     Answer:   No Sedation     Order Specific Question:   Does the patient have metallic implants?     Answer:   No     Order Specific Question:   Does this procedure require the 3T MRI at [place] or [place]?     Answer:   No     Order Specific Question:   Release to patient through [employer]     Answer:   Immediate     Order Specific Question:   Is order priority  selected as STAT?     Answer:   No     Order Specific Question:   Reason for Exam     Answer:   low back pain, left leg weakness    FL spine and pain procedure     Standing Status:   Future     Standing Expiration Date:   6/4/2028     Order Specific Question:   Reason for Exam:     Answer:   heriberto  c7-T1     Order Specific Question:   Anticoagulant hold needed?     Answer:   no    FL spine and pain procedure     Standing Status:   Future     Standing Expiration Date:   6/4/2028     Order Specific Question:   Reason for Exam:     Answer:   trigger point injections   thoracic area under US     Order Specific Question:   Anticoagulant hold needed?     Answer:   no     My impressions and treatment recommendations were discussed in detail with the patient who verbalized understanding and had no further questions.      The patient returns after having a MRI of his cervical spine which shows at C2-C3 a central disc protrusion with mild to moderate canal stenosis.  I expressed to the patient that if his pain is being generated from this far up in his cervical spine an epidural injection would not benefit him.  Patient does state that he has pain that radiates around his neck and into his shoulders, I did state we could trial a cervical epidural steroid injection to see if it provides him some relief of his pain but unfortunately if it is being generated by C2-C3 I advised that he follow-up with neurosurgery because the only treatment options we would have for him would be oral medications.  The patient is currently using gabapentin 800 mg 3 times daily, duloxetine 60 mg daily and ibuprofen 800 mg twice daily as needed for pain.    Patient also states he expressed to neurosurgery that he has been having left leg weakness.  On exam I find this weakness significant to warrant a updated MRI of his lumbar spine.  Patient also has a positive left slump test.  He has been going to physical therapy 3 days a week since August 2023.      And also has tenderness palpation throughout the thoracic  paraspinal muscles I recommend that he have trigger point injections in hopes of reducing some of his pain. Complete risks and benefits including bleeding, infection, tissue reaction, nerve injury and allergic reaction were discussed. The approach was demonstrated using models and literature was provided. Verbal and written consent was obtained.    Follow-up is planned in 4 weeks after injection time or sooner as warranted.  Discharge instructions were provided. I personally saw and examined the patient and I agree with the above discussed plan of care.    History of Present Illness:    Efra Garcia Jr. is a 58 y.o. male who presents to [facility] for interval re-evaluation of the above stated pain complaints. The patient has a past medical and chronic pain history as outlined in the assessment section. He was last seen on 3/1/2024.    At today's visit patient states that their pain symptoms are the same with a pain score of 5/10 on the verbal numeric pain scale.  The patient's pain is worse in the morning, evening, and at night.  The patient's pain is constant in nature.  And the quality of the patient's pain is described as dull-aching, pressure-like, along with arm pain.  The patient's pain is located in the posterior head, posterior neck, bilateral shoulders, mid back radiating down his left leg to his left foot.  The patient states the amount of pain relief he is obtaining from his current pain relievers which are prescribed through another provider are enough to make a difference in his life by reducing his pain symptoms by 50%.  Patient denies any side effects using gabapentin, duloxetine, or ibuprofen.    Other than as stated above, the patient denies any interval changes in medications, medical condition, mental condition, symptoms, or allergies since the last office visit.         Review of Systems:    Review of  "Systems   Respiratory:  Negative for shortness of breath.    Cardiovascular:  Negative for chest pain.   Gastrointestinal:  Negative for constipation, diarrhea, nausea and vomiting.   Musculoskeletal:  Positive for arthralgias and gait problem. Negative for joint swelling and myalgias.        Pain in Extremity Arms   Skin:  Negative for rash.   Neurological:  Positive for dizziness. Negative for seizures and weakness.   Psychiatric/Behavioral:          Memory Loss   All other systems reviewed and are negative.        Past Medical History:   Diagnosis Date    Anxiety     Arthritis     Bilateral occipital neuralgia     Cervical post-laminectomy syndrome     Cervical radiculopathy     Cervical spine disease     Cervical spondylosis with myelopathy     Chronic lumbar pain     Chronic pain syndrome     Chronic thoracic spine pain     Cubital tunnel syndrome of both upper extremities 09/10/2022    Degenerative cervical spinal stenosis     Dental crowns present     Depression     Diabetes (HCC)     type 2    Exercise involving walking     daily 5-10 minutes    Failed neck syndrome     Fatty liver     Full dentures     upper    GERD (gastroesophageal reflux disease)     History of COVID-19 2021    per pt admitted to the [facility]--    Hyperlipidemia     Hypertension     Irritable bowel syndrome     Motion sickness     Muscle weakness     \"arms and legs\"    Myofascial muscle pain     Neck stiffness     plate implanted-limited ROM    Polyarthralgia     Tinnitus     Wears glasses        Past Surgical History:   Procedure Laterality Date    CERVICAL DISC SURGERY      CERVICAL FUSION  2011    ACDF with Dr. uPrdy    COLONOSCOPY      OK ARTHRD PST/PSTLAT TQ 1NTRSPC CRV BELW C2 SEGMENT Bilateral 6/19/2023    Procedure: C3-T1 posterior decompression with instrumented fixation fusion (impulse monitoring);  Surgeon: Kelton Purdy MD;  Location: [facility];  Service: Neurosurgery    TOTAL SHOULDER REPLACEMENT Right     Reversal "       Family History   Problem Relation Age of Onset    Stroke Mother     Lung cancer Father        Social History     Occupational History    Not on file   Tobacco Use    Smoking status: Never    Smokeless tobacco: Never   Vaping Use    Vaping status: Never Used   Substance and Sexual Activity    Alcohol use: Yes     Alcohol/week: 1.0 standard drink of alcohol     Types: 1 Cans of beer per week     Comment: occasional, weekly    Drug use: No    Sexual activity: Not on file     Comment: defer         Current Outpatient Medications:     acetaminophen (TYLENOL) 325 mg tablet, Take 3 tablets (975 mg total) by mouth every 8 (eight) hours, Disp: 40 tablet, Rfl: 0    ASCORBIC ACID PO, , Disp: , Rfl:     benzonatate (TESSALON PERLES) 100 mg capsule, Take 100 mg by mouth Three times daily as needed, Disp: , Rfl:     Cholecalciferol 25 MCG (1000 UT) capsule, TAKE ONE TABLET BY MOUTH EVERY DAY (FOR LOW VITAMIN D), Disp: , Rfl:     Cyanocobalamin (B-12 PO), Take by mouth, Disp: , Rfl:     cyclobenzaprine (FLEXERIL) 5 mg tablet, Take 1 tablet (5 mg total) by mouth 3 (three) times a day as needed for muscle spasms, Disp: 60 tablet, Rfl: 0    dextrose 1 g CHEW, daily as needed for low blood sugar, Disp: , Rfl:     DULoxetine (CYMBALTA) 60 mg delayed release capsule, Take 1 capsule (60 mg total) by mouth daily, Disp: 30 capsule, Rfl: 2    Empagliflozin 25 MG TABS, Take 25 mg by mouth every morning ---- aka  Jardiance, Disp: , Rfl:     famotidine (PEPCID) 20 mg tablet, Take 20 mg by mouth daily at bedtime, Disp: , Rfl:     gabapentin (NEURONTIN) 800 mg tablet, TAKE 1 TABLET (800 MG TOTAL) BY MOUTH 3 (THREE) TIMES A DAY, Disp: 90 tablet, Rfl: 1    ibuprofen (MOTRIN) 800 mg tablet, Take 1 tablet (800 mg total) by mouth 2 (two) times a day, Disp: 60 tablet, Rfl: 3    lisinopril (ZESTRIL) 10 mg tablet, Take 10 mg by mouth daily, Disp: , Rfl:     losartan (COZAAR) 50 mg tablet, 25 mg, Disp: , Rfl:     metFORMIN (GLUMETZA) 1000 MG (MOD)  "24 hr tablet, Take 1,000 mg by mouth 2 (two) times a day with meals, Disp: , Rfl:     pantoprazole (PROTONIX) 20 mg tablet, Take 20 mg by mouth daily, Disp: , Rfl:     semaglutide, 0.25 or 0.5 mg/dose, (Ozempic) 2 mg/3 mL injection pen, INJECT 0.5MG SUBCUTANEOUSLY ONCE WEEKLY FOR DIABETES, Disp: , Rfl:     TRAZODONE HCL PO, Take 75 mg by mouth daily at bedtime  , Disp: , Rfl:     bisacodyl (DULCOLAX) 10 mg suppository, Insert 1 suppository (10 mg total) into the rectum daily as needed for constipation for up to 3 days, Disp: 3 suppository, Rfl: 0    polyethylene glycol (MIRALAX) 17 g packet, Take 17 g by mouth daily as needed (PRN per day) (Patient not taking: Reported on 7/6/2023), Disp: 100 g, Rfl: 0    Allergies   Allergen Reactions    Metoprolol Other (See Comments)     Too low of a heart rate    Penicillin V Shortness Of Breath    Penicillins Shortness Of Breath and Rash     rash       Physical Exam:    /80   Pulse 61   Ht 5' 7\" (1.702 m)   Wt 106 kg (233 lb 9.6 oz)   BMI 36.59 kg/m²     Constitutional:normal, well developed, well nourished, alert, in no distress and non-toxic and no overt pain behavior. and obese  Eyes:anicteric  HEENT:grossly intact and tenderness at the base of the head  Neck:supple, symmetric, trachea midline and no masses  and tenderness to palpation at the base of the head, neck, bilateral shoulders  Pulmonary:even and unlabored  Cardiovascular:No edema or pitting edema present  Skin:Normal without rashes or lesions and well hydrated  Psychiatric:Mood and affect appropriate  Neurologic:Cranial Nerves II-XII grossly intact  Musculoskeletal:antalgic gait, tenderness to palpation throughout the thoracic paraspinals,     Lumbar Spine Exam  Motor Strength:  Left hip flexion:  3/5  Left hip extension:  3/5  Right hip flexion:  5/5  Right hip extension:  5/5  Left knee flexion:  3/5  Left knee extension:  3/5  Right knee flexion:  5/5  Right knee extension:  5/5    Special " Tests:  positive slump test on the left,      Imaging  MRI lumbar spine wo contrast    (Results Pending)   FL spine and pain procedure    (Results Pending)   FL spine and pain procedure    (Results Pending)         Orders Placed This Encounter   Procedures    MRI lumbar spine wo contrast    FL spine and pain procedure    FL spine and pain procedure       This document was created using speech voice recognition software.   Grammatical errors, random word insertions, pronoun errors, and incomplete sentences are an occasional consequence of this system due to software limitations, ambient noise, and hardware issues.   Any formal questions or concerns about content, text, or information contained within the body of this dictation should be directly addressed to the provider for clarification.

## 2024-06-04 ENCOUNTER — OFFICE VISIT (OUTPATIENT)
Dept: PAIN MEDICINE | Facility: CLINIC | Age: 59
End: 2024-06-04

## 2024-06-04 ENCOUNTER — PATIENT MESSAGE (OUTPATIENT)
Dept: RADIOLOGY | Facility: CLINIC | Age: 59
End: 2024-06-04

## 2024-06-04 ENCOUNTER — OFFICE VISIT (OUTPATIENT)
Dept: FAMILY MEDICINE CLINIC | Facility: CLINIC | Age: 59
End: 2024-06-04
Payer: OTHER MISCELLANEOUS

## 2024-06-04 VITALS
BODY MASS INDEX: 36.66 KG/M2 | DIASTOLIC BLOOD PRESSURE: 80 MMHG | WEIGHT: 233.6 LBS | HEART RATE: 61 BPM | SYSTOLIC BLOOD PRESSURE: 138 MMHG | HEIGHT: 67 IN

## 2024-06-04 VITALS
DIASTOLIC BLOOD PRESSURE: 80 MMHG | BODY MASS INDEX: 36.73 KG/M2 | WEIGHT: 234 LBS | SYSTOLIC BLOOD PRESSURE: 130 MMHG | OXYGEN SATURATION: 98 % | TEMPERATURE: 98.1 F | HEIGHT: 67 IN | HEART RATE: 79 BPM

## 2024-06-04 DIAGNOSIS — G89.29 CHRONIC THORACIC SPINE PAIN: ICD-10-CM

## 2024-06-04 DIAGNOSIS — G89.29 CHRONIC BILATERAL LOW BACK PAIN WITH LEFT-SIDED SCIATICA: ICD-10-CM

## 2024-06-04 DIAGNOSIS — M79.18 MYOFASCIAL PAIN SYNDROME: ICD-10-CM

## 2024-06-04 DIAGNOSIS — Z98.1 S/P CERVICAL SPINAL FUSION: ICD-10-CM

## 2024-06-04 DIAGNOSIS — M54.6 CHRONIC THORACIC SPINE PAIN: ICD-10-CM

## 2024-06-04 DIAGNOSIS — R29.898 LEFT LEG WEAKNESS: Primary | ICD-10-CM

## 2024-06-04 DIAGNOSIS — M54.12 CERVICAL RADICULOPATHY: ICD-10-CM

## 2024-06-04 DIAGNOSIS — R29.898 LEFT LEG WEAKNESS: ICD-10-CM

## 2024-06-04 DIAGNOSIS — M54.42 CHRONIC BILATERAL LOW BACK PAIN WITH LEFT-SIDED SCIATICA: ICD-10-CM

## 2024-06-04 DIAGNOSIS — M54.16 LUMBAR RADICULOPATHY: ICD-10-CM

## 2024-06-04 DIAGNOSIS — M96.1 CERVICAL POST-LAMINECTOMY SYNDROME: ICD-10-CM
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
## 2024-06-04 PROCEDURE — 99214 OFFICE O/P EST MOD 30 MIN: CPT | Performed by: FAMILY MEDICINE

## 2024-06-04 PROCEDURE — 96372 THER/PROPH/DIAG INJ SC/IM: CPT | Performed by: FAMILY MEDICINE

## 2024-06-04 RX ORDER — KETOROLAC TROMETHAMINE 30 MG/ML
30 INJECTION, SOLUTION INTRAMUSCULAR; INTRAVENOUS ONCE
Status: COMPLETED | OUTPATIENT
Start: 2024-06-04 | End: 2024-06-04

## 2024-06-04 RX ADMIN — KETOROLAC TROMETHAMINE 30 MG: 30 INJECTION, SOLUTION INTRAMUSCULAR; INTRAVENOUS at 15:46

## 2024-06-04 NOTE — PROGRESS NOTES
Ambulatory Visit  Name: Efra Garcia Jr.      : 1965      MRN: [id number]  Encounter Provider: Ulises Truong MD  Encounter Date: 2024   Encounter department: [facility]    Assessment & Plan   1. Work related injury  -     MRI thoracic spine wo contrast; Future; Expected date: 2024    2. Cervical radiculopathy  After discussing risks and benefits of medication along with side effects will start the following:   -     ketorolac (TORADOL) injection 30 mg  F/U with Pain management    3. S/P cervical spinal fusion  -     ketorolac (TORADOL) injection 30 mg  4. Chronic thoracic spine pain  -     MRI thoracic spine wo contrast; Future; Expected date: 2024    5. Left leg weakness  -     MRI thoracic spine wo contrast; Future; Expected date: 2024    F/U in 3 months       History of Present Illness     Patient is here for a F/U  This is a worker's compensation case.     Summary of injury: He sustained a work injury in  while working for [employer] when he fell backwards in the snow while carrying some equipment. He suffered neck pain for many years. He underwent C5-7 ACDF in  for neck pain, right arm weakness and numbness.  He did very well postoperatively with near complete reduction in symptoms.  He was doing very well until approximately 1 year ago.  He was working at a desk and started noticing worsening neck pain and difficulty typing.  He described a constant pain in his bilateral upper extremities in no specific distribution with associated numbness and tingling.  He could feel his hands very well.  He had difficulty with range of motion in his neck.  When he could move his neck, he felt electric shock sensations in his arms.  He was also having significant difficulty with ambulation and balance.   He had a C3-T1 PCDF by Dr Purdy, neurosurgeon on 2023.     Unfortunately he continues to have neck pain ad stiffness. Saw Dr. Bales pain management  10/04/2023 and had neck injections done which did not improve his symptoms. Saw Dr Purdy Neurosurgery on 02/07/2024 who recommended more time to heal as it is less than 1 year since surgery and per note it takes 1-1.5 years to heal. He recommended no further surgical interventions and continue PT.     Updates: since his last office visit he has been experiencing left leg weakness to the point of almost falling due to left leg weakness.  He continues to have constant daily neck sharp in nature. Cymbalta has been helping with depression symptoms related to chronic pain.        Review of Systems   Constitutional:  Negative for activity change, appetite change, fatigue and fever.   HENT:  Negative for congestion and ear discharge.    Respiratory:  Negative for cough and shortness of breath.    Cardiovascular:  Negative for chest pain and palpitations.   Gastrointestinal:  Negative for diarrhea and nausea.   Musculoskeletal:  Positive for arthralgias, back pain, neck pain and neck stiffness.   Skin:  Negative for color change and rash.   Neurological:  Negative for dizziness and headaches.   Psychiatric/Behavioral:  Negative for agitation and behavioral problems.      Past Medical History:   Diagnosis Date   • Anxiety    • Arthritis    • Bilateral occipital neuralgia    • Cervical post-laminectomy syndrome    • Cervical radiculopathy    • Cervical spine disease    • Cervical spondylosis with myelopathy    • Chronic lumbar pain    • Chronic pain syndrome    • Chronic thoracic spine pain    • Cubital tunnel syndrome of both upper extremities 09/10/2022   • Degenerative cervical spinal stenosis    • Dental crowns present    • Depression    • Diabetes (HCC)     type 2   • Exercise involving walking     daily 5-10 minutes   • Failed neck syndrome    • Fatty liver    • Full dentures     upper   • GERD (gastroesophageal reflux disease)    • History [facility] COVID[facility]19 2021    [facility]   • Hyperlipidemia    •  "Hypertension    • Irritable bowel syndrome    • Motion sickness    • Muscle weakness     \"arms and legs\"   • Myofascial muscle pain    • Neck stiffness     plate implanted-limited ROM   • Polyarthralgia    • Tinnitus    • Wears glasses      Past Surgical History:   Procedure Laterality Date   • CERVICAL DISC SURGERY     • CERVICAL FUSION  2011    ACDF with Dr. Purdy   • COLONOSCOPY     • [place] ARTHRD PST/PSTLAT TQ 1NTRSPC CRV BELW C2 SEGMENT Bilateral 6/19/2023    Procedure: C3-T1 posterior decompression with instrumented fixation fusion (impulse monitoring);  Surgeon: Kelton Purdy MD;  [facility];  Service: Neurosurgery   • TOTAL SHOULDER REPLACEMENT Right     Reversal     Family History   Problem Relation Age of Onset   • Stroke Mother    • Lung cancer Father      Social History     Tobacco Use   • Smoking status: Never   • Smokeless tobacco: Never   Vaping Use   • Vaping status: Never Used   Substance and Sexual Activity   • Alcohol use: Yes     Alcohol/week: 1.0 standard drink of alcohol     Types: 1 Cans of beer per week     Comment: occasional, weekly   • Drug use: No   • Sexual activity: Not on file     Comment: defer     Current Outpatient Medications on File Prior to Visit   Medication Sig   • acetaminophen (TYLENOL) 325 mg tablet Take 3 tablets (975 mg total) by mouth every 8 (eight) hours   • ASCORBIC ACID PO    • benzonatate (TESSALON PERLES) 100 mg capsule Take 100 mg by mouth Three times daily as needed   • bisacodyl (DULCOLAX) 10 mg suppository Insert 1 suppository (10 mg total) into the rectum daily as needed for constipation for up to 3 days   • Cholecalciferol 25 MCG (1000 UT) capsule TAKE ONE TABLET BY MOUTH EVERY DAY (FOR LOW VITAMIN D)   • Cyanocobalamin (B-12 PO) Take by mouth   • cyclobenzaprine (FLEXERIL) 5 mg tablet Take 1 tablet (5 mg total) by mouth 3 (three) times a day as needed for muscle spasms   • dextrose 1 g CHEW daily as needed for low blood sugar   • DULoxetine (CYMBALTA) " "60 mg delayed release capsule Take 1 capsule (60 mg total) by mouth daily   • Empagliflozin 25 MG TABS Take 25 mg by mouth every morning ---- aka  Jardiance   • famotidine (PEPCID) 20 mg tablet Take 20 mg by mouth daily at bedtime   • gabapentin (NEURONTIN) 800 mg tablet TAKE 1 TABLET (800 MG TOTAL) BY MOUTH 3 (THREE) TIMES A DAY   • ibuprofen (MOTRIN) 800 mg tablet Take 1 tablet (800 mg total) by mouth 2 (two) times a day   • lisinopril (ZESTRIL) 10 mg tablet Take 10 mg by mouth daily   • losartan (COZAAR) 50 mg tablet 25 mg   • metFORMIN (GLUMETZA) 1000 MG (MOD) 24 hr tablet Take 1,000 mg by mouth 2 (two) times a day with meals   • pantoprazole (PROTONIX) 20 mg tablet Take 20 mg by mouth daily   • polyethylene glycol (MIRALAX) 17 g packet Take 17 g by mouth daily as needed (PRN per day) (Patient not taking: Reported on 7/6/2023)   • semaglutide, 0.25 or 0.5 mg/dose, (Ozempic) 2 mg/3 mL injection pen INJECT 0.5MG SUBCUTANEOUSLY ONCE WEEKLY FOR DIABETES   • TRAZODONE HCL PO Take 75 mg by mouth daily at bedtime       Allergies   Allergen Reactions   • Metoprolol Other (See Comments)     Too low of a heart rate   • Penicillin V Shortness Of Breath   • Penicillins Shortness Of Breath and Rash     rash     Immunization History   Administered Date(s) Administered   • COVID-19 MODERNA VACC 0.5 ML IM 01/04/2021, 02/02/2021, 11/26/2021   • COVID-19 Moderna Vac BIVALENT 12 Yr+ IM 0.5 ML 09/23/2022   • COVID-19 Moderna mRNA Vaccine 12 Yr+ 50 mcg/0.5 mL (Spikevax) 11/01/2023   • INFLUENZA 09/28/2009, 01/20/2011, 11/01/2019, 10/19/2020, 12/01/2021, 09/22/2023   • Pneumococcal Polysaccharide PPV23 05/30/2019   • Tdap 07/31/2015     Objective     /80 (BP Location: Left arm, Patient Position: Sitting, Cuff Size: Large)   Pulse 79   Temp 98.1 °F (36.7 °C)   Ht 5' 7\" (1.702 m)   Wt 106 kg (234 lb)   SpO2 98%   BMI 36.65 kg/m²     Physical Exam  Constitutional:       General: He is not in acute distress.     Appearance: " He is well-developed. He is not diaphoretic.   Eyes:      General: No scleral icterus.     Pupils: Pupils are equal, round, and reactive to light.   Cardiovascular:      Rate and Rhythm: Normal rate and regular rhythm.      Heart sounds: Normal heart sounds. No murmur heard.  Pulmonary:      Effort: Pulmonary effort is normal. No respiratory distress.      Breath sounds: Normal breath sounds. No wheezing.   Abdominal:      General: Bowel sounds are normal. There is no distension.      Palpations: Abdomen is soft.      Tenderness: There is no abdominal tenderness.   Musculoskeletal:         General: Tenderness present.      Comments: Cervical spine tenderness as well as mid back spine tenderness.  Limited ROM of cervical spine due to tenderness   Skin:     General: Skin is warm and dry.      Findings: No rash.   Neurological:      Mental Status: He is alert and oriented to person, place, and time.      Coordination: Abnormal coordination: 20.      Comments: Left leg weakness 4/5 strength left lower leg on ext and flexion  5/5 strength right lower ext.       Administrative Statements   I have spent a total time of 20 minutes on 06/04/24 In caring for this patient including Risks and benefits of tx options.

## 2024-06-04 NOTE — PATIENT COMMUNICATION
Pt scheduled for PORTILLO with Dr Bales on 7/30/24    Pt is diabetic but does not wear a glucose monitor    Pt takes ibuprofen PRN and was advised to stop med 24 hours prior to injection.    Pt given instructions in office and via myc message    Have you completed PT/HEP/Chiro in the past 6 months for dedicated area? Currently doing PT with  [facility] -- started 8/25/23  If yes, how long did you complete?  What was the frequency?  Did it provide relief?  If no, reason therapy was not completed?

## 2024-06-04 NOTE — PATIENT INSTRUCTIONS
Epidural Steroid Injection, Ambulatory Care   GENERAL INFORMATION:   What do I need to know about an epidural steroid injection?  An epidural steroid injection (CLARA) is a procedure to inject steroid medicine into the epidural space. The epidural space is between your spinal cord and vertebrae. Steroids reduce inflammation and fluid buildup in your spine that may be causing pain. You may be given pain medicine along with the steroids.   How do I prepare for an CLARA?  Your healthcare provider will talk to you about how to prepare for your procedure. He will tell you what medicines to take or not take on the day of your procedure. You may need to stop taking blood thinners or other medicines several days before your procedure. You may need to adjust any diabetes medicine you take on the day of your procedure. Steroid medicine can increase your blood sugar level.   What will happen during an CLARA?   You will be given medicine to numb the procedure area. You will be awake for the procedure, but you will not feel pain. You may also be given medicine to help you relax during the procedure. Contrast liquid will be used to help your healthcare provider see the area better. Tell the healthcare provider if you have ever had an allergic reaction to contrast liquid.    Your healthcare provider may place the needle into your neck area, middle of your back, or tailbone area. He may inject the medicine next to the nerves that are causing your pain. He may instead inject the medicine into a larger area of the epidural space. This helps the medicine spread to more nerves. Your healthcare provider will use a fluoroscope to help guide the needle to the right place. A fluoroscope is a type of x-ray. After the procedure, a bandage will be placed over the injection site to prevent infection.  What are the risks of an CLARA?  You may have temporary or permanent nerve damage or paralysis. You may have bleeding or develop a serious infection,  such as meningitis (swelling of the brain coverings). An abscess may also develop. You may need surgery to fix the abscess. You may have a seizure, anxiety, or trouble sleeping. If you are a man, you may have temporary erectile dysfunction (not able to have an erection).   CARE AGREEMENT:   You have the right to help plan your care. Learn about your health condition and how it may be treated. Discuss treatment options with your caregivers to decide what care you want to receive. You always have the right to refuse treatment. The above information is an  only. It is not intended as medical advice for individual conditions or treatments. Talk to your doctor, nurse or pharmacist before following any medical regimen to see if it is safe and effective for you.  © 2014 [employer]. Information is for End User's use only and may not be sold, redistributed or otherwise used for commercial purposes. All illustrations and images included in CareNotes® are the copyrighted property of [employer] or [employer].   Trigger Point Injection   WHAT YOU NEED TO KNOW:   What do I need to know about a trigger point injection?  A trigger point injection is used to relax a muscle knot. This helps relieve pain. You may be able to have more than one trigger point treated during a session.  How do I prepare for a trigger point injection?   Your healthcare provider will tell you how to prepare. Arrange to have someone drive you home after the injection.    Tell your provider about all medicines you take, including pain medicine, blood thinners, and muscle relaxers. He or she will tell you if you need to stop any medicine for the injection, and when to stop. He or she will tell you which medicines to take or not take on the day of the injection.    Tell your provider about all your allergies, including to any pain medicine.    What will happen during a trigger point injection?   You may be  sitting or lying, depending on where the trigger point is located. Your healthcare provider will feel for a knot in the muscle. He or she may myra your skin over the knot.    Your provider will put a needle through your skin and into the trigger point. Saline (salt solution), pain relievers, or other medicines may be pushed through the needle into the trigger point. Your provider may use only a dry needle (no medicine). He or she will pull the needle almost all the way out and then push it in again. He or she will repeat this several times until the muscle stops twitching or feels relaxed.    Your provider will remove the needle and stretch the muscle area. He or she may apply pressure to the area for 2 minutes. A bandage will be put over the injection site to prevent bleeding or an infection.    What should I expect after a trigger point injection?   You may feel pain relief right away. It is normal for some pain to start again 2 hours later. An ice pack or over-the-counter pain medicine can help lower the pain.    You may feel sore in the injection site for a few days. If you need another injection in the same area, wait until the area is not sore.    Your healthcare provider may give you specific activity instructions to follow at home or recommend physical therapy. In general, you should try to stay active. Avoid strenuous activity for the first 3 or 4 days after the injection.    Do not have more injections if you still have trigger point pain after 2 or 3 injections.    What are the risks of a trigger point injection?  You may have a severe allergic reaction to pain medicine injected. The injection may be painful, or you may be sore where you got the injection. You may bleed, bruise, or develop an infection in the injection area. The injection may cause you to feel faint. Rarely, the needle may cause muscle or blood vessel damage or your lung may collapse if you get the injection near your chest.  CARE  AGREEMENT:   You have the right to help plan your care. Learn about your health condition and how it may be treated. Discuss treatment options with your healthcare providers to decide what care you want to receive. You always have the right to refuse treatment. The above information is an  only. It is not intended as medical advice for individual conditions or treatments. Talk to your doctor, nurse or pharmacist before following any medical regimen to see if it is safe and effective for you.  © Copyright [employer] 2022 Information is for End User's use only and may not be sold, redistributed or otherwise used for commercial purposes. All illustrations and images included in CareNotes® are the copyrighted property of [employer] or [employer]

## 2024-06-05 ENCOUNTER — APPOINTMENT (OUTPATIENT)
Dept: PHYSICAL THERAPY | Facility: CLINIC | Age: 59
End: 2024-06-05
Payer: OTHER MISCELLANEOUS

## 2024-06-05 NOTE — PROGRESS NOTES
"Daily Note     Today's date: 2024  Patient name: Efra Garcia Jr.  : 1965  MRN: [id number]  Referring provider: Mimi Manuel PA-C  Dx: No diagnosis found.               Subjective: Stan reports       Objective: See treatment diary below      Assessment:       Plan: {PLAN:[id number]}     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 Fusion (23)       Manuals 5-8-24 5-10-24 5-24-24 5-31-24 6-3-24   STM to sub occipital to mid thoracic 15' 15' 15' 15' 25'   AROM c rotation w/ manual c2 block        Bilateral ulnar nerve mobilization                Neuro Re-Ed         LTP/ MTP  Blue 3x10 each Blue 3x10 ea Blue 3x10 ea Black 3x10 ea Defer-time constraints   Cervical retraction in available ROM 5\" x20 5\" x20 5\" x20 5\" x20 5\" x20   scap retract/ ER in doorframe Blue 3x10  Blue 3x10 Green 3x10 Blue 3x10 defer   Standing hip abd and ext        BOSU mini lunges        Heel raises for strength and balance        Wall ball squats  3x10 3x10     Wall push ups   2x10 2x10                                     Ther Ex        Seated thoracic extension AROM mobs w/ roll and board 5\" x20 2 lvls 5\" x20 2 lvls 5\" x20 2lvls  5\" x20 2 lvls  5\" x15 all levels   Gentle UT side bend 10\"x12 christopher  Resume NV       Cerv nod  10\"x12 10\"x12 10\"x12 10\"x12 10\" x12   Nustep         Scap retraction 5\" x20 5\" x20 5\" x20 5\" x20 W/ roll 5\" x15   SRC L3 hills 10' L3 Random-10' L3 random-10'  L3-random 10' Defer-time   Knee extension 55# 3x10 55# 3x10 55# 3x10 55# 3x10 55# 3x10   Knee curls 55# 3x10 55# 3x10 55# 3x10 55# 3x10 55# 3x10                                   Pt Ed/ HEP                Ther Activity                        Gait Training                        Modalities         IFC w/MHP 15' post tx  IFC w/MHP 15' post tx  IFC w/MHP 15' post tx  IFC w/MHP 15' post tx  IFC w/MHP 15' post tx         MHP 10'  start                                                             "

## 2024-06-07 ENCOUNTER — OFFICE VISIT (OUTPATIENT)
Dept: PHYSICAL THERAPY | Facility: CLINIC | Age: 59
End: 2024-06-07
Payer: OTHER MISCELLANEOUS

## 2024-06-07 DIAGNOSIS — M47.12 OTHER SPONDYLOSIS WITH MYELOPATHY, CERVICAL REGION: ICD-10-CM

## 2024-06-07 DIAGNOSIS — M43.22 FUSION OF SPINE OF CERVICAL REGION: Primary | ICD-10-CM

## 2024-06-07 PROCEDURE — 97014 ELECTRIC STIMULATION THERAPY: CPT

## 2024-06-07 PROCEDURE — 97110 THERAPEUTIC EXERCISES: CPT

## 2024-06-07 PROCEDURE — 97112 NEUROMUSCULAR REEDUCATION: CPT

## 2024-06-07 PROCEDURE — 97140 MANUAL THERAPY 1/> REGIONS: CPT

## 2024-06-07 NOTE — PROGRESS NOTES
"Daily Note     Today's date: 2024  Patient name: Efra Garcia Jr.  : 1965  MRN: [id number]  Referring provider: Mimi Manuel PA-C  Dx:   Encounter Diagnosis     ICD-10-CM    1. 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 Other spondylosis with myelopathy, cervical region  M47.12                      Subjective: Stan reports just getting recent MRI on c-spine and results showed herniated disc at C2 (per patient). He is also going to be getting MRI on thoracic and lumbar spine as he is still have symptoms in LLE. He is going to hold on therapy for approx a month to see what the results are.       Objective: See treatment diary below      Assessment: Visual and VC to ensure correct exercise technique. Improvement in soft tissue quality following STM to christopher UT, cerv paraspinals, scap retractors. Denied any increases in pain with exercises performed.       Plan: Continue with current POC to address pt deficits.      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 Fusion (23)       Manuals 24 5-10-24 5-24-24 5-31-24 6-3-24   STM to sub occipital to mid thoracic 10' 15' 15' 15' 25'   AROM c rotation w/ manual c2 block        Bilateral ulnar nerve mobilization                Neuro Re-Ed         LTP/ MTP Black 3x10 ea  Blue 3x10 ea Blue 3x10 ea Black 3x10 ea Defer-time constraints   Cervical retraction in available ROM 5\" x20 5\" x20 5\" x20 5\" x20 5\" x20   scap retract/ ER in doorframe Black 3x10 Blue 3x10 Green 3x10 Blue 3x10 defer   Standing hip abd and ext        BOSU mini lunges        Heel raises for strength and balance        Wall ball squats  3x10 3x10     Wall push ups   2x10 2x10                                     Ther Ex        Seated thoracic extension AROM mobs w/ roll and board 5\" x15 2 lvls  5\" x20 2 lvls 5\" x20 2lvls  5\" x20 2 lvls  5\" x15 all levels   Gentle UT side bend  Resume NV       Cerv nod  10\"x12 10\"x12 10\"x12 10\"x12 10\" x12   Nustep         Scap retraction 5\" x15 w/roll 5\" x20 5\" x20 5\" x20 " "W/ roll 5\" x15   SRC L3 random 10' L3 Random-10' L3 random-10'  L3-random 10' Defer-time   Knee extension  55# 3x10 55# 3x10 55# 3x10 55# 3x10   Knee curls  55# 3x10 55# 3x10 55# 3x10 55# 3x10                                   Pt Ed/ HEP                Ther Activity                        Gait Training                        Modalities         IFC w/MHP 15' post tx  IFC w/MHP 15' post tx  IFC w/MHP 15' post tx  IFC w/MHP 15' post tx  IFC w/MHP 15' post tx     MHP 10' start     MHP 10'  start                                                               "

## 2024-06-10 ENCOUNTER — APPOINTMENT (OUTPATIENT)
Dept: PHYSICAL THERAPY | Facility: CLINIC | Age: 59
End: 2024-06-10
Payer: OTHER MISCELLANEOUS

## 2024-06-12 ENCOUNTER — APPOINTMENT (OUTPATIENT)
Dept: PHYSICAL THERAPY | Facility: CLINIC | Age: 59
End: 2024-06-12
Payer: OTHER MISCELLANEOUS

## 2024-06-14 ENCOUNTER — APPOINTMENT (OUTPATIENT)
Dept: PHYSICAL THERAPY | Facility: CLINIC | Age: 59
End: 2024-06-14
Payer: OTHER MISCELLANEOUS

## 2024-06-17 ENCOUNTER — APPOINTMENT (OUTPATIENT)
Dept: PHYSICAL THERAPY | Facility: CLINIC | Age: 59
End: 2024-06-17
Payer: OTHER MISCELLANEOUS

## 2024-06-19 ENCOUNTER — APPOINTMENT (OUTPATIENT)
Dept: PHYSICAL THERAPY | Facility: CLINIC | Age: 59
End: 2024-06-19
Payer: OTHER MISCELLANEOUS

## 2024-06-20 ENCOUNTER — HOSPITAL ENCOUNTER (OUTPATIENT)
Dept: MRI IMAGING | Facility: HOSPITAL | Age: 59
End: 2024-06-20
Attending: FAMILY MEDICINE
Payer: OTHER MISCELLANEOUS

## 2024-06-20 ENCOUNTER — HOSPITAL ENCOUNTER (OUTPATIENT)
Dept: MRI IMAGING | Facility: HOSPITAL | Age: 59
End: 2024-06-20
Payer: OTHER MISCELLANEOUS

## 2024-06-20 DIAGNOSIS — G89.29 CHRONIC BILATERAL LOW BACK PAIN WITH LEFT-SIDED SCIATICA: ICD-10-CM

## 2024-06-20 DIAGNOSIS — G89.29 CHRONIC THORACIC SPINE PAIN: ICD-10-CM

## 2024-06-20 DIAGNOSIS — M54.6 CHRONIC THORACIC SPINE PAIN: ICD-10-CM

## 2024-06-20 DIAGNOSIS — M54.42 CHRONIC BILATERAL LOW BACK PAIN WITH LEFT-SIDED SCIATICA: ICD-10-CM

## 2024-06-20 DIAGNOSIS — R29.898 LEFT LEG WEAKNESS: ICD-10-CM
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
## 2024-06-20 PROCEDURE — 72148 MRI LUMBAR SPINE W/O DYE: CPT

## 2024-06-20 PROCEDURE — 72146 MRI CHEST SPINE W/O DYE: CPT

## 2024-06-21 ENCOUNTER — APPOINTMENT (OUTPATIENT)
Dept: PHYSICAL THERAPY | Facility: CLINIC | Age: 59
End: 2024-06-21
Payer: OTHER MISCELLANEOUS

## 2024-07-03 ENCOUNTER — TELEPHONE (OUTPATIENT)
Dept: RADIOLOGY | Facility: CLINIC | Age: 59
End: 2024-07-03

## 2024-07-03 ENCOUNTER — TELEPHONE (OUTPATIENT)
Dept: FAMILY MEDICINE CLINIC | Facility: CLINIC | Age: 59
End: 2024-07-03

## 2024-07-03 DIAGNOSIS — M54.16 LUMBAR RADICULOPATHY: Primary | ICD-10-CM

## 2024-07-03 DIAGNOSIS — M96.1 CERVICAL POST-LAMINECTOMY SYNDROME: ICD-10-CM

## 2024-07-03 DIAGNOSIS — M54.12 CERVICAL RADICULOPATHY: ICD-10-CM

## 2024-07-03 RX ORDER — DULOXETIN HYDROCHLORIDE 60 MG/1
60 CAPSULE, DELAYED RELEASE ORAL DAILY
Qty: 30 CAPSULE | Refills: 5 | Status: SHIPPED | OUTPATIENT
Start: 2024-07-03

## 2024-07-03 RX ORDER — IBUPROFEN 800 MG/1
800 TABLET ORAL 2 TIMES DAILY
Qty: 60 TABLET | Refills: 0 | Status: SHIPPED | OUTPATIENT
Start: 2024-07-03

## 2024-07-03 NOTE — TELEPHONE ENCOUNTER
Ulises Truong MD  7/2/2024  7:54 PM EDT Back to Top      MRI of thoracic spine shows stable hardware of spine  Small disc protrusions noted however no spinal canal stenosis  Advise to follow up with orthopedic or pain management.

## 2024-07-03 NOTE — TELEPHONE ENCOUNTER
Agreeable to LESI  Please place order      Pt is aware it will need to be at least 2 weeks after 7/30 PORTILLO

## 2024-07-03 NOTE — TELEPHONE ENCOUNTER
----- Message from NAVEED Davis sent at 7/2/2024  4:15 PM EDT -----  There is a small left foraminal bone spur complex that contacts the left L5 nerve root this is likely the source of your pain in your left leg.  We can offer you an epidural steroid injection at L5-S1 to see if it helps reduce your pain.

## 2024-07-09 ENCOUNTER — TELEPHONE (OUTPATIENT)
Age: 59
End: 2024-07-09

## 2024-07-09 NOTE — TELEPHONE ENCOUNTER
Caller: patient    Doctor: liam    Reason for call: would like to know if a procedure is needed for his procedure    And would like to schedule his other procedure      Call back#:

## 2024-07-09 NOTE — TELEPHONE ENCOUNTER
S/W pt who asked if he needed a  for TPI on 7/12  Advised no  needed.  Pt is scheduled for PORTILLO on 7/30, advised  is needed.    Pt would like to schedule ordered LESI

## 2024-07-11 ENCOUNTER — PATIENT MESSAGE (OUTPATIENT)
Dept: RADIOLOGY | Facility: CLINIC | Age: 59
End: 2024-07-11

## 2024-07-11 NOTE — PATIENT COMMUNICATION
Pt is scheduled for LESI with Dr Bales on 8/14/24    Pt is not diabetic    Pt denies taking prescription blood thinners or full dose aspirin    Pt sent myc message with instruction reminder (multiple procedures with office with same instructions in coming month/s)    Have you completed PT/HEP/Chiro in the past 6 months for dedicated area? Yes, per Josefa's note on 6/4/24, pt has been doing PT 3 times a week since August 2023  If yes, how long did you complete?  What was the frequency?  Did it provide relief?  If no, reason therapy was not completed?

## 2024-07-12 ENCOUNTER — PROCEDURE VISIT (OUTPATIENT)
Dept: PAIN MEDICINE | Facility: CLINIC | Age: 59
End: 2024-07-12
Payer: OTHER MISCELLANEOUS

## 2024-07-12 VITALS
SYSTOLIC BLOOD PRESSURE: 127 MMHG | BODY MASS INDEX: 35 KG/M2 | HEIGHT: 67 IN | DIASTOLIC BLOOD PRESSURE: 75 MMHG | WEIGHT: 223 LBS | HEART RATE: 77 BPM

## 2024-07-12 DIAGNOSIS — M79.18 MYOFASCIAL PAIN SYNDROME: ICD-10-CM

## 2024-07-12 PROCEDURE — 20553 NJX 1/MLT TRIGGER POINTS 3/>: CPT | Performed by: STUDENT IN AN ORGANIZED HEALTH CARE EDUCATION/TRAINING PROGRAM

## 2024-07-12 PROCEDURE — 76942 ECHO GUIDE FOR BIOPSY: CPT | Performed by: STUDENT IN AN ORGANIZED HEALTH CARE EDUCATION/TRAINING PROGRAM

## 2024-07-12 RX ORDER — METHYLPREDNISOLONE ACETATE 40 MG/ML
40 INJECTION, SUSPENSION INTRA-ARTICULAR; INTRALESIONAL; INTRAMUSCULAR; SOFT TISSUE ONCE
Status: COMPLETED | OUTPATIENT
Start: 2024-07-12 | End: 2024-07-12

## 2024-07-12 RX ORDER — BUPIVACAINE HYDROCHLORIDE 2.5 MG/ML
7 INJECTION, SOLUTION EPIDURAL; INFILTRATION; INTRACAUDAL ONCE
Status: COMPLETED | OUTPATIENT
Start: 2024-07-12 | End: 2024-07-12

## 2024-07-12 RX ADMIN — METHYLPREDNISOLONE ACETATE 40 MG: 40 INJECTION, SUSPENSION INTRA-ARTICULAR; INTRALESIONAL; INTRAMUSCULAR; SOFT TISSUE at 11:57

## 2024-07-12 RX ADMIN — BUPIVACAINE HYDROCHLORIDE 7 ML: 2.5 INJECTION, SOLUTION EPIDURAL; INFILTRATION; INTRACAUDAL at 11:57

## 2024-07-12 NOTE — PROGRESS NOTES
On 6/4/2024 patient was seen in the office with left-sided low back pain and left leg weakness.  After reviewing patient's new MRI done on 6/20/2024 patient does have foraminal extraforaminal disc osteophyte complex contacting the left L5 nerve root.  The patient reports today that he has ongoing severe left leg pain that interferes with his daily activities.  I recommend that the patient have a lumbar epidural steroid injection at L5-S1, since the patient's pain pattern did flow down his left lateral leg following an L5 dermatome distribution.  The patient is in agreement with this plan.

## 2024-07-12 NOTE — PROGRESS NOTES
" Universal Protocol:  Consent: Verbal consent obtained. Written consent obtained.  Risks and benefits: risks, benefits and alternatives were discussed  Consent given by: patient  Time out: Immediately prior to procedure a \"time out\" was called to verify the correct patient, procedure, equipment, support staff and site/side marked as required.  Timeout called at: 7/12/2024 10:49 AM.  Patient understanding: patient states understanding of the procedure being performed  Patient consent: the patient's understanding of the procedure matches consent given  Procedure consent: procedure consent matches procedure scheduled  Relevant documents: relevant documents present and verified  Test results: test results available and properly labeled  Site marked: the operative site was marked  Radiology Images displayed and confirmed. If images not available, report reviewed: imaging studies available  Required items: required blood products, implants, devices, and special equipment available  Patient identity confirmed: verbally with patient  Supporting Documentation  Indications: pain   Procedure Details  Location(s):  Additional procedure details: PROCEDURE NOTE    PATIENT NAME:  Efra Garcia Jr.    MEDICAL RECORD NUMBER:  [id number]    YOB: 1965    DATE OF PROCEDURE:  07/12/24    PROCEDURE:  Trigger point injection x 8 with local anesthetic and steroid in the bilateral trapezius, bilateral rhomboid, bilatearl splenius capitis muscle groups under ultrasound guidance.   ATTENDING PHYSICIAN:  Napoleon Bales M.D.  PREPROCEDURE DIAGNOSIS:  Myofascial pain with identifiable trigger points.  POSTPROCEDURE DIAGNOSIS:  Myofascial pain with identifiable trigger points.  ANESTHESIA:  Local  ESTIMATED BLOOD LOSS:  Minimal  COMPLICATIONS: None  CONSENT:  Today's procedure, its potential benefits as well as its risks and potential side effects were reviewed.  Discussed risks of the procedure include bleeding, infection, nerve " irritation or damage, reactions to the medications, failure of the pain to improve and exacerbation of the pain were explained to the patient, who verbalized understanding and who wished to proceed.  Informed consent was signed.  DESCRIPTION OF THE PROCEDURE:  After informed consent was obtained, the patient was placed in the seated position. 8 trigger points were identified via palpation and marked with a surgical skin marker.  The skin was prepped with antiseptic in the usual sterile fashion.  Strict aseptic technique was utilized throughout the procedure.  Using ultrasound guidance a 25 gauge, 2inch needle was then advanced into each identified trigger point.  Care was taken to visualize the entirety of the needle throughout the injection. An injectate consisting of 7 ml of 0.25% marcaine with 1 mL of 40mg/mL depo-medrol was slowly injected in divided doses after negative aspiration.  The needle was removed with tip intact.  The patient tolerated the procedure and hemostasis was maintained.  There were no apparent paresthesias or complications.  The skin was wiped clean, and a band-aid was placed as appropriate.  The patient was monitored for an appropriate period of time following the procedure and remained hemodynamically stable and neurovascularly intact.  The patient was ultimately discharged to home with supervision in good condition and instructed to call the office to report the response.

## 2024-07-29 ENCOUNTER — TELEPHONE (OUTPATIENT)
Age: 59
End: 2024-07-29

## 2024-07-29 DIAGNOSIS — M96.1 CERVICAL POST-LAMINECTOMY SYNDROME: Primary | ICD-10-CM

## 2024-07-29 DIAGNOSIS — M54.12 CERVICAL RADICULOPATHY: ICD-10-CM

## 2024-07-29 NOTE — TELEPHONE ENCOUNTER
Caller: Efra     Doctor: Amairani     Reason for call: Patient calling to reschedule procedure please advise     Call back#: [phone]

## 2024-08-14 NOTE — TELEPHONE ENCOUNTER
Pt was scheduled for PORTILLO on 7/30 (but it was cx by pt) and was supposed to have LESI 2 weeks later  Whatever body part is more painful can schedule that one and the next 2 weeks afterwards

## 2024-08-14 NOTE — TELEPHONE ENCOUNTER
Caller: roberto Kraus    Doctor: Amairani    Reason for call: pt called to r/s his procedure.  Pt also stated that he is having a lot of cervical pain    Call back#: [phone]

## 2024-08-15 RX ORDER — TRAMADOL HYDROCHLORIDE 50 MG/1
50 TABLET ORAL EVERY 8 HOURS PRN
Qty: 42 TABLET | Refills: 0 | Status: SHIPPED | OUTPATIENT
Start: 2024-08-15

## 2024-08-15 NOTE — TELEPHONE ENCOUNTER
Please see staff message:  NAVEED Davis  [facility]; [facility]  he can call me in 2 weeks let me know how he is feeling I can send in a refill and we can see him at 4 weeks.  But I am perfectly okay with you using the same-day slots or any other day of the week.

## 2024-08-15 NOTE — TELEPHONE ENCOUNTER
I sent 2 weeks of medication for the patient to trial.  He may take tramadol 50 mg every 8 hours as needed for moderate pain.  I prefer if he is able to follow-up    In 2 weeks but I understand my schedule is tight.  So if necessary 3 weeks

## 2024-08-15 NOTE — TELEPHONE ENCOUNTER
S/W pt and advised the same  Pt states he is willing to try the Tramadol  States Dr Eden had him on that about 5 years ago and it worked, however it did lose it's efficacy  He is still thinking it would work at this point  [facility], [place]  Please advise on when OV would be needed?

## 2024-08-15 NOTE — TELEPHONE ENCOUNTER
I MyChart message to him however I do not know if he saw it, we could introduce opioid management that would be the next step if this is something he is interested in we would start with tramadol.  And then he would need to come in the office to sign an opioid agreement and provide urinary drug screen sample.

## 2024-09-03 NOTE — PROGRESS NOTES
Pain Medicine Follow-Up Note    Assessment:  1. Chronic pain syndrome    2. Cervical post-laminectomy syndrome    3. Long-term current use of opiate analgesic    4. Uncomplicated opioid dependence (HCC)        Plan:  Orders Placed This Encounter   Procedures    MM ALL_Prescribed Meds and Special Instructions     Order Specific Question:   [facility] Is CYCLOBENZAPRINE Prescribed?     Answer:   Yes     Order Specific Question:   [facility] Is DULOXETINE prescribed?     Answer:   Yes     Order Specific Question:   [facility] Is GABAPENTIN prescribed?     Answer:   Yes     Order Specific Question:   [facility] Is TRAMADOL prescribed?     Answer:   Yes     Order Specific Question:   [facility] Is TRAZODONE prescribed?     Answer:   Yes    MM DT_Alprazolam Definitive Test    MM DT_Amphetamine Definitive Test    MM DT_Buprenorphine Definitive Test    MM DT_Butalbital Definitive Test    MM DT_Clonazepam Definitive Test    MM DT_Cocaine Definitive Test    MM DT_Codeine Definitive Test    MM DT_Dextromethorphan Definitive Test    MM Diazepam Definitive Test    MM DT_Ethyl Glucuronide/Ethyl Sulfate Definitive Test    MM DT_Fentanyl Definitive Test    MM DT_Heroin Definitive Test    MM DT_Hydrocodone Definitive Test    MM DT_Hydromorphone Definitive Test    MM DT_Kratom Definitive Test    MM DT_Levorphanol Definitive Test    MM DT_MDMA Definitive Test    MM DT_Meperidine Definitive Test    MM DT_Methadone Definitive Test    MM DT_Methamphetamine Definitive Test    MM DT_Methylphenidate Definitive Test    MM DT_Morphine Definitive Test    MM Lorazepam Definitive Test    MM DT_Oxazepam Definitive Test    MM DT_Oxycodone Definitive Test    MM DT_Oxymorphone Definitive Test    MM DT_Phencyclidine Definitive Test    MM DT_Phenobarbital Definitive Test    MM DT_Phentermine Definitive Test    MM DT_Secobarbital Definitive Test    MM DT_Spice Definitive Test    MM DT_Tapentadol Definitive Test    MM DT_Temazapam Definitive Test    MM  DT_THC Definitive Test    MM DT_Tramadol Definitive Test       New Medications Ordered This Visit   Medications    tiZANidine (ZANAFLEX) 4 mg tablet     Sig: Take 1 tablet (4 mg total) by mouth every 8 (eight) hours as needed for muscle spasms     Dispense:  60 tablet     Refill:  0    traMADol HCl, ER Biphasic, (RYZOLT) 100 MG 24 hr tablet     Sig: Take 1 tablet (100 mg total) by mouth daily     Dispense:  30 tablet     Refill:  1     Patient has opioid agreement on file.  Please fill today.    diclofenac (VOLTAREN) 75 mg EC tablet     Sig: Take 1 tablet (75 mg total) by mouth 2 (two) times a day as needed (pain)     Dispense:  30 tablet     Refill:  1       My impressions and treatment recommendations were discussed in detail with the patient who verbalized understanding and had no further questions.      Patient returns the office after having trigger point injections on 7/12/2024.  Patient states that these injections did provide him about a week and a half of some pain relief but he still has severe pain.  Patient states that while he was educated on his limitations after surgery he feels that his limited range of motion is far more limited than what was explained to him.  Patient states he is barely able to rotate his head to the right or left and has significant difficulty looking up.    At this time it is appropriate for the patient to initiate opioid management.  Patient was prescribed tramadol 50 mg every 8 hours as needed for moderate pain mid August however the pharmacy would only provide him 1 week of medication.  Patient states that while he was using this medication he did find it helpful.  Patient states in the past he used tramadol 100 mg extended release tablet which provided him enough pain relief therefore I will switch the patient to tramadol 100 mg extended release tablet patient to take 1 tablet daily as needed for moderate pain.  Also discussed rotating to a different muscle relaxer,  tizanidine 4 mg tablet patient may use 1 tablet every 8 hours as needed for pain/muscle spasms and also use an anti-inflammatory medication called diclofenac 75 mg twice daily as needed for pain.  Patient understands that diclofenac is a nonsteroid anti-inflammatory drug, understands not to use this medication with other NSAIDs. The patient was cautioned about possible side effects and risks of NSAID medications including stomach upset, stomach ulcers, kidney risks and cardiovascular risks to include hypertension and slightly increased risks of stroke and MI.  The patient also continues to use gabapentin 800 mg 3 times daily which is prescribed through his PCP.  Tramadol 100 mg extended release tablet e-prescribed to the patient's pharmacy to be filled today 9/5/2024.    [place] Prescription Drug Monitoring Program report was reviewed and was appropriate     A urine drug screen was collected at today's office visit as part of our medication management protocol. The point of care testing results were appropriate for what was being prescribed. The specimen will be sent for confirmatory testing. The drug screen is medically necessary because the patient is either dependent on opioid medication or is being considered for opioid medication therapy and the results could impact ongoing or future treatment. The drug screen is to evaluate for the presences or absence of prescribed, non-prescribed, and/or illicit drugs/substances.    There are risks associated with opioid medications, including dependence, addiction and tolerance. The patient understands and agrees to use these medications only as prescribed. Potential side effects of the medications include, but are not limited to, constipation, drowsiness, addiction, impaired judgment and risk of fatal overdose if not taken as prescribed. The patient was warned against driving while taking sedation medications.  Sharing medications is a felony. At this point in time,  the patient is showing no signs of addiction, abuse, diversion or suicidal ideation.    An opioid contract was reviewed with the patient.  The patient was made aware they are only to receive opioid medication from our office, and must take the medication as prescribed.  If the medication is lost or stolen, it will not be replaced.  We also do not condone the use of illegal substances or alcohol with opioid medication.  Random urine drug screens and pill counts will also be performed at office visits. Lastly, the patient was informed that office visits are needed for refills.  Patient was agreeable and signed the contract.      Follow-up is planned in 8 weeks time or sooner as warranted.  Discharge instructions were provided. I personally saw and examined the patient and I agree with the above discussed plan of care.    History of Present Illness:    Efra Garcia Jr. is a 59 y.o. male who presents to [facility] for interval re-evaluation of the above stated pain complaints. The patient has a past medical and chronic pain history as outlined in the assessment section. He was last seen on 7/12/2024.    At today's visit patient states that their pain symptoms are worse with a pain score of 7/10 on the verbal numeric pain scale.  The patient's pain is worse all the time.  The patient's pain is constant in nature.  And the quality of the patient's pain is described as pressure-like and stiffness.  The patient's pain is located in the neck and bilateral shoulders.    Pain Contract Signed: 9/5/2024  Last Urine Drug Screen: 9/5/2024    Other than as stated above, the patient denies any interval changes in medications, medical condition, mental condition, symptoms, or allergies since the last office visit.         Review of Systems:    Review of Systems   Respiratory:  Negative for shortness of breath.    Cardiovascular:  Negative for chest pain.   Gastrointestinal:  Negative for constipation,  "diarrhea, nausea and vomiting.   Musculoskeletal:  Positive for myalgias, neck pain and neck stiffness. Negative for arthralgias, gait problem and joint swelling.        DROM   Skin:  Negative for rash.   Neurological:  Negative for dizziness, seizures and weakness.   All other systems reviewed and are negative.        Past Medical History:   Diagnosis Date    Anxiety     Arthritis     Bilateral occipital neuralgia     Cervical post-laminectomy syndrome     Cervical radiculopathy     Cervical spine disease     Cervical spondylosis with myelopathy     Chronic lumbar pain     Chronic pain syndrome     Chronic thoracic spine pain     Cubital tunnel syndrome of both upper extremities 09/10/2022    Degenerative cervical spinal stenosis     Dental crowns present     Depression     Diabetes (HCC)     type 2    Exercise involving walking     daily 5-10 minutes    Failed neck syndrome     Fatty liver     Full dentures     upper    GERD (gastroesophageal reflux disease)     History of COVID-19 2021    per pt [facility] to the [facility]--    Hyperlipidemia     Hypertension     Irritable bowel syndrome     Motion sickness     Muscle weakness     \"arms and legs\"    Myofascial muscle pain     Neck stiffness     plate implanted-limited ROM    Polyarthralgia     Tinnitus     Wears glasses        Past Surgical History:   Procedure Laterality Date    CERVICAL DISC SURGERY      CERVICAL FUSION  2011    ACDF with Dr. Purdy    COLONOSCOPY      CT ARTHRD PST/PSTLAT TQ 1NTRSPC CRV BELW C2 SEGMENT Bilateral 6/19/2023    Procedure: C3-T1 posterior decompression with instrumented fixation fusion (impulse monitoring);  Surgeon: Kelton Purdy MD;  Location: [facility];  Service: Neurosurgery    TOTAL SHOULDER REPLACEMENT Right     Reversal       Family History   Problem Relation Age of Onset    Stroke Mother     Lung cancer Father        Social History     Occupational History    Not on file   Tobacco Use    Smoking status: Never    Smokeless " tobacco: Never   Vaping Use    Vaping status: Never Used   Substance and Sexual Activity    Alcohol use: Yes     Alcohol/week: 1.0 standard drink of alcohol     Types: 1 Cans of beer per week     Comment: occasional, weekly    Drug use: No    Sexual activity: Not on file     Comment: defer         Current Outpatient Medications:     acetaminophen (TYLENOL) 325 mg tablet, Take 3 tablets (975 mg total) by mouth every 8 (eight) hours, Disp: 40 tablet, Rfl: 0    ASCORBIC ACID PO, , Disp: , Rfl:     benzonatate (TESSALON PERLES) 100 mg capsule, Take 100 mg by mouth Three times daily as needed, Disp: , Rfl:     Cholecalciferol 25 MCG (1000 UT) capsule, TAKE ONE TABLET BY MOUTH EVERY DAY (FOR LOW VITAMIN D), Disp: , Rfl:     Cyanocobalamin (B-12 PO), Take by mouth, Disp: , Rfl:     cyclobenzaprine (FLEXERIL) 5 mg tablet, Take 1 tablet (5 mg total) by mouth 3 (three) times a day as needed for muscle spasms, Disp: 60 tablet, Rfl: 0    dextrose 1 g CHEW, daily as needed for low blood sugar, Disp: , Rfl:     diclofenac (VOLTAREN) 75 mg EC tablet, Take 1 tablet (75 mg total) by mouth 2 (two) times a day as needed (pain), Disp: 30 tablet, Rfl: 1    DULoxetine (CYMBALTA) 60 mg delayed release capsule, TAKE 1 CAPSULE (60 MG TOTAL) BY MOUTH DAILY, Disp: 30 capsule, Rfl: 5    Empagliflozin 25 MG TABS, Take 25 mg by mouth every morning ---- aka  Jardiance, Disp: , Rfl:     famotidine (PEPCID) 20 mg tablet, Take 20 mg by mouth daily at bedtime, Disp: , Rfl:     gabapentin (NEURONTIN) 800 mg tablet, TAKE 1 TABLET (800 MG TOTAL) BY MOUTH 3 (THREE) TIMES A DAY, Disp: 90 tablet, Rfl: 1    ibuprofen (MOTRIN) 800 mg tablet, TAKE 1 TABLET (800 MG TOTAL) BY MOUTH 2 (TWO) TIMES A DAY, Disp: 60 tablet, Rfl: 0    lisinopril (ZESTRIL) 10 mg tablet, Take 10 mg by mouth daily, Disp: , Rfl:     losartan (COZAAR) 50 mg tablet, 25 mg, Disp: , Rfl:     metFORMIN (GLUMETZA) 1000 MG (MOD) 24 hr tablet, Take 1,000 mg by mouth 2 (two) times a day with  "meals, Disp: , Rfl:     polyethylene glycol (MIRALAX) 17 g packet, Take 17 g by mouth daily as needed (PRN per day), Disp: 100 g, Rfl: 0    semaglutide, 0.25 or 0.5 mg/dose, (Ozempic) 2 mg/3 mL injection pen, INJECT 0.5MG SUBCUTANEOUSLY ONCE WEEKLY FOR DIABETES, Disp: , Rfl:     tiZANidine (ZANAFLEX) 4 mg tablet, Take 1 tablet (4 mg total) by mouth every 8 (eight) hours as needed for muscle spasms, Disp: 60 tablet, Rfl: 0    traMADol HCl, ER Biphasic, (RYZOLT) 100 MG 24 hr tablet, Take 1 tablet (100 mg total) by mouth daily, Disp: 30 tablet, Rfl: 1    TRAZODONE HCL PO, Take 75 mg by mouth daily at bedtime  , Disp: , Rfl:     bisacodyl (DULCOLAX) 10 mg suppository, Insert 1 suppository (10 mg total) into the rectum daily as needed for constipation for up to 3 days, Disp: 3 suppository, Rfl: 0    pantoprazole (PROTONIX) 20 mg tablet, Take 20 mg by mouth daily (Patient not taking: Reported on 7/12/2024), Disp: , Rfl:     Allergies   Allergen Reactions    Metoprolol Other (See Comments)     Too low of a heart rate    Penicillin V Shortness Of Breath    Penicillins Shortness Of Breath and Rash     rash       Physical Exam:    /84   Pulse 65   Ht 5' 7\" (1.702 m)   Wt 105 kg (232 lb)   BMI 36.34 kg/m²     Constitutional:normal, well developed, well nourished, alert, in no distress and non-toxic and no overt pain behavior.  Eyes:anicteric  HEENT:grossly intact  Neck: Very limited range of motion  Pulmonary:even and unlabored  Cardiovascular:No edema or pitting edema present  Skin:Normal without rashes or lesions and well hydrated  Psychiatric:Mood and affect appropriate  Neurologic:Cranial Nerves II-XII grossly intact  Musculoskeletal:antalgic gait      Orders Placed This Encounter   Procedures    MM ALL_Prescribed Meds and Special Instructions    MM DT_Alprazolam Definitive Test    MM DT_Amphetamine Definitive Test    MM DT_Buprenorphine Definitive Test    MM DT_Butalbital Definitive Test    MM DT_Clonazepam " Definitive Test    MM DT_Cocaine Definitive Test    MM DT_Codeine Definitive Test    MM DT_Dextromethorphan Definitive Test    MM Diazepam Definitive Test    MM DT_Ethyl Glucuronide/Ethyl Sulfate Definitive Test    MM DT_Fentanyl Definitive Test    MM DT_Heroin Definitive Test    MM DT_Hydrocodone Definitive Test    MM DT_Hydromorphone Definitive Test    MM DT_Kratom Definitive Test    MM DT_Levorphanol Definitive Test    MM DT_MDMA Definitive Test    MM DT_Meperidine Definitive Test    MM DT_Methadone Definitive Test    MM DT_Methamphetamine Definitive Test    MM DT_Methylphenidate Definitive Test    MM DT_Morphine Definitive Test    MM Lorazepam Definitive Test    MM DT_Oxazepam Definitive Test    MM DT_Oxycodone Definitive Test    MM DT_Oxymorphone Definitive Test    MM DT_Phencyclidine Definitive Test    MM DT_Phenobarbital Definitive Test    MM DT_Phentermine Definitive Test    MM DT_Secobarbital Definitive Test    MM DT_Spice Definitive Test    MM DT_Tapentadol Definitive Test    MM DT_Temazapam Definitive Test    MM DT_THC Definitive Test    MM DT_Tramadol Definitive Test       This document was created using speech voice recognition software.   Grammatical errors, random word insertions, pronoun errors, and incomplete sentences are an occasional consequence of this system due to software limitations, ambient noise, and hardware issues.   Any formal questions or concerns about content, text, or information contained within the body of this dictation should be directly addressed to the provider for clarification.

## 2024-09-04 ENCOUNTER — OFFICE VISIT (OUTPATIENT)
Dept: FAMILY MEDICINE CLINIC | Facility: CLINIC | Age: 59
End: 2024-09-04
Payer: OTHER MISCELLANEOUS

## 2024-09-04 VITALS
HEIGHT: 67 IN | OXYGEN SATURATION: 98 % | BODY MASS INDEX: 36.46 KG/M2 | HEART RATE: 63 BPM | WEIGHT: 232.3 LBS | DIASTOLIC BLOOD PRESSURE: 78 MMHG | SYSTOLIC BLOOD PRESSURE: 130 MMHG | TEMPERATURE: 98.5 F

## 2024-09-04 DIAGNOSIS — M54.12 CERVICAL RADICULOPATHY: Primary | ICD-10-CM

## 2024-09-04 DIAGNOSIS — M96.1 CERVICAL POST-LAMINECTOMY SYNDROME: ICD-10-CM

## 2024-09-04 DIAGNOSIS — Z98.1 S/P CERVICAL SPINAL FUSION: ICD-10-CM

## 2024-09-04 PROCEDURE — 99213 OFFICE O/P EST LOW 20 MIN: CPT | Performed by: FAMILY MEDICINE

## 2024-09-04 NOTE — PROGRESS NOTES
Ambulatory Visit  Name: Efra Garcia Jr.      : 1965      MRN: [id number]  Encounter Provider: Ulises Truong MD  Encounter Date: 2024   Encounter department: [facility]    Assessment & Plan   1. Cervical radiculopathy  -     Ambulatory Referral to Physical Therapy; Future  2. Cervical post-laminectomy syndrome  -     Ambulatory Referral to Physical Therapy; Future  3. S/P cervical spinal fusion  -     Ambulatory Referral to Physical Therapy; Future    F/U with pain management    F/U in 3 months       History of Present Illness     Patient is here for a F/U  This is a worker's compensation case.     Summary of injury: He sustained a work injury in  while working for [employer] when he fell backwards in the snow while carrying some equipment. He suffered neck pain for many years. He underwent C5-7 ACDF in  for neck pain, right arm weakness and numbness.      He saw Dr. Peguero, neurology who per chart review deemed the patient permanently disable back on 03/15/2016.    He had a C3-T1 PCDF by Dr Purdy, neurosurgeon on 2023.      Unfortunately he continues to have neck pain, decreased range of motion and neck stiffness. He saw Dr. Bales pain management 10/04/2023 and had neck injections done which did not improve his symptoms. Saw Dr Purdy Neurosurgery on 2024 who recommended more time to heal as it is less than 1 year since surgery and per note it takes 1-1.5 years to heal. He recommended no further surgical interventions and continue PT.     Updates: since his last office visit he has been experiencing left leg weakness to the point of almost falling due to left leg weakness. Had an MRI of lumbar spine which showed: a small left foraminal bone spur complex that contacts the left L5 nerve. He is going for epidural steroid injection at L5-S1 with pain management.    He also continues to have neck pain, stiffness and decreased ROM, these symptoms do affect  "his daily life, tramadol is not completely relieving his pain. Had an MRI cervical spine done recently which showed: post surgical changes and fusion as well as moderate spinal canal stenosis contacting the ventral cord.        Review of Systems   Constitutional:  Negative for activity change, appetite change, fatigue and fever.   HENT:  Negative for congestion and ear discharge.    Respiratory:  Negative for cough and shortness of breath.    Cardiovascular:  Negative for chest pain and palpitations.   Gastrointestinal:  Negative for diarrhea and nausea.   Musculoskeletal:  Positive for arthralgias, back pain, myalgias, neck pain and neck stiffness.   Skin:  Negative for color change and rash.   Neurological:  Negative for dizziness and headaches.   Psychiatric/Behavioral:  Negative for agitation and behavioral problems.      Pertinent Medical History       Medical History Reviewed by provider this encounter:       Past Medical History   Past Medical History:   Diagnosis Date   • Anxiety    • Arthritis    • Bilateral occipital neuralgia    • Cervical post-laminectomy syndrome    • Cervical radiculopathy    • Cervical spine disease    • Cervical spondylosis with myelopathy    • Chronic lumbar pain    • Chronic pain syndrome    • Chronic thoracic spine pain    • Cubital tunnel syndrome of both upper extremities 09/10/2022   • Degenerative cervical spinal stenosis    • Dental crowns present    • Depression    • Diabetes (HCC)     type 2   • Exercise involving walking     daily 5-10 minutes   • Failed neck syndrome    • Fatty liver    • Full dentures     upper   • GERD (gastroesophageal reflux disease)    • History of COVID-19 2021    per pt admitted to the [facility]--   • Hyperlipidemia    • Hypertension    • Irritable bowel syndrome    • Motion sickness    • Muscle weakness     \"arms and legs\"   • Myofascial muscle pain    • Neck stiffness     plate implanted-limited ROM   • Polyarthralgia    • Tinnitus    • Wears " glasses      Past Surgical History:   Procedure Laterality Date   • CERVICAL DISC SURGERY     • CERVICAL FUSION  2011    ACDF with Dr. Purdy   • COLONOSCOPY     • OR ARTHRD PST/PSTLAT TQ 1NTRSPC CRV BELW C2 SEGMENT Bilateral 6/19/2023    Procedure: C3-T1 posterior decompression with instrumented fixation fusion (impulse monitoring);  Surgeon: Kelton Purdy MD;  [facility]: [facility];  Service: Neurosurgery   • TOTAL SHOULDER REPLACEMENT Right     Reversal     Family History   Problem Relation Age of Onset   • Stroke Mother    • Lung cancer Father      Current Outpatient Medications on File Prior to Visit   Medication Sig Dispense Refill   • acetaminophen (TYLENOL) 325 mg tablet Take 3 tablets (975 mg total) by mouth every 8 (eight) hours 40 tablet 0   • ASCORBIC ACID PO      • benzonatate (TESSALON PERLES) 100 mg capsule Take 100 mg by mouth Three times daily as needed     • Cholecalciferol 25 MCG (1000 UT) capsule TAKE ONE TABLET BY MOUTH EVERY DAY (FOR LOW VITAMIN D)     • Cyanocobalamin (B-12 PO) Take by mouth     • cyclobenzaprine (FLEXERIL) 5 mg tablet Take 1 tablet (5 mg total) by mouth 3 (three) times a day as needed for muscle spasms 60 tablet 0   • dextrose 1 g CHEW daily as needed for low blood sugar     • DULoxetine (CYMBALTA) 60 mg delayed release capsule TAKE 1 CAPSULE (60 MG TOTAL) BY MOUTH DAILY 30 capsule 5   • Empagliflozin 25 MG TABS Take 25 mg by mouth every morning ---- aka  Jardiance     • famotidine (PEPCID) 20 mg tablet Take 20 mg by mouth daily at bedtime     • gabapentin (NEURONTIN) 800 mg tablet TAKE 1 TABLET (800 MG TOTAL) BY MOUTH 3 (THREE) TIMES A DAY 90 tablet 1   • ibuprofen (MOTRIN) 800 mg tablet TAKE 1 TABLET (800 MG TOTAL) BY MOUTH 2 (TWO) TIMES A DAY 60 tablet 0   • lisinopril (ZESTRIL) 10 mg tablet Take 10 mg by mouth daily     • losartan (COZAAR) 50 mg tablet 25 mg     • metFORMIN (GLUMETZA) 1000 MG (MOD) 24 hr tablet Take 1,000 mg by mouth 2 (two) times a day with meals     •  polyethylene glycol (MIRALAX) 17 g packet Take 17 g by mouth daily as needed (PRN per day) 100 g 0   • semaglutide, 0.25 or 0.5 mg/dose, (Ozempic) 2 mg/3 mL injection pen INJECT 0.5MG SUBCUTANEOUSLY ONCE WEEKLY FOR DIABETES     • traMADol (Ultram) 50 mg tablet Take 1 tablet (50 mg total) by mouth every 8 (eight) hours as needed for moderate pain 42 tablet 0   • TRAZODONE HCL PO Take 75 mg by mouth daily at bedtime       • bisacodyl (DULCOLAX) 10 mg suppository Insert 1 suppository (10 mg total) into the rectum daily as needed for constipation for up to 3 days 3 suppository 0   • pantoprazole (PROTONIX) 20 mg tablet Take 20 mg by mouth daily (Patient not taking: Reported on 7/12/2024)       No current facility-administered medications on file prior to visit.     Allergies   Allergen Reactions   • Metoprolol Other (See Comments)     Too low of a heart rate   • Penicillin V Shortness Of Breath   • Penicillins Shortness Of Breath and Rash     rash      Current Outpatient Medications on File Prior to Visit   Medication Sig Dispense Refill   • acetaminophen (TYLENOL) 325 mg tablet Take 3 tablets (975 mg total) by mouth every 8 (eight) hours 40 tablet 0   • ASCORBIC ACID PO      • benzonatate (TESSALON PERLES) 100 mg capsule Take 100 mg by mouth Three times daily as needed     • Cholecalciferol 25 MCG (1000 UT) capsule TAKE ONE TABLET BY MOUTH EVERY DAY (FOR LOW VITAMIN D)     • Cyanocobalamin (B-12 PO) Take by mouth     • cyclobenzaprine (FLEXERIL) 5 mg tablet Take 1 tablet (5 mg total) by mouth 3 (three) times a day as needed for muscle spasms 60 tablet 0   • dextrose 1 g CHEW daily as needed for low blood sugar     • DULoxetine (CYMBALTA) 60 mg delayed release capsule TAKE 1 CAPSULE (60 MG TOTAL) BY MOUTH DAILY 30 capsule 5   • Empagliflozin 25 MG TABS Take 25 mg by mouth every morning ---- aka  Jardiance     • famotidine (PEPCID) 20 mg tablet Take 20 mg by mouth daily at bedtime     • gabapentin (NEURONTIN) 800 mg  "tablet TAKE 1 TABLET (800 MG TOTAL) BY MOUTH 3 (THREE) TIMES A DAY 90 tablet 1   • ibuprofen (MOTRIN) 800 mg tablet TAKE 1 TABLET (800 MG TOTAL) BY MOUTH 2 (TWO) TIMES A DAY 60 tablet 0   • lisinopril (ZESTRIL) 10 mg tablet Take 10 mg by mouth daily     • losartan (COZAAR) 50 mg tablet 25 mg     • metFORMIN (GLUMETZA) 1000 MG (MOD) 24 hr tablet Take 1,000 mg by mouth 2 (two) times a day with meals     • polyethylene glycol (MIRALAX) 17 g packet Take 17 g by mouth daily as needed (PRN per day) 100 g 0   • semaglutide, 0.25 or 0.5 mg/dose, (Ozempic) 2 mg/3 mL injection pen INJECT 0.5MG SUBCUTANEOUSLY ONCE WEEKLY FOR DIABETES     • traMADol (Ultram) 50 mg tablet Take 1 tablet (50 mg total) by mouth every 8 (eight) hours as needed for moderate pain 42 tablet 0   • TRAZODONE HCL PO Take 75 mg by mouth daily at bedtime       • bisacodyl (DULCOLAX) 10 mg suppository Insert 1 suppository (10 mg total) into the rectum daily as needed for constipation for up to 3 days 3 suppository 0   • pantoprazole (PROTONIX) 20 mg tablet Take 20 mg by mouth daily (Patient not taking: Reported on 7/12/2024)       No current facility-administered medications on file prior to visit.      Social History     Tobacco Use   • Smoking status: Never   • Smokeless tobacco: Never   Vaping Use   • Vaping status: Never Used   Substance and Sexual Activity   • Alcohol use: Yes     Alcohol/week: 1.0 standard drink of alcohol     Types: 1 Cans of beer per week     Comment: occasional, weekly   • Drug use: No   • Sexual activity: Not on file     Comment: defer     Objective     /78   Pulse 63   Temp 98.5 °F (36.9 °C)   Ht 5' 7\" (1.702 m)   Wt 105 kg (232 lb 4.8 oz)   SpO2 98%   BMI 36.38 kg/m²     Physical Exam  Constitutional:       General: He is not in acute distress.     Appearance: He is well-developed. He is not diaphoretic.   Eyes:      General: No scleral icterus.     Pupils: Pupils are equal, round, and reactive to light. "   Cardiovascular:      Rate and Rhythm: Normal rate and regular rhythm.      Heart sounds: Normal heart sounds. No murmur heard.  Pulmonary:      Effort: Pulmonary effort is normal. No respiratory distress.      Breath sounds: Normal breath sounds. No wheezing.   Abdominal:      General: Bowel sounds are normal. There is no distension.      Palpations: Abdomen is soft.      Tenderness: There is no abdominal tenderness.   Musculoskeletal:         General: Tenderness present.      Comments: Decreased ROM of cervical spine,  Limited ROM on extension/flexion   Very limited on lateral rotation.     Skin:     General: Skin is warm and dry.      Findings: No rash.   Neurological:      Mental Status: He is alert and oriented to person, place, and time.       Administrative Statements   I have spent a total time of 20 minutes in caring for this patient on the day of the visit/encounter including Instructions for management.

## 2024-09-05 ENCOUNTER — OFFICE VISIT (OUTPATIENT)
Dept: PAIN MEDICINE | Facility: CLINIC | Age: 59
End: 2024-09-05
Payer: OTHER MISCELLANEOUS

## 2024-09-05 VITALS
HEART RATE: 65 BPM | HEIGHT: 67 IN | WEIGHT: 232 LBS | BODY MASS INDEX: 36.41 KG/M2 | SYSTOLIC BLOOD PRESSURE: 146 MMHG | DIASTOLIC BLOOD PRESSURE: 84 MMHG

## 2024-09-05 DIAGNOSIS — Z79.891 LONG-TERM CURRENT USE OF OPIATE ANALGESIC: ICD-10-CM

## 2024-09-05 DIAGNOSIS — M96.1 CERVICAL POST-LAMINECTOMY SYNDROME: ICD-10-CM

## 2024-09-05 DIAGNOSIS — F11.20 UNCOMPLICATED OPIOID DEPENDENCE (HCC): ICD-10-CM

## 2024-09-05 DIAGNOSIS — G89.4 CHRONIC PAIN SYNDROME: Primary | ICD-10-CM

## 2024-09-05 PROCEDURE — 99214 OFFICE O/P EST MOD 30 MIN: CPT

## 2024-09-05 RX ORDER — TRAMADOL HYDROCHLORIDE 100 MG/1
100 TABLET, FILM COATED, EXTENDED RELEASE ORAL DAILY
Qty: 30 TABLET | Refills: 1 | Status: SHIPPED | OUTPATIENT
Start: 2024-09-05

## 2024-09-05 RX ORDER — DICLOFENAC SODIUM 75 MG/1
75 TABLET, DELAYED RELEASE ORAL 2 TIMES DAILY PRN
Qty: 30 TABLET | Refills: 1 | Status: SHIPPED | OUTPATIENT
Start: 2024-09-05 | End: 2024-09-15

## 2024-09-05 NOTE — PATIENT INSTRUCTIONS

## 2024-09-06 ENCOUNTER — DOCUMENTATION (OUTPATIENT)
Dept: PAIN MEDICINE | Facility: CLINIC | Age: 59
End: 2024-09-06

## 2024-09-07 LAB
6MAM UR QL CFM: NEGATIVE NG/ML
7AMINOCLONAZEPAM UR QL CFM: NEGATIVE NG/ML
A-OH ALPRAZ UR QL CFM: NEGATIVE NG/ML
AMPHET UR QL CFM: NEGATIVE NG/ML
AMPHET UR QL CFM: NEGATIVE NG/ML
BUPRENORPHINE UR QL CFM: NEGATIVE NG/ML
BUTALBITAL UR QL CFM: NEGATIVE NG/ML
BZE UR QL CFM: NEGATIVE NG/ML
CODEINE UR QL CFM: NEGATIVE NG/ML
EDDP UR QL CFM: NEGATIVE NG/ML
ETHYL GLUCURONIDE UR QL CFM: NEGATIVE NG/ML
ETHYL SULFATE UR QL SCN: NEGATIVE NG/ML
FENTANYL UR QL CFM: NEGATIVE NG/ML
GLIADIN IGG SER IA-ACNC: NEGATIVE NG/ML
HYDROCODONE UR QL CFM: NEGATIVE NG/ML
HYDROCODONE UR QL CFM: NEGATIVE NG/ML
HYDROMORPHONE UR QL CFM: NEGATIVE NG/ML
LORAZEPAM UR QL CFM: NEGATIVE NG/ML
MDMA UR QL CFM: NEGATIVE NG/ML
ME-PHENIDATE UR QL CFM: NEGATIVE NG/ML
MEPERIDINE UR QL CFM: NEGATIVE NG/ML
METHADONE UR QL CFM: NEGATIVE NG/ML
METHAMPHET UR QL CFM: NEGATIVE NG/ML
MORPHINE UR QL CFM: NEGATIVE NG/ML
MORPHINE UR QL CFM: NEGATIVE NG/ML
NORBUPRENORPHINE UR QL CFM: NEGATIVE NG/ML
NORDIAZEPAM UR QL CFM: NEGATIVE NG/ML
NORFENTANYL UR QL CFM: NEGATIVE NG/ML
NORHYDROCODONE UR QL CFM: NEGATIVE NG/ML
NORHYDROCODONE UR QL CFM: NEGATIVE NG/ML
NORMEPERIDINE UR QL CFM: NEGATIVE NG/ML
NOROXYCODONE UR QL CFM: NEGATIVE NG/ML
OXAZEPAM UR QL CFM: NEGATIVE NG/ML
OXYCODONE UR QL CFM: NEGATIVE NG/ML
OXYMORPHONE UR QL CFM: NEGATIVE NG/ML
OXYMORPHONE UR QL CFM: NEGATIVE NG/ML
PARA-FLUOROFENTANYL QUANTIFICATION: NORMAL NG/ML
PCP UR QL CFM: NEGATIVE NG/ML
PHENOBARB UR QL CFM: NEGATIVE NG/ML
RESULT ALL_PRESCRIBED MEDS AND SPECIAL INSTRUCTIONS: NORMAL
SECOBARBITAL UR QL CFM: NEGATIVE NG/ML
SL AMB 5F-ADB-M7 METABOLITE QUANTIFICATION: NEGATIVE NG/ML
SL AMB 7-OH-MITRAGYNINE (KRATOM ALKALOID) QUANTIFICATION: NEGATIVE NG/ML
SL AMB AB-FUBINACA-M3 METABOLITE QUANTIFICATION: NEGATIVE NG/ML
SL AMB ACETYL FENTANYL QUANTIFICATION: NORMAL NG/ML
SL AMB ACETYL NORFENTANYL QUANTIFICATION: NORMAL NG/ML
SL AMB ACRYL FENTANYL QUANTIFICATION: NORMAL NG/ML
SL AMB CARFENTANIL QUANTIFICATION: NORMAL NG/ML
SL AMB CTHC (MARIJUANA METABOLITE) QUANTIFICATION: NEGATIVE NG/ML
SL AMB DEXTROMETHORPHAN QUANTIFICATION: NEGATIVE NG/ML
SL AMB DEXTRORPHAN (DEXTROMETHORPHAN METABOLITE) QUANT: NEGATIVE NG/ML
SL AMB DEXTRORPHAN (DEXTROMETHORPHAN METABOLITE) QUANT: NEGATIVE NG/ML
SL AMB JWH018 METABOLITE QUANTIFICATION: NEGATIVE NG/ML
SL AMB JWH073 METABOLITE QUANTIFICATION: NEGATIVE NG/ML
SL AMB MDMB-FUBINACA-M1 METABOLITE QUANTIFICATION: NEGATIVE NG/ML
SL AMB N-DESMETHYL-TRAMADOL QUANTIFICATION: ABNORMAL NG/ML
SL AMB PHENTERMINE QUANTIFICATION: NEGATIVE NG/ML
SL AMB RCS4 METABOLITE QUANTIFICATION: NEGATIVE NG/ML
SL AMB RITALINIC ACID QUANTIFICATION: NEGATIVE NG/ML
SPECIMEN DRAWN SERPL: NEGATIVE NG/ML
TAPENTADOL UR QL CFM: NEGATIVE NG/ML
TEMAZEPAM UR QL CFM: NEGATIVE NG/ML
TEMAZEPAM UR QL CFM: NEGATIVE NG/ML
TRAMADOL UR QL CFM: ABNORMAL NG/ML
URATE/CREAT 24H UR: ABNORMAL NG/ML

## 2024-09-09 ENCOUNTER — TELEPHONE (OUTPATIENT)
Dept: RADIOLOGY | Facility: CLINIC | Age: 59
End: 2024-09-09

## 2024-09-09 NOTE — TELEPHONE ENCOUNTER
Caller: roberto Kraus     Doctor: Amairani     Reason for call: pt returning schedulers call to r/s procedure     Call back#: [phone]

## 2024-09-10 ENCOUNTER — DOCUMENTATION (OUTPATIENT)
Dept: PAIN MEDICINE | Facility: CLINIC | Age: 59
End: 2024-09-10

## 2024-09-10 ENCOUNTER — TELEPHONE (OUTPATIENT)
Dept: PAIN MEDICINE | Facility: CLINIC | Age: 59
End: 2024-09-10

## 2024-09-10 NOTE — TELEPHONE ENCOUNTER
Not quite sure what to do with this  There was another task on this and MG signed form?  New form scanned in?  Number is wrong?

## 2024-09-10 NOTE — TELEPHONE ENCOUNTER
Phone number on the insurance card is not active number.Please call patient for active phone number for RX PLAN      Thank you

## 2024-09-10 NOTE — TELEPHONE ENCOUNTER
Spoke to [facility] and the insurance they have is [employer]. The pharmacy must submit this for authorization directly to [employer]. Pharmacist understands and apologized for the mistake. They will submit to  on file with them.    ABEBE LEMUS

## 2024-09-12 ENCOUNTER — OFFICE VISIT (OUTPATIENT)
Dept: URGENT CARE | Facility: CLINIC | Age: 59
End: 2024-09-12
Payer: COMMERCIAL

## 2024-09-12 ENCOUNTER — TELEPHONE (OUTPATIENT)
Dept: FAMILY MEDICINE CLINIC | Facility: CLINIC | Age: 59
End: 2024-09-12

## 2024-09-12 ENCOUNTER — TELEPHONE (OUTPATIENT)
Age: 59
End: 2024-09-12

## 2024-09-12 VITALS
TEMPERATURE: 98.6 F | RESPIRATION RATE: 18 BRPM | HEART RATE: 84 BPM | DIASTOLIC BLOOD PRESSURE: 68 MMHG | OXYGEN SATURATION: 96 % | SYSTOLIC BLOOD PRESSURE: 120 MMHG

## 2024-09-12 DIAGNOSIS — J02.9 ACUTE PHARYNGITIS, UNSPECIFIED ETIOLOGY: ICD-10-CM

## 2024-09-12 DIAGNOSIS — R05.1 ACUTE COUGH: ICD-10-CM

## 2024-09-12 DIAGNOSIS — R09.82 POST-NASAL DRIP: ICD-10-CM

## 2024-09-12 DIAGNOSIS — J18.9 PNEUMONIA DUE TO INFECTIOUS ORGANISM, UNSPECIFIED LATERALITY, UNSPECIFIED PART OF LUNG: Primary | ICD-10-CM

## 2024-09-12 DIAGNOSIS — R09.81 NASAL CONGESTION: ICD-10-CM

## 2024-09-12 PROCEDURE — 99213 OFFICE O/P EST LOW 20 MIN: CPT | Performed by: PHYSICIAN ASSISTANT

## 2024-09-12 RX ORDER — AZITHROMYCIN 250 MG/1
TABLET, FILM COATED ORAL
Qty: 6 TABLET | Refills: 0 | Status: SHIPPED | OUTPATIENT
Start: 2024-09-12 | End: 2024-09-16

## 2024-09-12 NOTE — TELEPHONE ENCOUNTER
Pt stopped in office to see if you had a conversation with Dr. Peguero.  Pt advised he has an appt at 11am on Friday 9/13 with Dr. William Gill / Orthopedic (in [place], [place]).  Please call pt as soon as possible. He would llike to hear from prior to his appt

## 2024-09-12 NOTE — PROGRESS NOTES
[facility]        NAME: Efra Garcia Jr. is a 59 y.o. male  : 1965    MRN: [id number]  DATE: 2024  TIME: 10:36 AM    Assessment and Plan   Pneumonia due to infectious organism, unspecified laterality, unspecified part of lung [J18.9]  1. Pneumonia due to infectious organism, unspecified laterality, unspecified part of lung  azithromycin (ZITHROMAX) 250 mg tablet      2. Acute cough        3. Nasal congestion        4. Acute pharyngitis, unspecified etiology        5. Post-nasal drip              Patient Instructions     Rx Zpack, take as directed.  Motrin/Tylenol as needed.  Stay well hydrated and get rested.  Follow up with PCP in 3-5 days, as necessary.  ER if SOB/chest pain.    If tests have been performed at [facility], our office will contact you with results if changes need to be made to the care plan discussed with you at the visit. You can review your full results on [facility] MyChart.     Chief Complaint     Chief Complaint   Patient presents with    Cough     Cough and chills x4 days. At home covid test negative. Chest tightness, head pressure, sore throat.         History of Present Illness       Patient is a 60yo male who presents with cough/congestion for a week with new feverish symptoms of chills and sweating since last night. He's been taking Motrin/Tylenol as needed with mild relief. His mother-in-law has been ill for a week also. He tested himself for COVID at home, which was negative. Appetite and energy levels are slightly down. Drinking fluids. Denies SOB/chest pain. Denies blood in sputum. Denies smoking. Denies hx of lung disease.        Review of Systems   Review of Systems   Constitutional:  Positive for appetite change, chills, fatigue and fever.   HENT:  Positive for congestion, rhinorrhea and sore throat.    Respiratory:  Positive for cough. Negative for chest tightness, shortness of breath and wheezing.    Cardiovascular: Negative.  Negative for chest  pain.   Skin: Negative.    Neurological: Negative.          Current Medications       Current Outpatient Medications:     acetaminophen (TYLENOL) 325 mg tablet, Take 3 tablets (975 mg total) by mouth every 8 (eight) hours, Disp: 40 tablet, Rfl: 0    azithromycin (ZITHROMAX) 250 mg tablet, Take 2 tablets today then 1 tablet daily x 4 days, Disp: 6 tablet, Rfl: 0    Cholecalciferol 25 MCG (1000 UT) capsule, TAKE ONE TABLET BY MOUTH EVERY DAY (FOR LOW VITAMIN D), Disp: , Rfl:     Cyanocobalamin (B-12 PO), Take by mouth, Disp: , Rfl:     cyclobenzaprine (FLEXERIL) 5 mg tablet, Take 1 tablet (5 mg total) by mouth 3 (three) times a day as needed for muscle spasms, Disp: 60 tablet, Rfl: 0    dextrose 1 g CHEW, daily as needed for low blood sugar, Disp: , Rfl:     diclofenac (VOLTAREN) 75 mg EC tablet, Take 1 tablet (75 mg total) by mouth 2 (two) times a day as needed (pain), Disp: 30 tablet, Rfl: 1    DULoxetine (CYMBALTA) 60 mg delayed release capsule, TAKE 1 CAPSULE (60 MG TOTAL) BY MOUTH DAILY, Disp: 30 capsule, Rfl: 5    Empagliflozin 25 MG TABS, Take 25 mg by mouth every morning ---- aka  Jardiance, Disp: , Rfl:     famotidine (PEPCID) 20 mg tablet, Take 20 mg by mouth daily at bedtime, Disp: , Rfl:     gabapentin (NEURONTIN) 800 mg tablet, TAKE 1 TABLET (800 MG TOTAL) BY MOUTH 3 (THREE) TIMES A DAY, Disp: 90 tablet, Rfl: 1    ibuprofen (MOTRIN) 800 mg tablet, TAKE 1 TABLET (800 MG TOTAL) BY MOUTH 2 (TWO) TIMES A DAY, Disp: 60 tablet, Rfl: 0    lisinopril (ZESTRIL) 10 mg tablet, Take 10 mg by mouth daily, Disp: , Rfl:     metFORMIN (GLUMETZA) 1000 MG (MOD) 24 hr tablet, Take 1,000 mg by mouth 2 (two) times a day with meals, Disp: , Rfl:     pantoprazole (PROTONIX) 20 mg tablet, Take 20 mg by mouth daily, Disp: , Rfl:     semaglutide, 0.25 or 0.5 mg/dose, (Ozempic) 2 mg/3 mL injection pen, INJECT 0.5MG SUBCUTANEOUSLY ONCE WEEKLY FOR DIABETES, Disp: , Rfl:     tiZANidine (ZANAFLEX) 4 mg tablet, Take 1 tablet (4 mg  total) by mouth every 8 (eight) hours as needed for muscle spasms, Disp: 60 tablet, Rfl: 0    traMADol HCl, ER Biphasic, (RYZOLT) 100 MG 24 hr tablet, Take 1 tablet (100 mg total) by mouth daily, Disp: 30 tablet, Rfl: 1    TRAZODONE HCL PO, Take 75 mg by mouth daily at bedtime  , Disp: , Rfl:     ASCORBIC ACID PO, , Disp: , Rfl:     benzonatate (TESSALON PERLES) 100 mg capsule, Take 100 mg by mouth Three times daily as needed (Patient not taking: Reported on 9/12/2024), Disp: , Rfl:     bisacodyl (DULCOLAX) 10 mg suppository, Insert 1 suppository (10 mg total) into the rectum daily as needed for constipation for up to 3 days (Patient not taking: Reported on 9/12/2024), Disp: 3 suppository, Rfl: 0    losartan (COZAAR) 50 mg tablet, 25 mg (Patient not taking: Reported on 9/12/2024), Disp: , Rfl:     polyethylene glycol (MIRALAX) 17 g packet, Take 17 g by mouth daily as needed (PRN per day) (Patient not taking: Reported on 9/12/2024), Disp: 100 g, Rfl: 0    Current Allergies     Allergies as of 09/12/2024 - Reviewed 09/12/2024   Allergen Reaction Noted    Metoprolol Other (See Comments) 02/28/2020    Penicillin v Shortness Of Breath     Penicillins Shortness Of Breath and Rash 11/29/2002            The following portions of the patient's history were reviewed and updated as appropriate: allergies, current medications, past family history, past medical history, past social history, past surgical history and problem list.     Past Medical History:   Diagnosis Date    Anxiety     Arthritis     Bilateral occipital neuralgia     Cervical post-laminectomy syndrome     Cervical radiculopathy     Cervical spine disease     Cervical spondylosis with myelopathy     Chronic lumbar pain     Chronic pain syndrome     Chronic thoracic spine pain     Cubital tunnel syndrome of both upper extremities 09/10/2022    Degenerative cervical spinal stenosis     Dental crowns present     Depression     Diabetes (HCC)     type 2    Exercise  "involving walking     daily 5-10 minutes    Failed neck syndrome     Fatty liver     Full dentures     upper    GERD (gastroesophageal reflux disease)     History of COVID-19 2021    per [facility] to the [facility]--    Hyperlipidemia     Hypertension     Irritable bowel syndrome     Motion sickness     Muscle weakness     \"arms and legs\"    Myofascial muscle pain     Neck stiffness     plate implanted-limited ROM    Polyarthralgia     Tinnitus     Wears glasses        Past Surgical History:   Procedure Laterality Date    CERVICAL DISC SURGERY      CERVICAL FUSION  2011    ACDF with Dr. Purdy    COLONOSCOPY      [place] ARTHRD PST/PSTLAT TQ 1NTRSPC CRV BELW C2 SEGMENT Bilateral 6/19/2023    Procedure: C3-T1 posterior decompression with instrumented fixation fusion (impulse monitoring);  Surgeon: Kelton Purdy MD;  [facility];  Service: Neurosurgery    TOTAL SHOULDER REPLACEMENT Right     Reversal       Family History   Problem Relation Age of Onset    Stroke Mother     Lung cancer Father          Medications have been verified.        Objective   /68   Pulse 84   Temp 98.6 °F (37 °C)   Resp 18   SpO2 96%        Physical Exam     Physical Exam  Vitals reviewed.   Constitutional:       General: He is not in acute distress.     Appearance: Normal appearance.   HENT:      Head: Normocephalic and atraumatic.      Right Ear: Tympanic membrane, ear canal and external ear normal.      Left Ear: Tympanic membrane, ear canal and external ear normal.      Nose: Congestion and rhinorrhea present.      Mouth/Throat:      Mouth: Mucous membranes are moist.      Pharynx: Posterior oropharyngeal erythema present. No oropharyngeal exudate.   Cardiovascular:      Rate and Rhythm: Normal rate and regular rhythm.      Pulses: Normal pulses.      Heart sounds: Normal heart sounds. No murmur heard.  Pulmonary:      Effort: Pulmonary effort is normal. No respiratory distress.      Breath sounds: No wheezing.      " Comments: + productive cough  + adventitious lung sounds in upper lobes  Musculoskeletal:      Cervical back: Normal range of motion and neck supple.   Lymphadenopathy:      Cervical: No cervical adenopathy.   Skin:     General: Skin is warm and dry.      Capillary Refill: Capillary refill takes less than 2 seconds.      Findings: No rash.   Neurological:      Mental Status: He is alert and oriented to person, place, and time.

## 2024-09-12 NOTE — PATIENT INSTRUCTIONS
Rx Zpack, take as directed.  Motrin/Tylenol as needed.  Stay well hydrated and get rested.  Follow up with PCP in 3-5 days, as necessary.  ER if SOB/chest pain.

## 2024-09-14 DIAGNOSIS — M96.1 CERVICAL POST-LAMINECTOMY SYNDROME: ICD-10-CM

## 2024-09-14 DIAGNOSIS — G89.4 CHRONIC PAIN SYNDROME: ICD-10-CM

## 2024-09-15 RX ORDER — DICLOFENAC SODIUM 75 MG/1
75 TABLET, DELAYED RELEASE ORAL 2 TIMES DAILY PRN
Qty: 30 TABLET | Refills: 1 | Status: SHIPPED | OUTPATIENT
Start: 2024-09-15

## 2024-09-16 ENCOUNTER — TELEPHONE (OUTPATIENT)
Age: 59
End: 2024-09-16

## 2024-09-16 NOTE — TELEPHONE ENCOUNTER
Spoke with pt - has pneumonia - cancelling injection for now, will call to r/s when feeling better

## 2024-09-16 NOTE — TELEPHONE ENCOUNTER
Caller: Patient     Doctor: Amairani     Reason for call: Calling to cancel and reschedule procedure for tomorrow.     Patient is sick.       Please assist     Call back#: [phone]

## 2024-10-30 NOTE — PROGRESS NOTES
Pain Medicine Follow-Up Note    Assessment:  1. Chronic pain syndrome    2. Cervical post-laminectomy syndrome    3. Cervical radiculopathy        Plan:  New Medications Ordered This Visit   Medications    traMADol HCl, ER Biphasic, (RYZOLT) 100 MG 24 hr tablet     Sig: Take 1 tablet (100 mg total) by mouth daily     Dispense:  30 tablet     Refill:  2     Patient has opioid agreement on file.  Please fill today.    gabapentin (NEURONTIN) 800 mg tablet     Sig: Take 1 tablet (800 mg total) by mouth 2 (two) times a day     Dispense:  180 tablet     Refill:  1    diclofenac (VOLTAREN) 75 mg EC tablet     Sig: Take 1 tablet (75 mg total) by mouth 2 (two) times a day as needed (pain)     Dispense:  60 tablet     Refill:  1       My impressions and treatment recommendations were discussed in detail with the patient who verbalized understanding and had no further questions.      Patient returns to the office frustrated since he has not been able to get tramadol 100 mg extended release tablet from the pharmacy.  He states that the prior authorization still has to be completed.  I will expedite prior authorization so that he may get some pain relief soon.  Patient continues to use gabapentin 800 mg tablet twice daily along with tizanidine 4 mg tablet as well as diclofenac 75 mg twice daily as needed for pain however uses this medication very sparingly. The patient was cautioned about possible side effects and risks of NSAID medications including stomach upset, stomach ulcers, kidney risks and cardiovascular risks to include hypertension and slightly increased risks of stroke and MI.  Patient provided refills of gabapentin 800 mg tablet, diclofenac 75 mg tablet and tramadol 100 mg extended release tablet patient to take 1 tablet daily e-prescribed to the patient's pharmacy to be filled today 10/31/2024.    [place] Prescription Drug Monitoring Program report was reviewed and was appropriate     There are risks associated  with opioid medications, including dependence, addiction and tolerance. The patient understands and agrees to use these medications only as prescribed. Potential side effects of the medications include, but are not limited to, constipation, drowsiness, addiction, impaired judgment and risk of fatal overdose if not taken as prescribed. The patient was warned against driving while taking sedation medications.  Sharing medications is a felony. At this point in time, the patient is showing no signs of addiction, abuse, diversion or suicidal ideation.    Follow-up is planned in 3 months time or sooner as warranted.  Discharge instructions were provided. I personally saw and examined the patient and I agree with the above discussed plan of care.    History of Present Illness:    Efra Garcia Jr. is a 59 y.o. male who presents to [facility] for interval re-evaluation of the above stated pain complaints. The patient has a past medical and chronic pain history as outlined in the assessment section. He was last seen on 9/5/2024.    At today's visit patient states that their pain symptoms are the same with a pain score of 7/10 on the verbal numeric pain scale.  The patient's pain is worse in the morning, evening, and at night.  The patient's pain is constant in nature.  And the quality of the patient's pain is described as burning, dull-aching, and pressure-like.  The patient's pain is located in the neck radiating down his bilateral arms.  Patient states he Monique pain relief he is obtaining from his pain relievers is 0 because he has yet to get approval to take the pain medication that I prescribed 2 months ago.    Pain Contract Signed:  9/5/2024  Last Urine Drug Screen:  9/5/2024    Other than as stated above, the patient denies any interval changes in medications, medical condition, mental condition, symptoms, or allergies since the last office visit.         Review of Systems:    Review of  "Systems   Respiratory:  Negative for shortness of breath.    Cardiovascular:  Negative for chest pain.   Gastrointestinal:  Negative for constipation, diarrhea, nausea and vomiting.   Musculoskeletal:  Positive for arthralgias, back pain, neck pain and neck stiffness. Negative for gait problem, joint swelling and myalgias.   Skin:  Negative for rash.   Neurological:  Negative for dizziness, seizures and weakness.   All other systems reviewed and are negative.        Past Medical History:   Diagnosis Date    Anxiety     Arthritis     Bilateral occipital neuralgia     Cervical post-laminectomy syndrome     Cervical radiculopathy     Cervical spine disease     Cervical spondylosis with myelopathy     Chronic lumbar pain     Chronic pain syndrome     Chronic thoracic spine pain     Cubital tunnel syndrome of both upper extremities 09/10/2022    Degenerative cervical spinal stenosis     Dental crowns present     Depression     Diabetes (HCC)     type 2    Exercise involving walking     daily 5-10 minutes    Failed neck syndrome     Fatty liver     Full dentures     upper    GERD (gastroesophageal reflux disease)     History of COVID-19 2021    per pt admitted to the [facility]--    Hyperlipidemia     Hypertension     Irritable bowel syndrome     Motion sickness     Muscle weakness     \"arms and legs\"    Myofascial muscle pain     Neck stiffness     plate implanted-limited ROM    Polyarthralgia     Tinnitus     Wears glasses        Past Surgical History:   Procedure Laterality Date    CERVICAL DISC SURGERY      CERVICAL FUSION  2011    ACDF with Dr. Purdy    COLONOSCOPY      LA ARTHRD PST/PSTLAT TQ 1NTRSPC CRV BELW C2 SEGMENT Bilateral 6/19/2023    Procedure: C3-T1 posterior decompression with instrumented fixation fusion (impulse monitoring);  Surgeon: Kelton Purdy MD;  Location: [facility];  Service: Neurosurgery    TOTAL SHOULDER REPLACEMENT Right     Reversal       Family History   Problem Relation Age of Onset    " Stroke Mother     Lung cancer Father        Social History     Occupational History    Not on file   Tobacco Use    Smoking status: Never    Smokeless tobacco: Never   Vaping Use    Vaping status: Never Used   Substance and Sexual Activity    Alcohol use: Yes     Alcohol/week: 1.0 standard drink of alcohol     Types: 1 Cans of beer per week     Comment: occasional, weekly    Drug use: No    Sexual activity: Not on file     Comment: defer         Current Outpatient Medications:     acetaminophen (TYLENOL) 325 mg tablet, Take 3 tablets (975 mg total) by mouth every 8 (eight) hours, Disp: 40 tablet, Rfl: 0    Cholecalciferol 25 MCG (1000 UT) capsule, TAKE ONE TABLET BY MOUTH EVERY DAY (FOR LOW VITAMIN D), Disp: , Rfl:     Cyanocobalamin (B-12 PO), Take by mouth, Disp: , Rfl:     cyclobenzaprine (FLEXERIL) 5 mg tablet, Take 1 tablet (5 mg total) by mouth 3 (three) times a day as needed for muscle spasms, Disp: 60 tablet, Rfl: 0    dextrose 1 g CHEW, daily as needed for low blood sugar, Disp: , Rfl:     diclofenac (VOLTAREN) 75 mg EC tablet, Take 1 tablet (75 mg total) by mouth 2 (two) times a day as needed (pain), Disp: 60 tablet, Rfl: 1    DULoxetine (CYMBALTA) 60 mg delayed release capsule, TAKE 1 CAPSULE (60 MG TOTAL) BY MOUTH DAILY, Disp: 30 capsule, Rfl: 5    Empagliflozin 25 MG TABS, Take 25 mg by mouth every morning ---- aka  Jardiance, Disp: , Rfl:     famotidine (PEPCID) 20 mg tablet, Take 20 mg by mouth daily at bedtime, Disp: , Rfl:     gabapentin (NEURONTIN) 800 mg tablet, Take 1 tablet (800 mg total) by mouth 2 (two) times a day, Disp: 180 tablet, Rfl: 1    ibuprofen (MOTRIN) 800 mg tablet, TAKE 1 TABLET (800 MG TOTAL) BY MOUTH 2 (TWO) TIMES A DAY, Disp: 60 tablet, Rfl: 0    lisinopril (ZESTRIL) 10 mg tablet, Take 10 mg by mouth daily, Disp: , Rfl:     metFORMIN (GLUMETZA) 1000 MG (MOD) 24 hr tablet, Take 1,000 mg by mouth 2 (two) times a day with meals, Disp: , Rfl:     pantoprazole (PROTONIX) 20 mg  "tablet, Take 20 mg by mouth daily, Disp: , Rfl:     semaglutide, 0.25 or 0.5 mg/dose, (Ozempic) 2 mg/3 mL injection pen, INJECT 0.5MG SUBCUTANEOUSLY ONCE WEEKLY FOR DIABETES, Disp: , Rfl:     tiZANidine (ZANAFLEX) 4 mg tablet, Take 1 tablet (4 mg total) by mouth every 8 (eight) hours as needed for muscle spasms, Disp: 60 tablet, Rfl: 0    traMADol HCl, ER Biphasic, (RYZOLT) 100 MG 24 hr tablet, Take 1 tablet (100 mg total) by mouth daily, Disp: 30 tablet, Rfl: 2    TRAZODONE HCL PO, Take 75 mg by mouth daily at bedtime  , Disp: , Rfl:     ASCORBIC ACID PO, , Disp: , Rfl:     benzonatate (TESSALON PERLES) 100 mg capsule, Take 100 mg by mouth Three times daily as needed (Patient not taking: Reported on 9/12/2024), Disp: , Rfl:     bisacodyl (DULCOLAX) 10 mg suppository, Insert 1 suppository (10 mg total) into the rectum daily as needed for constipation for up to 3 days (Patient not taking: Reported on 9/12/2024), Disp: 3 suppository, Rfl: 0    losartan (COZAAR) 50 mg tablet, 25 mg (Patient not taking: Reported on 9/12/2024), Disp: , Rfl:     polyethylene glycol (MIRALAX) 17 g packet, Take 17 g by mouth daily as needed (PRN per day) (Patient not taking: Reported on 9/12/2024), Disp: 100 g, Rfl: 0    Allergies   Allergen Reactions    Metoprolol Other (See Comments)     Too low of a heart rate    Penicillin V Shortness Of Breath    Penicillins Shortness Of Breath and Rash     rash       Physical Exam:    /76   Pulse 65   Ht 5' 7\" (1.702 m)   Wt 105 kg (232 lb)   BMI 36.34 kg/m²     Constitutional:normal, well developed, well nourished, alert, in no distress and non-toxic and no overt pain behavior. and obese  Eyes:anicteric  HEENT:grossly intact  Neck:supple, symmetric, trachea midline and no masses , DROM  Pulmonary:even and unlabored  Cardiovascular:No edema or pitting edema present  Skin:Normal without rashes or lesions and well hydrated  Psychiatric:Mood and affect appropriate  Neurologic:Cranial Nerves " II-XII grossly intact  Musculoskeletal:antalgic gait,      This document was created using speech voice recognition software.   Grammatical errors, random word insertions, pronoun errors, and incomplete sentences are an occasional consequence of this system due to software limitations, ambient noise, and hardware issues.   Any formal questions or concerns about content, text, or information contained within the body of this dictation should be directly addressed to the provider for clarification.

## 2024-10-31 ENCOUNTER — TELEPHONE (OUTPATIENT)
Age: 59
End: 2024-10-31

## 2024-10-31 ENCOUNTER — OFFICE VISIT (OUTPATIENT)
Dept: PAIN MEDICINE | Facility: CLINIC | Age: 59
End: 2024-10-31
Payer: OTHER MISCELLANEOUS

## 2024-10-31 ENCOUNTER — PATIENT MESSAGE (OUTPATIENT)
Age: 59
End: 2024-10-31

## 2024-10-31 VITALS
BODY MASS INDEX: 36.41 KG/M2 | WEIGHT: 232 LBS | DIASTOLIC BLOOD PRESSURE: 76 MMHG | HEART RATE: 65 BPM | SYSTOLIC BLOOD PRESSURE: 120 MMHG | HEIGHT: 67 IN

## 2024-10-31 DIAGNOSIS — M96.1 CERVICAL POST-LAMINECTOMY SYNDROME: ICD-10-CM

## 2024-10-31 DIAGNOSIS — M54.12 CERVICAL RADICULOPATHY: ICD-10-CM

## 2024-10-31 DIAGNOSIS — G89.4 CHRONIC PAIN SYNDROME: Primary | ICD-10-CM

## 2024-10-31 PROCEDURE — 99214 OFFICE O/P EST MOD 30 MIN: CPT

## 2024-10-31 RX ORDER — GABAPENTIN 800 MG/1
800 TABLET ORAL 2 TIMES DAILY
Qty: 180 TABLET | Refills: 1 | Status: SHIPPED | OUTPATIENT
Start: 2024-10-31

## 2024-10-31 RX ORDER — TRAMADOL HYDROCHLORIDE 100 MG/1
100 TABLET, FILM COATED, EXTENDED RELEASE ORAL DAILY
Qty: 30 TABLET | Refills: 2 | Status: SHIPPED | OUTPATIENT
Start: 2024-10-31

## 2024-10-31 RX ORDER — DICLOFENAC SODIUM 75 MG/1
75 TABLET, DELAYED RELEASE ORAL 2 TIMES DAILY PRN
Qty: 60 TABLET | Refills: 1 | Status: SHIPPED | OUTPATIENT
Start: 2024-10-31

## 2024-10-31 NOTE — PATIENT INSTRUCTIONS

## 2024-10-31 NOTE — TELEPHONE ENCOUNTER
I have initiated the process of Enrollment with the Dept of Labor in order to be able to submit a Prior Auth request for Medication for this pt. I will reach out to Josefa Coronado to get additional information needed to properly enroll.    I will share information as it becomes available.

## 2024-11-01 NOTE — TELEPHONE ENCOUNTER
Per the Dept of Labor (workmans comp) my enrollment has been denied as the Provider will need to enroll. I have made office aware of this. We will continue to monitor.

## 2024-11-04 NOTE — TELEPHONE ENCOUNTER
[facility] DEPT OF LABOR DEDICATED TEAM PHONE  [phone]. Per Sena at this number the Provider must enroll with the Dept Of Labor and submitted PA request through that. Provider is aware of the process and is working towards enrollment.    Date of injury  01/08/2005  Case/claim ID  [id number]

## 2024-11-07 ENCOUNTER — TELEPHONE (OUTPATIENT)
Age: 59
End: 2024-11-07

## 2024-11-07 NOTE — TELEPHONE ENCOUNTER
Per chart, pt had cancelled procedures on 8/14/24 and 9/18/24 with Dr Bales - as they were not completed, they were not billed.    Per chart, pt had TPI with Dr Bales on 7/12/24 -- billing for this procedure would include the codes in question -- 08994 which was paid and  (med code) and 43057 (u/s guidance) which were not.    Forwarding message via email to management for assistance, as these codes are always used with 34077

## 2024-11-07 NOTE — TELEPHONE ENCOUNTER
Caller: roberto Kraus    Doctor: Amairani    Reason for call: pt called stating the codes for his procedures on 8/14/24 & 9/18/24 were billed incorrectly.  Pt spoke with the billing dept and they told him it needed to be corrected at the dr's office.  The codes that were denied were , 37512.  The only code that was paid was 13051.   dept of labor says the codes that were denied are not associated with his injury    Call back#: [phone]

## 2024-11-25 DIAGNOSIS — G89.4 CHRONIC PAIN SYNDROME: ICD-10-CM

## 2024-11-25 DIAGNOSIS — M96.1 CERVICAL POST-LAMINECTOMY SYNDROME: ICD-10-CM

## 2024-11-25 RX ORDER — DICLOFENAC SODIUM 75 MG/1
75 TABLET, DELAYED RELEASE ORAL 2 TIMES DAILY PRN
Qty: 30 TABLET | Refills: 2 | Status: SHIPPED | OUTPATIENT
Start: 2024-11-25

## 2024-11-27 ENCOUNTER — TELEPHONE (OUTPATIENT)
Age: 59
End: 2024-11-27

## 2024-12-04 ENCOUNTER — TELEPHONE (OUTPATIENT)
Age: 59
End: 2024-12-04

## 2024-12-04 ENCOUNTER — TELEPHONE (OUTPATIENT)
Dept: PAIN MEDICINE | Facility: CLINIC | Age: 59
End: 2024-12-04

## 2024-12-04 NOTE — TELEPHONE ENCOUNTER
Spoke to patient and he is aware of the progress of the authorization request for Tramadol through [facility] phone number [phone] with the authorization request through .

## 2024-12-04 NOTE — TELEPHONE ENCOUNTER
Spoke to [facility] about Tramadol 50mg authorization. The pharmacy send for authorization on 11/27 and has not heard back, upon my request they are reaching out again for authorization through . Calling patient to notify him of the progress.

## 2024-12-04 NOTE — TELEPHONE ENCOUNTER
This is a Workers comp case . Patient will need to contact his pharmacy for billing . I called the pharmacy to let them know to bill WC

## 2024-12-12 ENCOUNTER — TELEPHONE (OUTPATIENT)
Dept: PAIN MEDICINE | Facility: CLINIC | Age: 59
End: 2024-12-12

## 2024-12-12 NOTE — TELEPHONE ENCOUNTER
Called [facility] and they ran the authorization for his medication. It came back as unapproved. Pharmacy tried to send authorization on 12/12/24

## 2024-12-13 DIAGNOSIS — M54.12 CERVICAL RADICULOPATHY: ICD-10-CM

## 2024-12-13 RX ORDER — GABAPENTIN 800 MG/1
800 TABLET ORAL 2 TIMES DAILY
Qty: 180 TABLET | Refills: 1 | Status: SHIPPED | OUTPATIENT
Start: 2024-12-13

## 2024-12-19 ENCOUNTER — TELEPHONE (OUTPATIENT)
Dept: FAMILY MEDICINE CLINIC | Facility: CLINIC | Age: 59
End: 2024-12-19

## 2024-12-20 ENCOUNTER — DOCUMENTATION (OUTPATIENT)
Dept: PAIN MEDICINE | Facility: CLINIC | Age: 59
End: 2024-12-20

## 2025-02-11 ENCOUNTER — OFFICE VISIT (OUTPATIENT)
Dept: FAMILY MEDICINE CLINIC | Facility: CLINIC | Age: 60
End: 2025-02-11
Payer: OTHER MISCELLANEOUS

## 2025-02-11 VITALS
HEIGHT: 67 IN | OXYGEN SATURATION: 97 % | BODY MASS INDEX: 36.88 KG/M2 | DIASTOLIC BLOOD PRESSURE: 86 MMHG | HEART RATE: 72 BPM | SYSTOLIC BLOOD PRESSURE: 140 MMHG | WEIGHT: 235 LBS | TEMPERATURE: 97.8 F

## 2025-02-11 DIAGNOSIS — G89.4 CHRONIC PAIN SYNDROME: ICD-10-CM
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
## 2025-02-11 DIAGNOSIS — M79.645 PAIN OF LEFT THUMB: ICD-10-CM

## 2025-02-11 DIAGNOSIS — M54.12 CERVICAL RADICULOPATHY: ICD-10-CM

## 2025-02-11 DIAGNOSIS — Z98.1 S/P CERVICAL SPINAL FUSION: ICD-10-CM

## 2025-02-11 DIAGNOSIS — M96.1 CERVICAL POST-LAMINECTOMY SYNDROME: ICD-10-CM

## 2025-02-11 PROCEDURE — 99213 OFFICE O/P EST LOW 20 MIN: CPT | Performed by: FAMILY MEDICINE

## 2025-02-11 NOTE — PROGRESS NOTES
Name: Efra Garcia Jr.      : 1965      MRN: [id number]  Encounter Provider: Ulises Truong MD  Encounter Date: 2025   Encounter department: [facility]  :  Assessment & Plan  Work related injury  F/U with        Cervical radiculopathy  F/U with pain management       Cervical post-laminectomy syndrome  F/U with pain management       Chronic pain syndrome  F/U with pain management  Continue current medications       S/P cervical spinal fusion  F/U with pain management       Pain of left thumb  Follow up with orthopedic surgeon       Follow up as needed       History of Present Illness   Patient is here for a F/U  This is a worker's compensation case.     Summary of injury: He sustained a work injury in  while working for [employer] when he fell backwards in the snow while carrying some equipment. He suffered neck pain for many years. He underwent C5-7 ACDF in  for neck pain, right arm weakness and numbness.       He saw Dr. Peguero, neurology who per chart review deemed the patient permanently disable back on 03/15/2016.     He had a C3-T1 PCDF by Dr Purdy, neurosurgeon on 2023.      He still has pain taking tramadol 100 mg ER daily which helps his symptoms some. Also taking gabapentin 800 mg. Also diclofenac used to be on cymbalta however stopped taking it.    He has been having right arm weakness and left thumb pain.          Review of Systems   Constitutional:  Negative for activity change, appetite change, fatigue and fever.   HENT:  Negative for congestion and ear discharge.    Respiratory:  Negative for cough and shortness of breath.    Cardiovascular:  Negative for chest pain and palpitations.   Gastrointestinal:  Negative for diarrhea and nausea.   Musculoskeletal:  Positive for arthralgias and myalgias. Negative for back pain.   Skin:  Negative for color change and rash.   Neurological:  Positive for weakness. Negative for dizziness and  "headaches.   Psychiatric/Behavioral:  Negative for agitation and behavioral problems.        Objective   /86 (BP Location: Left arm, Patient Position: Sitting)   Pulse 72   Temp 97.8 °F (36.6 °C) (Temporal)   Ht 5' 7\" (1.702 m)   Wt 107 kg (235 lb)   SpO2 97%   BMI 36.81 kg/m²      Physical Exam  Constitutional:       General: He is not in acute distress.     Appearance: He is well-developed. He is not diaphoretic.   Eyes:      General: No scleral icterus.     Pupils: Pupils are equal, round, and reactive to light.   Cardiovascular:      Rate and Rhythm: Normal rate and regular rhythm.      Heart sounds: Normal heart sounds. No murmur heard.  Pulmonary:      [place]: Pulmonary effort is normal. No respiratory distress.      Breath sounds: Normal breath sounds. No wheezing.   Abdominal:      General: Bowel sounds are normal. There is no distension.      Palpations: Abdomen is soft.      Tenderness: There is no abdominal tenderness.   Musculoskeletal:         General: Tenderness present.      Comments: Tenderness at the base of left thumb    Limited ROM of cervical spine    Tinel sign produces numbness and tingling of right hand     Skin:     General: Skin is warm and dry.      Findings: No rash.   Neurological:      Mental Status: He is alert and oriented to person, place, and time.         "

## 2025-03-12 ENCOUNTER — TELEPHONE (OUTPATIENT)
Dept: FAMILY MEDICINE CLINIC | Facility: CLINIC | Age: 60
End: 2025-03-12

## 2025-03-12 NOTE — TELEPHONE ENCOUNTER
3/12 ml called and lmom tcb and adan physical (usually seen for workers comp). Ins info needed for claim

## 2025-03-12 NOTE — TELEPHONE ENCOUNTER
Please schedule patient for a physical, we usually see him for his work comp. Will need insurance info.

## 2025-04-25 DIAGNOSIS — M54.12 CERVICAL RADICULOPATHY: ICD-10-CM

## 2025-04-25 DIAGNOSIS — M96.1 CERVICAL POST-LAMINECTOMY SYNDROME: ICD-10-CM

## 2025-04-25 DIAGNOSIS — G89.4 CHRONIC PAIN SYNDROME: ICD-10-CM

## 2025-04-25 RX ORDER — DICLOFENAC SODIUM 75 MG/1
75 TABLET, DELAYED RELEASE ORAL 2 TIMES DAILY PRN
Qty: 30 TABLET | Refills: 0 | Status: CANCELLED | OUTPATIENT
Start: 2025-04-25

## 2025-04-25 RX ORDER — IBUPROFEN 800 MG/1
800 TABLET, FILM COATED ORAL 2 TIMES DAILY
Qty: 60 TABLET | Refills: 0 | Status: SHIPPED | OUTPATIENT
Start: 2025-04-25

## 2025-04-25 RX ORDER — GABAPENTIN 800 MG/1
800 TABLET ORAL 2 TIMES DAILY
Qty: 180 TABLET | Refills: 0 | Status: CANCELLED | OUTPATIENT
Start: 2025-04-25

## 2025-04-30 RX ORDER — GABAPENTIN 800 MG/1
800 TABLET ORAL 2 TIMES DAILY
Qty: 180 TABLET | Refills: 1 | Status: SHIPPED | OUTPATIENT
Start: 2025-04-30

## 2025-04-30 NOTE — TELEPHONE ENCOUNTER
Please review  Pt last seen 10/31  Had trouble getting medications from [facility], specifically, Tramadol.  So in Dec, was reordered to [facility] in [place].    Pt needs refill on this medication, but will most likely need OV first.  Gabapentin 800mg BID, pt is currently taking and would like it resent to [facility] [place].    Pt is taking IB 800mg so he was advised that refill of Diclofenac is not appropriate.  Explained there is a risk to taking NSAID's already (Bleeding, stroke, MI) so taking two would be even more of a risk.  Pt verbalized understanding.    Can Reese be refilled and please advise on OV, pt's schedule is open and can come anytime

## 2025-04-30 NOTE — TELEPHONE ENCOUNTER
Patient was given 6-month supply of gabapentin in December but it was sent to the [facility] pharmacy so I can send a 3-month refill to the [facility] patient will need a office visit prior to needing any additional medications

## 2025-05-02 NOTE — TELEPHONE ENCOUNTER
Tried to call patient to make an appointment with MG had to leave a message for the patient provided call back number [phone]

## 2025-05-27 NOTE — PROGRESS NOTES
Daily Note     Today's date: 2023  Patient name: Sharifa Savage. : 1965  MRN: [id number]  Referring provider: Mayito Kelly PA-C  Dx:   Encounter Diagnosis     ICD-10-CM    1. 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 Other spondylosis with myelopathy, cervical region  M47.12                      Subjective: Pt reports being very sore after his last PT session; which was his first session in a month. Objective: See treatment diary below      Assessment: Tolerated treatment well. Patient would benefit from continued PT      Plan: Progress treatment as tolerated.        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 Fusion (23)       Manuals 10-30-23 11-20-23 11-22-23 10-23-23 10-25-23   STM to sub occipital to mid thoracic 30' 20' 20' 28' 30'   AROM c rotation w/ manual c2 block  Deferred due to pt time constraints 8'     Bilateral ulnar nerve mobilization 10' [place], cupping and K-Tape 10' 10' 10' [facility] KATHY  10'           Neuro Re-Ed         LTP/ MTP  Deferred-time constraints 3x10 each   green     Cervical retraction in available ROM 5" x20 5" x20 5" x20 5" x20  5" x20   scap retract/ ER in doorframe  Red 2x10 Green 2x10     Standing hip abd and ext  Deferred-time constraints 2x10 bilaterally each for strength and balance     BOSU mini lunges        Heel raises for strength and balance  Deferred- time constraints 20x     Wall ball squats  Deferred -time constraints Deferred- time                                             Ther Ex        Seated thoracic extension AROM mobs w/ roll and board 5" x20 2 lvls  5" 20x 2 levels 5" 20x 2 levels 5" x20 2 lvls +cerv retaction 5" x20 5" x20 2 lvls    Gentle UT side bend 10"x12 christopher  10" x12 bilat 10" x12 christopher  10"x12 christopher                                    Pt Ed/ HEP                Ther Activity                        Gait Training                        Modalities         MHP 15' post tx c-spine and thoracic spine MHP 15' post tx MHP and Stim  13' MHP 13' post tx Baljinder Lama MD;  Location: [facility];  Service: Orthopedics;  Laterality: Right;    CLOSURE OF LEFT ATRIAL APPENDAGE USING DEVICE  01/15/2024    COLONOSCOPY N/A 11/27/2023    Procedure: COLONOSCOPY;  Surgeon: Hilario Sutton MD;  Location: [address]);  Service: Endoscopy;  Laterality: N/A;  2nd floor case due to hx of massive nose bleeds after  c-spine and t-spine  MHP 15' post tx c-spine and thoracic spine Wayne Staley MD;  Location: [facility] OR 2ND FLR;  Service: ENT;  Laterality: Bilateral;  10 am start  COBLATOR    ESOPHAGOGASTRODUODENOSCOPY N/A 01/16/2024    Procedure: EGD (ESOPHAGOGASTRODUODENOSCOPY);  Surgeon: Alon Hernandez MD;  Location: [facility] ENDO (2ND FLR);  Christopher Beard MD;  Location: [facility] Baljinder Lama MD;  Location: [facility];  Service: Orthopedics;  Laterality: Right;    SHOULDER ARTHROSCOPY Right 05/11/2023    Procedure: ARTHROSCOPY, SHOULDER;  Surgeon: Baljinder Lama MD;  Location: [facility] OR;  Service: Orthopedics;  Laterality: Right;    TRANSESOPHAGEAL ECHOCARDIOGRAPHY N/A 01/16/2024    Procedure: ECHOCARDIOGRAM, TRANSESOPHAGEAL;  Surgeon: Dwight Alcaraz MD;  Location: [facility] Christopher Ortiz MD                       68 

## 2025-07-22 ENCOUNTER — OFFICE VISIT (OUTPATIENT)
Dept: PAIN MEDICINE | Facility: CLINIC | Age: 60
End: 2025-07-22
Payer: OTHER MISCELLANEOUS

## 2025-07-22 VITALS — WEIGHT: 235 LBS | HEIGHT: 67 IN | BODY MASS INDEX: 36.88 KG/M2

## 2025-07-22 DIAGNOSIS — Z79.891 LONG-TERM CURRENT USE OF OPIATE ANALGESIC: ICD-10-CM

## 2025-07-22 DIAGNOSIS — G89.4 CHRONIC PAIN SYNDROME: Primary | ICD-10-CM

## 2025-07-22 DIAGNOSIS — M96.1 CERVICAL POST-LAMINECTOMY SYNDROME: ICD-10-CM

## 2025-07-22 DIAGNOSIS — M62.838 CERVICAL PARASPINAL MUSCLE SPASM: ICD-10-CM

## 2025-07-22 DIAGNOSIS — M54.12 CERVICAL RADICULOPATHY: ICD-10-CM

## 2025-07-22 PROCEDURE — 99214 OFFICE O/P EST MOD 30 MIN: CPT

## 2025-07-22 RX ORDER — CYCLOBENZAPRINE HCL 5 MG
5 TABLET ORAL 3 TIMES DAILY PRN
Qty: 180 TABLET | Refills: 0 | Status: SHIPPED | OUTPATIENT
Start: 2025-07-22

## 2025-07-22 RX ORDER — AMOXICILLIN 250 MG
2 CAPSULE ORAL AS NEEDED
COMMUNITY
Start: 2025-03-24

## 2025-07-22 RX ORDER — TRAMADOL HYDROCHLORIDE 100 MG/1
100 TABLET, FILM COATED, EXTENDED RELEASE ORAL DAILY
Qty: 30 TABLET | Refills: 2 | Status: SHIPPED | OUTPATIENT
Start: 2025-07-22

## 2025-07-22 NOTE — ASSESSMENT & PLAN NOTE
Orders:  •  MM DT_Validity Creatinine  •  MM ALL_Prescribed Meds and Special Instructions  •  MM DT_Alprazolam Definitive Test  •  MM DT_Amphetamine Definitive Test  •  MM DT_Buprenorphine Definitive Test  •  MM DT_Butalbital Definitive Test  •  MM DT_Clonazepam Definitive Test  •  MM DT_Cocaine Definitive Test  •  MM DT_Codeine Definitive Test  •  MM DT_Dextromethorphan Definitive Test  •  MM Diazepam Definitive Test  •  MM DT_Ethyl Glucuronide/Ethyl Sulfate Definitive Test  •  MM DT_Fentanyl Definitive Test  •  MM DT_Heroin Definitive Test  •  MM DT_Hydrocodone Definitive Test  •  MM DT_Hydromorphone Definitive Test  •  MM DT_Kratom Definitive Test  •  MM DT_Levorphanol Definitive Test  •  MM DT_MDMA Definitive Test  •  MM DT_Meperidine Definitive Test  •  MM DT_Methadone Definitive Test  •  MM DT_Methamphetamine Definitive Test  •  MM DT_Methylphenidate Definitive Test  •  MM DT_Morphine Definitive Test  •  MM Lorazepam Definitive Test  •  MM DT_Oxazepam Definitive Test  •  MM DT_Oxycodone Definitive Test  •  MM DT_Oxymorphone Definitive Test  •  MM DT_Phencyclidine Definitive Test  •  MM DT_Phenobarbital Definitive Test  •  MM DT_Phentermine Definitive Test  •  MM DT_Secobarbital Definitive Test  •  MM DT_Spice Definitive Test  •  MM DT_Tapentadol Definitive Test  •  MM DT_Temazapam Definitive Test  •  MM DT_THC Definitive Test  •  MM DT_Tramadol Definitive Test  •  traMADol HCl, ER Biphasic, (RYZOLT) 100 MG 24 hr tablet; Take 1 tablet (100 mg total) by mouth daily  •  cyclobenzaprine (FLEXERIL) 5 mg tablet; Take 1 tablet (5 mg total) by mouth 3 (three) times a day as needed for muscle spasms

## 2025-07-22 NOTE — PATIENT INSTRUCTIONS

## 2025-07-22 NOTE — PROGRESS NOTES
Name: Efra Garcia Jr.      : 1965      MRN: [id number]  Encounter Provider: NAVEED Davis  Encounter Date: 2025   Encounter department: [facility] [place]  :  Assessment & Plan  Chronic pain syndrome  Cervical post-laminectomy syndrome  Cervical radiculopathy  Long-term current use of opiate analgesic  Cervical paraspinal muscle spasm      Orders:  •  MM DT_Validity Creatinine  •  MM ALL_Prescribed Meds and Special Instructions  •  MM DT_Alprazolam Definitive Test  •  MM DT_Amphetamine Definitive Test  •  MM DT_Buprenorphine Definitive Test  •  MM DT_Butalbital Definitive Test  •  MM DT_Clonazepam Definitive Test  •  MM DT_Cocaine Definitive Test  •  MM DT_Codeine Definitive Test  •  MM DT_Dextromethorphan Definitive Test  •  MM Diazepam Definitive Test  •  MM DT_Ethyl Glucuronide/Ethyl Sulfate Definitive Test  •  MM DT_Fentanyl Definitive Test  •  MM DT_Heroin Definitive Test  •  MM DT_Hydrocodone Definitive Test  •  MM DT_Hydromorphone Definitive Test  •  MM DT_Kratom Definitive Test  •  MM DT_Levorphanol Definitive Test  •  MM DT_MDMA Definitive Test  •  MM DT_Meperidine Definitive Test  •  MM DT_Methadone Definitive Test  •  MM DT_Methamphetamine Definitive Test  •  MM DT_Methylphenidate Definitive Test  •  MM DT_Morphine Definitive Test  •  MM Lorazepam Definitive Test  •  MM DT_Oxazepam Definitive Test  •  MM DT_Oxycodone Definitive Test  •  MM DT_Oxymorphone Definitive Test  •  MM DT_Phencyclidine Definitive Test  •  MM DT_Phenobarbital Definitive Test  •  MM DT_Phentermine Definitive Test  •  MM DT_Secobarbital Definitive Test  •  MM DT_Spice Definitive Test  •  MM DT_Tapentadol Definitive Test  •  MM DT_Temazapam Definitive Test  •  MM DT_THC Definitive Test  •  MM DT_Tramadol Definitive Test  •  traMADol HCl, ER Biphasic, (RYZOLT) 100 MG 24 hr tablet; Take 1 tablet (100 mg total) by mouth daily  •  cyclobenzaprine (FLEXERIL) 5 mg tablet; Take 1 tablet (5 mg  total) by mouth 3 (three) times a day as needed for muscle spasms      The patient continues to report an overall reduction of his pain level and improvement with his functioning without significant side effects using  tramadol 100 mg ER daily along with cyclobenzaprin 5 mg tablet patient may take 1 tablet up to 3 times a day but uses this medication sparingly, therefore I will continue the patient on these medications. Tramadol 100 mg ER E-prescribed to the patient's pharmacy with a do not fill until date of today 7/22/2025 and 8/20/2025.     There are risks associated with opioid medications, including dependence, addiction and tolerance. The patient understands and agrees to use these medications only as prescribed. Potential side effects of the medications include, but are not limited to, constipation, drowsiness, addiction, impaired judgment and risk of fatal overdose if not taken as prescribed. The patient was warned against driving while taking sedation medications.  Sharing medications is a felony. At this point in time, the patient is showing no signs of addiction, abuse, diversion or suicidal ideation.  A urine drug screen was collected at today's office visit as part of our medication management protocol. The point of care testing results were appropriate for what was being prescribed. The specimen will be sent for confirmatory testing. The drug screen is medically necessary because the patient is either dependent on opioid medication or is being considered for opioid medication therapy and the results could impact ongoing or future treatment. The drug screen is to evaluate for the presences or absence of prescribed, non-prescribed, and/or illicit drugs/substances.  [place] Prescription Drug Monitoring Program report was reviewed and was appropriate      My impressions and treatment recommendations were discussed in detail with the patient who verbalized understanding and had no further questions.   Discharge instructions were provided. I personally saw and examined the patient and I agree with the above discussed plan of care.    History of Present Illness {?Quick Links Encounters * My Last Note * Last Note in Specialty * Snapshot * Since Last Visit * History :40332}    Efra Garcia Jr. is a 60 y.o. male who presents to [facility] for interval re-evaluation in regards to pain in the Neck and Mid back. The patients last office visit was 6/4/24. Patient presents today with pain in his neck that radiates to mid back and arms . Pain is described as Intermittent, Burning, Dull-aching, and Numbness. Symptoms are about the same as last visit. Alleviating factors identifiable by the patient are laying down, heating pad. On the numeric pain scale of 1-10, the pain typically increases to max of 9 out of 10, which is currently impacting their quality of life and interferes with their activities of daily living.     Current pain medications includes:  Tramadol 100 mg ER daily.  The patient reports that this regimen is providing 50% pain relief.  The patient is reporting no side effects from this pain medication regimen.    Opioid contract date: 9/5/24  Last UDS Date: 9/7/24  New UDS date: 7/22/25    Review of Systems   Respiratory:  Negative for shortness of breath.    Cardiovascular:  Negative for chest pain.   Gastrointestinal:  Negative for constipation, diarrhea, nausea and vomiting.   Musculoskeletal:  Positive for arthralgias, back pain, neck pain and neck stiffness. Negative for gait problem, joint swelling and myalgias.   Skin:  Negative for rash.   Neurological:  Positive for numbness. Negative for dizziness, seizures and weakness.   Psychiatric/Behavioral:  Positive for decreased concentration.    All other systems reviewed and are negative.      Medical History Reviewed by provider this encounter:  Tobacco  Allergies  Meds  Problems  Med Hx  Surg Hx  Fam Hx     .     Objective  "{?Quick Links Trend Vitals * Enter New Vitals * Results Review * Timeline (Adult) * Labs * Imaging * Cardiology * Procedures * Lung Cancer Screening * Surgical eConsent :[id number]}  Ht 5' 7\" (1.702 m)   Wt 107 kg (235 lb)   BMI 36.81 kg/m²      Pain Score:   5    Constitutional:normal, well developed, well nourished, alert, in no distress and non-toxic and no overt pain behavior. and obese  Eyes:anicteric  HEENT:grossly intact  Neck:supple, symmetric, trachea midline and no masses  DROM  Pulmonary:even and unlabored  Cardiovascular:No edema or pitting edema present  Skin:Normal without rashes or lesions and well hydrated  Psychiatric:Mood and affect appropriate  Neurologic:Cranial Nerves II-XII grossly intact  Musculoskeletal: normal gait    This document was created using speech voice recognition software.   Grammatical errors, random word insertions, pronoun errors, and incomplete sentences are an occasional consequence of this system due to software limitations, ambient noise, and hardware issues.   Any formal questions or concerns about content, text, or information contained within the body of this dictation should be directly addressed to the provider for clarification.    "

## 2025-07-27 LAB
6MAM UR QL CFM: NEGATIVE NG/ML
7AMINOCLONAZEPAM UR QL CFM: NEGATIVE NG/ML
A-OH ALPRAZ UR QL CFM: NEGATIVE NG/ML
ACCEPTABLE CREAT UR QL: NORMAL MG/DL
AMPHET UR QL CFM: NEGATIVE NG/ML
AMPHET UR QL CFM: NEGATIVE NG/ML
BUPRENORPHINE UR QL CFM: NEGATIVE NG/ML
BUTALBITAL UR QL CFM: NEGATIVE NG/ML
BZE UR QL CFM: NEGATIVE NG/ML
CODEINE UR QL CFM: NEGATIVE NG/ML
EDDP UR QL CFM: NEGATIVE NG/ML
ETHYL GLUCURONIDE UR QL CFM: NEGATIVE NG/ML
ETHYL SULFATE UR QL SCN: NEGATIVE NG/ML
FENTANYL UR QL CFM: NEGATIVE NG/ML
GLIADIN IGG SER IA-ACNC: NEGATIVE NG/ML
HYDROCODONE UR QL CFM: NEGATIVE NG/ML
HYDROCODONE UR QL CFM: NEGATIVE NG/ML
HYDROMORPHONE UR QL CFM: NEGATIVE NG/ML
LORAZEPAM UR QL CFM: NEGATIVE NG/ML
MDMA UR QL CFM: NEGATIVE NG/ML
ME-PHENIDATE UR QL CFM: NEGATIVE NG/ML
MEPERIDINE UR QL CFM: NEGATIVE NG/ML
METHADONE UR QL CFM: NEGATIVE NG/ML
METHAMPHET UR QL CFM: NEGATIVE NG/ML
MORPHINE UR QL CFM: NEGATIVE NG/ML
MORPHINE UR QL CFM: NEGATIVE NG/ML
NORBUPRENORPHINE UR QL CFM: NEGATIVE NG/ML
NORDIAZEPAM UR QL CFM: NEGATIVE NG/ML
NORFENTANYL UR QL CFM: NEGATIVE NG/ML
NORHYDROCODONE UR QL CFM: NEGATIVE NG/ML
NORHYDROCODONE UR QL CFM: NEGATIVE NG/ML
NORMEPERIDINE UR QL CFM: NEGATIVE NG/ML
NOROXYCODONE UR QL CFM: NEGATIVE NG/ML
OXAZEPAM UR QL CFM: NEGATIVE NG/ML
OXYCODONE UR QL CFM: NEGATIVE NG/ML
OXYMORPHONE UR QL CFM: NEGATIVE NG/ML
OXYMORPHONE UR QL CFM: NEGATIVE NG/ML
PARA-FLUOROFENTANYL QUANTIFICATION: NORMAL NG/ML
PCP UR QL CFM: NEGATIVE NG/ML
PHENOBARB UR QL CFM: NEGATIVE NG/ML
RESULT ALL_PRESCRIBED MEDS AND SPECIAL INSTRUCTIONS: NORMAL
SECOBARBITAL UR QL CFM: NEGATIVE NG/ML
SL AMB 5F-ADB-M7 METABOLITE QUANTIFICATION: NEGATIVE NG/ML
SL AMB 7-OH-MITRAGYNINE (KRATOM ALKALOID) QUANTIFICATION: NEGATIVE NG/ML
SL AMB AB-FUBINACA-M3 METABOLITE QUANTIFICATION: NEGATIVE NG/ML
SL AMB ACETYL FENTANYL QUANTIFICATION: NORMAL NG/ML
SL AMB ACETYL NORFENTANYL QUANTIFICATION: NORMAL NG/ML
SL AMB ACRYL FENTANYL QUANTIFICATION: NORMAL NG/ML
SL AMB CARFENTANIL QUANTIFICATION: NORMAL NG/ML
SL AMB CTHC (MARIJUANA METABOLITE) QUANTIFICATION: NEGATIVE NG/ML
SL AMB DEXTROMETHORPHAN QUANTIFICATION: NEGATIVE NG/ML
SL AMB DEXTRORPHAN (DEXTROMETHORPHAN METABOLITE) QUANT: NEGATIVE NG/ML
SL AMB DEXTRORPHAN (DEXTROMETHORPHAN METABOLITE) QUANT: NEGATIVE NG/ML
SL AMB JWH018 METABOLITE QUANTIFICATION: NEGATIVE NG/ML
SL AMB JWH073 METABOLITE QUANTIFICATION: NEGATIVE NG/ML
SL AMB MDMB-FUBINACA-M1 METABOLITE QUANTIFICATION: NEGATIVE NG/ML
SL AMB N-DESMETHYL-TRAMADOL QUANTIFICATION: ABNORMAL NG/ML
SL AMB PHENTERMINE QUANTIFICATION: NEGATIVE NG/ML
SL AMB RCS4 METABOLITE QUANTIFICATION: NEGATIVE NG/ML
SL AMB RITALINIC ACID QUANTIFICATION: NEGATIVE NG/ML
SPECIMEN DRAWN SERPL: NEGATIVE NG/ML
TAPENTADOL UR QL CFM: NEGATIVE NG/ML
TEMAZEPAM UR QL CFM: NEGATIVE NG/ML
TEMAZEPAM UR QL CFM: NEGATIVE NG/ML
TRAMADOL UR QL CFM: ABNORMAL NG/ML
URATE/CREAT 24H UR: ABNORMAL NG/ML

## 2025-08-13 ENCOUNTER — OFFICE VISIT (OUTPATIENT)
Dept: FAMILY MEDICINE CLINIC | Facility: CLINIC | Age: 60
End: 2025-08-13
Payer: OTHER MISCELLANEOUS

## 2025-08-13 PROBLEM — M79.645 PAIN OF LEFT THUMB: Status: RESOLVED | Noted: 2025-02-11 | Resolved: 2025-08-13

## (undated) DEVICE — DRAPE SHEET X-LG

## (undated) DEVICE — MAYFIELD® DISPOSABLE ADULT SKULL PIN (PLASTIC BASE): Brand: MAYFIELD®

## (undated) DEVICE — ANTIBACTERIAL VIOLET BRAIDED (POLYGLACTIN 910), SYNTHETIC ABSORBABLE SUTURE: Brand: COATED VICRYL

## (undated) DEVICE — DRESSING MEPILEX AG BORDER 4 X 8 IN

## (undated) DEVICE — DRAPE EQUIPMENT RF WAND

## (undated) DEVICE — MINOR PROCEDURE DRAPE: Brand: CONVERTORS

## (undated) DEVICE — BIPOLAR SEALER 23-113-1 AQM 2.3: Brand: AQUAMANTYS™

## (undated) DEVICE — PLUMEPEN PRO 10FT

## (undated) DEVICE — DRILL BIT G3606010 2.4MM

## (undated) DEVICE — INTENDED FOR TISSUE SEPARATION, AND OTHER PROCEDURES THAT REQUIRE A SHARP SURGICAL BLADE TO PUNCTURE OR CUT.: Brand: BARD-PARKER SAFETY BLADES SIZE 10, STERILE

## (undated) DEVICE — DRESSING MEPILEX AG BORDER POST-OP 4 X 8 IN

## (undated) DEVICE — TOOL 14BA20 LEGEND 14CM 2MM BA: Brand: MIDAS REX ™

## (undated) DEVICE — TOOL 14MH30 LEGEND 14CM 3MM: Brand: MIDAS REX ™

## (undated) DEVICE — TRAY FOLEY 16FR URIMETER SURESTEP

## (undated) DEVICE — DRAPE C-ARMOUR

## (undated) DEVICE — GLOVE SRG BIOGEL 8

## (undated) DEVICE — HEMOSTATIC MATRIX SURGIFLO 8ML W/THROMBIN

## (undated) DEVICE — BETHLEHEM UNIVERSAL SPINE, KIT: Brand: CARDINAL HEALTH

## (undated) DEVICE — 1840 FOAM BLOCK NEEDLE COUNTER: Brand: DEVON

## (undated) DEVICE — DRAPE C-ARM X-RAY

## (undated) DEVICE — SPECIMEN CONTAINER STERILE PEEL PACK

## (undated) DEVICE — SURGICEL 4 X 8

## (undated) DEVICE — BETADINE OINTMENT FOIL PACK

## (undated) DEVICE — GLOVE INDICATOR PI UNDERGLOVE SZ 8 BLUE

## (undated) DEVICE — BOWL ASSY BM210 DUAL BLADE DISPOSABLE: Brand: MIDAS REX™

## (undated) DEVICE — PROXIMATE SKIN STAPLERS (35 WIDE) CONTAINS 35 STAINLESS STEEL STAPLES (FIXED HEAD): Brand: PROXIMATE

## (undated) DEVICE — ELECTRODE BLADE MOD E-Z CLEAN 4IN -0014AM

## (undated) DEVICE — SURGIFOAM 8.5 X 12.5

## (undated) DEVICE — PREP SURGICAL PURPREP 26ML

## (undated) DEVICE — SPONGE PVP SCRUB WING STERILE